# Patient Record
Sex: FEMALE | Race: WHITE | NOT HISPANIC OR LATINO | Employment: UNEMPLOYED | URBAN - NONMETROPOLITAN AREA
[De-identification: names, ages, dates, MRNs, and addresses within clinical notes are randomized per-mention and may not be internally consistent; named-entity substitution may affect disease eponyms.]

---

## 2017-01-07 ENCOUNTER — HOSPITAL ENCOUNTER (EMERGENCY)
Facility: HOSPITAL | Age: 42
Discharge: HOME | End: 2017-01-08
Attending: EMERGENCY MEDICINE | Admitting: EMERGENCY MEDICINE

## 2017-01-07 DIAGNOSIS — N39.0 COMPLICATED UTI (URINARY TRACT INFECTION): Primary | ICD-10-CM

## 2017-01-07 PROCEDURE — 99284 EMERGENCY DEPT VISIT MOD MDM: CPT

## 2017-01-08 ENCOUNTER — APPOINTMENT (OUTPATIENT)
Dept: CT IMAGING | Facility: HOSPITAL | Age: 42
End: 2017-01-08

## 2017-01-08 VITALS
TEMPERATURE: 98.2 F | HEIGHT: 63 IN | WEIGHT: 175 LBS | RESPIRATION RATE: 20 BRPM | SYSTOLIC BLOOD PRESSURE: 138 MMHG | HEART RATE: 104 BPM | DIASTOLIC BLOOD PRESSURE: 88 MMHG | OXYGEN SATURATION: 99 % | BODY MASS INDEX: 31.01 KG/M2

## 2017-01-08 PROBLEM — N39.0 COMPLICATED UTI (URINARY TRACT INFECTION): Status: ACTIVE | Noted: 2017-01-08

## 2017-01-08 LAB
ALBUMIN SERPL-MCNC: 4.7 G/DL (ref 3.5–5)
ALBUMIN/GLOB SERPL: 1.4 G/DL (ref 1.5–2.5)
ALP SERPL-CCNC: 66 U/L (ref 46–116)
ALT SERPL W P-5'-P-CCNC: 9 U/L (ref 10–36)
AMPHET+METHAMPHET UR QL: NEGATIVE
ANION GAP SERPL CALCULATED.3IONS-SCNC: 6.8 MMOL/L (ref 3.6–11.2)
AST SERPL-CCNC: 22 U/L (ref 10–30)
B-HCG UR QL: NEGATIVE
BACTERIA UR QL AUTO: ABNORMAL /HPF
BARBITURATES UR QL SCN: NEGATIVE
BASOPHILS # BLD AUTO: 0.03 10*3/MM3 (ref 0–0.3)
BASOPHILS NFR BLD AUTO: 0.2 % (ref 0–2)
BENZODIAZ UR QL SCN: NEGATIVE
BILIRUB SERPL-MCNC: 0.4 MG/DL (ref 0.2–1.8)
BILIRUB UR QL STRIP: NEGATIVE
BUN BLD-MCNC: 14 MG/DL (ref 7–21)
BUN/CREAT SERPL: 14.9 (ref 7–25)
CALCIUM SPEC-SCNC: 10 MG/DL (ref 7.7–10)
CANNABINOIDS SERPL QL: NEGATIVE
CHLORIDE SERPL-SCNC: 106 MMOL/L (ref 99–112)
CLARITY UR: ABNORMAL
CO2 SERPL-SCNC: 25.2 MMOL/L (ref 24.3–31.9)
COCAINE UR QL: NEGATIVE
COLOR UR: YELLOW
CREAT BLD-MCNC: 0.94 MG/DL (ref 0.43–1.29)
DEPRECATED RDW RBC AUTO: 42.6 FL (ref 37–54)
EOSINOPHIL # BLD AUTO: 0.23 10*3/MM3 (ref 0–0.7)
EOSINOPHIL NFR BLD AUTO: 1.4 % (ref 0–5)
ERYTHROCYTE [DISTWIDTH] IN BLOOD BY AUTOMATED COUNT: 13 % (ref 11.5–14.5)
GFR SERPL CREATININE-BSD FRML MDRD: 66 ML/MIN/1.73
GLOBULIN UR ELPH-MCNC: 3.3 GM/DL
GLUCOSE BLD-MCNC: 106 MG/DL (ref 70–110)
GLUCOSE UR STRIP-MCNC: NEGATIVE MG/DL
HCT VFR BLD AUTO: 42.7 % (ref 37–47)
HGB BLD-MCNC: 14.3 G/DL (ref 12–16)
HGB UR QL STRIP.AUTO: ABNORMAL
HYALINE CASTS UR QL AUTO: ABNORMAL /LPF
IMM GRANULOCYTES # BLD: 0.07 10*3/MM3 (ref 0–0.03)
IMM GRANULOCYTES NFR BLD: 0.4 % (ref 0–0.5)
KETONES UR QL STRIP: ABNORMAL
LEUKOCYTE ESTERASE UR QL STRIP.AUTO: ABNORMAL
LYMPHOCYTES # BLD AUTO: 3.81 10*3/MM3 (ref 1–3)
LYMPHOCYTES NFR BLD AUTO: 22.6 % (ref 21–51)
MCH RBC QN AUTO: 31 PG (ref 27–33)
MCHC RBC AUTO-ENTMCNC: 33.5 G/DL (ref 33–37)
MCV RBC AUTO: 92.4 FL (ref 80–94)
METHADONE UR QL SCN: NEGATIVE
MONOCYTES # BLD AUTO: 1.21 10*3/MM3 (ref 0.1–0.9)
MONOCYTES NFR BLD AUTO: 7.2 % (ref 0–10)
NEUTROPHILS # BLD AUTO: 11.5 10*3/MM3 (ref 1.4–6.5)
NEUTROPHILS NFR BLD AUTO: 68.2 % (ref 30–70)
NITRITE UR QL STRIP: NEGATIVE
OPIATES UR QL: NEGATIVE
OSMOLALITY SERPL CALC.SUM OF ELEC: 276.6 MOSM/KG (ref 273–305)
OXYCODONE UR QL SCN: NEGATIVE
PCP UR QL SCN: NEGATIVE
PH UR STRIP.AUTO: 5.5 [PH] (ref 5–8)
PLATELET # BLD AUTO: 309 10*3/MM3 (ref 130–400)
PMV BLD AUTO: 9.7 FL (ref 6–10)
POTASSIUM BLD-SCNC: 4.1 MMOL/L (ref 3.5–5.3)
PROPOXYPH UR QL: NEGATIVE
PROT SERPL-MCNC: 8 G/DL (ref 6–8)
PROT UR QL STRIP: ABNORMAL
RBC # BLD AUTO: 4.62 10*6/MM3 (ref 4.2–5.4)
RBC # UR: ABNORMAL /HPF
REF LAB TEST METHOD: ABNORMAL
SODIUM BLD-SCNC: 138 MMOL/L (ref 135–153)
SP GR UR STRIP: >1.03 (ref 1–1.03)
SQUAMOUS #/AREA URNS HPF: ABNORMAL /HPF
UROBILINOGEN UR QL STRIP: ABNORMAL
WBC NRBC COR # BLD: 16.85 10*3/MM3 (ref 4.5–12.5)
WBC UR QL AUTO: ABNORMAL /HPF

## 2017-01-08 PROCEDURE — 80307 DRUG TEST PRSMV CHEM ANLYZR: CPT | Performed by: PHYSICIAN ASSISTANT

## 2017-01-08 PROCEDURE — 96374 THER/PROPH/DIAG INJ IV PUSH: CPT

## 2017-01-08 PROCEDURE — 74176 CT ABD & PELVIS W/O CONTRAST: CPT | Performed by: RADIOLOGY

## 2017-01-08 PROCEDURE — G0477 DRUG TEST PRESUMP OPTICAL: HCPCS | Performed by: PHYSICIAN ASSISTANT

## 2017-01-08 PROCEDURE — 80053 COMPREHEN METABOLIC PANEL: CPT | Performed by: PHYSICIAN ASSISTANT

## 2017-01-08 PROCEDURE — 96375 TX/PRO/DX INJ NEW DRUG ADDON: CPT

## 2017-01-08 PROCEDURE — 87086 URINE CULTURE/COLONY COUNT: CPT | Performed by: PHYSICIAN ASSISTANT

## 2017-01-08 PROCEDURE — 74176 CT ABD & PELVIS W/O CONTRAST: CPT

## 2017-01-08 PROCEDURE — 25010000002 ONDANSETRON PER 1 MG: Performed by: PHYSICIAN ASSISTANT

## 2017-01-08 PROCEDURE — 81001 URINALYSIS AUTO W/SCOPE: CPT | Performed by: PHYSICIAN ASSISTANT

## 2017-01-08 PROCEDURE — 81025 URINE PREGNANCY TEST: CPT | Performed by: PHYSICIAN ASSISTANT

## 2017-01-08 PROCEDURE — 85025 COMPLETE CBC W/AUTO DIFF WBC: CPT | Performed by: PHYSICIAN ASSISTANT

## 2017-01-08 PROCEDURE — 25010000002 HYDROMORPHONE PER 4 MG: Performed by: EMERGENCY MEDICINE

## 2017-01-08 PROCEDURE — 96361 HYDRATE IV INFUSION ADD-ON: CPT

## 2017-01-08 PROCEDURE — 25010000002 KETOROLAC TROMETHAMINE PER 15 MG: Performed by: PHYSICIAN ASSISTANT

## 2017-01-08 RX ORDER — SODIUM CHLORIDE 0.9 % (FLUSH) 0.9 %
10 SYRINGE (ML) INJECTION AS NEEDED
Status: DISCONTINUED | OUTPATIENT
Start: 2017-01-08 | End: 2017-01-08 | Stop reason: HOSPADM

## 2017-01-08 RX ORDER — PROMETHAZINE HYDROCHLORIDE 25 MG/1
25 TABLET ORAL EVERY 6 HOURS PRN
COMMUNITY
End: 2019-01-07 | Stop reason: SDUPTHER

## 2017-01-08 RX ORDER — KETOROLAC TROMETHAMINE 30 MG/ML
30 INJECTION, SOLUTION INTRAMUSCULAR; INTRAVENOUS ONCE
Status: COMPLETED | OUTPATIENT
Start: 2017-01-08 | End: 2017-01-08

## 2017-01-08 RX ORDER — KETOROLAC TROMETHAMINE 30 MG/ML
INJECTION, SOLUTION INTRAMUSCULAR; INTRAVENOUS
Status: DISCONTINUED
Start: 2017-01-08 | End: 2017-01-08 | Stop reason: HOSPADM

## 2017-01-08 RX ORDER — ONDANSETRON 2 MG/ML
4 INJECTION INTRAMUSCULAR; INTRAVENOUS ONCE
Status: COMPLETED | OUTPATIENT
Start: 2017-01-08 | End: 2017-01-08

## 2017-01-08 RX ORDER — CIPROFLOXACIN 500 MG/1
500 TABLET, FILM COATED ORAL 2 TIMES DAILY
Qty: 20 TABLET | Refills: 0 | Status: SHIPPED | OUTPATIENT
Start: 2017-01-08 | End: 2017-05-02

## 2017-01-08 RX ADMIN — KETOROLAC TROMETHAMINE 30 MG: 30 INJECTION, SOLUTION INTRAMUSCULAR; INTRAVENOUS at 00:40

## 2017-01-08 RX ADMIN — SODIUM CHLORIDE 1000 ML: 9 INJECTION, SOLUTION INTRAVENOUS at 00:38

## 2017-01-08 RX ADMIN — ONDANSETRON 4 MG: 2 INJECTION, SOLUTION INTRAMUSCULAR; INTRAVENOUS at 00:42

## 2017-01-08 RX ADMIN — HYDROMORPHONE HYDROCHLORIDE 1 MG: 1 INJECTION, SOLUTION INTRAMUSCULAR; INTRAVENOUS; SUBCUTANEOUS at 00:47

## 2017-01-08 NOTE — DISCHARGE INSTRUCTIONS
Follow up with Dr. De Oliveira, if symptoms worsen come back to the ER for further evaluation and treatment.

## 2017-01-08 NOTE — ED PROVIDER NOTES
Subjective   Patient is a 41 y.o. female presenting with flank pain.   History provided by:  Patient   used: No    Flank Pain   Pain location:  L flank  Pain radiates to:  Groin  Duration:  1 day  Timing:  Constant  Progression:  Worsening  Chronicity:  Recurrent  Context: not alcohol use and not diet changes    Relieved by:  Nothing  Ineffective treatments:  Movement  Associated symptoms: no chest pain, no diarrhea, no dysuria and no fever    Risk factors: no alcohol abuse and not pregnant        Review of Systems   Constitutional: Negative.  Negative for fever.   HENT: Negative.    Respiratory: Negative.    Cardiovascular: Negative.  Negative for chest pain.   Gastrointestinal: Negative.  Negative for abdominal pain and diarrhea.   Endocrine: Negative.    Genitourinary: Positive for flank pain. Negative for dysuria.   Skin: Negative.    Neurological: Negative.    Psychiatric/Behavioral: Negative.    All other systems reviewed and are negative.      Past Medical History   Diagnosis Date   • Kidney stones    • Migraine    • Pneumonia    • Strep throat        Allergies   Allergen Reactions   • Compazine [Prochlorperazine Edisylate]    • Sulfa Antibiotics        Past Surgical History   Procedure Laterality Date   •  section     • Cystoscopy electrohydraulic lithotripsy     • Colonoscopy     • Dental procedure     • Kidney stone surgery         History reviewed. No pertinent family history.    Social History     Social History   • Marital status:      Spouse name: N/A   • Number of children: N/A   • Years of education: N/A     Social History Main Topics   • Smoking status: Current Every Day Smoker     Packs/day: 0.50     Types: Cigarettes   • Smokeless tobacco: None   • Alcohol use No   • Drug use: No   • Sexual activity: Defer     Other Topics Concern   • None     Social History Narrative           Objective   Physical Exam   Constitutional: She is oriented to person, place, and  time. She appears well-developed and well-nourished. No distress.   HENT:   Head: Normocephalic and atraumatic.   Right Ear: External ear normal.   Left Ear: External ear normal.   Nose: Nose normal.   Eyes: Conjunctivae and EOM are normal. Pupils are equal, round, and reactive to light.   Neck: Normal range of motion. Neck supple. No JVD present. No tracheal deviation present.   Cardiovascular: Normal rate, regular rhythm and normal heart sounds.    No murmur heard.  Pulmonary/Chest: Effort normal and breath sounds normal. No respiratory distress. She has no wheezes.   Abdominal: Soft. Bowel sounds are normal. There is no tenderness. There is CVA tenderness.   Musculoskeletal: Normal range of motion. She exhibits no edema or deformity.   Neurological: She is alert and oriented to person, place, and time. No cranial nerve deficit.   Skin: Skin is warm and dry. No rash noted. She is not diaphoretic. No erythema. No pallor.   Psychiatric: She has a normal mood and affect. Her behavior is normal. Thought content normal.   Nursing note and vitals reviewed.      Procedures         ED Course  ED Course   Comment By Time   CT scan was ordered on the pt as she insisted on confirmation of passing a kidney stone. She was here 8 days ago with the same pain and states at that time stones were in her kidneys. She states she called Dr. Pierce yesterday as she thought this was due to her ovarian cyst on the left, however she reports that he got on the phone and assured her that it wasn't her cyst and she was likely passing a kidney stone and told her to come to the ER. Linda Okeefe PA-C 01/08 0138                  MDM  Number of Diagnoses or Management Options  Complicated UTI (urinary tract infection): established and worsening     Amount and/or Complexity of Data Reviewed  Clinical lab tests: reviewed and ordered  Tests in the radiology section of CPT®: reviewed and ordered  Decide to obtain previous medical records or  to obtain history from someone other than the patient: yes  Independent visualization of images, tracings, or specimens: yes    Risk of Complications, Morbidity, and/or Mortality  Presenting problems: moderate  Diagnostic procedures: moderate  Management options: moderate    Patient Progress  Patient progress: stable      Final diagnoses:   Complicated UTI (urinary tract infection)            Linda Okeefe PA-C  01/08/17 0152

## 2017-01-10 LAB — BACTERIA SPEC AEROBE CULT: NORMAL

## 2017-01-17 ENCOUNTER — OFFICE VISIT (OUTPATIENT)
Dept: UROLOGY | Facility: CLINIC | Age: 42
End: 2017-01-17

## 2017-01-17 DIAGNOSIS — R30.1 PAINFUL BLADDER SPASM: ICD-10-CM

## 2017-01-17 DIAGNOSIS — R31.29 MICROHEMATURIA: Primary | ICD-10-CM

## 2017-01-17 DIAGNOSIS — R10.32 LEFT LOWER QUADRANT PAIN: ICD-10-CM

## 2017-01-17 LAB
BILIRUB BLD-MCNC: NEGATIVE MG/DL
CLARITY, POC: CLEAR
COLOR UR: YELLOW
GLUCOSE UR STRIP-MCNC: NEGATIVE MG/DL
KETONES UR QL: NEGATIVE
LEUKOCYTE EST, POC: NEGATIVE
NITRITE UR-MCNC: NEGATIVE MG/ML
PH UR: 6 [PH] (ref 5–8)
PROT UR STRIP-MCNC: NEGATIVE MG/DL
RBC # UR STRIP: ABNORMAL /UL
SP GR UR: 1.01 (ref 1–1.03)
UROBILINOGEN UR QL: NORMAL

## 2017-01-17 PROCEDURE — 99213 OFFICE O/P EST LOW 20 MIN: CPT | Performed by: NURSE PRACTITIONER

## 2017-01-17 PROCEDURE — 81003 URINALYSIS AUTO W/O SCOPE: CPT | Performed by: NURSE PRACTITIONER

## 2017-01-17 NOTE — PROGRESS NOTES
Chief Complaint:          Chief Complaint   Patient presents with   • Nephrolithiasis       HPI:   41 y.o. female being seen in the office today status post ED visit for complaint of left lower quadrant pain with nausea and vomiting.  She had a CT scan stone protocol completed on 1/8/2017 revealing bilateral non-obstructing stones.  She continues to have a large amount of blood in her urine per urinalysis today.  She is a smoker of 1/4 ppd x 14 years.    HPI        Past Medical History:        Past Medical History   Diagnosis Date   • Kidney stones    • Migraine    • Pneumonia    • Strep throat          Current Meds:     Current Outpatient Prescriptions   Medication Sig Dispense Refill   • acetaminophen-codeine (TYLENOL #3) 300-30 MG per tablet Take 1-2 tablets by mouth Every 4 (Four) Hours As Needed for moderate pain (4-6). 20 tablet 0   • acetaminophen-codeine (TYLENOL #3) 300-30 MG per tablet Take 1 tablet by mouth Every 6 (Six) Hours As Needed for moderate pain (4-6). 12 tablet 0   • amitriptyline (ELAVIL) 25 MG tablet Take 25 mg by mouth every night.     • ciprofloxacin (CIPRO) 500 MG tablet Take 1 tablet by mouth 2 (Two) Times a Day. 20 tablet 0   • clonazePAM (KlonoPIN) 0.5 MG tablet Take 0.5 mg by mouth 2 (two) times a day as needed for seizures.     • gabapentin (NEURONTIN) 600 MG tablet   0   • HYDROcodone-acetaminophen (NORCO) 5-325 MG per tablet Take 1 tablet by mouth Every 6 (Six) Hours As Needed for moderate pain (4-6) or severe pain (7-10). 30 tablet 0   • ibuprofen (ADVIL,MOTRIN) 800 MG tablet   0   • ondansetron (ZOFRAN) 4 MG tablet Take 1 tablet by mouth Every 6 (Six) Hours As Needed for nausea or vomiting. 10 tablet 0   • ondansetron (ZOFRAN) 4 MG tablet Take 1 tablet by mouth Every 6 (Six) Hours As Needed for nausea or vomiting. 10 tablet 0   • promethazine (PHENERGAN) 25 MG tablet Take 25 mg by mouth Every 6 (Six) Hours As Needed for nausea or vomiting.       No current facility-administered  medications for this visit.         Allergies:      Allergies   Allergen Reactions   • Compazine [Prochlorperazine Edisylate]    • Sulfa Antibiotics         Past Surgical History:     Past Surgical History   Procedure Laterality Date   •  section     • Cystoscopy electrohydraulic lithotripsy     • Colonoscopy     • Dental procedure     • Kidney stone surgery           Social History:     Social History     Social History   • Marital status:      Spouse name: N/A   • Number of children: N/A   • Years of education: N/A     Occupational History   • Not on file.     Social History Main Topics   • Smoking status: Current Every Day Smoker     Packs/day: 0.50     Types: Cigarettes   • Smokeless tobacco: Not on file   • Alcohol use No   • Drug use: No   • Sexual activity: Defer     Other Topics Concern   • Not on file     Social History Narrative       Family History:     History reviewed. No pertinent family history.    Review of Systems:     Review of Systems   Constitutional: Negative for chills, fatigue and fever.   Respiratory: Negative for cough, shortness of breath and wheezing.    Cardiovascular: Negative for leg swelling.   Gastrointestinal: Negative for abdominal pain and nausea.   Musculoskeletal: Negative for back pain and joint swelling.   Neurological: Negative for dizziness and headaches.   Psychiatric/Behavioral: Negative for confusion.       Physical Exam:     Physical Exam   Constitutional: She is oriented to person, place, and time. She appears well-developed and well-nourished. No distress.   Abdominal: Soft. Bowel sounds are normal. She exhibits no distension and no mass. There is no tenderness. There is no rebound and no guarding. No hernia.   Neurological: She is alert and oriented to person, place, and time.   Skin: Skin is warm and dry. No rash noted. She is not diaphoretic. No erythema. No pallor.   Psychiatric: She has a normal mood and affect. Her behavior is normal. Judgment and  thought content normal.   Nursing note and vitals reviewed.      Procedure:     No notes on file      Assessment:     Encounter Diagnoses   Name Primary?   • Microhematuria Yes   • Painful bladder spasm    • Left lower quadrant pain      No orders of the defined types were placed in this encounter.      Plan:   Since the patient continues to have a large amount of blood in her urine, is a smoker, and has not had a cystoscopy in several years, I recommend she have a cystoscopy by Dr. Alcazar or Dr. Friedman to evaluate the bladder lining.  She is anxious over the procedure secondary to having a bad experience during her first in [place], [place].  Will give her a one time prescription for Xanax 0.5 mg to take 30 minutes prior to her cystoscopy to help relieve her anxiety.  She is agreeable to the procedure.    Counseling was given to patient for the following topics diagnostic results, patient and family education, impressions and risks and benefits of treatment options. and the interim medical history and current results were reviewed.  A treatment plan with follow-up was made. Total time of the encounter was 22 minutes and 22 minutes were spent discussing Microhematuria [R31.29] face-to-face.       This document has been electronically signed by CELINE Saucedo January 17, 2017 10:20 AM

## 2017-01-17 NOTE — MR AVS SNAPSHOT
Olivia M Richmond   1/17/2017 9:20 AM   Office Visit    Dept Phone:  [phone]   Encounter #:  [id number]    Provider:  CELINE Robertson   Department:  [facility]                Your Full Care Plan              Your Updated Medication List          This list is accurate as of: 1/17/17 10:27 AM.  Always use your most recent med list.                * acetaminophen-codeine 300-30 MG per tablet   Commonly known as:  TYLENOL #3   Take 1-2 tablets by mouth Every 4 (Four) Hours As Needed for moderate pain (4-6).       * acetaminophen-codeine 300-30 MG per tablet   Commonly known as:  TYLENOL #3   Take 1 tablet by mouth Every 6 (Six) Hours As Needed for moderate pain (4-6).       amitriptyline 25 MG tablet   Commonly known as:  ELAVIL       ciprofloxacin 500 MG tablet   Commonly known as:  CIPRO   Take 1 tablet by mouth 2 (Two) Times a Day.       clonazePAM 0.5 MG tablet   Commonly known as:  KlonoPIN       gabapentin 600 MG tablet   Commonly known as:  NEURONTIN       HYDROcodone-acetaminophen 5-325 MG per tablet   Commonly known as:  NORCO   Take 1 tablet by mouth Every 6 (Six) Hours As Needed for moderate pain (4-6) or severe pain (7-10).       ibuprofen 800 MG tablet   Commonly known as:  ADVIL,MOTRIN       * ondansetron 4 MG tablet   Commonly known as:  ZOFRAN   Take 1 tablet by mouth Every 6 (Six) Hours As Needed for nausea or vomiting.       * ondansetron 4 MG tablet   Commonly known as:  ZOFRAN   Take 1 tablet by mouth Every 6 (Six) Hours As Needed for nausea or vomiting.       promethazine 25 MG tablet   Commonly known as:  PHENERGAN       * Notice:  This list has 4 medication(s) that are the same as other medications prescribed for you. Read the directions carefully, and ask your doctor or other care provider to review them with you.            You Were Diagnosed With        Codes Comments    Microhematuria    -  Primary ICD-10-CM: R31.29  ICD-9-CM:  599.72     Painful bladder spasm     ICD-10-CM: R30.1  ICD-9-CM: 788.99     Left lower quadrant pain     ICD-10-CM: R10.32  ICD-9-CM: 789.04       Instructions     None    Patient Instructions History      Upcoming Appointments     Visit Type Date Time Department    FOLLOW UP 1/17/2017  9:20 AM [facility] [place]    IN OFFICE PROCEDURE 2/1/2017  2:00 PM [facility] [place]      [employer] Signup     Our records indicate that you have an active [email or web address] account.    You can view your After Visit Summary by going to [email or web address] and logging in with your [employer] username and password.  If you don't have a [employer] username and password but a parent or guardian has access to your record, the parent or guardian should login with their own [employer] username and password and access your record to view the After Visit Summary.    If you have questions, you can email [email or web address] or call [phone] to talk to our [employer] staff.  Remember, [employer] is NOT to be used for urgent needs.  For medical emergencies, dial 911.               Other Info from Your Visit           Your Appointments     Feb 01, 2017  2:00 PM EST   IN OFFICE PROCEDURE with [facility]   [facility] (--)    [address] [place] [address] [phone]           Bring medication list, test results, and radiology films that apply.              Allergies     Compazine [Prochlorperazine Edisylate]      Sulfa Antibiotics        Reason for Visit     Nephrolithiasis           Vital Signs     Smoking Status                   Current Every Day Smoker           Problems and Diagnoses Noted     Microscopic amount of blood in urine    -  Primary    Painful bladder spasm        Abdominal pain, left lower quadrant

## 2017-02-18 ENCOUNTER — HOSPITAL ENCOUNTER (EMERGENCY)
Facility: HOSPITAL | Age: 42
Discharge: HOME | End: 2017-02-18
Attending: EMERGENCY MEDICINE | Admitting: EMERGENCY MEDICINE

## 2017-02-18 VITALS
HEART RATE: 79 BPM | TEMPERATURE: 98 F | DIASTOLIC BLOOD PRESSURE: 84 MMHG | SYSTOLIC BLOOD PRESSURE: 122 MMHG | RESPIRATION RATE: 18 BRPM | BODY MASS INDEX: 31.01 KG/M2 | OXYGEN SATURATION: 98 % | WEIGHT: 175 LBS | HEIGHT: 63 IN

## 2017-02-18 DIAGNOSIS — N20.1 RIGHT URETERAL STONE: Primary | ICD-10-CM

## 2017-02-18 LAB
ALBUMIN SERPL-MCNC: 4.5 G/DL (ref 3.5–5)
ALBUMIN/GLOB SERPL: 1.5 G/DL (ref 1.5–2.5)
ALP SERPL-CCNC: 69 U/L (ref 35–104)
ALT SERPL W P-5'-P-CCNC: 17 U/L (ref 10–36)
ANION GAP SERPL CALCULATED.3IONS-SCNC: 5.5 MMOL/L (ref 3.6–11.2)
AST SERPL-CCNC: 25 U/L (ref 10–30)
B-HCG UR QL: NEGATIVE
BACTERIA UR QL AUTO: ABNORMAL /HPF
BASOPHILS # BLD AUTO: 0.01 10*3/MM3 (ref 0–0.3)
BASOPHILS NFR BLD AUTO: 0.1 % (ref 0–2)
BILIRUB SERPL-MCNC: 0.4 MG/DL (ref 0.2–1.8)
BILIRUB UR QL STRIP: NEGATIVE
BUN BLD-MCNC: 10 MG/DL (ref 7–21)
BUN/CREAT SERPL: 12.3 (ref 7–25)
CALCIUM SPEC-SCNC: 9.7 MG/DL (ref 7.7–10)
CHLORIDE SERPL-SCNC: 109 MMOL/L (ref 99–112)
CLARITY UR: CLEAR
CO2 SERPL-SCNC: 26.5 MMOL/L (ref 24.3–31.9)
COLOR UR: YELLOW
CREAT BLD-MCNC: 0.81 MG/DL (ref 0.43–1.29)
DEPRECATED RDW RBC AUTO: 41.7 FL (ref 37–54)
EOSINOPHIL # BLD AUTO: 0.11 10*3/MM3 (ref 0–0.7)
EOSINOPHIL NFR BLD AUTO: 1.2 % (ref 0–5)
ERYTHROCYTE [DISTWIDTH] IN BLOOD BY AUTOMATED COUNT: 12.5 % (ref 11.5–14.5)
GFR SERPL CREATININE-BSD FRML MDRD: 78 ML/MIN/1.73
GLOBULIN UR ELPH-MCNC: 3 GM/DL
GLUCOSE BLD-MCNC: 83 MG/DL (ref 70–110)
GLUCOSE UR STRIP-MCNC: NEGATIVE MG/DL
HCT VFR BLD AUTO: 41.7 % (ref 37–47)
HGB BLD-MCNC: 13.5 G/DL (ref 12–16)
HGB UR QL STRIP.AUTO: ABNORMAL
HYALINE CASTS UR QL AUTO: ABNORMAL /LPF
IMM GRANULOCYTES # BLD: 0.02 10*3/MM3 (ref 0–0.03)
IMM GRANULOCYTES NFR BLD: 0.2 % (ref 0–0.5)
KETONES UR QL STRIP: ABNORMAL
LEUKOCYTE ESTERASE UR QL STRIP.AUTO: NEGATIVE
LYMPHOCYTES # BLD AUTO: 1.67 10*3/MM3 (ref 1–3)
LYMPHOCYTES NFR BLD AUTO: 18.5 % (ref 21–51)
MCH RBC QN AUTO: 30.1 PG (ref 27–33)
MCHC RBC AUTO-ENTMCNC: 32.4 G/DL (ref 33–37)
MCV RBC AUTO: 93.1 FL (ref 80–94)
MONOCYTES # BLD AUTO: 0.63 10*3/MM3 (ref 0.1–0.9)
MONOCYTES NFR BLD AUTO: 7 % (ref 0–10)
NEUTROPHILS # BLD AUTO: 6.6 10*3/MM3 (ref 1.4–6.5)
NEUTROPHILS NFR BLD AUTO: 73 % (ref 30–70)
NITRITE UR QL STRIP: NEGATIVE
OSMOLALITY SERPL CALC.SUM OF ELEC: 279.4 MOSM/KG (ref 273–305)
PH UR STRIP.AUTO: 6 [PH] (ref 5–8)
PLATELET # BLD AUTO: 343 10*3/MM3 (ref 130–400)
PMV BLD AUTO: 9.4 FL (ref 6–10)
POTASSIUM BLD-SCNC: 3.5 MMOL/L (ref 3.5–5.3)
PROT SERPL-MCNC: 7.5 G/DL (ref 6–8)
PROT UR QL STRIP: NEGATIVE
RBC # BLD AUTO: 4.48 10*6/MM3 (ref 4.2–5.4)
RBC # UR: ABNORMAL /HPF
REF LAB TEST METHOD: ABNORMAL
SODIUM BLD-SCNC: 141 MMOL/L (ref 135–153)
SP GR UR STRIP: 1.03 (ref 1–1.03)
SQUAMOUS #/AREA URNS HPF: ABNORMAL /HPF
UROBILINOGEN UR QL STRIP: ABNORMAL
WBC NRBC COR # BLD: 9.04 10*3/MM3 (ref 4.5–12.5)
WBC UR QL AUTO: ABNORMAL /HPF

## 2017-02-18 PROCEDURE — 80053 COMPREHEN METABOLIC PANEL: CPT | Performed by: EMERGENCY MEDICINE

## 2017-02-18 PROCEDURE — 87086 URINE CULTURE/COLONY COUNT: CPT | Performed by: EMERGENCY MEDICINE

## 2017-02-18 PROCEDURE — 81001 URINALYSIS AUTO W/SCOPE: CPT | Performed by: EMERGENCY MEDICINE

## 2017-02-18 PROCEDURE — 25010000002 HYDROMORPHONE PER 4 MG: Performed by: EMERGENCY MEDICINE

## 2017-02-18 PROCEDURE — 36415 COLL VENOUS BLD VENIPUNCTURE: CPT

## 2017-02-18 PROCEDURE — 85025 COMPLETE CBC W/AUTO DIFF WBC: CPT | Performed by: EMERGENCY MEDICINE

## 2017-02-18 PROCEDURE — 96372 THER/PROPH/DIAG INJ SC/IM: CPT

## 2017-02-18 PROCEDURE — 99283 EMERGENCY DEPT VISIT LOW MDM: CPT

## 2017-02-18 PROCEDURE — 81025 URINE PREGNANCY TEST: CPT | Performed by: EMERGENCY MEDICINE

## 2017-02-18 RX ORDER — TAMSULOSIN HYDROCHLORIDE 0.4 MG/1
1 CAPSULE ORAL DAILY
Qty: 30 CAPSULE | Refills: 0 | Status: SHIPPED | OUTPATIENT
Start: 2017-02-18 | End: 2017-05-02

## 2017-02-18 RX ORDER — OXYCODONE HYDROCHLORIDE AND ACETAMINOPHEN 5; 325 MG/1; MG/1
1 TABLET ORAL EVERY 6 HOURS PRN
Qty: 12 TABLET | Refills: 0 | Status: SHIPPED | OUTPATIENT
Start: 2017-02-18 | End: 2017-03-13

## 2017-02-18 RX ADMIN — HYDROMORPHONE HYDROCHLORIDE 1 MG: 1 INJECTION, SOLUTION INTRAMUSCULAR; INTRAVENOUS; SUBCUTANEOUS at 09:05

## 2017-02-18 NOTE — ED PROVIDER NOTES
Subjective   Patient is a 41 y.o. female presenting with abdominal pain.   History provided by:  Patient  Abdominal Pain   Pain location:  R flank  Pain quality: aching    Pain radiates to:  Groin  Pain severity:  Severe  Onset quality:  Sudden  Duration:  2 days  Timing:  Constant  Context comment:  Patient has a long history of multiple kidney stones she's had pain in her right flank 2 days radiating to her right groin.  Patient doesn't want CAT scan of possible she's had some  Associated symptoms: no chest pain, no diarrhea, no fever, no nausea, no shortness of breath and no vomiting        Review of Systems   Constitutional: Negative for activity change and fever.   HENT: Negative.  Negative for trouble swallowing.    Eyes: Negative for discharge.   Respiratory: Negative for shortness of breath, wheezing and stridor.    Cardiovascular: Negative for chest pain.   Gastrointestinal: Positive for abdominal pain. Negative for diarrhea, nausea and vomiting.   Genitourinary: Negative for difficulty urinating and flank pain.   Musculoskeletal: Negative for arthralgias and myalgias.   Skin: Negative for rash and wound.   Neurological: Negative for dizziness.   Psychiatric/Behavioral: Negative for agitation.   All other systems reviewed and are negative.      Past Medical History   Diagnosis Date   • Kidney stones    • Migraine    • Pneumonia    • Strep throat        Allergies   Allergen Reactions   • Compazine [Prochlorperazine Edisylate]    • Sulfa Antibiotics        Past Surgical History   Procedure Laterality Date   •  section     • Cystoscopy electrohydraulic lithotripsy     • Colonoscopy     • Dental procedure     • Kidney stone surgery         History reviewed. No pertinent family history.    Social History     Social History   • Marital status:      Spouse name: N/A   • Number of children: N/A   • Years of education: N/A     Social History Main Topics   • Smoking status: Current Every Day Smoker      Packs/day: 0.50     Types: Cigarettes   • Smokeless tobacco: None   • Alcohol use No   • Drug use: No   • Sexual activity: Defer     Other Topics Concern   • None     Social History Narrative           Objective   Physical Exam   Constitutional: She is oriented to person, place, and time. She appears well-developed and well-nourished.   HENT:   Head: Normocephalic.   Right Ear: External ear normal.   Left Ear: External ear normal.   Nose: Nose normal.   Mouth/Throat: Oropharynx is clear and moist.   Eyes: EOM are normal. Pupils are equal, round, and reactive to light.   Neck: Normal range of motion. Neck supple. No thyromegaly present.   Cardiovascular: Normal rate, regular rhythm, normal heart sounds and intact distal pulses.    Pulmonary/Chest: Effort normal and breath sounds normal. No respiratory distress. She has no wheezes. She has no rales.   Abdominal: Soft. She exhibits no distension. There is no tenderness. There is no rebound and no guarding.   Musculoskeletal: Normal range of motion. She exhibits tenderness (patient has tenderness in the right flank). She exhibits no edema.   Neurological: She is alert and oriented to person, place, and time. She has normal reflexes. No cranial nerve deficit.   Skin: Skin is warm and dry. No rash noted.   Psychiatric: She has a normal mood and affect. Her behavior is normal. Judgment and thought content normal.   Nursing note and vitals reviewed.      Procedures         ED Course  ED Course   Comment By Time   Patient is somewhat improved she would rather continue without a CT at this point she does have hematuria and no white cells in the urine significant be okay ever continues some pain meds and follow up with her urologist if she gets a fever or it's worse she has to come back here Justin Pierce MD 02/18 1130                  MDM  Number of Diagnoses or Management Options  Right ureteral stone: new and requires workup     Amount and/or Complexity of Data  Reviewed  Clinical lab tests: ordered and reviewed  Tests in the radiology section of CPT®: ordered and reviewed  Tests in the medicine section of CPT®: ordered and reviewed    Risk of Complications, Morbidity, and/or Mortality  Presenting problems: moderate  Diagnostic procedures: moderate  Management options: moderate    Patient Progress  Patient progress: stable      Final diagnoses:   Right ureteral stone            Justin Pierce MD  02/18/17 1130

## 2017-02-20 LAB — BACTERIA SPEC AEROBE CULT: NO GROWTH

## 2017-02-26 ENCOUNTER — HOSPITAL ENCOUNTER (EMERGENCY)
Facility: HOSPITAL | Age: 42
Discharge: HOME | End: 2017-02-26
Attending: EMERGENCY MEDICINE | Admitting: PHYSICIAN ASSISTANT

## 2017-02-26 VITALS
DIASTOLIC BLOOD PRESSURE: 89 MMHG | SYSTOLIC BLOOD PRESSURE: 130 MMHG | TEMPERATURE: 97.9 F | OXYGEN SATURATION: 99 % | HEART RATE: 89 BPM | HEIGHT: 63 IN | WEIGHT: 175 LBS | BODY MASS INDEX: 31.01 KG/M2 | RESPIRATION RATE: 16 BRPM

## 2017-02-26 DIAGNOSIS — N20.1 RIGHT URETERAL STONE: Primary | ICD-10-CM

## 2017-02-26 LAB
ANION GAP SERPL CALCULATED.3IONS-SCNC: 7.4 MMOL/L (ref 3.6–11.2)
B-HCG UR QL: NEGATIVE
BACTERIA UR QL AUTO: ABNORMAL /HPF
BASOPHILS # BLD AUTO: 0.01 10*3/MM3 (ref 0–0.3)
BASOPHILS NFR BLD AUTO: 0.1 % (ref 0–2)
BILIRUB UR QL STRIP: NEGATIVE
BUN BLD-MCNC: 6 MG/DL (ref 7–21)
BUN/CREAT SERPL: 8.3 (ref 7–25)
CALCIUM SPEC-SCNC: 9.7 MG/DL (ref 7.7–10)
CHLORIDE SERPL-SCNC: 110 MMOL/L (ref 99–112)
CLARITY UR: ABNORMAL
CO2 SERPL-SCNC: 22.6 MMOL/L (ref 24.3–31.9)
COLOR UR: YELLOW
CREAT BLD-MCNC: 0.72 MG/DL (ref 0.43–1.29)
DEPRECATED RDW RBC AUTO: 44.7 FL (ref 37–54)
EOSINOPHIL # BLD AUTO: 0.11 10*3/MM3 (ref 0–0.7)
EOSINOPHIL NFR BLD AUTO: 0.8 % (ref 0–5)
ERYTHROCYTE [DISTWIDTH] IN BLOOD BY AUTOMATED COUNT: 13.1 % (ref 11.5–14.5)
GFR SERPL CREATININE-BSD FRML MDRD: 90 ML/MIN/1.73
GLUCOSE BLD-MCNC: 94 MG/DL (ref 70–110)
GLUCOSE UR STRIP-MCNC: NEGATIVE MG/DL
HCT VFR BLD AUTO: 43.4 % (ref 37–47)
HGB BLD-MCNC: 14.8 G/DL (ref 12–16)
HGB UR QL STRIP.AUTO: ABNORMAL
HYALINE CASTS UR QL AUTO: ABNORMAL /LPF
IMM GRANULOCYTES # BLD: 0.04 10*3/MM3 (ref 0–0.03)
IMM GRANULOCYTES NFR BLD: 0.3 % (ref 0–0.5)
KETONES UR QL STRIP: NEGATIVE
LEUKOCYTE ESTERASE UR QL STRIP.AUTO: NEGATIVE
LYMPHOCYTES # BLD AUTO: 3.05 10*3/MM3 (ref 1–3)
LYMPHOCYTES NFR BLD AUTO: 22.4 % (ref 21–51)
MCH RBC QN AUTO: 31.9 PG (ref 27–33)
MCHC RBC AUTO-ENTMCNC: 34.1 G/DL (ref 33–37)
MCV RBC AUTO: 93.5 FL (ref 80–94)
MONOCYTES # BLD AUTO: 1.06 10*3/MM3 (ref 0.1–0.9)
MONOCYTES NFR BLD AUTO: 7.8 % (ref 0–10)
NEUTROPHILS # BLD AUTO: 9.36 10*3/MM3 (ref 1.4–6.5)
NEUTROPHILS NFR BLD AUTO: 68.6 % (ref 30–70)
NITRITE UR QL STRIP: NEGATIVE
OSMOLALITY SERPL CALC.SUM OF ELEC: 276.8 MOSM/KG (ref 273–305)
PH UR STRIP.AUTO: 7.5 [PH] (ref 5–8)
PLATELET # BLD AUTO: 333 10*3/MM3 (ref 130–400)
PMV BLD AUTO: 9.8 FL (ref 6–10)
POTASSIUM BLD-SCNC: 3.7 MMOL/L (ref 3.5–5.3)
PROT UR QL STRIP: NEGATIVE
RBC # BLD AUTO: 4.64 10*6/MM3 (ref 4.2–5.4)
RBC # UR: ABNORMAL /HPF
REF LAB TEST METHOD: ABNORMAL
SODIUM BLD-SCNC: 140 MMOL/L (ref 135–153)
SP GR UR STRIP: 1.02 (ref 1–1.03)
SQUAMOUS #/AREA URNS HPF: ABNORMAL /HPF
UROBILINOGEN UR QL STRIP: ABNORMAL
WBC NRBC COR # BLD: 13.63 10*3/MM3 (ref 4.5–12.5)
WBC UR QL AUTO: ABNORMAL /HPF

## 2017-02-26 PROCEDURE — 25010000002 ONDANSETRON PER 1 MG: Performed by: EMERGENCY MEDICINE

## 2017-02-26 PROCEDURE — 87086 URINE CULTURE/COLONY COUNT: CPT | Performed by: PHYSICIAN ASSISTANT

## 2017-02-26 PROCEDURE — 96374 THER/PROPH/DIAG INJ IV PUSH: CPT

## 2017-02-26 PROCEDURE — 85025 COMPLETE CBC W/AUTO DIFF WBC: CPT | Performed by: EMERGENCY MEDICINE

## 2017-02-26 PROCEDURE — 96361 HYDRATE IV INFUSION ADD-ON: CPT

## 2017-02-26 PROCEDURE — 81001 URINALYSIS AUTO W/SCOPE: CPT | Performed by: PHYSICIAN ASSISTANT

## 2017-02-26 PROCEDURE — 81025 URINE PREGNANCY TEST: CPT | Performed by: PHYSICIAN ASSISTANT

## 2017-02-26 PROCEDURE — 80048 BASIC METABOLIC PNL TOTAL CA: CPT | Performed by: EMERGENCY MEDICINE

## 2017-02-26 PROCEDURE — 25010000002 MORPHINE PER 10 MG: Performed by: EMERGENCY MEDICINE

## 2017-02-26 PROCEDURE — 96375 TX/PRO/DX INJ NEW DRUG ADDON: CPT

## 2017-02-26 PROCEDURE — 99284 EMERGENCY DEPT VISIT MOD MDM: CPT

## 2017-02-26 RX ORDER — SODIUM CHLORIDE 0.9 % (FLUSH) 0.9 %
10 SYRINGE (ML) INJECTION AS NEEDED
Status: DISCONTINUED | OUTPATIENT
Start: 2017-02-26 | End: 2017-02-26 | Stop reason: HOSPADM

## 2017-02-26 RX ORDER — OXYCODONE HYDROCHLORIDE AND ACETAMINOPHEN 5; 325 MG/1; MG/1
1 TABLET ORAL ONCE
Status: COMPLETED | OUTPATIENT
Start: 2017-02-26 | End: 2017-02-26

## 2017-02-26 RX ORDER — MORPHINE SULFATE 2 MG/ML
2 INJECTION, SOLUTION INTRAMUSCULAR; INTRAVENOUS ONCE
Status: COMPLETED | OUTPATIENT
Start: 2017-02-26 | End: 2017-02-26

## 2017-02-26 RX ORDER — ONDANSETRON 2 MG/ML
4 INJECTION INTRAMUSCULAR; INTRAVENOUS ONCE
Status: COMPLETED | OUTPATIENT
Start: 2017-02-26 | End: 2017-02-26

## 2017-02-26 RX ORDER — TRAMADOL HYDROCHLORIDE 50 MG/1
50 TABLET ORAL EVERY 6 HOURS PRN
COMMUNITY
End: 2017-05-02

## 2017-02-26 RX ADMIN — OXYCODONE HYDROCHLORIDE AND ACETAMINOPHEN 1 TABLET: 5; 325 TABLET ORAL at 18:26

## 2017-02-26 RX ADMIN — ONDANSETRON 4 MG: 2 INJECTION, SOLUTION INTRAMUSCULAR; INTRAVENOUS at 16:19

## 2017-02-26 RX ADMIN — SODIUM CHLORIDE 500 ML: 9 INJECTION, SOLUTION INTRAVENOUS at 16:19

## 2017-02-26 RX ADMIN — MORPHINE SULFATE 2 MG: 2 INJECTION, SOLUTION INTRAMUSCULAR; INTRAVENOUS at 16:19

## 2017-02-28 LAB — BACTERIA SPEC AEROBE CULT: NORMAL

## 2017-03-02 ENCOUNTER — HOSPITAL ENCOUNTER (EMERGENCY)
Facility: HOSPITAL | Age: 42
Discharge: HOME | End: 2017-03-02
Attending: EMERGENCY MEDICINE | Admitting: EMERGENCY MEDICINE

## 2017-03-02 VITALS
TEMPERATURE: 97.7 F | BODY MASS INDEX: 31.01 KG/M2 | HEART RATE: 94 BPM | RESPIRATION RATE: 18 BRPM | DIASTOLIC BLOOD PRESSURE: 98 MMHG | WEIGHT: 175 LBS | OXYGEN SATURATION: 97 % | HEIGHT: 63 IN | SYSTOLIC BLOOD PRESSURE: 146 MMHG

## 2017-03-02 DIAGNOSIS — N30.01 ACUTE CYSTITIS WITH HEMATURIA: ICD-10-CM

## 2017-03-02 DIAGNOSIS — N20.1 URETEROLITHIASIS: Primary | ICD-10-CM

## 2017-03-02 LAB
ALBUMIN SERPL-MCNC: 4.6 G/DL (ref 3.5–5)
ALBUMIN/GLOB SERPL: 1.3 G/DL (ref 1.5–2.5)
ALP SERPL-CCNC: 71 U/L (ref 35–104)
ALT SERPL W P-5'-P-CCNC: 16 U/L (ref 10–36)
AMPHET+METHAMPHET UR QL: NEGATIVE
ANION GAP SERPL CALCULATED.3IONS-SCNC: 6.5 MMOL/L (ref 3.6–11.2)
AST SERPL-CCNC: 23 U/L (ref 10–30)
BACTERIA UR QL AUTO: ABNORMAL /HPF
BARBITURATES UR QL SCN: NEGATIVE
BASOPHILS # BLD AUTO: 0.02 10*3/MM3 (ref 0–0.3)
BASOPHILS NFR BLD AUTO: 0.2 % (ref 0–2)
BENZODIAZ UR QL SCN: NEGATIVE
BILIRUB SERPL-MCNC: 0.5 MG/DL (ref 0.2–1.8)
BILIRUB UR QL STRIP: NEGATIVE
BUN BLD-MCNC: 9 MG/DL (ref 7–21)
BUN/CREAT SERPL: 10.7 (ref 7–25)
CALCIUM SPEC-SCNC: 10 MG/DL (ref 7.7–10)
CANNABINOIDS SERPL QL: NEGATIVE
CHLORIDE SERPL-SCNC: 106 MMOL/L (ref 99–112)
CLARITY UR: CLEAR
CO2 SERPL-SCNC: 26.5 MMOL/L (ref 24.3–31.9)
COCAINE UR QL: NEGATIVE
COLOR UR: YELLOW
CREAT BLD-MCNC: 0.84 MG/DL (ref 0.43–1.29)
DEPRECATED RDW RBC AUTO: 41.8 FL (ref 37–54)
EOSINOPHIL # BLD AUTO: 0.1 10*3/MM3 (ref 0–0.7)
EOSINOPHIL NFR BLD AUTO: 0.8 % (ref 0–5)
ERYTHROCYTE [DISTWIDTH] IN BLOOD BY AUTOMATED COUNT: 12.8 % (ref 11.5–14.5)
GFR SERPL CREATININE-BSD FRML MDRD: 75 ML/MIN/1.73
GLOBULIN UR ELPH-MCNC: 3.6 GM/DL
GLUCOSE BLD-MCNC: 108 MG/DL (ref 70–110)
GLUCOSE UR STRIP-MCNC: NEGATIVE MG/DL
HCT VFR BLD AUTO: 42 % (ref 37–47)
HGB BLD-MCNC: 14 G/DL (ref 12–16)
HGB UR QL STRIP.AUTO: ABNORMAL
HYALINE CASTS UR QL AUTO: ABNORMAL /LPF
IMM GRANULOCYTES # BLD: 0.03 10*3/MM3 (ref 0–0.03)
IMM GRANULOCYTES NFR BLD: 0.3 % (ref 0–0.5)
KETONES UR QL STRIP: NEGATIVE
LEUKOCYTE ESTERASE UR QL STRIP.AUTO: ABNORMAL
LYMPHOCYTES # BLD AUTO: 2.14 10*3/MM3 (ref 1–3)
LYMPHOCYTES NFR BLD AUTO: 17.8 % (ref 21–51)
MCH RBC QN AUTO: 30.8 PG (ref 27–33)
MCHC RBC AUTO-ENTMCNC: 33.3 G/DL (ref 33–37)
MCV RBC AUTO: 92.5 FL (ref 80–94)
METHADONE UR QL SCN: NEGATIVE
MONOCYTES # BLD AUTO: 0.82 10*3/MM3 (ref 0.1–0.9)
MONOCYTES NFR BLD AUTO: 6.8 % (ref 0–10)
NEUTROPHILS # BLD AUTO: 8.88 10*3/MM3 (ref 1.4–6.5)
NEUTROPHILS NFR BLD AUTO: 74.1 % (ref 30–70)
NITRITE UR QL STRIP: NEGATIVE
OPIATES UR QL: NEGATIVE
OSMOLALITY SERPL CALC.SUM OF ELEC: 276.8 MOSM/KG (ref 273–305)
OXYCODONE UR QL SCN: NEGATIVE
PCP UR QL SCN: NEGATIVE
PH UR STRIP.AUTO: 6.5 [PH] (ref 5–8)
PLATELET # BLD AUTO: 319 10*3/MM3 (ref 130–400)
PMV BLD AUTO: 9.5 FL (ref 6–10)
POTASSIUM BLD-SCNC: 3.9 MMOL/L (ref 3.5–5.3)
PROPOXYPH UR QL: NEGATIVE
PROT SERPL-MCNC: 8.2 G/DL (ref 6–8)
PROT UR QL STRIP: NEGATIVE
RBC # BLD AUTO: 4.54 10*6/MM3 (ref 4.2–5.4)
RBC # UR: ABNORMAL /HPF
REF LAB TEST METHOD: ABNORMAL
SODIUM BLD-SCNC: 139 MMOL/L (ref 135–153)
SP GR UR STRIP: 1.02 (ref 1–1.03)
SQUAMOUS #/AREA URNS HPF: ABNORMAL /HPF
UROBILINOGEN UR QL STRIP: ABNORMAL
WBC NRBC COR # BLD: 11.99 10*3/MM3 (ref 4.5–12.5)
WBC UR QL AUTO: ABNORMAL /HPF

## 2017-03-02 PROCEDURE — 87086 URINE CULTURE/COLONY COUNT: CPT | Performed by: PHYSICIAN ASSISTANT

## 2017-03-02 PROCEDURE — 80307 DRUG TEST PRSMV CHEM ANLYZR: CPT | Performed by: PHYSICIAN ASSISTANT

## 2017-03-02 PROCEDURE — 80053 COMPREHEN METABOLIC PANEL: CPT | Performed by: PHYSICIAN ASSISTANT

## 2017-03-02 PROCEDURE — 85025 COMPLETE CBC W/AUTO DIFF WBC: CPT | Performed by: PHYSICIAN ASSISTANT

## 2017-03-02 PROCEDURE — 96374 THER/PROPH/DIAG INJ IV PUSH: CPT

## 2017-03-02 PROCEDURE — 99283 EMERGENCY DEPT VISIT LOW MDM: CPT

## 2017-03-02 PROCEDURE — 96361 HYDRATE IV INFUSION ADD-ON: CPT

## 2017-03-02 PROCEDURE — 36415 COLL VENOUS BLD VENIPUNCTURE: CPT

## 2017-03-02 PROCEDURE — 25010000002 HYDROMORPHONE PER 4 MG: Performed by: EMERGENCY MEDICINE

## 2017-03-02 PROCEDURE — 81001 URINALYSIS AUTO W/SCOPE: CPT | Performed by: PHYSICIAN ASSISTANT

## 2017-03-02 RX ORDER — HYDROCODONE BITARTRATE AND ACETAMINOPHEN 5; 325 MG/1; MG/1
1 TABLET ORAL EVERY 8 HOURS PRN
Qty: 15 TABLET | Refills: 0 | Status: SHIPPED | OUTPATIENT
Start: 2017-03-02 | End: 2017-05-02

## 2017-03-02 RX ORDER — NITROFURANTOIN 25; 75 MG/1; MG/1
100 CAPSULE ORAL 2 TIMES DAILY
Qty: 14 CAPSULE | Refills: 0 | Status: SHIPPED | OUTPATIENT
Start: 2017-03-02 | End: 2017-03-09

## 2017-03-02 RX ORDER — PROMETHAZINE HYDROCHLORIDE 12.5 MG/1
12.5 TABLET ORAL EVERY 8 HOURS PRN
Qty: 10 TABLET | Refills: 0 | Status: SHIPPED | OUTPATIENT
Start: 2017-03-02 | End: 2017-05-02

## 2017-03-02 RX ORDER — SODIUM CHLORIDE 0.9 % (FLUSH) 0.9 %
10 SYRINGE (ML) INJECTION AS NEEDED
Status: DISCONTINUED | OUTPATIENT
Start: 2017-03-02 | End: 2017-03-02 | Stop reason: HOSPADM

## 2017-03-02 RX ORDER — KETOROLAC TROMETHAMINE 10 MG/1
10 TABLET, FILM COATED ORAL EVERY 6 HOURS PRN
Qty: 10 TABLET | Refills: 0 | Status: SHIPPED | OUTPATIENT
Start: 2017-03-02 | End: 2017-05-02

## 2017-03-02 RX ADMIN — HYDROMORPHONE HYDROCHLORIDE 1 MG: 1 INJECTION, SOLUTION INTRAMUSCULAR; INTRAVENOUS; SUBCUTANEOUS at 12:41

## 2017-03-02 RX ADMIN — SODIUM CHLORIDE 1000 ML: 9 INJECTION, SOLUTION INTRAVENOUS at 12:40

## 2017-03-02 NOTE — ED PROVIDER NOTES
Subjective   Patient is a 41 y.o. female presenting with flank pain.   History provided by:  Patient   used: No    Flank Pain   Pain location:  R flank  Pain quality: sharp, shooting and stabbing    Pain radiates to:  RLQ  Pain severity:  Moderate  Onset quality:  Sudden  Duration:  1 week  Timing:  Constant  Progression:  Worsening  Chronicity:  New  Context: not alcohol use, not awakening from sleep, not diet changes, not eating, not medication withdrawal, not previous surgeries, not recent illness, not retching and not sick contacts    Relieved by:  Nothing  Worsened by:  Nothing  Associated symptoms: hematuria, nausea and vomiting    Associated symptoms: no anorexia, no belching, no dysuria and no fatigue    Risk factors: no alcohol abuse, no aspirin use, has not had multiple surgeries, no NSAID use and not pregnant        Review of Systems   Constitutional: Negative for fatigue.   Gastrointestinal: Positive for nausea and vomiting. Negative for anorexia.   Genitourinary: Positive for flank pain and hematuria. Negative for dysuria.   All other systems reviewed and are negative.      Past Medical History   Diagnosis Date   • Kidney stones    • Migraine    • Pneumonia    • Strep throat        Allergies   Allergen Reactions   • Compazine [Prochlorperazine Edisylate]    • Sulfa Antibiotics        Past Surgical History   Procedure Laterality Date   •  section     • Cystoscopy electrohydraulic lithotripsy     • Colonoscopy     • Dental procedure     • Kidney stone surgery         History reviewed. No pertinent family history.    Social History     Social History   • Marital status:      Spouse name: N/A   • Number of children: N/A   • Years of education: N/A     Social History Main Topics   • Smoking status: Current Every Day Smoker     Packs/day: 0.50     Types: Cigarettes   • Smokeless tobacco: None   • Alcohol use No   • Drug use: No   • Sexual activity: Defer     Other Topics  Concern   • None     Social History Narrative           Objective   Physical Exam   Constitutional: She is oriented to person, place, and time. She appears well-developed and well-nourished.   HENT:   Head: Normocephalic and atraumatic.   Right Ear: External ear normal.   Left Ear: External ear normal.   Nose: Nose normal.   Mouth/Throat: Oropharynx is clear and moist.   Eyes: Conjunctivae and EOM are normal. Pupils are equal, round, and reactive to light.   Neck: Normal range of motion. Neck supple.   Cardiovascular: Normal rate, regular rhythm and normal heart sounds.    Pulmonary/Chest: Effort normal and breath sounds normal.   Abdominal: Soft. Normal appearance and bowel sounds are normal. She exhibits no mass. There is generalized tenderness. There is no rebound and no guarding. No hernia.   Musculoskeletal: She exhibits tenderness.        Arms:  Right CVA tenderness    Neurological: She is alert and oriented to person, place, and time.   Skin: Skin is warm and dry.   Psychiatric: She has a normal mood and affect. Her behavior is normal. Judgment and thought content normal.   Nursing note and vitals reviewed.      Procedures         ED Course  ED Course   Comment By Time   41-year-old female comes in with right flank pain radiates her right lower abdomen for approximately one week.  Patient states this is been intermittent pain described as sharp and stabbing.  Patient does report that she has known kidney stones and has been treated by urologist Dr. Alcazar.  Patient reports she called Dr. Alcazar clinic this a.m. and was unable to get in. Akhil Barajas PA-C 03/02 1235   Patient refused to have repeated CT scan due to multiple scans in the past. Akhil Barajas PA-C 03/02 1236                  MDM  Number of Diagnoses or Management Options  Acute cystitis with hematuria: new and requires workup  Ureterolithiasis: new and requires workup     Amount and/or Complexity of Data Reviewed  Clinical lab  tests: reviewed and ordered    Risk of Complications, Morbidity, and/or Mortality  Presenting problems: moderate  Diagnostic procedures: moderate  Management options: moderate    Patient Progress  Patient progress: stable      Final diagnoses:   Ureterolithiasis   Acute cystitis with hematuria            Akhil Barajas PA-C  03/02/17 1352       Akhil Barajas PA-C  03/02/17 1401

## 2017-03-04 LAB — BACTERIA SPEC AEROBE CULT: NORMAL

## 2017-03-13 ENCOUNTER — APPOINTMENT (OUTPATIENT)
Dept: CT IMAGING | Facility: HOSPITAL | Age: 42
End: 2017-03-13

## 2017-03-13 ENCOUNTER — HOSPITAL ENCOUNTER (EMERGENCY)
Facility: HOSPITAL | Age: 42
Discharge: HOME | End: 2017-03-13
Attending: FAMILY MEDICINE | Admitting: FAMILY MEDICINE

## 2017-03-13 VITALS
WEIGHT: 180 LBS | BODY MASS INDEX: 31.89 KG/M2 | HEIGHT: 63 IN | RESPIRATION RATE: 18 BRPM | TEMPERATURE: 98 F | HEART RATE: 78 BPM | DIASTOLIC BLOOD PRESSURE: 88 MMHG | SYSTOLIC BLOOD PRESSURE: 140 MMHG | OXYGEN SATURATION: 99 %

## 2017-03-13 DIAGNOSIS — N20.0 NEPHROLITHIASIS: ICD-10-CM

## 2017-03-13 DIAGNOSIS — N23 URETERAL COLIC: Primary | ICD-10-CM

## 2017-03-13 LAB
ALBUMIN SERPL-MCNC: 4.4 G/DL (ref 3.5–5)
ALBUMIN/GLOB SERPL: 1.5 G/DL (ref 1.5–2.5)
ALP SERPL-CCNC: 72 U/L (ref 35–104)
ALT SERPL W P-5'-P-CCNC: 14 U/L (ref 10–36)
AMPHET+METHAMPHET UR QL: NEGATIVE
ANION GAP SERPL CALCULATED.3IONS-SCNC: 5.3 MMOL/L (ref 3.6–11.2)
APTT PPP: 27.1 SECONDS (ref 24.4–31)
AST SERPL-CCNC: 23 U/L (ref 10–30)
B-HCG UR QL: NEGATIVE
BACTERIA UR QL AUTO: ABNORMAL /HPF
BARBITURATES UR QL SCN: NEGATIVE
BASOPHILS # BLD AUTO: 0.02 10*3/MM3 (ref 0–0.3)
BASOPHILS NFR BLD AUTO: 0.2 % (ref 0–2)
BENZODIAZ UR QL SCN: NEGATIVE
BILIRUB SERPL-MCNC: 0.3 MG/DL (ref 0.2–1.8)
BILIRUB UR QL STRIP: NEGATIVE
BUN BLD-MCNC: 7 MG/DL (ref 7–21)
BUN/CREAT SERPL: 8.6 (ref 7–25)
CALCIUM SPEC-SCNC: 9.6 MG/DL (ref 7.7–10)
CANNABINOIDS SERPL QL: NEGATIVE
CHLORIDE SERPL-SCNC: 107 MMOL/L (ref 99–112)
CLARITY UR: CLEAR
CO2 SERPL-SCNC: 26.7 MMOL/L (ref 24.3–31.9)
COCAINE UR QL: NEGATIVE
COLOR UR: YELLOW
CREAT BLD-MCNC: 0.81 MG/DL (ref 0.43–1.29)
DEPRECATED RDW RBC AUTO: 44.1 FL (ref 37–54)
EOSINOPHIL # BLD AUTO: 0.17 10*3/MM3 (ref 0–0.7)
EOSINOPHIL NFR BLD AUTO: 1.5 % (ref 0–5)
ERYTHROCYTE [DISTWIDTH] IN BLOOD BY AUTOMATED COUNT: 13.4 % (ref 11.5–14.5)
GFR SERPL CREATININE-BSD FRML MDRD: 78 ML/MIN/1.73
GLOBULIN UR ELPH-MCNC: 3 GM/DL
GLUCOSE BLD-MCNC: 94 MG/DL (ref 70–110)
GLUCOSE UR STRIP-MCNC: NEGATIVE MG/DL
HCT VFR BLD AUTO: 41.9 % (ref 37–47)
HGB BLD-MCNC: 13.6 G/DL (ref 12–16)
HGB UR QL STRIP.AUTO: ABNORMAL
HYALINE CASTS UR QL AUTO: ABNORMAL /LPF
IMM GRANULOCYTES # BLD: 0.03 10*3/MM3 (ref 0–0.03)
IMM GRANULOCYTES NFR BLD: 0.3 % (ref 0–0.5)
INR PPP: 0.88 (ref 0.8–1.1)
KETONES UR QL STRIP: NEGATIVE
LEUKOCYTE ESTERASE UR QL STRIP.AUTO: NEGATIVE
LYMPHOCYTES # BLD AUTO: 2.46 10*3/MM3 (ref 1–3)
LYMPHOCYTES NFR BLD AUTO: 22.3 % (ref 21–51)
MCH RBC QN AUTO: 30.5 PG (ref 27–33)
MCHC RBC AUTO-ENTMCNC: 32.5 G/DL (ref 33–37)
MCV RBC AUTO: 93.9 FL (ref 80–94)
METHADONE UR QL SCN: NEGATIVE
MONOCYTES # BLD AUTO: 0.67 10*3/MM3 (ref 0.1–0.9)
MONOCYTES NFR BLD AUTO: 6.1 % (ref 0–10)
NEUTROPHILS # BLD AUTO: 7.66 10*3/MM3 (ref 1.4–6.5)
NEUTROPHILS NFR BLD AUTO: 69.6 % (ref 30–70)
NITRITE UR QL STRIP: NEGATIVE
OPIATES UR QL: NEGATIVE
OSMOLALITY SERPL CALC.SUM OF ELEC: 275.3 MOSM/KG (ref 273–305)
OXYCODONE UR QL SCN: NEGATIVE
PCP UR QL SCN: NEGATIVE
PH UR STRIP.AUTO: 6 [PH] (ref 5–8)
PLATELET # BLD AUTO: 377 10*3/MM3 (ref 130–400)
PMV BLD AUTO: 9 FL (ref 6–10)
POTASSIUM BLD-SCNC: 3.7 MMOL/L (ref 3.5–5.3)
PROPOXYPH UR QL: NEGATIVE
PROT SERPL-MCNC: 7.4 G/DL (ref 6–8)
PROT UR QL STRIP: NEGATIVE
PROTHROMBIN TIME: 9.7 SECONDS (ref 9.8–11.9)
RBC # BLD AUTO: 4.46 10*6/MM3 (ref 4.2–5.4)
RBC # UR: ABNORMAL /HPF
REF LAB TEST METHOD: ABNORMAL
SODIUM BLD-SCNC: 139 MMOL/L (ref 135–153)
SP GR UR STRIP: 1.01 (ref 1–1.03)
SQUAMOUS #/AREA URNS HPF: ABNORMAL /HPF
UROBILINOGEN UR QL STRIP: ABNORMAL
WBC NRBC COR # BLD: 11.01 10*3/MM3 (ref 4.5–12.5)
WBC UR QL AUTO: ABNORMAL /HPF

## 2017-03-13 PROCEDURE — 25010000002 HYDROMORPHONE PER 4 MG: Performed by: FAMILY MEDICINE

## 2017-03-13 PROCEDURE — 80307 DRUG TEST PRSMV CHEM ANLYZR: CPT | Performed by: FAMILY MEDICINE

## 2017-03-13 PROCEDURE — 25010000002 KETOROLAC TROMETHAMINE PER 15 MG: Performed by: FAMILY MEDICINE

## 2017-03-13 PROCEDURE — 96361 HYDRATE IV INFUSION ADD-ON: CPT

## 2017-03-13 PROCEDURE — 85730 THROMBOPLASTIN TIME PARTIAL: CPT | Performed by: FAMILY MEDICINE

## 2017-03-13 PROCEDURE — 25010000002 ONDANSETRON PER 1 MG: Performed by: FAMILY MEDICINE

## 2017-03-13 PROCEDURE — 96375 TX/PRO/DX INJ NEW DRUG ADDON: CPT

## 2017-03-13 PROCEDURE — 85025 COMPLETE CBC W/AUTO DIFF WBC: CPT | Performed by: FAMILY MEDICINE

## 2017-03-13 PROCEDURE — 80053 COMPREHEN METABOLIC PANEL: CPT | Performed by: FAMILY MEDICINE

## 2017-03-13 PROCEDURE — 81025 URINE PREGNANCY TEST: CPT | Performed by: FAMILY MEDICINE

## 2017-03-13 PROCEDURE — 74176 CT ABD & PELVIS W/O CONTRAST: CPT

## 2017-03-13 PROCEDURE — 96376 TX/PRO/DX INJ SAME DRUG ADON: CPT

## 2017-03-13 PROCEDURE — 74176 CT ABD & PELVIS W/O CONTRAST: CPT | Performed by: RADIOLOGY

## 2017-03-13 PROCEDURE — 85610 PROTHROMBIN TIME: CPT | Performed by: FAMILY MEDICINE

## 2017-03-13 PROCEDURE — 87086 URINE CULTURE/COLONY COUNT: CPT | Performed by: FAMILY MEDICINE

## 2017-03-13 PROCEDURE — 99283 EMERGENCY DEPT VISIT LOW MDM: CPT

## 2017-03-13 PROCEDURE — 96374 THER/PROPH/DIAG INJ IV PUSH: CPT

## 2017-03-13 PROCEDURE — 81001 URINALYSIS AUTO W/SCOPE: CPT | Performed by: FAMILY MEDICINE

## 2017-03-13 RX ORDER — KETOROLAC TROMETHAMINE 30 MG/ML
30 INJECTION, SOLUTION INTRAMUSCULAR; INTRAVENOUS ONCE
Status: COMPLETED | OUTPATIENT
Start: 2017-03-13 | End: 2017-03-13

## 2017-03-13 RX ORDER — ONDANSETRON 2 MG/ML
4 INJECTION INTRAMUSCULAR; INTRAVENOUS ONCE
Status: COMPLETED | OUTPATIENT
Start: 2017-03-13 | End: 2017-03-13

## 2017-03-13 RX ORDER — ONDANSETRON 4 MG/1
4 TABLET, FILM COATED ORAL EVERY 6 HOURS
Qty: 15 TABLET | Refills: 0 | Status: SHIPPED | OUTPATIENT
Start: 2017-03-13 | End: 2017-05-02

## 2017-03-13 RX ORDER — HYDROMORPHONE HYDROCHLORIDE 1 MG/ML
0.5 INJECTION, SOLUTION INTRAMUSCULAR; INTRAVENOUS; SUBCUTANEOUS ONCE
Status: COMPLETED | OUTPATIENT
Start: 2017-03-13 | End: 2017-03-13

## 2017-03-13 RX ORDER — SODIUM CHLORIDE 0.9 % (FLUSH) 0.9 %
10 SYRINGE (ML) INJECTION AS NEEDED
Status: DISCONTINUED | OUTPATIENT
Start: 2017-03-13 | End: 2017-03-13 | Stop reason: HOSPADM

## 2017-03-13 RX ORDER — OXYCODONE HYDROCHLORIDE AND ACETAMINOPHEN 5; 325 MG/1; MG/1
1 TABLET ORAL EVERY 6 HOURS PRN
Qty: 12 TABLET | Refills: 0 | Status: SHIPPED | OUTPATIENT
Start: 2017-03-13 | End: 2017-05-02

## 2017-03-13 RX ADMIN — ONDANSETRON 4 MG: 2 INJECTION INTRAMUSCULAR; INTRAVENOUS at 11:43

## 2017-03-13 RX ADMIN — KETOROLAC TROMETHAMINE 30 MG: 30 INJECTION, SOLUTION INTRAMUSCULAR at 11:42

## 2017-03-13 RX ADMIN — HYDROMORPHONE HYDROCHLORIDE 0.5 MG: 1 INJECTION, SOLUTION INTRAMUSCULAR; INTRAVENOUS; SUBCUTANEOUS at 12:40

## 2017-03-13 RX ADMIN — SODIUM CHLORIDE 1000 ML: 9 INJECTION, SOLUTION INTRAVENOUS at 11:41

## 2017-03-13 RX ADMIN — HYDROMORPHONE HYDROCHLORIDE 0.5 MG: 1 INJECTION, SOLUTION INTRAMUSCULAR; INTRAVENOUS; SUBCUTANEOUS at 11:45

## 2017-03-13 NOTE — ED PROVIDER NOTES
Subjective   Patient is a 41 y.o. female presenting with flank pain.   History provided by:  Patient  Flank Pain   Pain location:  L flank  Pain quality: aching and cramping    Pain radiates to:  Groin  Onset quality:  Gradual  Timing:  Intermittent  Progression:  Worsening  Chronicity:  Recurrent  Context: not alcohol use, not awakening from sleep, not diet changes, not previous surgeries, not recent illness and not recent sexual activity    Relieved by:  Nothing  Worsened by:  Nothing  Ineffective treatments:  None tried  Associated symptoms: nausea    Associated symptoms: no anorexia, no belching, no chest pain, no fatigue and no fever        Review of Systems   Constitutional: Negative for fatigue and fever.   Cardiovascular: Negative for chest pain.   Gastrointestinal: Positive for nausea. Negative for anorexia.   Genitourinary: Positive for flank pain.       Past Medical History   Diagnosis Date   • Kidney stones    • Migraine    • Pneumonia    • Strep throat        Allergies   Allergen Reactions   • Compazine [Prochlorperazine Edisylate]    • Sulfa Antibiotics        Past Surgical History   Procedure Laterality Date   •  section     • Cystoscopy electrohydraulic lithotripsy     • Colonoscopy     • Dental procedure     • Kidney stone surgery         History reviewed. No pertinent family history.    Social History     Social History   • Marital status:      Spouse name: N/A   • Number of children: N/A   • Years of education: N/A     Social History Main Topics   • Smoking status: Current Every Day Smoker     Packs/day: 0.50     Types: Cigarettes   • Smokeless tobacco: None   • Alcohol use No   • Drug use: No   • Sexual activity: Defer     Other Topics Concern   • None     Social History Narrative           Objective   Physical Exam   Constitutional: She appears well-developed and well-nourished.   HENT:   Head: Normocephalic.   Right Ear: External ear normal.   Left Ear: External ear normal.    Nose: Nose normal.   Mouth/Throat: Oropharynx is clear and moist.   Eyes: EOM are normal. Pupils are equal, round, and reactive to light.   Neck: Neck supple.   Cardiovascular: Normal rate and regular rhythm.    Pulmonary/Chest: Effort normal and breath sounds normal.   Abdominal: Soft. Bowel sounds are normal.   Musculoskeletal: Normal range of motion.   Neurological: She is alert.   Skin: Skin is warm.   Psychiatric: She has a normal mood and affect. Her behavior is normal. Judgment and thought content normal.   Vitals reviewed.      Procedures         ED Course  ED Course                  MDM  Number of Diagnoses or Management Options  Nephrolithiasis: new and does not require workup  Ureteral colic: new and does not require workup     Amount and/or Complexity of Data Reviewed  Clinical lab tests: ordered and reviewed  Tests in the radiology section of CPT®: ordered and reviewed  Tests in the medicine section of CPT®: ordered and reviewed  Independent visualization of images, tracings, or specimens: yes    Risk of Complications, Morbidity, and/or Mortality  Presenting problems: moderate  Diagnostic procedures: moderate  Management options: moderate    Patient Progress  Patient progress: stable      Final diagnoses:   Ureteral colic   Nephrolithiasis            Zonia Euceda DO  03/13/17 5300

## 2017-03-15 LAB — BACTERIA SPEC AEROBE CULT: NORMAL

## 2017-03-25 ENCOUNTER — HOSPITAL ENCOUNTER (EMERGENCY)
Facility: HOSPITAL | Age: 42
Discharge: HOME | End: 2017-03-25
Attending: EMERGENCY MEDICINE | Admitting: EMERGENCY MEDICINE

## 2017-03-25 ENCOUNTER — APPOINTMENT (OUTPATIENT)
Dept: ULTRASOUND IMAGING | Facility: HOSPITAL | Age: 42
End: 2017-03-25

## 2017-03-25 VITALS
DIASTOLIC BLOOD PRESSURE: 102 MMHG | SYSTOLIC BLOOD PRESSURE: 144 MMHG | OXYGEN SATURATION: 95 % | TEMPERATURE: 98 F | RESPIRATION RATE: 18 BRPM | WEIGHT: 180 LBS | BODY MASS INDEX: 31.89 KG/M2 | HEIGHT: 63 IN | HEART RATE: 95 BPM

## 2017-03-25 DIAGNOSIS — N23 RENAL COLIC ON LEFT SIDE: Primary | ICD-10-CM

## 2017-03-25 DIAGNOSIS — R31.9 HEMATURIA: ICD-10-CM

## 2017-03-25 LAB
ALBUMIN SERPL-MCNC: 4.5 G/DL (ref 3.5–5)
ALBUMIN/GLOB SERPL: 1.4 G/DL (ref 1.5–2.5)
ALP SERPL-CCNC: 63 U/L (ref 35–104)
ALT SERPL W P-5'-P-CCNC: 17 U/L (ref 10–36)
AMPHET+METHAMPHET UR QL: NEGATIVE
ANION GAP SERPL CALCULATED.3IONS-SCNC: 4.5 MMOL/L (ref 3.6–11.2)
AST SERPL-CCNC: 25 U/L (ref 10–30)
B-HCG UR QL: NEGATIVE
BACTERIA UR QL AUTO: ABNORMAL /HPF
BARBITURATES UR QL SCN: NEGATIVE
BASOPHILS # BLD AUTO: 0.01 10*3/MM3 (ref 0–0.3)
BASOPHILS NFR BLD AUTO: 0.1 % (ref 0–2)
BENZODIAZ UR QL SCN: NEGATIVE
BILIRUB SERPL-MCNC: 0.6 MG/DL (ref 0.2–1.8)
BILIRUB UR QL STRIP: NEGATIVE
BUN BLD-MCNC: 12 MG/DL (ref 7–21)
BUN/CREAT SERPL: 15.2 (ref 7–25)
BUPRENORPHINE+NOR UR QL SCN: NEGATIVE
CALCIUM SPEC-SCNC: 9.6 MG/DL (ref 7.7–10)
CANNABINOIDS SERPL QL: NEGATIVE
CHLORIDE SERPL-SCNC: 109 MMOL/L (ref 99–112)
CLARITY UR: ABNORMAL
CO2 SERPL-SCNC: 26.5 MMOL/L (ref 24.3–31.9)
COCAINE UR QL: NEGATIVE
COLOR UR: ABNORMAL
CREAT BLD-MCNC: 0.79 MG/DL (ref 0.43–1.29)
DEPRECATED RDW RBC AUTO: 45.5 FL (ref 37–54)
EOSINOPHIL # BLD AUTO: 0.14 10*3/MM3 (ref 0–0.7)
EOSINOPHIL NFR BLD AUTO: 1.6 % (ref 0–5)
ERYTHROCYTE [DISTWIDTH] IN BLOOD BY AUTOMATED COUNT: 13.3 % (ref 11.5–14.5)
GFR SERPL CREATININE-BSD FRML MDRD: 81 ML/MIN/1.73
GLOBULIN UR ELPH-MCNC: 3.2 GM/DL
GLUCOSE BLD-MCNC: 96 MG/DL (ref 70–110)
GLUCOSE UR STRIP-MCNC: NEGATIVE MG/DL
HCT VFR BLD AUTO: 40.9 % (ref 37–47)
HGB BLD-MCNC: 13.7 G/DL (ref 12–16)
HGB UR QL STRIP.AUTO: ABNORMAL
IMM GRANULOCYTES # BLD: 0.03 10*3/MM3 (ref 0–0.03)
IMM GRANULOCYTES NFR BLD: 0.3 % (ref 0–0.5)
KETONES UR QL STRIP: ABNORMAL
LEUKOCYTE ESTERASE UR QL STRIP.AUTO: ABNORMAL
LYMPHOCYTES # BLD AUTO: 2.04 10*3/MM3 (ref 1–3)
LYMPHOCYTES NFR BLD AUTO: 23.1 % (ref 21–51)
MCH RBC QN AUTO: 31.6 PG (ref 27–33)
MCHC RBC AUTO-ENTMCNC: 33.5 G/DL (ref 33–37)
MCV RBC AUTO: 94.2 FL (ref 80–94)
METHADONE UR QL SCN: NEGATIVE
MONOCYTES # BLD AUTO: 0.84 10*3/MM3 (ref 0.1–0.9)
MONOCYTES NFR BLD AUTO: 9.5 % (ref 0–10)
NEUTROPHILS # BLD AUTO: 5.78 10*3/MM3 (ref 1.4–6.5)
NEUTROPHILS NFR BLD AUTO: 65.4 % (ref 30–70)
NITRITE UR QL STRIP: NEGATIVE
OPIATES UR QL: NEGATIVE
OSMOLALITY SERPL CALC.SUM OF ELEC: 279 MOSM/KG (ref 273–305)
OXYCODONE UR QL SCN: NEGATIVE
PCP UR QL SCN: NEGATIVE
PH UR STRIP.AUTO: 5.5 [PH] (ref 5–8)
PLATELET # BLD AUTO: 308 10*3/MM3 (ref 130–400)
PMV BLD AUTO: 9.7 FL (ref 6–10)
POTASSIUM BLD-SCNC: 3.6 MMOL/L (ref 3.5–5.3)
PROPOXYPH UR QL: NEGATIVE
PROT SERPL-MCNC: 7.7 G/DL (ref 6–8)
PROT UR QL STRIP: ABNORMAL
RBC # BLD AUTO: 4.34 10*6/MM3 (ref 4.2–5.4)
RBC # UR: ABNORMAL /HPF
REF LAB TEST METHOD: ABNORMAL
SODIUM BLD-SCNC: 140 MMOL/L (ref 135–153)
SP GR UR STRIP: >1.03 (ref 1–1.03)
SQUAMOUS #/AREA URNS HPF: ABNORMAL /HPF
UROBILINOGEN UR QL STRIP: ABNORMAL
WBC NRBC COR # BLD: 8.84 10*3/MM3 (ref 4.5–12.5)
WBC UR QL AUTO: ABNORMAL /HPF

## 2017-03-25 PROCEDURE — 36415 COLL VENOUS BLD VENIPUNCTURE: CPT

## 2017-03-25 PROCEDURE — 80307 DRUG TEST PRSMV CHEM ANLYZR: CPT | Performed by: PHYSICIAN ASSISTANT

## 2017-03-25 PROCEDURE — 76775 US EXAM ABDO BACK WALL LIM: CPT | Performed by: RADIOLOGY

## 2017-03-25 PROCEDURE — 81025 URINE PREGNANCY TEST: CPT | Performed by: PHYSICIAN ASSISTANT

## 2017-03-25 PROCEDURE — 80053 COMPREHEN METABOLIC PANEL: CPT | Performed by: PHYSICIAN ASSISTANT

## 2017-03-25 PROCEDURE — 81001 URINALYSIS AUTO W/SCOPE: CPT | Performed by: PHYSICIAN ASSISTANT

## 2017-03-25 PROCEDURE — 76775 US EXAM ABDO BACK WALL LIM: CPT

## 2017-03-25 PROCEDURE — 99283 EMERGENCY DEPT VISIT LOW MDM: CPT

## 2017-03-25 PROCEDURE — 85025 COMPLETE CBC W/AUTO DIFF WBC: CPT | Performed by: PHYSICIAN ASSISTANT

## 2017-03-25 PROCEDURE — 87086 URINE CULTURE/COLONY COUNT: CPT | Performed by: PHYSICIAN ASSISTANT

## 2017-03-25 RX ORDER — SODIUM CHLORIDE 0.9 % (FLUSH) 0.9 %
10 SYRINGE (ML) INJECTION AS NEEDED
Status: DISCONTINUED | OUTPATIENT
Start: 2017-03-25 | End: 2017-03-25

## 2017-03-25 RX ORDER — HYDROCODONE BITARTRATE AND ACETAMINOPHEN 5; 325 MG/1; MG/1
1 TABLET ORAL ONCE
Status: COMPLETED | OUTPATIENT
Start: 2017-03-25 | End: 2017-03-25

## 2017-03-25 RX ORDER — CEPHALEXIN 500 MG/1
500 CAPSULE ORAL 3 TIMES DAILY
Qty: 21 CAPSULE | Refills: 0 | Status: SHIPPED | OUTPATIENT
Start: 2017-03-25 | End: 2017-04-01

## 2017-03-25 RX ADMIN — HYDROCODONE BITARTRATE AND ACETAMINOPHEN 1 TABLET: 5; 325 TABLET ORAL at 12:56

## 2017-03-25 NOTE — ED PROVIDER NOTES
Subjective   Patient is a 41 y.o. female presenting with flank pain.   History provided by:  Patient   used: No    Flank Pain   Pain location:  L flank  Pain quality: cramping    Pain radiates to:  LLQ  Pain severity:  Moderate  Onset quality:  Sudden  Duration:  2 days  Timing:  Intermittent  Progression:  Waxing and waning  Chronicity:  Chronic  Context: not alcohol use, not awakening from sleep, not diet changes, not eating, not laxative use, not medication withdrawal, not previous surgeries, not recent illness, not recent sexual activity, not recent travel, not retching, not sick contacts, not suspicious food intake and not trauma    Relieved by:  Nothing  Worsened by:  Nothing  Ineffective treatments:  None tried  Associated symptoms: dysuria    Associated symptoms: no anorexia, no belching, no chest pain, no chills, no constipation, no cough, no diarrhea, no fatigue, no fever, no flatus, no hematemesis, no hematochezia, no hematuria, no melena, no nausea, no shortness of breath, no sore throat, no vaginal bleeding, no vaginal discharge and no vomiting    Risk factors: no alcohol abuse, no aspirin use, not elderly, has not had multiple surgeries, no NSAID use, not obese, not pregnant and no recent hospitalization        Review of Systems   Constitutional: Negative for chills, fatigue and fever.   HENT: Negative for sore throat.    Respiratory: Negative for cough and shortness of breath.    Cardiovascular: Negative for chest pain.   Gastrointestinal: Negative for anorexia, constipation, diarrhea, flatus, hematemesis, hematochezia, melena, nausea and vomiting.   Genitourinary: Positive for dysuria and flank pain. Negative for hematuria, vaginal bleeding and vaginal discharge.   All other systems reviewed and are negative.      Past Medical History:   Diagnosis Date   • Kidney stones    • Migraine    • Pneumonia    • Strep throat        Allergies   Allergen Reactions   • Compazine  [Prochlorperazine Edisylate]    • Sulfa Antibiotics        Past Surgical History:   Procedure Laterality Date   •  SECTION     • COLONOSCOPY     • CYSTOSCOPY ELECTROHYDRAULIC LITHOTRIPSY     • DENTAL PROCEDURE     • KIDNEY STONE SURGERY         History reviewed. No pertinent family history.    Social History     Social History   • Marital status:      Spouse name: N/A   • Number of children: N/A   • Years of education: N/A     Social History Main Topics   • Smoking status: Current Every Day Smoker     Packs/day: 0.50     Types: Cigarettes   • Smokeless tobacco: None   • Alcohol use No   • Drug use: No   • Sexual activity: Defer     Other Topics Concern   • None     Social History Narrative           Objective   Physical Exam   Constitutional: She is oriented to person, place, and time. Vital signs are normal. She appears well-developed and well-nourished.   HENT:   Head: Normocephalic and atraumatic.   Right Ear: External ear normal.   Left Ear: External ear normal.   Nose: Nose normal.   Mouth/Throat: Oropharynx is clear and moist.   Eyes: Conjunctivae and EOM are normal. Pupils are equal, round, and reactive to light.   Neck: Normal range of motion.   Cardiovascular: Normal rate, regular rhythm and normal heart sounds.    Pulmonary/Chest: Effort normal and breath sounds normal.   Abdominal: Soft. Normal appearance and bowel sounds are normal. There is CVA tenderness (left flank).   Neurological: She is alert and oriented to person, place, and time.   Skin: Skin is warm and dry.   Nursing note and vitals reviewed.      Procedures         ED Course  ED Course                  MDM  Number of Diagnoses or Management Options  Hematuria: established and worsening  Renal colic on left side: established and worsening     Amount and/or Complexity of Data Reviewed  Clinical lab tests: reviewed and ordered  Tests in the radiology section of CPT®: ordered and reviewed  Tests in the medicine section of CPT®:  ordered and reviewed    Risk of Complications, Morbidity, and/or Mortality  Presenting problems: moderate  Diagnostic procedures: moderate  Management options: moderate    Patient Progress  Patient progress: stable      Final diagnoses:   Renal colic on left side   Hematuria            CRISTINO Lovell  03/25/17 5168

## 2017-03-27 LAB — BACTERIA SPEC AEROBE CULT: NORMAL

## 2017-04-09 ENCOUNTER — APPOINTMENT (OUTPATIENT)
Dept: ULTRASOUND IMAGING | Facility: HOSPITAL | Age: 42
End: 2017-04-09

## 2017-04-09 ENCOUNTER — HOSPITAL ENCOUNTER (EMERGENCY)
Facility: HOSPITAL | Age: 42
Discharge: HOME | End: 2017-04-09
Attending: EMERGENCY MEDICINE | Admitting: EMERGENCY MEDICINE

## 2017-04-09 VITALS
TEMPERATURE: 98 F | WEIGHT: 180 LBS | BODY MASS INDEX: 31.89 KG/M2 | HEIGHT: 63 IN | OXYGEN SATURATION: 98 % | SYSTOLIC BLOOD PRESSURE: 147 MMHG | HEART RATE: 94 BPM | DIASTOLIC BLOOD PRESSURE: 108 MMHG | RESPIRATION RATE: 16 BRPM

## 2017-04-09 DIAGNOSIS — R10.2 PELVIC PAIN: Primary | ICD-10-CM

## 2017-04-09 LAB
ALBUMIN SERPL-MCNC: 4.7 G/DL (ref 3.5–5)
ALBUMIN/GLOB SERPL: 1.5 G/DL (ref 1.5–2.5)
ALP SERPL-CCNC: 70 U/L (ref 35–104)
ALT SERPL W P-5'-P-CCNC: 12 U/L (ref 10–36)
AMPHET+METHAMPHET UR QL: NEGATIVE
AMYLASE SERPL-CCNC: 27 U/L (ref 28–100)
ANION GAP SERPL CALCULATED.3IONS-SCNC: 6 MMOL/L (ref 3.6–11.2)
AST SERPL-CCNC: 19 U/L (ref 10–30)
B-HCG UR QL: NEGATIVE
BACTERIA UR QL AUTO: ABNORMAL /HPF
BARBITURATES UR QL SCN: NEGATIVE
BASOPHILS # BLD AUTO: 0.02 10*3/MM3 (ref 0–0.3)
BASOPHILS NFR BLD AUTO: 0.1 % (ref 0–2)
BENZODIAZ UR QL SCN: NEGATIVE
BILIRUB SERPL-MCNC: 0.6 MG/DL (ref 0.2–1.8)
BILIRUB UR QL STRIP: NEGATIVE
BUN BLD-MCNC: 12 MG/DL (ref 7–21)
BUN/CREAT SERPL: 15.4 (ref 7–25)
BUPRENORPHINE+NOR UR QL SCN: NEGATIVE
C TRACH RRNA CVX QL NAA+PROBE: NOT DETECTED
CALCIUM SPEC-SCNC: 9.3 MG/DL (ref 7.7–10)
CANNABINOIDS SERPL QL: NEGATIVE
CHLORIDE SERPL-SCNC: 105 MMOL/L (ref 99–112)
CLARITY UR: CLEAR
CO2 SERPL-SCNC: 27 MMOL/L (ref 24.3–31.9)
COCAINE UR QL: NEGATIVE
COLOR UR: YELLOW
CREAT BLD-MCNC: 0.78 MG/DL (ref 0.43–1.29)
DEPRECATED RDW RBC AUTO: 42.8 FL (ref 37–54)
EOSINOPHIL # BLD AUTO: 0.12 10*3/MM3 (ref 0–0.7)
EOSINOPHIL NFR BLD AUTO: 0.9 % (ref 0–5)
ERYTHROCYTE [DISTWIDTH] IN BLOOD BY AUTOMATED COUNT: 12.9 % (ref 11.5–14.5)
GFR SERPL CREATININE-BSD FRML MDRD: 82 ML/MIN/1.73
GLOBULIN UR ELPH-MCNC: 3.2 GM/DL
GLUCOSE BLD-MCNC: 111 MG/DL (ref 70–110)
GLUCOSE UR STRIP-MCNC: NEGATIVE MG/DL
HCT VFR BLD AUTO: 40.9 % (ref 37–47)
HGB BLD-MCNC: 13.5 G/DL (ref 12–16)
HGB UR QL STRIP.AUTO: ABNORMAL
HYALINE CASTS UR QL AUTO: ABNORMAL /LPF
IMM GRANULOCYTES # BLD: 0.04 10*3/MM3 (ref 0–0.03)
IMM GRANULOCYTES NFR BLD: 0.3 % (ref 0–0.5)
KETONES UR QL STRIP: NEGATIVE
LEUKOCYTE ESTERASE UR QL STRIP.AUTO: ABNORMAL
LIPASE SERPL-CCNC: 22 U/L (ref 13–60)
LYMPHOCYTES # BLD AUTO: 2.16 10*3/MM3 (ref 1–3)
LYMPHOCYTES NFR BLD AUTO: 15.8 % (ref 21–51)
MCH RBC QN AUTO: 30.9 PG (ref 27–33)
MCHC RBC AUTO-ENTMCNC: 33 G/DL (ref 33–37)
MCV RBC AUTO: 93.6 FL (ref 80–94)
METHADONE UR QL SCN: NEGATIVE
MONOCYTES # BLD AUTO: 1 10*3/MM3 (ref 0.1–0.9)
MONOCYTES NFR BLD AUTO: 7.3 % (ref 0–10)
N GONORRHOEA RRNA SPEC QL NAA+PROBE: NOT DETECTED
NEUTROPHILS # BLD AUTO: 10.35 10*3/MM3 (ref 1.4–6.5)
NEUTROPHILS NFR BLD AUTO: 75.6 % (ref 30–70)
NITRITE UR QL STRIP: NEGATIVE
OPIATES UR QL: NEGATIVE
OSMOLALITY SERPL CALC.SUM OF ELEC: 276.1 MOSM/KG (ref 273–305)
OXYCODONE UR QL SCN: NEGATIVE
PCP UR QL SCN: NEGATIVE
PH UR STRIP.AUTO: 6 [PH] (ref 5–8)
PLATELET # BLD AUTO: 340 10*3/MM3 (ref 130–400)
PMV BLD AUTO: 9.1 FL (ref 6–10)
POTASSIUM BLD-SCNC: 3.8 MMOL/L (ref 3.5–5.3)
PROPOXYPH UR QL: NEGATIVE
PROT SERPL-MCNC: 7.9 G/DL (ref 6–8)
PROT UR QL STRIP: NEGATIVE
RBC # BLD AUTO: 4.37 10*6/MM3 (ref 4.2–5.4)
RBC # UR: ABNORMAL /HPF
REF LAB TEST METHOD: ABNORMAL
SODIUM BLD-SCNC: 138 MMOL/L (ref 135–153)
SP GR UR STRIP: 1.02 (ref 1–1.03)
SQUAMOUS #/AREA URNS HPF: ABNORMAL /HPF
UROBILINOGEN UR QL STRIP: ABNORMAL
WBC NRBC COR # BLD: 13.69 10*3/MM3 (ref 4.5–12.5)
WBC UR QL AUTO: ABNORMAL /HPF

## 2017-04-09 PROCEDURE — 87086 URINE CULTURE/COLONY COUNT: CPT | Performed by: PHYSICIAN ASSISTANT

## 2017-04-09 PROCEDURE — 80307 DRUG TEST PRSMV CHEM ANLYZR: CPT | Performed by: PHYSICIAN ASSISTANT

## 2017-04-09 PROCEDURE — 85025 COMPLETE CBC W/AUTO DIFF WBC: CPT | Performed by: PHYSICIAN ASSISTANT

## 2017-04-09 PROCEDURE — 82150 ASSAY OF AMYLASE: CPT | Performed by: PHYSICIAN ASSISTANT

## 2017-04-09 PROCEDURE — 83690 ASSAY OF LIPASE: CPT | Performed by: PHYSICIAN ASSISTANT

## 2017-04-09 PROCEDURE — 99283 EMERGENCY DEPT VISIT LOW MDM: CPT

## 2017-04-09 PROCEDURE — 87491 CHLMYD TRACH DNA AMP PROBE: CPT | Performed by: PHYSICIAN ASSISTANT

## 2017-04-09 PROCEDURE — 76830 TRANSVAGINAL US NON-OB: CPT | Performed by: RADIOLOGY

## 2017-04-09 PROCEDURE — 81001 URINALYSIS AUTO W/SCOPE: CPT | Performed by: PHYSICIAN ASSISTANT

## 2017-04-09 PROCEDURE — 81025 URINE PREGNANCY TEST: CPT | Performed by: PHYSICIAN ASSISTANT

## 2017-04-09 PROCEDURE — 87591 N.GONORRHOEAE DNA AMP PROB: CPT | Performed by: PHYSICIAN ASSISTANT

## 2017-04-09 PROCEDURE — 80053 COMPREHEN METABOLIC PANEL: CPT | Performed by: PHYSICIAN ASSISTANT

## 2017-04-09 PROCEDURE — 76830 TRANSVAGINAL US NON-OB: CPT

## 2017-04-09 RX ORDER — ACETAMINOPHEN AND CODEINE PHOSPHATE 300; 30 MG/1; MG/1
1 TABLET ORAL EVERY 4 HOURS PRN
Qty: 10 TABLET | Refills: 0 | Status: SHIPPED | OUTPATIENT
Start: 2017-04-09 | End: 2017-05-02

## 2017-04-09 RX ORDER — HYDROCODONE BITARTRATE AND ACETAMINOPHEN 5; 325 MG/1; MG/1
1 TABLET ORAL ONCE
Status: COMPLETED | OUTPATIENT
Start: 2017-04-09 | End: 2017-04-09

## 2017-04-09 RX ADMIN — HYDROCODONE BITARTRATE AND ACETAMINOPHEN 1 TABLET: 5; 325 TABLET ORAL at 13:24

## 2017-04-09 NOTE — DISCHARGE INSTRUCTIONS

## 2017-04-09 NOTE — ED PROVIDER NOTES
Subjective   HPI Comments: 41-year-old female presents to the ED today due to left-sided pelvic pain.  She states the pain has been throbbing for the last 2 days and it seems to be worse today.  She states her last menstrual period was March 5.  She states she is not pregnant as she is not sexually active.  The patient states this pain is different than her typical kidney stone pain.  She is concerned that she may have another ovarian cyst.  She states she has a history of ovarian cysts in the past.  She denies any urinary symptoms.  She states she has been having some breast tenderness.  She denies any fever.  She denies any hematuria.  She states she is having some slight vaginal discharge that is cloudy in color.  She denies any vaginal pain.    Patient is a 41 y.o. female presenting with abdominal pain.   History provided by:  Patient  Abdominal Pain   Pain location:  LLQ  Pain quality: throbbing    Pain radiates to:  LLQ  Pain severity:  Severe  Onset quality:  Gradual  Duration:  2 days  Timing:  Constant  Progression:  Worsening  Chronicity:  Recurrent  Context: not recent travel, not sick contacts, not suspicious food intake and not trauma    Relieved by:  Nothing  Worsened by:  Nothing  Ineffective treatments:  NSAIDs  Associated symptoms: vaginal discharge    Associated symptoms: no anorexia, no belching, no chest pain, no chills, no constipation, no cough, no diarrhea, no dysuria, no fatigue, no fever, no hematemesis, no hematochezia, no hematuria, no melena, no nausea, no shortness of breath, no sore throat and no vomiting    Risk factors: not pregnant        Review of Systems   Constitutional: Negative for chills, fatigue and fever.   HENT: Negative.  Negative for sore throat.    Eyes: Negative.    Respiratory: Negative for cough and shortness of breath.    Cardiovascular: Negative for chest pain.   Gastrointestinal: Positive for abdominal pain. Negative for anorexia, constipation, diarrhea,  hematemesis, hematochezia, melena, nausea and vomiting.   Genitourinary: Positive for pelvic pain and vaginal discharge. Negative for dysuria and hematuria.   Musculoskeletal: Negative.    Skin: Negative.    Neurological: Negative.    Psychiatric/Behavioral: Negative.    All other systems reviewed and are negative.      Past Medical History:   Diagnosis Date   • Kidney stones    • Migraine    • Pneumonia    • Strep throat        Allergies   Allergen Reactions   • Compazine [Prochlorperazine Edisylate]    • Sulfa Antibiotics        Past Surgical History:   Procedure Laterality Date   •  SECTION     • COLONOSCOPY     • CYSTOSCOPY ELECTROHYDRAULIC LITHOTRIPSY     • DENTAL PROCEDURE     • KIDNEY STONE SURGERY         History reviewed. No pertinent family history.    Social History     Social History   • Marital status:      Spouse name: N/A   • Number of children: N/A   • Years of education: N/A     Social History Main Topics   • Smoking status: Current Every Day Smoker     Packs/day: 0.50     Types: Cigarettes   • Smokeless tobacco: None   • Alcohol use No   • Drug use: No   • Sexual activity: Defer     Other Topics Concern   • None     Social History Narrative           Objective   Physical Exam   Constitutional: She is oriented to person, place, and time. She appears well-developed and well-nourished. No distress.   Sitting in a chair, no distress   HENT:   Head: Normocephalic and atraumatic.   Right Ear: External ear normal.   Left Ear: External ear normal.   Nose: Nose normal.   Mouth/Throat: Oropharynx is clear and moist.   Eyes: Conjunctivae and EOM are normal. Pupils are equal, round, and reactive to light.   Neck: Normal range of motion. Neck supple.   Cardiovascular: Regular rhythm, normal heart sounds and intact distal pulses.  Tachycardia present.    Pulmonary/Chest: Effort normal and breath sounds normal. No respiratory distress.   Abdominal: Soft. Bowel sounds are normal. There is no  tenderness. There is no rebound and no guarding.   Musculoskeletal: Normal range of motion.   Neurological: She is alert and oriented to person, place, and time.   Skin: Skin is warm and dry.   Psychiatric: She has a normal mood and affect. Her behavior is normal. Judgment and thought content normal.   Nursing note and vitals reviewed.      Procedures         ED Course  ED Course   Comment By Time   Pt US shows no acute abnormalities.  Her workup is unremarkable at this time.  I have offered a CT scan to further evaluate her pain but she has declined at this time.  The patient has had multiple stones in the past and has had several CT scans in the recent past.  Will have her follow up outpatient. CRISTINO Dennis 04/09 1500                  MDM  Number of Diagnoses or Management Options  Pelvic pain:      Amount and/or Complexity of Data Reviewed  Clinical lab tests: reviewed and ordered  Tests in the radiology section of CPT®: ordered and reviewed  Decide to obtain previous medical records or to obtain history from someone other than the patient: yes    Patient Progress  Patient progress: stable      Final diagnoses:   Pelvic pain            CRISTINO Dennis  04/09/17 8735

## 2017-04-11 LAB — BACTERIA SPEC AEROBE CULT: NORMAL

## 2017-05-02 ENCOUNTER — HOSPITAL ENCOUNTER (EMERGENCY)
Facility: HOSPITAL | Age: 42
Discharge: HOME | End: 2017-05-02
Attending: EMERGENCY MEDICINE | Admitting: EMERGENCY MEDICINE

## 2017-05-02 ENCOUNTER — APPOINTMENT (OUTPATIENT)
Dept: GENERAL RADIOLOGY | Facility: HOSPITAL | Age: 42
End: 2017-05-02

## 2017-05-02 ENCOUNTER — APPOINTMENT (OUTPATIENT)
Dept: ULTRASOUND IMAGING | Facility: HOSPITAL | Age: 42
End: 2017-05-02

## 2017-05-02 VITALS
HEIGHT: 63 IN | SYSTOLIC BLOOD PRESSURE: 140 MMHG | HEART RATE: 76 BPM | DIASTOLIC BLOOD PRESSURE: 78 MMHG | TEMPERATURE: 98.6 F | WEIGHT: 180 LBS | OXYGEN SATURATION: 97 % | BODY MASS INDEX: 31.89 KG/M2 | RESPIRATION RATE: 16 BRPM

## 2017-05-02 DIAGNOSIS — R10.9 LEFT FLANK PAIN: Primary | ICD-10-CM

## 2017-05-02 DIAGNOSIS — R31.9 HEMATURIA: ICD-10-CM

## 2017-05-02 LAB
ALBUMIN SERPL-MCNC: 4.2 G/DL (ref 3.5–5)
ALBUMIN/GLOB SERPL: 1.3 G/DL (ref 1.5–2.5)
ALP SERPL-CCNC: 69 U/L (ref 35–104)
ALT SERPL W P-5'-P-CCNC: 16 U/L (ref 10–36)
ANION GAP SERPL CALCULATED.3IONS-SCNC: 3.8 MMOL/L (ref 3.6–11.2)
AST SERPL-CCNC: 23 U/L (ref 10–30)
B-HCG UR QL: NEGATIVE
BACTERIA UR QL AUTO: ABNORMAL /HPF
BASOPHILS # BLD AUTO: 0.02 10*3/MM3 (ref 0–0.3)
BASOPHILS NFR BLD AUTO: 0.1 % (ref 0–2)
BILIRUB SERPL-MCNC: 0.3 MG/DL (ref 0.2–1.8)
BILIRUB UR QL STRIP: NEGATIVE
BUN BLD-MCNC: 13 MG/DL (ref 7–21)
BUN/CREAT SERPL: 14.9 (ref 7–25)
CALCIUM SPEC-SCNC: 9.4 MG/DL (ref 7.7–10)
CHLORIDE SERPL-SCNC: 112 MMOL/L (ref 99–112)
CLARITY UR: CLEAR
CO2 SERPL-SCNC: 24.2 MMOL/L (ref 24.3–31.9)
COLOR UR: YELLOW
CREAT BLD-MCNC: 0.87 MG/DL (ref 0.43–1.29)
DEPRECATED RDW RBC AUTO: 43.7 FL (ref 37–54)
EOSINOPHIL # BLD AUTO: 0.14 10*3/MM3 (ref 0–0.7)
EOSINOPHIL NFR BLD AUTO: 1 % (ref 0–5)
ERYTHROCYTE [DISTWIDTH] IN BLOOD BY AUTOMATED COUNT: 13.2 % (ref 11.5–14.5)
GFR SERPL CREATININE-BSD FRML MDRD: 72 ML/MIN/1.73
GLOBULIN UR ELPH-MCNC: 3.2 GM/DL
GLUCOSE BLD-MCNC: 98 MG/DL (ref 70–110)
GLUCOSE UR STRIP-MCNC: NEGATIVE MG/DL
HCT VFR BLD AUTO: 42.5 % (ref 37–47)
HGB BLD-MCNC: 14.4 G/DL (ref 12–16)
HGB UR QL STRIP.AUTO: ABNORMAL
HYALINE CASTS UR QL AUTO: ABNORMAL /LPF
IMM GRANULOCYTES # BLD: 0.08 10*3/MM3 (ref 0–0.03)
IMM GRANULOCYTES NFR BLD: 0.6 % (ref 0–0.5)
KETONES UR QL STRIP: NEGATIVE
LEUKOCYTE ESTERASE UR QL STRIP.AUTO: NEGATIVE
LYMPHOCYTES # BLD AUTO: 2.63 10*3/MM3 (ref 1–3)
LYMPHOCYTES NFR BLD AUTO: 19.7 % (ref 21–51)
MCH RBC QN AUTO: 31.3 PG (ref 27–33)
MCHC RBC AUTO-ENTMCNC: 33.9 G/DL (ref 33–37)
MCV RBC AUTO: 92.4 FL (ref 80–94)
MONOCYTES # BLD AUTO: 1.33 10*3/MM3 (ref 0.1–0.9)
MONOCYTES NFR BLD AUTO: 10 % (ref 0–10)
NEUTROPHILS # BLD AUTO: 9.15 10*3/MM3 (ref 1.4–6.5)
NEUTROPHILS NFR BLD AUTO: 68.6 % (ref 30–70)
NITRITE UR QL STRIP: NEGATIVE
OSMOLALITY SERPL CALC.SUM OF ELEC: 279.5 MOSM/KG (ref 273–305)
PH UR STRIP.AUTO: 6.5 [PH] (ref 5–8)
PLATELET # BLD AUTO: 290 10*3/MM3 (ref 130–400)
PMV BLD AUTO: 9.7 FL (ref 6–10)
POTASSIUM BLD-SCNC: 3.7 MMOL/L (ref 3.5–5.3)
PROT SERPL-MCNC: 7.4 G/DL (ref 6–8)
PROT UR QL STRIP: NEGATIVE
RBC # BLD AUTO: 4.6 10*6/MM3 (ref 4.2–5.4)
RBC # UR: ABNORMAL /HPF
REF LAB TEST METHOD: ABNORMAL
SODIUM BLD-SCNC: 140 MMOL/L (ref 135–153)
SP GR UR STRIP: 1.01 (ref 1–1.03)
SQUAMOUS #/AREA URNS HPF: ABNORMAL /HPF
UROBILINOGEN UR QL STRIP: ABNORMAL
WBC NRBC COR # BLD: 13.35 10*3/MM3 (ref 4.5–12.5)
WBC UR QL AUTO: ABNORMAL /HPF

## 2017-05-02 PROCEDURE — 96374 THER/PROPH/DIAG INJ IV PUSH: CPT

## 2017-05-02 PROCEDURE — 25010000002 ONDANSETRON PER 1 MG: Performed by: EMERGENCY MEDICINE

## 2017-05-02 PROCEDURE — 99283 EMERGENCY DEPT VISIT LOW MDM: CPT

## 2017-05-02 PROCEDURE — 25010000002 HYDROMORPHONE PER 4 MG: Performed by: EMERGENCY MEDICINE

## 2017-05-02 PROCEDURE — 85025 COMPLETE CBC W/AUTO DIFF WBC: CPT | Performed by: PHYSICIAN ASSISTANT

## 2017-05-02 PROCEDURE — 76775 US EXAM ABDO BACK WALL LIM: CPT | Performed by: RADIOLOGY

## 2017-05-02 PROCEDURE — 74000 XR ABDOMEN KUB: CPT | Performed by: RADIOLOGY

## 2017-05-02 PROCEDURE — 81001 URINALYSIS AUTO W/SCOPE: CPT | Performed by: PHYSICIAN ASSISTANT

## 2017-05-02 PROCEDURE — P9612 CATHETERIZE FOR URINE SPEC: HCPCS

## 2017-05-02 PROCEDURE — 76775 US EXAM ABDO BACK WALL LIM: CPT

## 2017-05-02 PROCEDURE — 80053 COMPREHEN METABOLIC PANEL: CPT | Performed by: PHYSICIAN ASSISTANT

## 2017-05-02 PROCEDURE — 96361 HYDRATE IV INFUSION ADD-ON: CPT

## 2017-05-02 PROCEDURE — 36415 COLL VENOUS BLD VENIPUNCTURE: CPT

## 2017-05-02 PROCEDURE — 25010000002 KETOROLAC TROMETHAMINE PER 15 MG: Performed by: EMERGENCY MEDICINE

## 2017-05-02 PROCEDURE — 81025 URINE PREGNANCY TEST: CPT | Performed by: PHYSICIAN ASSISTANT

## 2017-05-02 PROCEDURE — 96375 TX/PRO/DX INJ NEW DRUG ADDON: CPT

## 2017-05-02 PROCEDURE — 74000 HC ABDOMEN KUB: CPT

## 2017-05-02 RX ORDER — KETOROLAC TROMETHAMINE 10 MG/1
10 TABLET, FILM COATED ORAL EVERY 6 HOURS PRN
Qty: 10 TABLET | Refills: 0 | Status: SHIPPED | OUTPATIENT
Start: 2017-05-02 | End: 2018-01-18

## 2017-05-02 RX ORDER — SODIUM CHLORIDE 0.9 % (FLUSH) 0.9 %
10 SYRINGE (ML) INJECTION AS NEEDED
Status: DISCONTINUED | OUTPATIENT
Start: 2017-05-02 | End: 2017-05-02 | Stop reason: HOSPADM

## 2017-05-02 RX ORDER — ONDANSETRON 2 MG/ML
4 INJECTION INTRAMUSCULAR; INTRAVENOUS ONCE
Status: COMPLETED | OUTPATIENT
Start: 2017-05-02 | End: 2017-05-02

## 2017-05-02 RX ORDER — KETOROLAC TROMETHAMINE 30 MG/ML
30 INJECTION, SOLUTION INTRAMUSCULAR; INTRAVENOUS ONCE
Status: COMPLETED | OUTPATIENT
Start: 2017-05-02 | End: 2017-05-02

## 2017-05-02 RX ADMIN — SODIUM CHLORIDE 500 ML: 9 INJECTION, SOLUTION INTRAVENOUS at 14:04

## 2017-05-02 RX ADMIN — KETOROLAC TROMETHAMINE 30 MG: 30 INJECTION, SOLUTION INTRAMUSCULAR; INTRAVENOUS at 14:05

## 2017-05-02 RX ADMIN — HYDROMORPHONE HYDROCHLORIDE 1 MG: 1 INJECTION, SOLUTION INTRAMUSCULAR; INTRAVENOUS; SUBCUTANEOUS at 14:06

## 2017-05-02 RX ADMIN — ONDANSETRON 4 MG: 2 INJECTION, SOLUTION INTRAMUSCULAR; INTRAVENOUS at 14:04

## 2017-06-03 ENCOUNTER — APPOINTMENT (OUTPATIENT)
Dept: ULTRASOUND IMAGING | Facility: HOSPITAL | Age: 42
End: 2017-06-03

## 2017-06-03 ENCOUNTER — HOSPITAL ENCOUNTER (EMERGENCY)
Facility: HOSPITAL | Age: 42
Discharge: HOME | End: 2017-06-03
Attending: EMERGENCY MEDICINE | Admitting: EMERGENCY MEDICINE

## 2017-06-03 VITALS
DIASTOLIC BLOOD PRESSURE: 80 MMHG | HEIGHT: 63 IN | TEMPERATURE: 98.1 F | SYSTOLIC BLOOD PRESSURE: 130 MMHG | BODY MASS INDEX: 31.89 KG/M2 | HEART RATE: 99 BPM | WEIGHT: 180 LBS | OXYGEN SATURATION: 99 % | RESPIRATION RATE: 18 BRPM

## 2017-06-03 DIAGNOSIS — R31.9 HEMATURIA: ICD-10-CM

## 2017-06-03 DIAGNOSIS — R10.9 LEFT FLANK PAIN: Primary | ICD-10-CM

## 2017-06-03 LAB
ANION GAP SERPL CALCULATED.3IONS-SCNC: 4.3 MMOL/L (ref 3.6–11.2)
B-HCG UR QL: NEGATIVE
BACTERIA UR QL AUTO: ABNORMAL /HPF
BASOPHILS # BLD AUTO: 0.02 10*3/MM3 (ref 0–0.3)
BASOPHILS NFR BLD AUTO: 0.2 % (ref 0–2)
BILIRUB UR QL STRIP: NEGATIVE
BUN BLD-MCNC: 10 MG/DL (ref 7–21)
BUN/CREAT SERPL: 11.1 (ref 7–25)
CALCIUM SPEC-SCNC: 9.7 MG/DL (ref 7.7–10)
CHLORIDE SERPL-SCNC: 109 MMOL/L (ref 99–112)
CLARITY UR: CLEAR
CO2 SERPL-SCNC: 23.7 MMOL/L (ref 24.3–31.9)
COLOR UR: YELLOW
CREAT BLD-MCNC: 0.9 MG/DL (ref 0.43–1.29)
DEPRECATED RDW RBC AUTO: 43.9 FL (ref 37–54)
EOSINOPHIL # BLD AUTO: 0.09 10*3/MM3 (ref 0–0.7)
EOSINOPHIL NFR BLD AUTO: 0.8 % (ref 0–5)
ERYTHROCYTE [DISTWIDTH] IN BLOOD BY AUTOMATED COUNT: 12.8 % (ref 11.5–14.5)
GFR SERPL CREATININE-BSD FRML MDRD: 69 ML/MIN/1.73
GLUCOSE BLD-MCNC: 102 MG/DL (ref 70–110)
GLUCOSE UR STRIP-MCNC: NEGATIVE MG/DL
HCT VFR BLD AUTO: 40.7 % (ref 37–47)
HGB BLD-MCNC: 13.7 G/DL (ref 12–16)
HGB UR QL STRIP.AUTO: ABNORMAL
HYALINE CASTS UR QL AUTO: ABNORMAL /LPF
IMM GRANULOCYTES # BLD: 0.06 10*3/MM3 (ref 0–0.03)
IMM GRANULOCYTES NFR BLD: 0.5 % (ref 0–0.5)
KETONES UR QL STRIP: NEGATIVE
LEUKOCYTE ESTERASE UR QL STRIP.AUTO: ABNORMAL
LYMPHOCYTES # BLD AUTO: 2.45 10*3/MM3 (ref 1–3)
LYMPHOCYTES NFR BLD AUTO: 20.7 % (ref 21–51)
MCH RBC QN AUTO: 31.5 PG (ref 27–33)
MCHC RBC AUTO-ENTMCNC: 33.7 G/DL (ref 33–37)
MCV RBC AUTO: 93.6 FL (ref 80–94)
MONOCYTES # BLD AUTO: 0.95 10*3/MM3 (ref 0.1–0.9)
MONOCYTES NFR BLD AUTO: 8 % (ref 0–10)
NEUTROPHILS # BLD AUTO: 8.24 10*3/MM3 (ref 1.4–6.5)
NEUTROPHILS NFR BLD AUTO: 69.8 % (ref 30–70)
NITRITE UR QL STRIP: NEGATIVE
OSMOLALITY SERPL CALC.SUM OF ELEC: 273.1 MOSM/KG (ref 273–305)
PH UR STRIP.AUTO: 7.5 [PH] (ref 5–8)
PLATELET # BLD AUTO: 292 10*3/MM3 (ref 130–400)
PMV BLD AUTO: 9.9 FL (ref 6–10)
POTASSIUM BLD-SCNC: 5.9 MMOL/L (ref 3.5–5.3)
PROT UR QL STRIP: NEGATIVE
RBC # BLD AUTO: 4.35 10*6/MM3 (ref 4.2–5.4)
RBC # UR: ABNORMAL /HPF
REF LAB TEST METHOD: ABNORMAL
SODIUM BLD-SCNC: 137 MMOL/L (ref 135–153)
SP GR UR STRIP: 1.02 (ref 1–1.03)
SQUAMOUS #/AREA URNS HPF: ABNORMAL /HPF
UROBILINOGEN UR QL STRIP: ABNORMAL
WBC NRBC COR # BLD: 11.81 10*3/MM3 (ref 4.5–12.5)
WBC UR QL AUTO: ABNORMAL /HPF

## 2017-06-03 PROCEDURE — 99284 EMERGENCY DEPT VISIT MOD MDM: CPT

## 2017-06-03 PROCEDURE — 76775 US EXAM ABDO BACK WALL LIM: CPT

## 2017-06-03 PROCEDURE — 81025 URINE PREGNANCY TEST: CPT | Performed by: PHYSICIAN ASSISTANT

## 2017-06-03 PROCEDURE — 87086 URINE CULTURE/COLONY COUNT: CPT | Performed by: PHYSICIAN ASSISTANT

## 2017-06-03 PROCEDURE — 25010000002 HYDROMORPHONE PER 4 MG: Performed by: EMERGENCY MEDICINE

## 2017-06-03 PROCEDURE — 80048 BASIC METABOLIC PNL TOTAL CA: CPT | Performed by: PHYSICIAN ASSISTANT

## 2017-06-03 PROCEDURE — 81001 URINALYSIS AUTO W/SCOPE: CPT | Performed by: PHYSICIAN ASSISTANT

## 2017-06-03 PROCEDURE — 85025 COMPLETE CBC W/AUTO DIFF WBC: CPT | Performed by: PHYSICIAN ASSISTANT

## 2017-06-03 PROCEDURE — 76775 US EXAM ABDO BACK WALL LIM: CPT | Performed by: RADIOLOGY

## 2017-06-03 PROCEDURE — 96374 THER/PROPH/DIAG INJ IV PUSH: CPT

## 2017-06-03 RX ORDER — HYDROMORPHONE HYDROCHLORIDE 1 MG/ML
0.5 INJECTION, SOLUTION INTRAMUSCULAR; INTRAVENOUS; SUBCUTANEOUS ONCE
Status: COMPLETED | OUTPATIENT
Start: 2017-06-03 | End: 2017-06-03

## 2017-06-03 RX ORDER — SODIUM CHLORIDE 0.9 % (FLUSH) 0.9 %
10 SYRINGE (ML) INJECTION AS NEEDED
Status: DISCONTINUED | OUTPATIENT
Start: 2017-06-03 | End: 2017-06-03 | Stop reason: HOSPADM

## 2017-06-03 RX ADMIN — SODIUM CHLORIDE 500 ML: 9 INJECTION, SOLUTION INTRAVENOUS at 13:11

## 2017-06-03 RX ADMIN — HYDROMORPHONE HYDROCHLORIDE 0.5 MG: 1 INJECTION, SOLUTION INTRAMUSCULAR; INTRAVENOUS; SUBCUTANEOUS at 14:40

## 2017-06-03 NOTE — ED PROVIDER NOTES
Subjective   Patient is a 41 y.o. female presenting with flank pain.   History provided by:  Patient   used: No    Flank Pain   Pain location:  L flank and LLQ  Pain quality: sharp    Pain radiates to:  Does not radiate  Pain severity:  Moderate  Onset quality:  Sudden  Timing:  Constant  Progression:  Unchanged  Chronicity:  Recurrent  Relieved by:  None tried  Worsened by:  Urination  Ineffective treatments:  None tried  Associated symptoms: dysuria, hematuria and nausea    Associated symptoms: no chest pain, no cough, no diarrhea, no fever, no shortness of breath, no sore throat and no vomiting        Review of Systems   Constitutional: Negative for activity change and fever.   HENT: Negative for congestion and sore throat.    Eyes: Negative for pain.   Respiratory: Negative for cough, shortness of breath and wheezing.    Cardiovascular: Negative for chest pain.   Gastrointestinal: Positive for nausea. Negative for abdominal distention, diarrhea and vomiting.   Genitourinary: Positive for dysuria, flank pain and hematuria. Negative for difficulty urinating.   Musculoskeletal: Negative for arthralgias and myalgias.   Skin: Negative for rash and wound.   Neurological: Negative for dizziness and headaches.   Psychiatric/Behavioral: Negative for agitation.   All other systems reviewed and are negative.      Past Medical History:   Diagnosis Date   • Kidney stones    • Migraine    • Pneumonia    • Strep throat        Allergies   Allergen Reactions   • Compazine [Prochlorperazine Edisylate]    • Sulfa Antibiotics        Past Surgical History:   Procedure Laterality Date   •  SECTION     • COLONOSCOPY     • CYSTOSCOPY ELECTROHYDRAULIC LITHOTRIPSY     • DENTAL PROCEDURE     • KIDNEY STONE SURGERY         History reviewed. No pertinent family history.    Social History     Social History   • Marital status:      Spouse name: N/A   • Number of children: N/A   • Years of education: N/A      Social History Main Topics   • Smoking status: Current Every Day Smoker     Packs/day: 0.50     Types: Cigarettes   • Smokeless tobacco: None   • Alcohol use No   • Drug use: No   • Sexual activity: Defer     Other Topics Concern   • None     Social History Narrative           Objective   Physical Exam   Constitutional: She is oriented to person, place, and time. She appears well-developed and well-nourished.   HENT:   Head: Normocephalic and atraumatic.   Eyes: EOM are normal. Pupils are equal, round, and reactive to light.   Neck: Normal range of motion. Neck supple.   Cardiovascular: Normal rate, regular rhythm and normal heart sounds.    Pulmonary/Chest: Effort normal and breath sounds normal.   Abdominal: Soft. Bowel sounds are normal. There is tenderness in the left lower quadrant.   Musculoskeletal: Normal range of motion.   Neurological: She is alert and oriented to person, place, and time.   Skin: Skin is warm and dry.   Psychiatric: She has a normal mood and affect. Her behavior is normal. Judgment and thought content normal.   Nursing note and vitals reviewed.      Procedures         ED Course  ED Course                  MDM  Number of Diagnoses or Management Options  Hematuria:   Left flank pain:      Amount and/or Complexity of Data Reviewed  Clinical lab tests: ordered and reviewed  Tests in the radiology section of CPT®: reviewed and ordered  Tests in the medicine section of CPT®: ordered and reviewed    Patient Progress  Patient progress: stable      Final diagnoses:   Left flank pain   Hematuria            CRISTINO Patterson  06/03/17 1800

## 2017-06-03 NOTE — ED NOTES
Patient states she has taken 3 ketoralac pills one hour prior to   ER visit .  Patient states she doesn't know the mg of medications.  Patient states they have not  helped her pain.       Rod Davey RN  06/03/17 2687

## 2017-06-05 LAB — BACTERIA SPEC AEROBE CULT: NORMAL

## 2017-06-29 ENCOUNTER — APPOINTMENT (OUTPATIENT)
Dept: ULTRASOUND IMAGING | Facility: HOSPITAL | Age: 42
End: 2017-06-29

## 2017-06-29 ENCOUNTER — HOSPITAL ENCOUNTER (EMERGENCY)
Facility: HOSPITAL | Age: 42
Discharge: HOME | End: 2017-06-29
Attending: EMERGENCY MEDICINE | Admitting: EMERGENCY MEDICINE

## 2017-06-29 VITALS
TEMPERATURE: 98.3 F | HEIGHT: 63 IN | HEART RATE: 76 BPM | RESPIRATION RATE: 18 BRPM | DIASTOLIC BLOOD PRESSURE: 89 MMHG | OXYGEN SATURATION: 98 % | WEIGHT: 180 LBS | SYSTOLIC BLOOD PRESSURE: 129 MMHG | BODY MASS INDEX: 31.89 KG/M2

## 2017-06-29 DIAGNOSIS — N23 RENAL COLIC ON RIGHT SIDE: Primary | ICD-10-CM

## 2017-06-29 LAB
6-ACETYL MORPHINE: NEGATIVE
ALBUMIN SERPL-MCNC: 4.5 G/DL (ref 3.5–5)
ALBUMIN/GLOB SERPL: 1.4 G/DL (ref 1.5–2.5)
ALP SERPL-CCNC: 77 U/L (ref 35–104)
ALT SERPL W P-5'-P-CCNC: 18 U/L (ref 10–36)
AMPHET+METHAMPHET UR QL: NEGATIVE
ANION GAP SERPL CALCULATED.3IONS-SCNC: 4.1 MMOL/L (ref 3.6–11.2)
AST SERPL-CCNC: 23 U/L (ref 10–30)
B-HCG UR QL: NEGATIVE
BACTERIA UR QL AUTO: ABNORMAL /HPF
BARBITURATES UR QL SCN: NEGATIVE
BASOPHILS # BLD AUTO: 0.02 10*3/MM3 (ref 0–0.3)
BASOPHILS NFR BLD AUTO: 0.2 % (ref 0–2)
BENZODIAZ UR QL SCN: NEGATIVE
BILIRUB SERPL-MCNC: 0.2 MG/DL (ref 0.2–1.8)
BILIRUB UR QL STRIP: NEGATIVE
BUN BLD-MCNC: 9 MG/DL (ref 7–21)
BUN/CREAT SERPL: 9.7 (ref 7–25)
BUPRENORPHINE SERPL-MCNC: NEGATIVE NG/ML
CALCIUM SPEC-SCNC: 10.2 MG/DL (ref 7.7–10)
CANNABINOIDS SERPL QL: NEGATIVE
CHLORIDE SERPL-SCNC: 108 MMOL/L (ref 99–112)
CLARITY UR: CLEAR
CO2 SERPL-SCNC: 27.9 MMOL/L (ref 24.3–31.9)
COCAINE UR QL: NEGATIVE
COLOR UR: ABNORMAL
CREAT BLD-MCNC: 0.93 MG/DL (ref 0.43–1.29)
DEPRECATED RDW RBC AUTO: 42 FL (ref 37–54)
EOSINOPHIL # BLD AUTO: 0.14 10*3/MM3 (ref 0–0.7)
EOSINOPHIL NFR BLD AUTO: 1.2 % (ref 0–5)
ERYTHROCYTE [DISTWIDTH] IN BLOOD BY AUTOMATED COUNT: 12.5 % (ref 11.5–14.5)
GFR SERPL CREATININE-BSD FRML MDRD: 67 ML/MIN/1.73
GLOBULIN UR ELPH-MCNC: 3.3 GM/DL
GLUCOSE BLD-MCNC: 105 MG/DL (ref 70–110)
GLUCOSE UR STRIP-MCNC: NEGATIVE MG/DL
HCT VFR BLD AUTO: 40.8 % (ref 37–47)
HGB BLD-MCNC: 13.6 G/DL (ref 12–16)
HGB UR QL STRIP.AUTO: ABNORMAL
HYALINE CASTS UR QL AUTO: ABNORMAL /LPF
IMM GRANULOCYTES # BLD: 0.05 10*3/MM3 (ref 0–0.03)
IMM GRANULOCYTES NFR BLD: 0.4 % (ref 0–0.5)
KETONES UR QL STRIP: NEGATIVE
LEUKOCYTE ESTERASE UR QL STRIP.AUTO: ABNORMAL
LYMPHOCYTES # BLD AUTO: 2.31 10*3/MM3 (ref 1–3)
LYMPHOCYTES NFR BLD AUTO: 19.3 % (ref 21–51)
MCH RBC QN AUTO: 31.7 PG (ref 27–33)
MCHC RBC AUTO-ENTMCNC: 33.3 G/DL (ref 33–37)
MCV RBC AUTO: 95.1 FL (ref 80–94)
MDMA UR QL SCN: NEGATIVE
METHADONE UR QL SCN: NEGATIVE
MONOCYTES # BLD AUTO: 0.8 10*3/MM3 (ref 0.1–0.9)
MONOCYTES NFR BLD AUTO: 6.7 % (ref 0–10)
NEUTROPHILS # BLD AUTO: 8.63 10*3/MM3 (ref 1.4–6.5)
NEUTROPHILS NFR BLD AUTO: 72.2 % (ref 30–70)
NITRITE UR QL STRIP: NEGATIVE
OPIATES UR QL: NEGATIVE
OSMOLALITY SERPL CALC.SUM OF ELEC: 278.4 MOSM/KG (ref 273–305)
OXYCODONE UR QL SCN: NEGATIVE
PCP UR QL SCN: NEGATIVE
PH UR STRIP.AUTO: 8.5 [PH] (ref 5–8)
PLATELET # BLD AUTO: 305 10*3/MM3 (ref 130–400)
PMV BLD AUTO: 9.5 FL (ref 6–10)
POTASSIUM BLD-SCNC: 4.2 MMOL/L (ref 3.5–5.3)
PROT SERPL-MCNC: 7.8 G/DL (ref 6–8)
PROT UR QL STRIP: NEGATIVE
RBC # BLD AUTO: 4.29 10*6/MM3 (ref 4.2–5.4)
RBC # UR: ABNORMAL /HPF
REF LAB TEST METHOD: ABNORMAL
SODIUM BLD-SCNC: 140 MMOL/L (ref 135–153)
SP GR UR STRIP: 1.01 (ref 1–1.03)
SQUAMOUS #/AREA URNS HPF: ABNORMAL /HPF
UROBILINOGEN UR QL STRIP: ABNORMAL
WBC NRBC COR # BLD: 11.95 10*3/MM3 (ref 4.5–12.5)
WBC UR QL AUTO: ABNORMAL /HPF

## 2017-06-29 PROCEDURE — 96374 THER/PROPH/DIAG INJ IV PUSH: CPT

## 2017-06-29 PROCEDURE — 81025 URINE PREGNANCY TEST: CPT | Performed by: PHYSICIAN ASSISTANT

## 2017-06-29 PROCEDURE — 80307 DRUG TEST PRSMV CHEM ANLYZR: CPT | Performed by: PHYSICIAN ASSISTANT

## 2017-06-29 PROCEDURE — 96375 TX/PRO/DX INJ NEW DRUG ADDON: CPT

## 2017-06-29 PROCEDURE — 99283 EMERGENCY DEPT VISIT LOW MDM: CPT

## 2017-06-29 PROCEDURE — 25010000002 HYDROMORPHONE PER 4 MG: Performed by: EMERGENCY MEDICINE

## 2017-06-29 PROCEDURE — 81001 URINALYSIS AUTO W/SCOPE: CPT | Performed by: PHYSICIAN ASSISTANT

## 2017-06-29 PROCEDURE — 76775 US EXAM ABDO BACK WALL LIM: CPT | Performed by: RADIOLOGY

## 2017-06-29 PROCEDURE — 85025 COMPLETE CBC W/AUTO DIFF WBC: CPT | Performed by: PHYSICIAN ASSISTANT

## 2017-06-29 PROCEDURE — 87086 URINE CULTURE/COLONY COUNT: CPT | Performed by: PHYSICIAN ASSISTANT

## 2017-06-29 PROCEDURE — 80053 COMPREHEN METABOLIC PANEL: CPT | Performed by: PHYSICIAN ASSISTANT

## 2017-06-29 PROCEDURE — 25010000002 ONDANSETRON PER 1 MG: Performed by: PHYSICIAN ASSISTANT

## 2017-06-29 PROCEDURE — 96361 HYDRATE IV INFUSION ADD-ON: CPT

## 2017-06-29 PROCEDURE — 76775 US EXAM ABDO BACK WALL LIM: CPT

## 2017-06-29 RX ORDER — SODIUM CHLORIDE 0.9 % (FLUSH) 0.9 %
10 SYRINGE (ML) INJECTION AS NEEDED
Status: DISCONTINUED | OUTPATIENT
Start: 2017-06-29 | End: 2017-06-29 | Stop reason: HOSPADM

## 2017-06-29 RX ORDER — ONDANSETRON 2 MG/ML
4 INJECTION INTRAMUSCULAR; INTRAVENOUS ONCE
Status: COMPLETED | OUTPATIENT
Start: 2017-06-29 | End: 2017-06-29

## 2017-06-29 RX ADMIN — SODIUM CHLORIDE 1000 ML: 9 INJECTION, SOLUTION INTRAVENOUS at 13:33

## 2017-06-29 RX ADMIN — ONDANSETRON 4 MG: 2 INJECTION, SOLUTION INTRAMUSCULAR; INTRAVENOUS at 13:29

## 2017-06-29 RX ADMIN — HYDROMORPHONE HYDROCHLORIDE 1 MG: 1 INJECTION, SOLUTION INTRAMUSCULAR; INTRAVENOUS; SUBCUTANEOUS at 13:31

## 2017-07-01 LAB — BACTERIA SPEC AEROBE CULT: NO GROWTH

## 2017-07-24 ENCOUNTER — HOSPITAL ENCOUNTER (EMERGENCY)
Facility: HOSPITAL | Age: 42
Discharge: HOME | End: 2017-07-24
Attending: EMERGENCY MEDICINE | Admitting: EMERGENCY MEDICINE

## 2017-07-24 ENCOUNTER — APPOINTMENT (OUTPATIENT)
Dept: CT IMAGING | Facility: HOSPITAL | Age: 42
End: 2017-07-24

## 2017-07-24 VITALS
SYSTOLIC BLOOD PRESSURE: 146 MMHG | DIASTOLIC BLOOD PRESSURE: 104 MMHG | BODY MASS INDEX: 31.89 KG/M2 | RESPIRATION RATE: 17 BRPM | TEMPERATURE: 98.7 F | HEART RATE: 91 BPM | HEIGHT: 63 IN | OXYGEN SATURATION: 99 % | WEIGHT: 180 LBS

## 2017-07-24 DIAGNOSIS — N20.1 URETERAL STONE: Primary | ICD-10-CM

## 2017-07-24 LAB
ALBUMIN SERPL-MCNC: 4.6 G/DL (ref 3.5–5)
ALBUMIN/GLOB SERPL: 1.4 G/DL (ref 1.5–2.5)
ALP SERPL-CCNC: 86 U/L (ref 35–104)
ALT SERPL W P-5'-P-CCNC: 19 U/L (ref 10–36)
ANION GAP SERPL CALCULATED.3IONS-SCNC: 15 MMOL/L (ref 3.6–11.2)
AST SERPL-CCNC: 29 U/L (ref 10–30)
BACTERIA UR QL AUTO: ABNORMAL /HPF
BASOPHILS # BLD AUTO: 0.02 10*3/MM3 (ref 0–0.3)
BASOPHILS NFR BLD AUTO: 0.2 % (ref 0–2)
BILIRUB SERPL-MCNC: 0.3 MG/DL (ref 0.2–1.8)
BILIRUB UR QL STRIP: NEGATIVE
BUN BLD-MCNC: 6 MG/DL (ref 7–21)
BUN/CREAT SERPL: 8.1 (ref 7–25)
CALCIUM SPEC-SCNC: 10.1 MG/DL (ref 7.7–10)
CHLORIDE SERPL-SCNC: 110 MMOL/L (ref 99–112)
CLARITY UR: CLEAR
CO2 SERPL-SCNC: 18 MMOL/L (ref 24.3–31.9)
COLOR UR: YELLOW
CREAT BLD-MCNC: 0.74 MG/DL (ref 0.43–1.29)
DEPRECATED RDW RBC AUTO: 45.1 FL (ref 37–54)
EOSINOPHIL # BLD AUTO: 0.14 10*3/MM3 (ref 0–0.7)
EOSINOPHIL NFR BLD AUTO: 1.2 % (ref 0–5)
ERYTHROCYTE [DISTWIDTH] IN BLOOD BY AUTOMATED COUNT: 13 % (ref 11.5–14.5)
GFR SERPL CREATININE-BSD FRML MDRD: 87 ML/MIN/1.73
GLOBULIN UR ELPH-MCNC: 3.4 GM/DL
GLUCOSE BLD-MCNC: 89 MG/DL (ref 70–110)
GLUCOSE UR STRIP-MCNC: NEGATIVE MG/DL
HCT VFR BLD AUTO: 40.7 % (ref 37–47)
HGB BLD-MCNC: 13.4 G/DL (ref 12–16)
HGB UR QL STRIP.AUTO: ABNORMAL
HYALINE CASTS UR QL AUTO: ABNORMAL /LPF
IMM GRANULOCYTES # BLD: 0.03 10*3/MM3 (ref 0–0.03)
IMM GRANULOCYTES NFR BLD: 0.3 % (ref 0–0.5)
KETONES UR QL STRIP: NEGATIVE
LEUKOCYTE ESTERASE UR QL STRIP.AUTO: NEGATIVE
LYMPHOCYTES # BLD AUTO: 2.25 10*3/MM3 (ref 1–3)
LYMPHOCYTES NFR BLD AUTO: 19.7 % (ref 21–51)
MCH RBC QN AUTO: 31.5 PG (ref 27–33)
MCHC RBC AUTO-ENTMCNC: 32.9 G/DL (ref 33–37)
MCV RBC AUTO: 95.5 FL (ref 80–94)
MONOCYTES # BLD AUTO: 0.83 10*3/MM3 (ref 0.1–0.9)
MONOCYTES NFR BLD AUTO: 7.2 % (ref 0–10)
NEUTROPHILS # BLD AUTO: 8.18 10*3/MM3 (ref 1.4–6.5)
NEUTROPHILS NFR BLD AUTO: 71.4 % (ref 30–70)
NITRITE UR QL STRIP: NEGATIVE
OSMOLALITY SERPL CALC.SUM OF ELEC: 282.1 MOSM/KG (ref 273–305)
PH UR STRIP.AUTO: 6 [PH] (ref 5–8)
PLATELET # BLD AUTO: 315 10*3/MM3 (ref 130–400)
PMV BLD AUTO: 9.8 FL (ref 6–10)
POTASSIUM BLD-SCNC: 3.7 MMOL/L (ref 3.5–5.3)
PROT SERPL-MCNC: 8 G/DL (ref 6–8)
PROT UR QL STRIP: NEGATIVE
RBC # BLD AUTO: 4.26 10*6/MM3 (ref 4.2–5.4)
RBC # UR: ABNORMAL /HPF
REF LAB TEST METHOD: ABNORMAL
SODIUM BLD-SCNC: 143 MMOL/L (ref 135–153)
SP GR UR STRIP: 1.01 (ref 1–1.03)
SQUAMOUS #/AREA URNS HPF: ABNORMAL /HPF
UROBILINOGEN UR QL STRIP: ABNORMAL
WBC NRBC COR # BLD: 11.45 10*3/MM3 (ref 4.5–12.5)
WBC UR QL AUTO: ABNORMAL /HPF

## 2017-07-24 PROCEDURE — 99283 EMERGENCY DEPT VISIT LOW MDM: CPT

## 2017-07-24 PROCEDURE — 87086 URINE CULTURE/COLONY COUNT: CPT | Performed by: EMERGENCY MEDICINE

## 2017-07-24 PROCEDURE — 80053 COMPREHEN METABOLIC PANEL: CPT | Performed by: EMERGENCY MEDICINE

## 2017-07-24 PROCEDURE — 96372 THER/PROPH/DIAG INJ SC/IM: CPT

## 2017-07-24 PROCEDURE — 36415 COLL VENOUS BLD VENIPUNCTURE: CPT

## 2017-07-24 PROCEDURE — 85025 COMPLETE CBC W/AUTO DIFF WBC: CPT | Performed by: EMERGENCY MEDICINE

## 2017-07-24 PROCEDURE — 81001 URINALYSIS AUTO W/SCOPE: CPT | Performed by: EMERGENCY MEDICINE

## 2017-07-24 PROCEDURE — 25010000002 HYDROMORPHONE PER 4 MG: Performed by: EMERGENCY MEDICINE

## 2017-07-24 RX ORDER — HYDROMORPHONE HCL 110MG/55ML
2 PATIENT CONTROLLED ANALGESIA SYRINGE INTRAVENOUS ONCE
Status: COMPLETED | OUTPATIENT
Start: 2017-07-24 | End: 2017-07-24

## 2017-07-24 RX ORDER — KETOROLAC TROMETHAMINE 10 MG/1
10 TABLET, FILM COATED ORAL EVERY 6 HOURS PRN
Qty: 16 TABLET | Refills: 0 | Status: SHIPPED | OUTPATIENT
Start: 2017-07-24 | End: 2018-01-18

## 2017-07-24 RX ADMIN — HYDROMORPHONE HYDROCHLORIDE 2 MG: 2 INJECTION INTRAMUSCULAR; INTRAVENOUS; SUBCUTANEOUS at 14:43

## 2017-07-24 NOTE — ED NOTES
Lower abdominal and flank pain. Chronic issues with kidney stones.     Christy Velarde RN  07/24/17 0646

## 2017-07-24 NOTE — ED PROVIDER NOTES
Subjective   Patient is a 41 y.o. female presenting with female genitourinary complaint.   History provided by:  Patient  Female  Problem   Primary symptoms include dysuria. This is a recurrent problem. The problem occurs constantly. The problem has been gradually worsening. She is not pregnant. Associated symptoms include abdominal swelling, abdominal pain and nausea.       Review of Systems   Constitutional: Negative.  Negative for fever.   HENT: Negative.    Respiratory: Negative.    Cardiovascular: Negative.  Negative for chest pain.   Gastrointestinal: Positive for abdominal pain and nausea.   Endocrine: Negative.    Genitourinary: Positive for dysuria and hematuria.   Skin: Negative.    Neurological: Negative.    Psychiatric/Behavioral: Negative.    All other systems reviewed and are negative.      Past Medical History:   Diagnosis Date   • Kidney stones    • Migraine    • Pneumonia    • Strep throat        Allergies   Allergen Reactions   • Compazine [Prochlorperazine Edisylate]    • Sulfa Antibiotics        Past Surgical History:   Procedure Laterality Date   •  SECTION     • COLONOSCOPY     • CYSTOSCOPY ELECTROHYDRAULIC LITHOTRIPSY     • DENTAL PROCEDURE     • KIDNEY STONE SURGERY         History reviewed. No pertinent family history.    Social History     Social History   • Marital status:      Spouse name: N/A   • Number of children: N/A   • Years of education: N/A     Social History Main Topics   • Smoking status: Current Every Day Smoker     Packs/day: 0.50     Types: Cigarettes   • Smokeless tobacco: Never Used   • Alcohol use No   • Drug use: No   • Sexual activity: Defer     Other Topics Concern   • None     Social History Narrative           Objective   Physical Exam   Constitutional: She is oriented to person, place, and time. She appears well-developed and well-nourished. No distress.   HENT:   Head: Normocephalic and atraumatic.   Right Ear: External ear normal.   Left Ear:  External ear normal.   Nose: Nose normal.   Eyes: Conjunctivae and EOM are normal. Pupils are equal, round, and reactive to light.   Neck: Normal range of motion. Neck supple. No JVD present. No tracheal deviation present.   Cardiovascular: Normal rate, regular rhythm and normal heart sounds.    No murmur heard.  Pulmonary/Chest: Effort normal and breath sounds normal. No respiratory distress. She has no wheezes.   Abdominal: Soft. Bowel sounds are normal. There is no tenderness.   Tender flank to palpation   Musculoskeletal: Normal range of motion. She exhibits no edema or deformity.   Neurological: She is alert and oriented to person, place, and time. No cranial nerve deficit.   Skin: Skin is warm and dry. No rash noted. She is not diaphoretic. No erythema. No pallor.   Psychiatric: She has a normal mood and affect. Her behavior is normal. Thought content normal.   Nursing note and vitals reviewed.      Procedures         ED Course  ED Course                  MDM    Final diagnoses:   Ureteral stone            Sudeep Pearson MD  07/26/17 2139       Sudeep Pearson MD  07/26/17 2139

## 2017-07-27 LAB — BACTERIA SPEC AEROBE CULT: NORMAL

## 2017-07-28 ENCOUNTER — HOSPITAL ENCOUNTER (EMERGENCY)
Facility: HOSPITAL | Age: 42
Discharge: HOME | End: 2017-07-28
Attending: EMERGENCY MEDICINE | Admitting: EMERGENCY MEDICINE

## 2017-07-28 ENCOUNTER — APPOINTMENT (OUTPATIENT)
Dept: GENERAL RADIOLOGY | Facility: HOSPITAL | Age: 42
End: 2017-07-28

## 2017-07-28 ENCOUNTER — APPOINTMENT (OUTPATIENT)
Dept: ULTRASOUND IMAGING | Facility: HOSPITAL | Age: 42
End: 2017-07-28

## 2017-07-28 VITALS
HEART RATE: 93 BPM | SYSTOLIC BLOOD PRESSURE: 123 MMHG | BODY MASS INDEX: 33.13 KG/M2 | TEMPERATURE: 98.3 F | DIASTOLIC BLOOD PRESSURE: 79 MMHG | RESPIRATION RATE: 17 BRPM | WEIGHT: 180 LBS | HEIGHT: 62 IN | OXYGEN SATURATION: 100 %

## 2017-07-28 DIAGNOSIS — R31.9 HEMATURIA, UNDIAGNOSED CAUSE: Primary | ICD-10-CM

## 2017-07-28 DIAGNOSIS — R10.32 LEFT LOWER QUADRANT PAIN: ICD-10-CM

## 2017-07-28 LAB
ALBUMIN SERPL-MCNC: 4.5 G/DL (ref 3.5–5)
ALBUMIN/GLOB SERPL: 1.5 G/DL (ref 1.5–2.5)
ALP SERPL-CCNC: 73 U/L (ref 35–104)
ALT SERPL W P-5'-P-CCNC: 19 U/L (ref 10–36)
ANION GAP SERPL CALCULATED.3IONS-SCNC: 5.1 MMOL/L (ref 3.6–11.2)
AST SERPL-CCNC: 33 U/L (ref 10–30)
BACTERIA UR QL AUTO: ABNORMAL /HPF
BASOPHILS # BLD AUTO: 0.03 10*3/MM3 (ref 0–0.3)
BASOPHILS NFR BLD AUTO: 0.3 % (ref 0–2)
BILIRUB SERPL-MCNC: 0.4 MG/DL (ref 0.2–1.8)
BILIRUB UR QL STRIP: NEGATIVE
BUN BLD-MCNC: 8 MG/DL (ref 7–21)
BUN/CREAT SERPL: 9.8 (ref 7–25)
CALCIUM SPEC-SCNC: 9.3 MG/DL (ref 7.7–10)
CHLORIDE SERPL-SCNC: 110 MMOL/L (ref 99–112)
CLARITY UR: ABNORMAL
CO2 SERPL-SCNC: 24.9 MMOL/L (ref 24.3–31.9)
COLOR UR: ABNORMAL
CREAT BLD-MCNC: 0.82 MG/DL (ref 0.43–1.29)
DEPRECATED RDW RBC AUTO: 43.4 FL (ref 37–54)
EOSINOPHIL # BLD AUTO: 0.18 10*3/MM3 (ref 0–0.7)
EOSINOPHIL NFR BLD AUTO: 1.6 % (ref 0–5)
ERYTHROCYTE [DISTWIDTH] IN BLOOD BY AUTOMATED COUNT: 13 % (ref 11.5–14.5)
GFR SERPL CREATININE-BSD FRML MDRD: 77 ML/MIN/1.73
GLOBULIN UR ELPH-MCNC: 3.1 GM/DL
GLUCOSE BLD-MCNC: 115 MG/DL (ref 70–110)
GLUCOSE UR STRIP-MCNC: NEGATIVE MG/DL
HCT VFR BLD AUTO: 40.6 % (ref 37–47)
HGB BLD-MCNC: 13.4 G/DL (ref 12–16)
HGB UR QL STRIP.AUTO: ABNORMAL
HYALINE CASTS UR QL AUTO: ABNORMAL /LPF
IMM GRANULOCYTES # BLD: 0.06 10*3/MM3 (ref 0–0.03)
IMM GRANULOCYTES NFR BLD: 0.5 % (ref 0–0.5)
KETONES UR QL STRIP: NEGATIVE
LEUKOCYTE ESTERASE UR QL STRIP.AUTO: ABNORMAL
LYMPHOCYTES # BLD AUTO: 2.1 10*3/MM3 (ref 1–3)
LYMPHOCYTES NFR BLD AUTO: 18.4 % (ref 21–51)
MCH RBC QN AUTO: 31.3 PG (ref 27–33)
MCHC RBC AUTO-ENTMCNC: 33 G/DL (ref 33–37)
MCV RBC AUTO: 94.9 FL (ref 80–94)
MONOCYTES # BLD AUTO: 0.88 10*3/MM3 (ref 0.1–0.9)
MONOCYTES NFR BLD AUTO: 7.7 % (ref 0–10)
NEUTROPHILS # BLD AUTO: 8.17 10*3/MM3 (ref 1.4–6.5)
NEUTROPHILS NFR BLD AUTO: 71.5 % (ref 30–70)
NITRITE UR QL STRIP: NEGATIVE
OSMOLALITY SERPL CALC.SUM OF ELEC: 278.6 MOSM/KG (ref 273–305)
PH UR STRIP.AUTO: 8 [PH] (ref 5–8)
PLATELET # BLD AUTO: 314 10*3/MM3 (ref 130–400)
PMV BLD AUTO: 9.4 FL (ref 6–10)
POTASSIUM BLD-SCNC: 4.9 MMOL/L (ref 3.5–5.3)
PROT SERPL-MCNC: 7.6 G/DL (ref 6–8)
PROT UR QL STRIP: NEGATIVE
RBC # BLD AUTO: 4.28 10*6/MM3 (ref 4.2–5.4)
RBC # UR: ABNORMAL /HPF
REF LAB TEST METHOD: ABNORMAL
SODIUM BLD-SCNC: 140 MMOL/L (ref 135–153)
SP GR UR STRIP: 1.02 (ref 1–1.03)
SQUAMOUS #/AREA URNS HPF: ABNORMAL /HPF
UROBILINOGEN UR QL STRIP: ABNORMAL
WBC NRBC COR # BLD: 11.42 10*3/MM3 (ref 4.5–12.5)
WBC UR QL AUTO: ABNORMAL /HPF

## 2017-07-28 PROCEDURE — 80053 COMPREHEN METABOLIC PANEL: CPT | Performed by: EMERGENCY MEDICINE

## 2017-07-28 PROCEDURE — 25010000002 DICYCLOMINE PER 20 MG: Performed by: EMERGENCY MEDICINE

## 2017-07-28 PROCEDURE — 85025 COMPLETE CBC W/AUTO DIFF WBC: CPT | Performed by: EMERGENCY MEDICINE

## 2017-07-28 PROCEDURE — 74000 HC ABDOMEN KUB: CPT

## 2017-07-28 PROCEDURE — 25010000002 HYDROMORPHONE PER 4 MG

## 2017-07-28 PROCEDURE — 96372 THER/PROPH/DIAG INJ SC/IM: CPT

## 2017-07-28 PROCEDURE — 76775 US EXAM ABDO BACK WALL LIM: CPT | Performed by: RADIOLOGY

## 2017-07-28 PROCEDURE — 81001 URINALYSIS AUTO W/SCOPE: CPT | Performed by: EMERGENCY MEDICINE

## 2017-07-28 PROCEDURE — 25010000002 HYDROMORPHONE PER 4 MG: Performed by: EMERGENCY MEDICINE

## 2017-07-28 PROCEDURE — 76775 US EXAM ABDO BACK WALL LIM: CPT

## 2017-07-28 PROCEDURE — 96376 TX/PRO/DX INJ SAME DRUG ADON: CPT

## 2017-07-28 PROCEDURE — 87086 URINE CULTURE/COLONY COUNT: CPT | Performed by: EMERGENCY MEDICINE

## 2017-07-28 PROCEDURE — 96375 TX/PRO/DX INJ NEW DRUG ADDON: CPT

## 2017-07-28 PROCEDURE — 96374 THER/PROPH/DIAG INJ IV PUSH: CPT

## 2017-07-28 PROCEDURE — 99283 EMERGENCY DEPT VISIT LOW MDM: CPT

## 2017-07-28 PROCEDURE — 74000 XR ABDOMEN KUB: CPT | Performed by: RADIOLOGY

## 2017-07-28 PROCEDURE — 96361 HYDRATE IV INFUSION ADD-ON: CPT

## 2017-07-28 PROCEDURE — 25010000002 KETOROLAC TROMETHAMINE PER 15 MG: Performed by: EMERGENCY MEDICINE

## 2017-07-28 RX ORDER — PHENAZOPYRIDINE HYDROCHLORIDE 200 MG/1
200 TABLET, FILM COATED ORAL ONCE
Status: COMPLETED | OUTPATIENT
Start: 2017-07-28 | End: 2017-07-28

## 2017-07-28 RX ORDER — NITROFURANTOIN 25; 75 MG/1; MG/1
100 CAPSULE ORAL ONCE
Status: COMPLETED | OUTPATIENT
Start: 2017-07-28 | End: 2017-07-28

## 2017-07-28 RX ORDER — DICYCLOMINE HYDROCHLORIDE 10 MG/ML
20 INJECTION INTRAMUSCULAR ONCE
Status: COMPLETED | OUTPATIENT
Start: 2017-07-28 | End: 2017-07-28

## 2017-07-28 RX ORDER — NITROFURANTOIN 25; 75 MG/1; MG/1
100 CAPSULE ORAL 2 TIMES DAILY
Qty: 14 CAPSULE | Refills: 0 | Status: SHIPPED | OUTPATIENT
Start: 2017-07-28 | End: 2017-08-04

## 2017-07-28 RX ORDER — KETOROLAC TROMETHAMINE 30 MG/ML
30 INJECTION, SOLUTION INTRAMUSCULAR; INTRAVENOUS ONCE
Status: COMPLETED | OUTPATIENT
Start: 2017-07-28 | End: 2017-07-28

## 2017-07-28 RX ADMIN — HYDROMORPHONE HYDROCHLORIDE 1 MG: 1 INJECTION, SOLUTION INTRAMUSCULAR; INTRAVENOUS; SUBCUTANEOUS at 12:55

## 2017-07-28 RX ADMIN — DICYCLOMINE HYDROCHLORIDE 20 MG: 20 INJECTION, SOLUTION INTRAMUSCULAR at 12:58

## 2017-07-28 RX ADMIN — HYDROMORPHONE HYDROCHLORIDE 1 MG: 1 INJECTION, SOLUTION INTRAMUSCULAR; INTRAVENOUS; SUBCUTANEOUS at 09:57

## 2017-07-28 RX ADMIN — SODIUM CHLORIDE 1000 ML: 9 INJECTION, SOLUTION INTRAVENOUS at 09:39

## 2017-07-28 RX ADMIN — Medication 1 MG: at 09:57

## 2017-07-28 RX ADMIN — PHENAZOPYRIDINE HYDROCHLORIDE 200 MG: 200 TABLET, FILM COATED ORAL at 11:13

## 2017-07-28 RX ADMIN — NITROFURANTOIN MONOHYDRATE/MACROCRYSTALLINE 100 MG: 25; 75 CAPSULE ORAL at 12:54

## 2017-07-28 RX ADMIN — KETOROLAC TROMETHAMINE 30 MG: 30 INJECTION, SOLUTION INTRAMUSCULAR at 09:38

## 2017-07-30 LAB — BACTERIA SPEC AEROBE CULT: NORMAL

## 2017-08-15 ENCOUNTER — HOSPITAL ENCOUNTER (EMERGENCY)
Facility: HOSPITAL | Age: 42
Discharge: HOME | End: 2017-08-15
Admitting: FAMILY MEDICINE

## 2017-08-15 VITALS
HEIGHT: 63 IN | HEART RATE: 103 BPM | WEIGHT: 180 LBS | OXYGEN SATURATION: 99 % | SYSTOLIC BLOOD PRESSURE: 145 MMHG | DIASTOLIC BLOOD PRESSURE: 89 MMHG | RESPIRATION RATE: 20 BRPM | BODY MASS INDEX: 31.89 KG/M2 | TEMPERATURE: 98.3 F

## 2017-08-15 DIAGNOSIS — R10.9 FLANK PAIN: ICD-10-CM

## 2017-08-15 DIAGNOSIS — N39.0 ACUTE UTI: Primary | ICD-10-CM

## 2017-08-15 LAB
ALBUMIN SERPL-MCNC: 4.6 G/DL (ref 3.5–5)
ALBUMIN/GLOB SERPL: 1.4 G/DL (ref 1.5–2.5)
ALP SERPL-CCNC: 74 U/L (ref 35–104)
ALT SERPL W P-5'-P-CCNC: 14 U/L (ref 10–36)
ANION GAP SERPL CALCULATED.3IONS-SCNC: 7.7 MMOL/L (ref 3.6–11.2)
AST SERPL-CCNC: 19 U/L (ref 10–30)
BACTERIA UR QL AUTO: ABNORMAL /HPF
BASOPHILS # BLD AUTO: 0.02 10*3/MM3 (ref 0–0.3)
BASOPHILS NFR BLD AUTO: 0.2 % (ref 0–2)
BILIRUB SERPL-MCNC: 0.5 MG/DL (ref 0.2–1.8)
BILIRUB UR QL STRIP: ABNORMAL
BUN BLD-MCNC: 10 MG/DL (ref 7–21)
BUN/CREAT SERPL: 13.3 (ref 7–25)
CALCIUM SPEC-SCNC: 9.6 MG/DL (ref 7.7–10)
CHLORIDE SERPL-SCNC: 108 MMOL/L (ref 99–112)
CLARITY UR: ABNORMAL
CO2 SERPL-SCNC: 22.3 MMOL/L (ref 24.3–31.9)
COLOR UR: ABNORMAL
CREAT BLD-MCNC: 0.75 MG/DL (ref 0.43–1.29)
DEPRECATED RDW RBC AUTO: 42.6 FL (ref 37–54)
EOSINOPHIL # BLD AUTO: 0.14 10*3/MM3 (ref 0–0.7)
EOSINOPHIL NFR BLD AUTO: 1.5 % (ref 0–5)
ERYTHROCYTE [DISTWIDTH] IN BLOOD BY AUTOMATED COUNT: 12.5 % (ref 11.5–14.5)
GFR SERPL CREATININE-BSD FRML MDRD: 86 ML/MIN/1.73
GLOBULIN UR ELPH-MCNC: 3.4 GM/DL
GLUCOSE BLD-MCNC: 96 MG/DL (ref 70–110)
GLUCOSE UR STRIP-MCNC: NEGATIVE MG/DL
HCT VFR BLD AUTO: 40.2 % (ref 37–47)
HGB BLD-MCNC: 13.2 G/DL (ref 12–16)
HGB UR QL STRIP.AUTO: ABNORMAL
HYALINE CASTS UR QL AUTO: ABNORMAL /LPF
IMM GRANULOCYTES # BLD: 0.02 10*3/MM3 (ref 0–0.03)
IMM GRANULOCYTES NFR BLD: 0.2 % (ref 0–0.5)
KETONES UR QL STRIP: NEGATIVE
LEUKOCYTE ESTERASE UR QL STRIP.AUTO: ABNORMAL
LYMPHOCYTES # BLD AUTO: 2.22 10*3/MM3 (ref 1–3)
LYMPHOCYTES NFR BLD AUTO: 24.1 % (ref 21–51)
MCH RBC QN AUTO: 31.6 PG (ref 27–33)
MCHC RBC AUTO-ENTMCNC: 32.8 G/DL (ref 33–37)
MCV RBC AUTO: 96.2 FL (ref 80–94)
MONOCYTES # BLD AUTO: 0.62 10*3/MM3 (ref 0.1–0.9)
MONOCYTES NFR BLD AUTO: 6.7 % (ref 0–10)
NEUTROPHILS # BLD AUTO: 6.21 10*3/MM3 (ref 1.4–6.5)
NEUTROPHILS NFR BLD AUTO: 67.3 % (ref 30–70)
NITRITE UR QL STRIP: POSITIVE
OSMOLALITY SERPL CALC.SUM OF ELEC: 274.6 MOSM/KG (ref 273–305)
PH UR STRIP.AUTO: <=5 [PH] (ref 5–8)
PLATELET # BLD AUTO: 321 10*3/MM3 (ref 130–400)
PMV BLD AUTO: 9.1 FL (ref 6–10)
POTASSIUM BLD-SCNC: 3.6 MMOL/L (ref 3.5–5.3)
PROT SERPL-MCNC: 8 G/DL (ref 6–8)
PROT UR QL STRIP: ABNORMAL
RBC # BLD AUTO: 4.18 10*6/MM3 (ref 4.2–5.4)
RBC # UR: ABNORMAL /HPF
REF LAB TEST METHOD: ABNORMAL
SODIUM BLD-SCNC: 138 MMOL/L (ref 135–153)
SP GR UR STRIP: >1.03 (ref 1–1.03)
SQUAMOUS #/AREA URNS HPF: ABNORMAL /HPF
UROBILINOGEN UR QL STRIP: ABNORMAL
WBC NRBC COR # BLD: 9.23 10*3/MM3 (ref 4.5–12.5)
WBC UR QL AUTO: ABNORMAL /HPF

## 2017-08-15 PROCEDURE — 85025 COMPLETE CBC W/AUTO DIFF WBC: CPT | Performed by: NURSE PRACTITIONER

## 2017-08-15 PROCEDURE — 99283 EMERGENCY DEPT VISIT LOW MDM: CPT

## 2017-08-15 PROCEDURE — 36415 COLL VENOUS BLD VENIPUNCTURE: CPT

## 2017-08-15 PROCEDURE — 81003 URINALYSIS AUTO W/O SCOPE: CPT | Performed by: NURSE PRACTITIONER

## 2017-08-15 PROCEDURE — 80053 COMPREHEN METABOLIC PANEL: CPT | Performed by: NURSE PRACTITIONER

## 2017-08-15 PROCEDURE — 87086 URINE CULTURE/COLONY COUNT: CPT | Performed by: NURSE PRACTITIONER

## 2017-08-15 PROCEDURE — 81001 URINALYSIS AUTO W/SCOPE: CPT | Performed by: NURSE PRACTITIONER

## 2017-08-15 RX ORDER — NITROFURANTOIN 25; 75 MG/1; MG/1
100 CAPSULE ORAL 2 TIMES DAILY
Qty: 14 CAPSULE | Refills: 0 | Status: SHIPPED | OUTPATIENT
Start: 2017-08-15 | End: 2017-08-22

## 2017-08-15 RX ORDER — OXYCODONE HYDROCHLORIDE AND ACETAMINOPHEN 5; 325 MG/1; MG/1
1 TABLET ORAL ONCE
Status: COMPLETED | OUTPATIENT
Start: 2017-08-15 | End: 2017-08-15

## 2017-08-15 RX ORDER — ONDANSETRON 4 MG/1
4 TABLET, ORALLY DISINTEGRATING ORAL EVERY 6 HOURS PRN
Qty: 12 TABLET | Refills: 0 | Status: SHIPPED | OUTPATIENT
Start: 2017-08-15 | End: 2018-06-12

## 2017-08-15 RX ORDER — ACETAMINOPHEN AND CODEINE PHOSPHATE 300; 30 MG/1; MG/1
1 TABLET ORAL EVERY 4 HOURS PRN
Qty: 16 TABLET | Refills: 0 | Status: SHIPPED | OUTPATIENT
Start: 2017-08-15 | End: 2018-06-12

## 2017-08-15 RX ORDER — TAMSULOSIN HYDROCHLORIDE 0.4 MG/1
1 CAPSULE ORAL DAILY
Qty: 15 CAPSULE | Refills: 0 | Status: SHIPPED | OUTPATIENT
Start: 2017-08-15 | End: 2018-04-18 | Stop reason: SDUPTHER

## 2017-08-15 RX ADMIN — OXYCODONE HYDROCHLORIDE AND ACETAMINOPHEN 1 TABLET: 5; 325 TABLET ORAL at 14:25

## 2017-08-17 LAB — BACTERIA SPEC AEROBE CULT: NORMAL

## 2017-10-04 ENCOUNTER — APPOINTMENT (OUTPATIENT)
Dept: CT IMAGING | Facility: HOSPITAL | Age: 42
End: 2017-10-04

## 2017-10-04 ENCOUNTER — HOSPITAL ENCOUNTER (EMERGENCY)
Facility: HOSPITAL | Age: 42
Discharge: HOME | End: 2017-10-04
Attending: EMERGENCY MEDICINE | Admitting: EMERGENCY MEDICINE

## 2017-10-04 VITALS
HEART RATE: 73 BPM | RESPIRATION RATE: 18 BRPM | DIASTOLIC BLOOD PRESSURE: 94 MMHG | OXYGEN SATURATION: 98 % | SYSTOLIC BLOOD PRESSURE: 125 MMHG | TEMPERATURE: 97.5 F | WEIGHT: 180 LBS | HEIGHT: 63 IN | BODY MASS INDEX: 31.89 KG/M2

## 2017-10-04 DIAGNOSIS — N20.0 RENAL CALCULI: Primary | ICD-10-CM

## 2017-10-04 LAB
ALBUMIN SERPL-MCNC: 5.2 G/DL (ref 3.5–5)
ALBUMIN/GLOB SERPL: 1.4 G/DL (ref 1.5–2.5)
ALP SERPL-CCNC: 87 U/L (ref 35–104)
ALT SERPL W P-5'-P-CCNC: 23 U/L (ref 10–36)
AMYLASE SERPL-CCNC: 33 U/L (ref 28–100)
ANION GAP SERPL CALCULATED.3IONS-SCNC: 6 MMOL/L (ref 3.6–11.2)
AST SERPL-CCNC: 23 U/L (ref 10–30)
B-HCG UR QL: NEGATIVE
BACTERIA UR QL AUTO: ABNORMAL /HPF
BASOPHILS # BLD AUTO: 0.01 10*3/MM3 (ref 0–0.3)
BASOPHILS NFR BLD AUTO: 0.1 % (ref 0–2)
BILIRUB SERPL-MCNC: 0.6 MG/DL (ref 0.2–1.8)
BILIRUB UR QL STRIP: NEGATIVE
BUN BLD-MCNC: 8 MG/DL (ref 7–21)
BUN/CREAT SERPL: 9.2 (ref 7–25)
CALCIUM SPEC-SCNC: 10.6 MG/DL (ref 7.7–10)
CHLORIDE SERPL-SCNC: 109 MMOL/L (ref 99–112)
CLARITY UR: CLEAR
CO2 SERPL-SCNC: 26 MMOL/L (ref 24.3–31.9)
COLOR UR: YELLOW
CREAT BLD-MCNC: 0.87 MG/DL (ref 0.43–1.29)
DEPRECATED RDW RBC AUTO: 44.3 FL (ref 37–54)
EOSINOPHIL # BLD AUTO: 0.12 10*3/MM3 (ref 0–0.7)
EOSINOPHIL NFR BLD AUTO: 0.9 % (ref 0–5)
ERYTHROCYTE [DISTWIDTH] IN BLOOD BY AUTOMATED COUNT: 12.9 % (ref 11.5–14.5)
GFR SERPL CREATININE-BSD FRML MDRD: 72 ML/MIN/1.73
GLOBULIN UR ELPH-MCNC: 3.7 GM/DL
GLUCOSE BLD-MCNC: 82 MG/DL (ref 70–110)
GLUCOSE UR STRIP-MCNC: NEGATIVE MG/DL
HCT VFR BLD AUTO: 44.7 % (ref 37–47)
HGB BLD-MCNC: 14.6 G/DL (ref 12–16)
HGB UR QL STRIP.AUTO: ABNORMAL
HOLD SPECIMEN: NORMAL
HOLD SPECIMEN: NORMAL
HYALINE CASTS UR QL AUTO: ABNORMAL /LPF
IMM GRANULOCYTES # BLD: 0.04 10*3/MM3 (ref 0–0.03)
IMM GRANULOCYTES NFR BLD: 0.3 % (ref 0–0.5)
KETONES UR QL STRIP: NEGATIVE
LEUKOCYTE ESTERASE UR QL STRIP.AUTO: ABNORMAL
LIPASE SERPL-CCNC: 34 U/L (ref 13–60)
LYMPHOCYTES # BLD AUTO: 3.09 10*3/MM3 (ref 1–3)
LYMPHOCYTES NFR BLD AUTO: 24.3 % (ref 21–51)
MCH RBC QN AUTO: 31.5 PG (ref 27–33)
MCHC RBC AUTO-ENTMCNC: 32.7 G/DL (ref 33–37)
MCV RBC AUTO: 96.3 FL (ref 80–94)
MONOCYTES # BLD AUTO: 0.78 10*3/MM3 (ref 0.1–0.9)
MONOCYTES NFR BLD AUTO: 6.1 % (ref 0–10)
NEUTROPHILS # BLD AUTO: 8.68 10*3/MM3 (ref 1.4–6.5)
NEUTROPHILS NFR BLD AUTO: 68.3 % (ref 30–70)
NITRITE UR QL STRIP: NEGATIVE
OSMOLALITY SERPL CALC.SUM OF ELEC: 278.7 MOSM/KG (ref 273–305)
PH UR STRIP.AUTO: 6 [PH] (ref 5–8)
PLATELET # BLD AUTO: 328 10*3/MM3 (ref 130–400)
PMV BLD AUTO: 9.5 FL (ref 6–10)
POTASSIUM BLD-SCNC: 3.6 MMOL/L (ref 3.5–5.3)
PROT SERPL-MCNC: 8.9 G/DL (ref 6–8)
PROT UR QL STRIP: NEGATIVE
RBC # BLD AUTO: 4.64 10*6/MM3 (ref 4.2–5.4)
RBC # UR: ABNORMAL /HPF
REF LAB TEST METHOD: ABNORMAL
SODIUM BLD-SCNC: 141 MMOL/L (ref 135–153)
SP GR UR STRIP: 1.02 (ref 1–1.03)
SQUAMOUS #/AREA URNS HPF: ABNORMAL /HPF
UROBILINOGEN UR QL STRIP: ABNORMAL
WBC NRBC COR # BLD: 12.72 10*3/MM3 (ref 4.5–12.5)
WBC UR QL AUTO: ABNORMAL /HPF
WHOLE BLOOD HOLD SPECIMEN: NORMAL
WHOLE BLOOD HOLD SPECIMEN: NORMAL

## 2017-10-04 PROCEDURE — 99283 EMERGENCY DEPT VISIT LOW MDM: CPT

## 2017-10-04 PROCEDURE — 80053 COMPREHEN METABOLIC PANEL: CPT | Performed by: EMERGENCY MEDICINE

## 2017-10-04 PROCEDURE — 83690 ASSAY OF LIPASE: CPT | Performed by: EMERGENCY MEDICINE

## 2017-10-04 PROCEDURE — 25010000002 ONDANSETRON PER 1 MG: Performed by: NURSE PRACTITIONER

## 2017-10-04 PROCEDURE — 81025 URINE PREGNANCY TEST: CPT | Performed by: EMERGENCY MEDICINE

## 2017-10-04 PROCEDURE — 25010000002 KETOROLAC TROMETHAMINE PER 15 MG: Performed by: NURSE PRACTITIONER

## 2017-10-04 PROCEDURE — 87086 URINE CULTURE/COLONY COUNT: CPT | Performed by: EMERGENCY MEDICINE

## 2017-10-04 PROCEDURE — 96374 THER/PROPH/DIAG INJ IV PUSH: CPT

## 2017-10-04 PROCEDURE — 96375 TX/PRO/DX INJ NEW DRUG ADDON: CPT

## 2017-10-04 PROCEDURE — 85025 COMPLETE CBC W/AUTO DIFF WBC: CPT | Performed by: EMERGENCY MEDICINE

## 2017-10-04 PROCEDURE — 74176 CT ABD & PELVIS W/O CONTRAST: CPT

## 2017-10-04 PROCEDURE — 82150 ASSAY OF AMYLASE: CPT | Performed by: NURSE PRACTITIONER

## 2017-10-04 PROCEDURE — 96361 HYDRATE IV INFUSION ADD-ON: CPT

## 2017-10-04 PROCEDURE — 74176 CT ABD & PELVIS W/O CONTRAST: CPT | Performed by: RADIOLOGY

## 2017-10-04 PROCEDURE — 81001 URINALYSIS AUTO W/SCOPE: CPT | Performed by: EMERGENCY MEDICINE

## 2017-10-04 RX ORDER — SODIUM CHLORIDE 0.9 % (FLUSH) 0.9 %
10 SYRINGE (ML) INJECTION AS NEEDED
Status: DISCONTINUED | OUTPATIENT
Start: 2017-10-04 | End: 2017-10-04 | Stop reason: HOSPADM

## 2017-10-04 RX ORDER — KETOROLAC TROMETHAMINE 30 MG/ML
30 INJECTION, SOLUTION INTRAMUSCULAR; INTRAVENOUS ONCE
Status: COMPLETED | OUTPATIENT
Start: 2017-10-04 | End: 2017-10-04

## 2017-10-04 RX ORDER — ONDANSETRON 2 MG/ML
4 INJECTION INTRAMUSCULAR; INTRAVENOUS ONCE
Status: COMPLETED | OUTPATIENT
Start: 2017-10-04 | End: 2017-10-04

## 2017-10-04 RX ADMIN — KETOROLAC TROMETHAMINE 30 MG: 30 INJECTION, SOLUTION INTRAMUSCULAR at 16:48

## 2017-10-04 RX ADMIN — SODIUM CHLORIDE 1000 ML: 9 INJECTION, SOLUTION INTRAVENOUS at 16:45

## 2017-10-04 RX ADMIN — ONDANSETRON 4 MG: 2 INJECTION INTRAMUSCULAR; INTRAVENOUS at 16:46

## 2017-10-04 NOTE — DISCHARGE INSTRUCTIONS

## 2017-10-06 LAB — BACTERIA SPEC AEROBE CULT: NORMAL

## 2018-01-18 ENCOUNTER — HOSPITAL ENCOUNTER (EMERGENCY)
Facility: HOSPITAL | Age: 43
Discharge: HOME | End: 2018-01-18
Attending: EMERGENCY MEDICINE | Admitting: EMERGENCY MEDICINE

## 2018-01-18 ENCOUNTER — APPOINTMENT (OUTPATIENT)
Dept: CT IMAGING | Facility: HOSPITAL | Age: 43
End: 2018-01-18

## 2018-01-18 VITALS
BODY MASS INDEX: 31.18 KG/M2 | HEIGHT: 63 IN | SYSTOLIC BLOOD PRESSURE: 110 MMHG | RESPIRATION RATE: 18 BRPM | HEART RATE: 94 BPM | OXYGEN SATURATION: 100 % | WEIGHT: 176 LBS | DIASTOLIC BLOOD PRESSURE: 84 MMHG | TEMPERATURE: 98.6 F

## 2018-01-18 DIAGNOSIS — R91.1 LUNG NODULE: ICD-10-CM

## 2018-01-18 DIAGNOSIS — M54.12 CERVICAL RADICULOPATHY: Primary | ICD-10-CM

## 2018-01-18 LAB
B-HCG UR QL: NEGATIVE
BACTERIA UR QL AUTO: ABNORMAL /HPF
BILIRUB UR QL STRIP: NEGATIVE
CLARITY UR: CLEAR
COLOR UR: YELLOW
GLUCOSE UR STRIP-MCNC: NEGATIVE MG/DL
HGB UR QL STRIP.AUTO: ABNORMAL
HYALINE CASTS UR QL AUTO: ABNORMAL /LPF
KETONES UR QL STRIP: NEGATIVE
LEUKOCYTE ESTERASE UR QL STRIP.AUTO: NEGATIVE
NITRITE UR QL STRIP: NEGATIVE
PH UR STRIP.AUTO: 7.5 [PH] (ref 5–8)
PROT UR QL STRIP: NEGATIVE
RBC # UR: ABNORMAL /HPF
REF LAB TEST METHOD: ABNORMAL
SP GR UR STRIP: 1.01 (ref 1–1.03)
SQUAMOUS #/AREA URNS HPF: ABNORMAL /HPF
UROBILINOGEN UR QL STRIP: ABNORMAL
WBC UR QL AUTO: ABNORMAL /HPF

## 2018-01-18 PROCEDURE — 81025 URINE PREGNANCY TEST: CPT | Performed by: PHYSICIAN ASSISTANT

## 2018-01-18 PROCEDURE — 72128 CT CHEST SPINE W/O DYE: CPT | Performed by: RADIOLOGY

## 2018-01-18 PROCEDURE — 87086 URINE CULTURE/COLONY COUNT: CPT | Performed by: PHYSICIAN ASSISTANT

## 2018-01-18 PROCEDURE — 96372 THER/PROPH/DIAG INJ SC/IM: CPT

## 2018-01-18 PROCEDURE — 99283 EMERGENCY DEPT VISIT LOW MDM: CPT

## 2018-01-18 PROCEDURE — 72128 CT CHEST SPINE W/O DYE: CPT

## 2018-01-18 PROCEDURE — 81001 URINALYSIS AUTO W/SCOPE: CPT | Performed by: PHYSICIAN ASSISTANT

## 2018-01-18 PROCEDURE — 25010000002 ORPHENADRINE CITRATE PER 60 MG: Performed by: PHYSICIAN ASSISTANT

## 2018-01-18 PROCEDURE — 72125 CT NECK SPINE W/O DYE: CPT | Performed by: RADIOLOGY

## 2018-01-18 PROCEDURE — 72125 CT NECK SPINE W/O DYE: CPT

## 2018-01-18 RX ORDER — ORPHENADRINE CITRATE 30 MG/ML
60 INJECTION INTRAMUSCULAR; INTRAVENOUS ONCE
Status: COMPLETED | OUTPATIENT
Start: 2018-01-18 | End: 2018-01-18

## 2018-01-18 RX ORDER — CYCLOBENZAPRINE HCL 10 MG
10 TABLET ORAL 3 TIMES DAILY PRN
Qty: 15 TABLET | Refills: 0 | Status: SHIPPED | OUTPATIENT
Start: 2018-01-18 | End: 2018-08-17

## 2018-01-18 RX ORDER — OXYCODONE HYDROCHLORIDE AND ACETAMINOPHEN 5; 325 MG/1; MG/1
1 TABLET ORAL ONCE
Status: COMPLETED | OUTPATIENT
Start: 2018-01-18 | End: 2018-01-18

## 2018-01-18 RX ADMIN — OXYCODONE HYDROCHLORIDE AND ACETAMINOPHEN 1 TABLET: 5; 325 TABLET ORAL at 10:33

## 2018-01-18 RX ADMIN — ORPHENADRINE CITRATE 60 MG: 30 INJECTION INTRAMUSCULAR; INTRAVENOUS at 11:18

## 2018-01-18 NOTE — ED NOTES
Patient report she is still having pain after her percocet. Notified Mika French RN  01/18/18 8000

## 2018-01-18 NOTE — ED PROVIDER NOTES
Subjective   Patient is a 42 y.o. female presenting with fall.   Fall   Mechanism of injury: fall    Injury location:  Head/neck  Head/neck injury location:  L neck  Time since incident:  11 days  Arrived directly from scene: no    Fall:     Fall occurred: Patient reports that she slipped and fell at home.  She try to get herself with her left arm.  She states that she has injured her left neck that radiates into her left arm into her fourth finger.  She is followed by neurology.      Impact surface:  Hard floor    Entrapped after fall: no    Suspicion of alcohol use: no    Suspicion of drug use: no    Associated symptoms: neck pain    Associated symptoms: no abdominal pain, no back pain, no chest pain and no loss of consciousness    Associated symptoms comment:  She denies any back injury at this time.  Her complaint is her neck that radiates into her left arm.  She voices no other complaints at this time.      Review of Systems   Constitutional: Negative.  Negative for fever.   HENT: Negative.    Respiratory: Negative.    Cardiovascular: Negative.  Negative for chest pain.   Gastrointestinal: Negative.  Negative for abdominal pain.   Endocrine: Negative.    Genitourinary: Negative.  Negative for dysuria.   Musculoskeletal: Positive for neck pain. Negative for back pain.   Skin: Negative.    Neurological: Negative.  Negative for loss of consciousness.   Psychiatric/Behavioral: Negative.    All other systems reviewed and are negative.      Past Medical History:   Diagnosis Date   • Kidney stones    • Migraine    • Pneumonia    • Strep throat        Allergies   Allergen Reactions   • Compazine [Prochlorperazine Edisylate]    • Sulfa Antibiotics        Past Surgical History:   Procedure Laterality Date   •  SECTION     • COLONOSCOPY     • CYSTOSCOPY ELECTROHYDRAULIC LITHOTRIPSY     • DENTAL PROCEDURE     • KIDNEY STONE SURGERY         History reviewed. No pertinent family history.    Social History     Social  History   • Marital status:      Spouse name: N/A   • Number of children: N/A   • Years of education: N/A     Social History Main Topics   • Smoking status: Current Every Day Smoker     Packs/day: 0.50     Types: Cigarettes   • Smokeless tobacco: Never Used   • Alcohol use No   • Drug use: No   • Sexual activity: Defer     Other Topics Concern   • None     Social History Narrative           Objective   Physical Exam   Constitutional: She is oriented to person, place, and time. She appears well-developed and well-nourished. No distress.   HENT:   Head: Normocephalic and atraumatic.   Right Ear: External ear normal.   Left Ear: External ear normal.   Nose: Nose normal.   Eyes: Conjunctivae and EOM are normal. Pupils are equal, round, and reactive to light.   Neck: Normal range of motion. Neck supple. No JVD present. No tracheal deviation present.   Cardiovascular: Normal rate, regular rhythm and normal heart sounds.    No murmur heard.  Pulmonary/Chest: Effort normal and breath sounds normal. No respiratory distress. She has no wheezes.   Abdominal: Soft. Bowel sounds are normal. There is no tenderness.   Musculoskeletal: Normal range of motion. She exhibits tenderness (over her lower C-spine/upper T-spine). She exhibits no edema or deformity.   Neurological: She is alert and oriented to person, place, and time. No cranial nerve deficit.   Skin: Skin is warm and dry. No rash noted. She is not diaphoretic. No erythema. No pallor.   Psychiatric: She has a normal mood and affect. Her behavior is normal. Thought content normal.   Nursing note and vitals reviewed.      Procedures         ED Course  ED Course   Comment By Time   Patient was counseled about the CT findings concerning for an AVM.  She was given the number for pulmonology.  She is also counseled that she could follow primary care on this.  She is not coughing up blood.  She voices no other complaint other than the pain in her arm and neck Ramírez CASTRO  CRISTINO Buitrago 01/18 1208                  MDM  Number of Diagnoses or Management Options  Cervical radiculopathy: new and requires workup  Lung nodule: new and requires workup     Amount and/or Complexity of Data Reviewed  Tests in the radiology section of CPT®: ordered and reviewed  Discuss the patient with other providers: yes  Independent visualization of images, tracings, or specimens: yes    Risk of Complications, Morbidity, and/or Mortality  Presenting problems: moderate        Final diagnoses:   Cervical radiculopathy   Lung nodule            CRISTINO Pena  01/18/18 1248

## 2018-01-18 NOTE — ED NOTES
Patient states she fell last Sunday at home on her living room floor and caught herself with her left arm and states she has been hurting in her left shoulder and down her left arm since. Patient state she went to her neurologist and was given a shot into her shoulder and that really didn't help her. Patient states the pain from her left shoulder and back shoots pain down her left arm and into her hand and to her 4th finger. Patient states it hurts to move her arm any way and states the pain is there wether she moves her extremity of not. Patient is tearful and rates her pain at a 10.  Patient resting quietly on stretcher. Pt AAOx4. No respiratory distress noted. Respirations even and unlabored. Pt denies any needs at this time. Skin PWD. Will continue to monitor and follow plan of care. Bed rails up x 2, bed in lowest position, call light within reach.      Eva French RN  01/18/18 7933

## 2018-01-19 ENCOUNTER — TELEPHONE (OUTPATIENT)
Dept: GENERAL RADIOLOGY | Facility: HOSPITAL | Age: 43
End: 2018-01-19

## 2018-01-20 LAB — BACTERIA SPEC AEROBE CULT: NORMAL

## 2018-04-18 ENCOUNTER — HOSPITAL ENCOUNTER (EMERGENCY)
Facility: HOSPITAL | Age: 43
Discharge: HOME | End: 2018-04-18
Attending: EMERGENCY MEDICINE | Admitting: EMERGENCY MEDICINE

## 2018-04-18 ENCOUNTER — APPOINTMENT (OUTPATIENT)
Dept: CT IMAGING | Facility: HOSPITAL | Age: 43
End: 2018-04-18

## 2018-04-18 VITALS
HEART RATE: 80 BPM | WEIGHT: 172 LBS | BODY MASS INDEX: 30.48 KG/M2 | DIASTOLIC BLOOD PRESSURE: 70 MMHG | HEIGHT: 63 IN | TEMPERATURE: 98 F | SYSTOLIC BLOOD PRESSURE: 120 MMHG | OXYGEN SATURATION: 99 % | RESPIRATION RATE: 16 BRPM

## 2018-04-18 DIAGNOSIS — R10.9 FLANK PAIN: Primary | ICD-10-CM

## 2018-04-18 LAB
ALBUMIN SERPL-MCNC: 4.5 G/DL (ref 3.5–5)
ALBUMIN/GLOB SERPL: 1.5 G/DL (ref 1.5–2.5)
ALP SERPL-CCNC: 72 U/L (ref 35–104)
ALT SERPL W P-5'-P-CCNC: 27 U/L (ref 10–36)
ANION GAP SERPL CALCULATED.3IONS-SCNC: 8.7 MMOL/L (ref 3.6–11.2)
AST SERPL-CCNC: 31 U/L (ref 10–30)
BACTERIA UR QL AUTO: ABNORMAL /HPF
BASOPHILS # BLD AUTO: 0.02 10*3/MM3 (ref 0–0.3)
BASOPHILS NFR BLD AUTO: 0.2 % (ref 0–2)
BILIRUB SERPL-MCNC: 0.3 MG/DL (ref 0.2–1.8)
BILIRUB UR QL STRIP: NEGATIVE
BUN BLD-MCNC: 13 MG/DL (ref 7–21)
BUN/CREAT SERPL: 12.5 (ref 7–25)
CALCIUM SPEC-SCNC: 9.8 MG/DL (ref 7.7–10)
CHLORIDE SERPL-SCNC: 108 MMOL/L (ref 99–112)
CLARITY UR: ABNORMAL
CO2 SERPL-SCNC: 22.3 MMOL/L (ref 24.3–31.9)
COLOR UR: YELLOW
CREAT BLD-MCNC: 1.04 MG/DL (ref 0.43–1.29)
DEPRECATED RDW RBC AUTO: 41.5 FL (ref 37–54)
EOSINOPHIL # BLD AUTO: 0.14 10*3/MM3 (ref 0–0.7)
EOSINOPHIL NFR BLD AUTO: 1.6 % (ref 0–5)
ERYTHROCYTE [DISTWIDTH] IN BLOOD BY AUTOMATED COUNT: 12.6 % (ref 11.5–14.5)
GFR SERPL CREATININE-BSD FRML MDRD: 58 ML/MIN/1.73
GLOBULIN UR ELPH-MCNC: 3.1 GM/DL
GLUCOSE BLD-MCNC: 84 MG/DL (ref 70–110)
GLUCOSE UR STRIP-MCNC: NEGATIVE MG/DL
HCT VFR BLD AUTO: 41.6 % (ref 37–47)
HGB BLD-MCNC: 13.9 G/DL (ref 12–16)
HGB UR QL STRIP.AUTO: ABNORMAL
HYALINE CASTS UR QL AUTO: ABNORMAL /LPF
IMM GRANULOCYTES # BLD: 0.03 10*3/MM3 (ref 0–0.03)
IMM GRANULOCYTES NFR BLD: 0.3 % (ref 0–0.5)
KETONES UR QL STRIP: NEGATIVE
LEUKOCYTE ESTERASE UR QL STRIP.AUTO: ABNORMAL
LYMPHOCYTES # BLD AUTO: 2.45 10*3/MM3 (ref 1–3)
LYMPHOCYTES NFR BLD AUTO: 27.2 % (ref 21–51)
MCH RBC QN AUTO: 31 PG (ref 27–33)
MCHC RBC AUTO-ENTMCNC: 33.4 G/DL (ref 33–37)
MCV RBC AUTO: 92.9 FL (ref 80–94)
MONOCYTES # BLD AUTO: 0.71 10*3/MM3 (ref 0.1–0.9)
MONOCYTES NFR BLD AUTO: 7.9 % (ref 0–10)
NEUTROPHILS # BLD AUTO: 5.67 10*3/MM3 (ref 1.4–6.5)
NEUTROPHILS NFR BLD AUTO: 62.8 % (ref 30–70)
NITRITE UR QL STRIP: NEGATIVE
OSMOLALITY SERPL CALC.SUM OF ELEC: 276.8 MOSM/KG (ref 273–305)
PH UR STRIP.AUTO: 5.5 [PH] (ref 5–8)
PLATELET # BLD AUTO: 282 10*3/MM3 (ref 130–400)
PMV BLD AUTO: 10.2 FL (ref 6–10)
POTASSIUM BLD-SCNC: 3.5 MMOL/L (ref 3.5–5.3)
PROT SERPL-MCNC: 7.6 G/DL (ref 6–8)
PROT UR QL STRIP: ABNORMAL
RBC # BLD AUTO: 4.48 10*6/MM3 (ref 4.2–5.4)
RBC # UR: ABNORMAL /HPF
REF LAB TEST METHOD: ABNORMAL
SODIUM BLD-SCNC: 139 MMOL/L (ref 135–153)
SP GR UR STRIP: 1.03 (ref 1–1.03)
SQUAMOUS #/AREA URNS HPF: ABNORMAL /HPF
UROBILINOGEN UR QL STRIP: ABNORMAL
WBC NRBC COR # BLD: 9.02 10*3/MM3 (ref 4.5–12.5)
WBC UR QL AUTO: ABNORMAL /HPF

## 2018-04-18 PROCEDURE — 74176 CT ABD & PELVIS W/O CONTRAST: CPT

## 2018-04-18 PROCEDURE — 96361 HYDRATE IV INFUSION ADD-ON: CPT

## 2018-04-18 PROCEDURE — 87086 URINE CULTURE/COLONY COUNT: CPT | Performed by: PHYSICIAN ASSISTANT

## 2018-04-18 PROCEDURE — 85025 COMPLETE CBC W/AUTO DIFF WBC: CPT | Performed by: PHYSICIAN ASSISTANT

## 2018-04-18 PROCEDURE — 96375 TX/PRO/DX INJ NEW DRUG ADDON: CPT

## 2018-04-18 PROCEDURE — 81001 URINALYSIS AUTO W/SCOPE: CPT | Performed by: PHYSICIAN ASSISTANT

## 2018-04-18 PROCEDURE — 25010000002 ONDANSETRON PER 1 MG: Performed by: PHYSICIAN ASSISTANT

## 2018-04-18 PROCEDURE — 80053 COMPREHEN METABOLIC PANEL: CPT | Performed by: PHYSICIAN ASSISTANT

## 2018-04-18 PROCEDURE — 99284 EMERGENCY DEPT VISIT MOD MDM: CPT

## 2018-04-18 PROCEDURE — 74176 CT ABD & PELVIS W/O CONTRAST: CPT | Performed by: RADIOLOGY

## 2018-04-18 PROCEDURE — 25010000002 HYDROMORPHONE PER 4 MG: Performed by: EMERGENCY MEDICINE

## 2018-04-18 PROCEDURE — 96374 THER/PROPH/DIAG INJ IV PUSH: CPT

## 2018-04-18 RX ORDER — KETOROLAC TROMETHAMINE 10 MG/1
10 TABLET, FILM COATED ORAL EVERY 6 HOURS PRN
Qty: 12 TABLET | Refills: 0 | Status: SHIPPED | OUTPATIENT
Start: 2018-04-18 | End: 2019-07-01

## 2018-04-18 RX ORDER — ONDANSETRON 2 MG/ML
4 INJECTION INTRAMUSCULAR; INTRAVENOUS ONCE
Status: COMPLETED | OUTPATIENT
Start: 2018-04-18 | End: 2018-04-18

## 2018-04-18 RX ORDER — SODIUM CHLORIDE 0.9 % (FLUSH) 0.9 %
10 SYRINGE (ML) INJECTION AS NEEDED
Status: DISCONTINUED | OUTPATIENT
Start: 2018-04-18 | End: 2018-04-18 | Stop reason: HOSPADM

## 2018-04-18 RX ORDER — TAMSULOSIN HYDROCHLORIDE 0.4 MG/1
1 CAPSULE ORAL DAILY
Qty: 30 CAPSULE | Refills: 0 | Status: SHIPPED | OUTPATIENT
Start: 2018-04-18 | End: 2018-08-24 | Stop reason: SDUPTHER

## 2018-04-18 RX ADMIN — SODIUM CHLORIDE 1000 ML: 9 INJECTION, SOLUTION INTRAVENOUS at 13:56

## 2018-04-18 RX ADMIN — HYDROMORPHONE HYDROCHLORIDE 1 MG: 1 INJECTION, SOLUTION INTRAMUSCULAR; INTRAVENOUS; SUBCUTANEOUS at 13:55

## 2018-04-18 RX ADMIN — ONDANSETRON 4 MG: 2 INJECTION INTRAMUSCULAR; INTRAVENOUS at 13:55

## 2018-04-18 NOTE — ED NOTES
Pt has been advised of the need for urine, she stated she will try to but it hurts wants to see if fluids will help her out put     Jess Varela  04/18/18 7303

## 2018-04-18 NOTE — ED NOTES
Patient sitting on side of bed at this time, states that the pain is beginning to come back in her left flank area. Denies nausea or vomiting. Spouse and child noted to have walked out of the ED at this time. Updated on POC. NADN. WCTM.      Shirley Park RN  04/18/18 8304

## 2018-04-18 NOTE — ED NOTES
Patient asking for a work excuse related to having to leave from work to come to the ED related to back pain.      Shirley Park RN  04/18/18 6881

## 2018-04-18 NOTE — ED PROVIDER NOTES
Subjective   Long history of renal stones   States she has been trying to pass on since yesterday         History provided by:  Patient   used: No    Flank Pain   Pain location:  L flank and R flank  Pain quality: aching    Pain radiates to:  Suprapubic region  Pain severity:  Moderate  Onset quality:  Sudden  Duration:  1 day  Timing:  Constant  Progression:  Worsening  Chronicity:  Recurrent  Relieved by:  None tried  Worsened by:  Urination and movement  Ineffective treatments:  None tried  Associated symptoms: hematuria    Associated symptoms: no chest pain, no chills, no cough, no diarrhea, no dysuria, no fever, no nausea, no shortness of breath, no sore throat and no vomiting        Review of Systems   Constitutional: Negative for chills and fever.   HENT: Negative for congestion, ear pain and sore throat.    Respiratory: Negative for cough, shortness of breath and wheezing.    Cardiovascular: Negative for chest pain.   Gastrointestinal: Positive for abdominal pain. Negative for diarrhea, nausea and vomiting.   Genitourinary: Positive for flank pain and hematuria. Negative for dysuria.   Skin: Negative for rash.   Neurological: Negative for headaches.   Psychiatric/Behavioral: The patient is not nervous/anxious.    All other systems reviewed and are negative.      Past Medical History:   Diagnosis Date   • Kidney stones    • Migraine    • Pneumonia    • Strep throat        Allergies   Allergen Reactions   • Sulfa Antibiotics Anaphylaxis   • Compazine [Prochlorperazine Edisylate] Hives       Past Surgical History:   Procedure Laterality Date   •  SECTION     • COLONOSCOPY     • CYSTOSCOPY ELECTROHYDRAULIC LITHOTRIPSY     • DENTAL PROCEDURE     • KIDNEY STONE SURGERY         History reviewed. No pertinent family history.    Social History     Social History   • Marital status:      Social History Main Topics   • Smoking status: Current Every Day Smoker     Packs/day: 0.50      Types: Cigarettes   • Smokeless tobacco: Never Used   • Alcohol use No   • Drug use: No   • Sexual activity: Defer     Other Topics Concern   • Not on file           Objective   Physical Exam   Constitutional: She is oriented to person, place, and time. She appears well-developed and well-nourished.   HENT:   Head: Normocephalic.   Mouth/Throat: Oropharynx is clear and moist.   Neck: Neck supple.   Cardiovascular: Normal rate and regular rhythm.    Pulmonary/Chest: Effort normal and breath sounds normal.   Abdominal: Soft. Bowel sounds are normal. There is tenderness in the suprapubic area. There is CVA tenderness.   Musculoskeletal: Normal range of motion.   Neurological: She is alert and oriented to person, place, and time.   Skin: Skin is warm and dry.   Psychiatric: She has a normal mood and affect. Her behavior is normal. Judgment and thought content normal.   Nursing note and vitals reviewed.      Procedures         ED Course  ED Course                  MDM    Final diagnoses:   Flank pain            CRISTINO Null  04/18/18 7961

## 2018-04-20 LAB — BACTERIA SPEC AEROBE CULT: NORMAL

## 2018-06-12 ENCOUNTER — APPOINTMENT (OUTPATIENT)
Dept: CT IMAGING | Facility: HOSPITAL | Age: 43
End: 2018-06-12

## 2018-06-12 ENCOUNTER — HOSPITAL ENCOUNTER (EMERGENCY)
Facility: HOSPITAL | Age: 43
Discharge: HOME | End: 2018-06-12
Attending: EMERGENCY MEDICINE | Admitting: EMERGENCY MEDICINE

## 2018-06-12 VITALS
SYSTOLIC BLOOD PRESSURE: 124 MMHG | DIASTOLIC BLOOD PRESSURE: 86 MMHG | RESPIRATION RATE: 18 BRPM | TEMPERATURE: 98.2 F | HEIGHT: 63 IN | HEART RATE: 68 BPM | BODY MASS INDEX: 31.18 KG/M2 | WEIGHT: 176 LBS | OXYGEN SATURATION: 99 %

## 2018-06-12 DIAGNOSIS — R10.9 FLANK PAIN: Primary | ICD-10-CM

## 2018-06-12 LAB
6-ACETYL MORPHINE: NEGATIVE
ALBUMIN SERPL-MCNC: 4.5 G/DL (ref 3.5–5)
ALBUMIN/GLOB SERPL: 1.6 G/DL (ref 1.5–2.5)
ALP SERPL-CCNC: 74 U/L (ref 35–104)
ALT SERPL W P-5'-P-CCNC: 11 U/L (ref 10–36)
AMPHET+METHAMPHET UR QL: NEGATIVE
AMYLASE SERPL-CCNC: 24 U/L (ref 28–100)
ANION GAP SERPL CALCULATED.3IONS-SCNC: 6.9 MMOL/L (ref 3.6–11.2)
AST SERPL-CCNC: 22 U/L (ref 10–30)
BACTERIA UR QL AUTO: ABNORMAL /HPF
BARBITURATES UR QL SCN: NEGATIVE
BASOPHILS # BLD AUTO: 0.02 10*3/MM3 (ref 0–0.3)
BASOPHILS NFR BLD AUTO: 0.2 % (ref 0–2)
BENZODIAZ UR QL SCN: NEGATIVE
BILIRUB SERPL-MCNC: 0.3 MG/DL (ref 0.2–1.8)
BILIRUB UR QL STRIP: NEGATIVE
BUN BLD-MCNC: 8 MG/DL (ref 7–21)
BUN/CREAT SERPL: 9.1 (ref 7–25)
BUPRENORPHINE SERPL-MCNC: NEGATIVE NG/ML
CALCIUM SPEC-SCNC: 9.4 MG/DL (ref 7.7–10)
CANNABINOIDS SERPL QL: NEGATIVE
CHLORIDE SERPL-SCNC: 108 MMOL/L (ref 99–112)
CLARITY UR: CLEAR
CO2 SERPL-SCNC: 26.1 MMOL/L (ref 24.3–31.9)
COCAINE UR QL: NEGATIVE
COLOR UR: YELLOW
CREAT BLD-MCNC: 0.88 MG/DL (ref 0.43–1.29)
DEPRECATED RDW RBC AUTO: 40.4 FL (ref 37–54)
EOSINOPHIL # BLD AUTO: 0.1 10*3/MM3 (ref 0–0.7)
EOSINOPHIL NFR BLD AUTO: 1 % (ref 0–5)
ERYTHROCYTE [DISTWIDTH] IN BLOOD BY AUTOMATED COUNT: 12.2 % (ref 11.5–14.5)
GFR SERPL CREATININE-BSD FRML MDRD: 71 ML/MIN/1.73
GLOBULIN UR ELPH-MCNC: 2.9 GM/DL
GLUCOSE BLD-MCNC: 97 MG/DL (ref 70–110)
GLUCOSE UR STRIP-MCNC: NEGATIVE MG/DL
HCG SERPL QL: NEGATIVE
HCT VFR BLD AUTO: 40.9 % (ref 37–47)
HGB BLD-MCNC: 13.7 G/DL (ref 12–16)
HGB UR QL STRIP.AUTO: ABNORMAL
HYALINE CASTS UR QL AUTO: ABNORMAL /LPF
IMM GRANULOCYTES # BLD: 0.02 10*3/MM3 (ref 0–0.03)
IMM GRANULOCYTES NFR BLD: 0.2 % (ref 0–0.5)
KETONES UR QL STRIP: NEGATIVE
LEUKOCYTE ESTERASE UR QL STRIP.AUTO: NEGATIVE
LIPASE SERPL-CCNC: 26 U/L (ref 13–60)
LYMPHOCYTES # BLD AUTO: 2.21 10*3/MM3 (ref 1–3)
LYMPHOCYTES NFR BLD AUTO: 23.2 % (ref 21–51)
MCH RBC QN AUTO: 30.9 PG (ref 27–33)
MCHC RBC AUTO-ENTMCNC: 33.5 G/DL (ref 33–37)
MCV RBC AUTO: 92.3 FL (ref 80–94)
METHADONE UR QL SCN: NEGATIVE
MONOCYTES # BLD AUTO: 0.67 10*3/MM3 (ref 0.1–0.9)
MONOCYTES NFR BLD AUTO: 7 % (ref 0–10)
NEUTROPHILS # BLD AUTO: 6.52 10*3/MM3 (ref 1.4–6.5)
NEUTROPHILS NFR BLD AUTO: 68.4 % (ref 30–70)
NITRITE UR QL STRIP: NEGATIVE
OPIATES UR QL: POSITIVE
OSMOLALITY SERPL CALC.SUM OF ELEC: 279.5 MOSM/KG (ref 273–305)
OXYCODONE UR QL SCN: NEGATIVE
PCP UR QL SCN: NEGATIVE
PH UR STRIP.AUTO: 7.5 [PH] (ref 5–8)
PLATELET # BLD AUTO: 271 10*3/MM3 (ref 130–400)
PMV BLD AUTO: 10 FL (ref 6–10)
POTASSIUM BLD-SCNC: 4.5 MMOL/L (ref 3.5–5.3)
PROT SERPL-MCNC: 7.4 G/DL (ref 6–8)
PROT UR QL STRIP: NEGATIVE
RBC # BLD AUTO: 4.43 10*6/MM3 (ref 4.2–5.4)
RBC # UR: ABNORMAL /HPF
REF LAB TEST METHOD: ABNORMAL
S PYO AG THROAT QL: NEGATIVE
SODIUM BLD-SCNC: 141 MMOL/L (ref 135–153)
SP GR UR STRIP: 1.02 (ref 1–1.03)
SQUAMOUS #/AREA URNS HPF: ABNORMAL /HPF
UROBILINOGEN UR QL STRIP: ABNORMAL
WBC NRBC COR # BLD: 9.54 10*3/MM3 (ref 4.5–12.5)
WBC UR QL AUTO: ABNORMAL /HPF

## 2018-06-12 PROCEDURE — 80307 DRUG TEST PRSMV CHEM ANLYZR: CPT | Performed by: EMERGENCY MEDICINE

## 2018-06-12 PROCEDURE — 87880 STREP A ASSAY W/OPTIC: CPT | Performed by: EMERGENCY MEDICINE

## 2018-06-12 PROCEDURE — 74176 CT ABD & PELVIS W/O CONTRAST: CPT | Performed by: RADIOLOGY

## 2018-06-12 PROCEDURE — 96361 HYDRATE IV INFUSION ADD-ON: CPT

## 2018-06-12 PROCEDURE — 25010000002 ONDANSETRON PER 1 MG: Performed by: EMERGENCY MEDICINE

## 2018-06-12 PROCEDURE — 25010000002 MORPHINE PER 10 MG: Performed by: EMERGENCY MEDICINE

## 2018-06-12 PROCEDURE — 96375 TX/PRO/DX INJ NEW DRUG ADDON: CPT

## 2018-06-12 PROCEDURE — 87081 CULTURE SCREEN ONLY: CPT | Performed by: EMERGENCY MEDICINE

## 2018-06-12 PROCEDURE — 96374 THER/PROPH/DIAG INJ IV PUSH: CPT

## 2018-06-12 PROCEDURE — 80053 COMPREHEN METABOLIC PANEL: CPT | Performed by: EMERGENCY MEDICINE

## 2018-06-12 PROCEDURE — 84703 CHORIONIC GONADOTROPIN ASSAY: CPT | Performed by: EMERGENCY MEDICINE

## 2018-06-12 PROCEDURE — 82150 ASSAY OF AMYLASE: CPT | Performed by: EMERGENCY MEDICINE

## 2018-06-12 PROCEDURE — 99284 EMERGENCY DEPT VISIT MOD MDM: CPT

## 2018-06-12 PROCEDURE — 85025 COMPLETE CBC W/AUTO DIFF WBC: CPT | Performed by: EMERGENCY MEDICINE

## 2018-06-12 PROCEDURE — 81001 URINALYSIS AUTO W/SCOPE: CPT | Performed by: EMERGENCY MEDICINE

## 2018-06-12 PROCEDURE — 74176 CT ABD & PELVIS W/O CONTRAST: CPT

## 2018-06-12 PROCEDURE — 83690 ASSAY OF LIPASE: CPT | Performed by: EMERGENCY MEDICINE

## 2018-06-12 PROCEDURE — 96376 TX/PRO/DX INJ SAME DRUG ADON: CPT

## 2018-06-12 PROCEDURE — 25010000002 KETOROLAC TROMETHAMINE PER 15 MG: Performed by: EMERGENCY MEDICINE

## 2018-06-12 RX ORDER — ACETAMINOPHEN AND CODEINE PHOSPHATE 300; 30 MG/1; MG/1
1-2 TABLET ORAL EVERY 6 HOURS PRN
Qty: 12 TABLET | Refills: 0 | Status: SHIPPED | OUTPATIENT
Start: 2018-06-12 | End: 2018-08-17

## 2018-06-12 RX ORDER — ONDANSETRON 2 MG/ML
4 INJECTION INTRAMUSCULAR; INTRAVENOUS ONCE
Status: COMPLETED | OUTPATIENT
Start: 2018-06-12 | End: 2018-06-12

## 2018-06-12 RX ORDER — MORPHINE SULFATE 10 MG/ML
4 INJECTION INTRAMUSCULAR; INTRAVENOUS; SUBCUTANEOUS ONCE
Status: COMPLETED | OUTPATIENT
Start: 2018-06-12 | End: 2018-06-12

## 2018-06-12 RX ORDER — KETOROLAC TROMETHAMINE 30 MG/ML
15 INJECTION, SOLUTION INTRAMUSCULAR; INTRAVENOUS ONCE
Status: COMPLETED | OUTPATIENT
Start: 2018-06-12 | End: 2018-06-12

## 2018-06-12 RX ORDER — SODIUM CHLORIDE 9 MG/ML
125 INJECTION, SOLUTION INTRAVENOUS CONTINUOUS
Status: DISCONTINUED | OUTPATIENT
Start: 2018-06-12 | End: 2018-06-12 | Stop reason: HOSPADM

## 2018-06-12 RX ORDER — ONDANSETRON 4 MG/1
4 TABLET, ORALLY DISINTEGRATING ORAL EVERY 6 HOURS PRN
Qty: 12 TABLET | Refills: 0 | Status: SHIPPED | OUTPATIENT
Start: 2018-06-12 | End: 2018-08-17

## 2018-06-12 RX ADMIN — SODIUM CHLORIDE 500 ML: 9 INJECTION, SOLUTION INTRAVENOUS at 15:02

## 2018-06-12 RX ADMIN — MORPHINE SULFATE 4 MG: 10 INJECTION INTRAVENOUS at 15:05

## 2018-06-12 RX ADMIN — SODIUM CHLORIDE 125 ML/HR: 9 INJECTION, SOLUTION INTRAVENOUS at 16:12

## 2018-06-12 RX ADMIN — ONDANSETRON 4 MG: 2 INJECTION, SOLUTION INTRAMUSCULAR; INTRAVENOUS at 15:03

## 2018-06-12 RX ADMIN — MORPHINE SULFATE 4 MG: 10 INJECTION INTRAVENOUS at 16:33

## 2018-06-12 RX ADMIN — KETOROLAC TROMETHAMINE 15 MG: 30 INJECTION, SOLUTION INTRAMUSCULAR; INTRAVENOUS at 15:04

## 2018-06-12 NOTE — DISCHARGE INSTRUCTIONS
Home in care of family.  Rest and drink plenty of fluids.  Take your medications as prescribed.  You need to establish herself with a primary care doctor.  Here is a list of local doctors.  Please call to schedule an appointment as soon as possible.  Return to the emergency Department right away should his worsen/any problems.      Call one of the offices below to establish a primary care provider.  If you are unable to get an appointment and feel it is an emergency and need to be seen immediately please return to the Emergency Department.    Call one of the office below to set up a primary care provider.    Dr. Elvis Elena                                                                                                       [address] [facility] [address]  [phone]    Dr. Villarreal, Dr. CANDELARIA Lee, Dr. BK Lee (Critical access hospital)  [address] [facility] [address]  [phone]    Dr. Cardenas, Dr. Kim, Dr. Hussein (Critical access hospital)  [address] [facility] [address]  [phone]    Dr. Bean  [address] [facility] [address]  [phone]    Dr. Still, Dr. Simmons, Dr. Nino, Dr. Lopez ([facility])  [address] [place] [address] [address]  [facility] [address]  [phone]    Dr. Doris Barr  [address] [facility] [address]  [phone]    Dr. Chandni Rivera  [address]   [address]  [facility] [address]  [phone]    Dr. Elena  [address] [facility] [address]  [phone]    Dr. Ardon, Dr. Perez  [address] [facility]   [facility] [address]  [phone]    Dr. Johnson  [address]                                                              [address]  [facility] [address]  [phone]    Dr. Coyne  [address] [facility] [id number] [phone]    Dr. Norma Gupta  [address]  [address]  [facility] [address]  [phone]

## 2018-06-12 NOTE — ED NOTES
Orange bracelet in place instructed to ask for assistance      Alexandra Johnson RN  06/12/18 1751

## 2018-06-12 NOTE — ED NOTES
Patient resting in stretcher, vital signs stable.updated patient regarding plan of care, will continue to follow plan of care. No request at this time, awaiting further orders. Will continue to monitor.      Jose Torre RN  06/12/18 6003

## 2018-06-12 NOTE — ED PROVIDER NOTES
Subjective   Patient is a 42-year-old white female with a history of kidney stones.  She presents complaining of right flank pain, onset yesterday, that she states feels exactly like previous stones.  This has been associated with nausea and vomiting, urinary frequency and urgency.  She denies fever, chills, chest pain, shortness of breath, hematemesis, hematochezia or melena, hematuria, secondary near-syncope, focal numbness or weakness, other symptoms or other complaints.            Review of Systems   Constitutional: Negative for chills, diaphoresis and fever.   HENT: Negative for ear pain, sore throat and trouble swallowing.    Eyes: Negative for photophobia and pain.   Respiratory: Negative for shortness of breath, wheezing and stridor.    Cardiovascular: Negative for chest pain and palpitations.   Gastrointestinal: Positive for abdominal pain, nausea and vomiting. Negative for abdominal distention, blood in stool and diarrhea.   Endocrine: Negative for polydipsia and polyphagia.   Genitourinary: Negative for difficulty urinating and flank pain.   Musculoskeletal: Negative for back pain, neck pain and neck stiffness.   Skin: Negative for color change and pallor.   Neurological: Negative for seizures, syncope and speech difficulty.   Psychiatric/Behavioral: Negative for confusion.   All other systems reviewed and are negative.      Past Medical History:   Diagnosis Date   • Kidney stones    • Migraine    • Pneumonia    • Strep throat        Allergies   Allergen Reactions   • Sulfa Antibiotics Anaphylaxis   • Compazine [Prochlorperazine Edisylate] Allergic reaction to contrast dye       Past Surgical History:   Procedure Laterality Date   •  SECTION     • COLONOSCOPY     • CYSTOSCOPY ELECTROHYDRAULIC LITHOTRIPSY     • DENTAL PROCEDURE     • KIDNEY STONE SURGERY         History reviewed. No pertinent family history.    Social History     Social History   • Marital status:      Social History Main  Topics   • Smoking status: Current Every Day Smoker     Packs/day: 0.50     Types: Cigarettes   • Smokeless tobacco: Never Used   • Alcohol use No   • Drug use: No   • Sexual activity: Defer     Other Topics Concern   • Not on file           Objective   Physical Exam   Constitutional: She is oriented to person, place, and time. She appears well-developed and well-nourished. No distress.   HENT:   Head: Normocephalic and atraumatic.   Eyes: EOM are normal. Pupils are equal, round, and reactive to light. No scleral icterus.   Neck: Normal range of motion. Neck supple. No neck rigidity. No tracheal deviation present.   Cardiovascular: Normal rate, regular rhythm and intact distal pulses.    Pulmonary/Chest: Effort normal and breath sounds normal. No respiratory distress. She exhibits no tenderness.   Abdominal: Soft. Bowel sounds are normal. There is tenderness (Moderate tenderness in the right flank.  Mild tenderness right lower quadrant and suprapubically.  No other tenderness.  No acute peritoneal signs.). There is no rebound and no guarding.   There is no costovertebral angle tenderness.   Musculoskeletal: Normal range of motion. She exhibits no tenderness.   Neurological: She is alert and oriented to person, place, and time. She has normal strength. No sensory deficit. She exhibits normal muscle tone. Coordination normal. GCS eye subscore is 4. GCS verbal subscore is 5. GCS motor subscore is 6.   Skin: Skin is warm and dry. Capillary refill takes less than 2 seconds. No cyanosis. No pallor.   Psychiatric: She has a normal mood and affect. Her behavior is normal.   Nursing note and vitals reviewed.      Procedures  Ct Abdomen Pelvis Without Contrast    Result Date: 6/12/2018  Narrative:  CT ABDOMEN PELVIS WO CONTRAST-    TECHNIQUE: Multiple axial CT images were obtained from lung bases through pubic symphysis without administration of IV contrast. Reformatted images in the coronal and/or sagittal plane(s) were  generated from the axial data set to facilitate diagnostic accuracy and/or surgical planning. Oral Contrast:NONE.  Radiation dose reduction techniques were utilized per ALARA protocol. Automated exposure control was initiated through either or [employer] or [employer] software packages by  protocol.       Clinical information right flank pain; if hcg negative  Comparison NONE.  Findings LOWER THORAX: Clear. No effusions.  ABDOMEN:     LIVER: Homogeneous. No focal hepatic mass or ductal dilatation.     GALLBLADDER: No dilation or stone identified.     PANCREAS: Unremarkable. No mass or ductal dilatation.     SPLEEN: Homogeneous. No splenomegaly.     ADRENALS: No mass.     KIDNEYS: BILATERAL NONOBSTRUCTIVE RENAL CALCULI. BILATERAL RENAL CYST.     GI TRACT: Non-dilated. No definite wall thickening.     PERITONEUM: No free air. No free fluid or loculated fluid collections.     MESENTERY: Unremarkable.     LYMPH NODES: No lymphadenopathy.     VASCULATURE: No evidence of aneurysm.     ABDOMINAL WALL: No focal hernia or mass.      OTHER: None.  PELVIS:     BLADDER: No focal mass or significant wall thickening     REPRODUCTIVE: Unremarkable as visualized.     APPENDIX: Nondistended. No surrounding inflammation.  BONES: No acute bony abnormality.      Impression: Impression: Bilateral nonobstructive renal calculi. Bilateral renal cyst.  This report was finalized on 6/12/2018 3:48 PM by Dr. Rick Martinez MD.      Results for orders placed or performed during the hospital encounter of 06/12/18   Rapid Strep A Screen - Swab, Throat   Result Value Ref Range    Strep A Ag Negative Negative   Comprehensive Metabolic Panel   Result Value Ref Range    Glucose 97 70 - 110 mg/dL    BUN 8 7 - 21 mg/dL    Creatinine 0.88 0.43 - 1.29 mg/dL    Sodium 141 135 - 153 mmol/L    Potassium 4.5 3.5 - 5.3 mmol/L    Chloride 108 99 - 112 mmol/L    CO2 26.1 24.3 - 31.9 mmol/L    Calcium 9.4 7.7 - 10.0 mg/dL    Total Protein 7.4 6.0 - 8.0  g/dL    Albumin 4.50 3.50 - 5.00 g/dL    ALT (SGPT) 11 10 - 36 U/L    AST (SGOT) 22 10 - 30 U/L    Alkaline Phosphatase 74 35 - 104 U/L    Total Bilirubin 0.3 0.2 - 1.8 mg/dL    eGFR Non African Amer 71 >60 mL/min/1.73    Globulin 2.9 gm/dL    A/G Ratio 1.6 1.5 - 2.5 g/dL    BUN/Creatinine Ratio 9.1 7.0 - 25.0    Anion Gap 6.9 3.6 - 11.2 mmol/L   Lipase   Result Value Ref Range    Lipase 26 13 - 60 U/L   Amylase   Result Value Ref Range    Amylase 24 (L) 28 - 100 U/L   Urinalysis With / Culture If Indicated - Urine, Clean Catch   Result Value Ref Range    Color, UA Yellow Yellow, Straw    Appearance, UA Clear Clear    pH, UA 7.5 5.0 - 8.0    Specific Gravity, UA 1.022 1.005 - 1.030    Glucose, UA Negative Negative    Ketones, UA Negative Negative    Bilirubin, UA Negative Negative    Blood, UA Moderate (2+) (A) Negative    Protein, UA Negative Negative    Leuk Esterase, UA Negative Negative    Nitrite, UA Negative Negative    Urobilinogen, UA 0.2 E.U./dL 0.2 - 1.0 E.U./dL   hCG, Serum, Qualitative   Result Value Ref Range    HCG Qualitative Negative Negative   CBC Auto Differential   Result Value Ref Range    WBC 9.54 4.50 - 12.50 10*3/mm3    RBC 4.43 4.20 - 5.40 10*6/mm3    Hemoglobin 13.7 12.0 - 16.0 g/dL    Hematocrit 40.9 37.0 - 47.0 %    MCV 92.3 80.0 - 94.0 fL    MCH 30.9 27.0 - 33.0 pg    MCHC 33.5 33.0 - 37.0 g/dL    RDW 12.2 11.5 - 14.5 %    RDW-SD 40.4 37.0 - 54.0 fl    MPV 10.0 6.0 - 10.0 fL    Platelets 271 130 - 400 10*3/mm3    Neutrophil % 68.4 30.0 - 70.0 %    Lymphocyte % 23.2 21.0 - 51.0 %    Monocyte % 7.0 0.0 - 10.0 %    Eosinophil % 1.0 0.0 - 5.0 %    Basophil % 0.2 0.0 - 2.0 %    Immature Grans % 0.2 0.0 - 0.5 %    Neutrophils, Absolute 6.52 (H) 1.40 - 6.50 10*3/mm3    Lymphocytes, Absolute 2.21 1.00 - 3.00 10*3/mm3    Monocytes, Absolute 0.67 0.10 - 0.90 10*3/mm3    Eosinophils, Absolute 0.10 0.00 - 0.70 10*3/mm3    Basophils, Absolute 0.02 0.00 - 0.30 10*3/mm3    Immature Grans, Absolute 0.02  0.00 - 0.03 10*3/mm3   Urine Drug Screen - Urine, Clean Catch   Result Value Ref Range    Amphetamine Screen, Urine Negative Negative    Barbiturates Screen, Urine Negative Negative    Benzodiazepine Screen, Urine Negative Negative    Cocaine Screen, Urine Negative Negative    Methadone Screen, Urine Negative Negative    Opiate Screen Positive (A) Negative    Phencyclidine (PCP), Urine Negative Negative    THC, Screen, Urine Negative Negative    6-ACETYL MORPHINE Negative Negative    Buprenorphine, Screen, Urine Negative Negative    Oxycodone Screen, Urine Negative Negative   Osmolality, Calculated   Result Value Ref Range    Osmolality Calc 279.5 273.0 - 305.0 mOsm/kg   Urinalysis, Microscopic Only - Urine, Clean Catch   Result Value Ref Range    RBC, UA 7-12 (A) None Seen, 0-2 /HPF    WBC, UA 0-2 None Seen, 0-2 /HPF    Bacteria, UA Trace (A) None Seen /HPF    Squamous Epithelial Cells, UA 3-6 (A) None Seen, 0-2 /HPF    Hyaline Casts, UA None Seen None Seen /LPF    Methodology Manual Light Microscopy                   ED Course  Patient's emergency department stay has been uneventful.  Never has she shown any signs of distress.  She voices feeling much better after fluids and medications.  We discussed her test results and her plan of care.  She voices understanding and agreement.  She is discharged home in care of family.                  MDM      Final diagnoses:   Flank pain             Please note that portions of this note were completed with a voice recognition program. Efforts were made to edit the dictations, but occasionally words are mistranscribed.       John Paul Shultz MD  06/12/18 9556

## 2018-06-13 LAB — BACTERIA SPEC AEROBE CULT: NORMAL

## 2018-08-17 ENCOUNTER — APPOINTMENT (OUTPATIENT)
Dept: GENERAL RADIOLOGY | Facility: HOSPITAL | Age: 43
End: 2018-08-17

## 2018-08-17 ENCOUNTER — HOSPITAL ENCOUNTER (EMERGENCY)
Facility: HOSPITAL | Age: 43
Discharge: HOME | End: 2018-08-17
Attending: EMERGENCY MEDICINE | Admitting: EMERGENCY MEDICINE

## 2018-08-17 VITALS
HEART RATE: 85 BPM | RESPIRATION RATE: 17 BRPM | HEIGHT: 63 IN | TEMPERATURE: 98 F | BODY MASS INDEX: 30.12 KG/M2 | DIASTOLIC BLOOD PRESSURE: 77 MMHG | WEIGHT: 170 LBS | OXYGEN SATURATION: 99 % | SYSTOLIC BLOOD PRESSURE: 126 MMHG

## 2018-08-17 DIAGNOSIS — N23 RENAL COLIC ON LEFT SIDE: Primary | ICD-10-CM

## 2018-08-17 LAB
ALBUMIN SERPL-MCNC: 4.4 G/DL (ref 3.5–5)
ALBUMIN/GLOB SERPL: 1.5 G/DL (ref 1.5–2.5)
ALP SERPL-CCNC: 80 U/L (ref 35–104)
ALT SERPL W P-5'-P-CCNC: 23 U/L (ref 10–36)
AMYLASE SERPL-CCNC: 30 U/L (ref 28–100)
ANION GAP SERPL CALCULATED.3IONS-SCNC: 5.9 MMOL/L (ref 3.6–11.2)
AST SERPL-CCNC: 28 U/L (ref 10–30)
B-HCG UR QL: NEGATIVE
BACTERIA UR QL AUTO: ABNORMAL /HPF
BASOPHILS # BLD AUTO: 0.02 10*3/MM3 (ref 0–0.3)
BASOPHILS NFR BLD AUTO: 0.2 % (ref 0–2)
BILIRUB SERPL-MCNC: 0.6 MG/DL (ref 0.2–1.8)
BILIRUB UR QL STRIP: NEGATIVE
BUN BLD-MCNC: 11 MG/DL (ref 7–21)
BUN/CREAT SERPL: 12.1 (ref 7–25)
CALCIUM SPEC-SCNC: 9.4 MG/DL (ref 7.7–10)
CHLORIDE SERPL-SCNC: 111 MMOL/L (ref 99–112)
CLARITY UR: CLEAR
CO2 SERPL-SCNC: 22.1 MMOL/L (ref 24.3–31.9)
COLOR UR: YELLOW
CREAT BLD-MCNC: 0.91 MG/DL (ref 0.43–1.29)
DEPRECATED RDW RBC AUTO: 48.2 FL (ref 37–54)
EOSINOPHIL # BLD AUTO: 0.07 10*3/MM3 (ref 0–0.7)
EOSINOPHIL NFR BLD AUTO: 0.8 % (ref 0–5)
ERYTHROCYTE [DISTWIDTH] IN BLOOD BY AUTOMATED COUNT: 14.8 % (ref 11.5–14.5)
GFR SERPL CREATININE-BSD FRML MDRD: 68 ML/MIN/1.73
GLOBULIN UR ELPH-MCNC: 3 GM/DL
GLUCOSE BLD-MCNC: 93 MG/DL (ref 70–110)
GLUCOSE UR STRIP-MCNC: NEGATIVE MG/DL
HCT VFR BLD AUTO: 38.8 % (ref 37–47)
HGB BLD-MCNC: 13.1 G/DL (ref 12–16)
HGB UR QL STRIP.AUTO: ABNORMAL
HYALINE CASTS UR QL AUTO: ABNORMAL /LPF
IMM GRANULOCYTES # BLD: 0.02 10*3/MM3 (ref 0–0.03)
IMM GRANULOCYTES NFR BLD: 0.2 % (ref 0–0.5)
KETONES UR QL STRIP: NEGATIVE
LEUKOCYTE ESTERASE UR QL STRIP.AUTO: ABNORMAL
LIPASE SERPL-CCNC: 26 U/L (ref 13–60)
LYMPHOCYTES # BLD AUTO: 3.72 10*3/MM3 (ref 1–3)
LYMPHOCYTES NFR BLD AUTO: 41.3 % (ref 21–51)
MCH RBC QN AUTO: 31.2 PG (ref 27–33)
MCHC RBC AUTO-ENTMCNC: 33.8 G/DL (ref 33–37)
MCV RBC AUTO: 92.4 FL (ref 80–94)
MONOCYTES # BLD AUTO: 0.75 10*3/MM3 (ref 0.1–0.9)
MONOCYTES NFR BLD AUTO: 8.3 % (ref 0–10)
NEUTROPHILS # BLD AUTO: 4.42 10*3/MM3 (ref 1.4–6.5)
NEUTROPHILS NFR BLD AUTO: 49.2 % (ref 30–70)
NITRITE UR QL STRIP: NEGATIVE
OSMOLALITY SERPL CALC.SUM OF ELEC: 276.6 MOSM/KG (ref 273–305)
PH UR STRIP.AUTO: 6.5 [PH] (ref 5–8)
PLATELET # BLD AUTO: 224 10*3/MM3 (ref 130–400)
PMV BLD AUTO: 9.2 FL (ref 6–10)
POTASSIUM BLD-SCNC: 3.7 MMOL/L (ref 3.5–5.3)
PROT SERPL-MCNC: 7.4 G/DL (ref 6–8)
PROT UR QL STRIP: NEGATIVE
RBC # BLD AUTO: 4.2 10*6/MM3 (ref 4.2–5.4)
RBC # UR: ABNORMAL /HPF
REF LAB TEST METHOD: ABNORMAL
SODIUM BLD-SCNC: 139 MMOL/L (ref 135–153)
SP GR UR STRIP: 1.02 (ref 1–1.03)
SQUAMOUS #/AREA URNS HPF: ABNORMAL /HPF
UROBILINOGEN UR QL STRIP: ABNORMAL
WBC NRBC COR # BLD: 9 10*3/MM3 (ref 4.5–12.5)
WBC UR QL AUTO: ABNORMAL /HPF

## 2018-08-17 PROCEDURE — 74018 RADEX ABDOMEN 1 VIEW: CPT

## 2018-08-17 PROCEDURE — 74018 RADEX ABDOMEN 1 VIEW: CPT | Performed by: RADIOLOGY

## 2018-08-17 PROCEDURE — 25010000002 HYDROMORPHONE PER 4 MG: Performed by: EMERGENCY MEDICINE

## 2018-08-17 PROCEDURE — 81001 URINALYSIS AUTO W/SCOPE: CPT | Performed by: PHYSICIAN ASSISTANT

## 2018-08-17 PROCEDURE — 83690 ASSAY OF LIPASE: CPT | Performed by: PHYSICIAN ASSISTANT

## 2018-08-17 PROCEDURE — 81025 URINE PREGNANCY TEST: CPT | Performed by: PHYSICIAN ASSISTANT

## 2018-08-17 PROCEDURE — 96375 TX/PRO/DX INJ NEW DRUG ADDON: CPT

## 2018-08-17 PROCEDURE — 80053 COMPREHEN METABOLIC PANEL: CPT | Performed by: PHYSICIAN ASSISTANT

## 2018-08-17 PROCEDURE — 25010000002 ONDANSETRON PER 1 MG: Performed by: PHYSICIAN ASSISTANT

## 2018-08-17 PROCEDURE — 96374 THER/PROPH/DIAG INJ IV PUSH: CPT

## 2018-08-17 PROCEDURE — 99283 EMERGENCY DEPT VISIT LOW MDM: CPT

## 2018-08-17 PROCEDURE — 85025 COMPLETE CBC W/AUTO DIFF WBC: CPT | Performed by: PHYSICIAN ASSISTANT

## 2018-08-17 PROCEDURE — 82150 ASSAY OF AMYLASE: CPT | Performed by: PHYSICIAN ASSISTANT

## 2018-08-17 RX ORDER — ONDANSETRON 2 MG/ML
4 INJECTION INTRAMUSCULAR; INTRAVENOUS ONCE
Status: COMPLETED | OUTPATIENT
Start: 2018-08-17 | End: 2018-08-17

## 2018-08-17 RX ORDER — SODIUM CHLORIDE 0.9 % (FLUSH) 0.9 %
10 SYRINGE (ML) INJECTION AS NEEDED
Status: DISCONTINUED | OUTPATIENT
Start: 2018-08-17 | End: 2018-08-17 | Stop reason: HOSPADM

## 2018-08-17 RX ORDER — TAMSULOSIN HYDROCHLORIDE 0.4 MG/1
0.4 CAPSULE ORAL ONCE
Status: COMPLETED | OUTPATIENT
Start: 2018-08-17 | End: 2018-08-17

## 2018-08-17 RX ADMIN — TAMSULOSIN HYDROCHLORIDE 0.4 MG: 0.4 CAPSULE ORAL at 14:04

## 2018-08-17 RX ADMIN — HYDROMORPHONE HYDROCHLORIDE 1 MG: 1 INJECTION, SOLUTION INTRAMUSCULAR; INTRAVENOUS; SUBCUTANEOUS at 12:39

## 2018-08-17 RX ADMIN — ONDANSETRON 4 MG: 2 INJECTION, SOLUTION INTRAMUSCULAR; INTRAVENOUS at 12:39

## 2018-08-17 RX ADMIN — SODIUM CHLORIDE 1000 ML: 9 INJECTION, SOLUTION INTRAVENOUS at 12:38

## 2018-08-17 NOTE — DISCHARGE INSTRUCTIONS
Call one of the offices below to establish a primary care provider.  If you are unable to get an appointment and feel it is an emergency and need to be seen immediately please return to the Emergency Department.    Call one of the office below to set up a primary care provider.    Dr. Elvis Elena                                                                                                       [address] [facility] [address]  [phone]    Dr. Villarreal, Dr. CANDELARIA Lee, Dr. BK Lee (On license of [facility])  [address] [facility] [address]  [phone]    Dr. Cardenas, Dr. Kim, Dr. Hussein (On license of [facility])  [address] [facility] [address]  [phone]    Dr. Bean  [address] [facility] [address]  [phone]    Dr. Still, Dr. Simmons, Dr. Nino, Dr. Lopez ([facility])  [address] [place] [address] [address]  [facility] [address]  [phone]    Dr. Doris Barr  [address] [facility] [place] [address]  [phone]    Dr. Chandni Rivera  [address]   [address]  [facility] [address]  [phone]    Dr. Elena  [address] [facility] [address]  [phone]    Dr. Ardon, Dr. Perez  [address] [facility] [place] [address]  [phone]    Dr. Johnson  [address]                                                              [address]  [facility] [address]  [phone]    Dr. Coyne  [address] [facility] [id number] [phone]    Dr. Norma Gupta  [address]  [address]  [facility] [address]  [phone]

## 2018-08-17 NOTE — ED PROVIDER NOTES
Subjective   43-year-old female presents to the ED today for left flank pain.  She states it has been going on for the last 5 days.  She states she has a history of passing frequent kidney stones and this feels very similar.  She states the pain is now worse and to the left pelvic area.  She denies any fever.  She states she has had nausea but no vomiting.  She says she's had increased urinary urgency and frequency.  She states she took a Toradol this morning with no relief.        History provided by:  Patient  Flank Pain   Pain location:  L flank  Pain quality: sharp    Pain radiates to:  Groin  Pain severity:  Severe  Onset quality:  Gradual  Duration:  5 days  Timing:  Constant  Progression:  Worsening  Chronicity:  Recurrent  Context: not recent travel, not sick contacts, not suspicious food intake and not trauma    Relieved by:  Nothing  Worsened by:  Nothing  Ineffective treatments:  NSAIDs  Associated symptoms: nausea    Associated symptoms: no anorexia, no chest pain, no chills, no constipation, no cough, no diarrhea, no dysuria, no fatigue, no fever, no hematemesis, no hematochezia, no hematuria, no melena, no shortness of breath and no vomiting    Risk factors: not pregnant        Review of Systems   Constitutional: Negative for chills, fatigue and fever.   HENT: Negative.    Eyes: Negative.    Respiratory: Negative for cough and shortness of breath.    Cardiovascular: Negative for chest pain.   Gastrointestinal: Positive for nausea. Negative for anorexia, constipation, diarrhea, hematemesis, hematochezia, melena and vomiting.   Genitourinary: Positive for flank pain, frequency and urgency. Negative for dysuria and hematuria.   Skin: Negative.    Neurological: Negative.    Psychiatric/Behavioral: Negative.    All other systems reviewed and are negative.      Past Medical History:   Diagnosis Date   • Kidney stones    • Migraine    • Pneumonia    • Strep throat        Allergies   Allergen Reactions   •  Sulfa Antibiotics Anaphylaxis   • Compazine [Prochlorperazine Edisylate] Hives       Past Surgical History:   Procedure Laterality Date   •  SECTION     • COLONOSCOPY     • CYSTOSCOPY ELECTROHYDRAULIC LITHOTRIPSY     • DENTAL PROCEDURE     • KIDNEY STONE SURGERY         History reviewed. No pertinent family history.    Social History     Social History   • Marital status:      Social History Main Topics   • Smoking status: Current Every Day Smoker     Packs/day: 0.50     Types: Cigarettes   • Smokeless tobacco: Never Used   • Alcohol use No   • Drug use: No   • Sexual activity: Defer     Other Topics Concern   • Not on file           Objective   Physical Exam   Constitutional: She is oriented to person, place, and time. She appears well-developed and well-nourished. No distress.   HENT:   Head: Normocephalic and atraumatic.   Right Ear: External ear normal.   Left Ear: External ear normal.   Nose: Nose normal.   Mouth/Throat: Oropharynx is clear and moist.   Eyes: Pupils are equal, round, and reactive to light. Conjunctivae and EOM are normal.   Neck: Normal range of motion. Neck supple.   Cardiovascular: Normal rate, regular rhythm, normal heart sounds and intact distal pulses.    Pulmonary/Chest: Effort normal and breath sounds normal.   Abdominal: Soft. Bowel sounds are normal. There is no tenderness. There is no rebound and no guarding.   No CVA tenderness   Musculoskeletal: Normal range of motion.   Neurological: She is alert and oriented to person, place, and time.   Skin: Skin is warm and dry. Capillary refill takes less than 2 seconds.   Psychiatric: She has a normal mood and affect. Her behavior is normal. Judgment and thought content normal.   Nursing note and vitals reviewed.      Procedures           ED Course  ED Course as of Aug 17 1415   Fri Aug 17, 2018   Klaudia VARGAS queried #[id number]  Patient reports she is out of her medications early because she has had to take extra medicine due to  her headaches and back pain.  I advised her that she will have to follow up with her prescriber regarding her medications.  [AH]      ED Course User Index  [AH] Christy Jean, PA                  [facility]  Number of Diagnoses or Management Options  Renal colic on left side:      Amount and/or Complexity of Data Reviewed  Clinical lab tests: reviewed  Tests in the radiology section of CPT®: reviewed    Patient Progress  Patient progress: improved        Final diagnoses:   Renal colic on left side            Christy Jean PA  08/17/18 0401

## 2018-08-24 ENCOUNTER — OFFICE VISIT (OUTPATIENT)
Dept: UROLOGY | Facility: CLINIC | Age: 43
End: 2018-08-24

## 2018-08-24 VITALS — HEIGHT: 63 IN | WEIGHT: 170 LBS | BODY MASS INDEX: 30.12 KG/M2

## 2018-08-24 DIAGNOSIS — N20.0 RENAL CALCULUS: Primary | ICD-10-CM

## 2018-08-24 PROCEDURE — 99204 OFFICE O/P NEW MOD 45 MIN: CPT | Performed by: UROLOGY

## 2018-08-24 RX ORDER — TAMSULOSIN HYDROCHLORIDE 0.4 MG/1
1 CAPSULE ORAL DAILY
Qty: 30 CAPSULE | Refills: 6 | Status: SHIPPED | OUTPATIENT
Start: 2018-08-24 | End: 2019-01-31 | Stop reason: SDUPTHER

## 2018-08-24 RX ORDER — OXYCODONE AND ACETAMINOPHEN 10; 325 MG/1; MG/1
1 TABLET ORAL EVERY 6 HOURS PRN
Qty: 15 TABLET | Refills: 0 | Status: SHIPPED | OUTPATIENT
Start: 2018-08-24 | End: 2018-11-08 | Stop reason: SDUPTHER

## 2018-08-24 NOTE — PROGRESS NOTES
Chief Complaint:          Chief Complaint   Patient presents with   • Renal Calculi       HPI:   43 y.o. female.  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  She has no fevers or chills.  She has nausea and vomiting.  The stone is an obvious calcium oxalate dihydrate stone.  Currently she is free of disease.  But I'm and to give her appropriate pain medication to have a recurrent stones and Flomax to stand out facilitate passage used as medical expulsive therapy.    Past Medical History:        Past Medical History:   Diagnosis Date   • Kidney stones    • Migraine    • Pneumonia    • Strep throat          Current Meds:     Current Outpatient Prescriptions   Medication Sig Dispense Refill   • amitriptyline (ELAVIL) 25 MG tablet Take 25 mg by mouth every night.     • clonazePAM (KlonoPIN) 0.5 MG tablet Take 0.5 mg by mouth 2 (two) times a day as needed for seizures.     • gabapentin (NEURONTIN) 600 MG tablet   0   • ketorolac (TORADOL) 10 MG tablet Take 1 tablet by mouth Every 6 (Six) Hours As Needed for Moderate Pain . 12 tablet 0   • promethazine (PHENERGAN) 25 MG tablet Take 25 mg by mouth Every 6 (Six) Hours As Needed for nausea or vomiting.     • tamsulosin (FLOMAX) 0.4 MG capsule 24 hr capsule Take 1 capsule by mouth Daily. 30 capsule 0     No current facility-administered medications for this visit.         Allergies:      Allergies   Allergen Reactions   • Sulfa Antibiotics Anaphylaxis   • Compazine [Prochlorperazine Edisylate] Hives        Past Surgical History:     Past Surgical History:   Procedure Laterality Date   •  SECTION     • COLONOSCOPY     • CYSTOSCOPY ELECTROHYDRAULIC LITHOTRIPSY     • DENTAL PROCEDURE     • KIDNEY STONE SURGERY           Social History:     Social History     Social History   • Marital status:      Spouse name: N/A   • Number of children: N/A   • Years of education: N/A     Occupational  History   • Not on file.     Social History Main Topics   • Smoking status: Current Every Day Smoker     Packs/day: 0.50     Types: Cigarettes   • Smokeless tobacco: Never Used   • Alcohol use No   • Drug use: No   • Sexual activity: Defer     Other Topics Concern   • Not on file     Social History Narrative   • No narrative on file       Family History:     History reviewed. No pertinent family history.    Review of Systems:     Review of Systems   Constitutional: Negative.  Negative for activity change, appetite change, chills, diaphoresis, fatigue and unexpected weight change.   HENT: Negative for congestion, dental problem, drooling, ear discharge, ear pain, facial swelling, hearing loss, mouth sores, nosebleeds, postnasal drip, rhinorrhea, sinus pressure, sneezing, sore throat, tinnitus, trouble swallowing and voice change.    Eyes: Negative.  Negative for photophobia, pain, discharge, redness, itching and visual disturbance.   Respiratory: Negative.  Negative for apnea, cough, choking, chest tightness, shortness of breath, wheezing and stridor.    Cardiovascular: Negative.  Negative for chest pain, palpitations and leg swelling.   Gastrointestinal: Negative.  Negative for abdominal distention, abdominal pain, anal bleeding, blood in stool, constipation, diarrhea, nausea, rectal pain and vomiting.   Endocrine: Negative.  Negative for cold intolerance, heat intolerance, polydipsia, polyphagia and polyuria.   Musculoskeletal: Negative.  Negative for arthralgias, back pain, gait problem, joint swelling, myalgias, neck pain and neck stiffness.   Skin: Negative.  Negative for color change, pallor, rash and wound.   Allergic/Immunologic: Negative.  Negative for environmental allergies, food allergies and immunocompromised state.   Neurological: Negative.  Negative for dizziness, tremors, seizures, syncope, facial asymmetry, speech difficulty, weakness, light-headedness, numbness and headaches.   Hematological:  Negative.  Negative for adenopathy. Does not bruise/bleed easily.   Psychiatric/Behavioral: Negative for agitation, behavioral problems, confusion, decreased concentration, dysphoric mood, hallucinations, self-injury, sleep disturbance and suicidal ideas. The patient is not nervous/anxious and is not hyperactive.    All other systems reviewed and are negative.      Physical Exam:     Physical Exam   Constitutional: She appears well-developed and well-nourished.   HENT:   Head: Normocephalic and atraumatic.   Right Ear: External ear normal.   Left Ear: External ear normal.   Mouth/Throat: Oropharynx is clear and moist.   Eyes: Pupils are equal, round, and reactive to light. Conjunctivae are normal.   Cardiovascular: Normal rate, regular rhythm, normal heart sounds and intact distal pulses.    Pulmonary/Chest: Effort normal and breath sounds normal.   Abdominal: Soft. Bowel sounds are normal. She exhibits no distension and no mass. There is no tenderness. There is no rebound and no guarding.   Genitourinary: No vaginal discharge found.   Musculoskeletal: Normal range of motion.   Neurological: She is alert. She has normal reflexes.   Skin: Skin is warm and dry.   Psychiatric: She has a normal mood and affect. Her behavior is normal. Judgment and thought content normal.       I have reviewed the following portions of the patient's history: allergies, current medications, past family history, past medical history, past social history, past surgical history, problem list and ROS and confirm it's accurate.      Procedure:       Assessment/Plan:   Recurrent urinary tract calculi-likely calcium oxalate nephrolithiasis.  She's had multiple prior stone analysis.  We discussed fluid intake and appropriate measurement of fluid output.  I'm also to see her back in 6 months for KUB to detect the metabolic activity     Patient's Body mass index is 30.11 kg/m². BMI is above normal parameters. Recommendations include: educational  material.          This document has been electronically signed by LOU MONTANA MD August 24, 2018 8:45 AM

## 2018-08-27 PROBLEM — N20.0 RENAL CALCULUS: Status: ACTIVE | Noted: 2018-08-27

## 2018-09-05 ENCOUNTER — HOSPITAL ENCOUNTER (EMERGENCY)
Facility: HOSPITAL | Age: 43
Discharge: HOME | End: 2018-09-05
Attending: EMERGENCY MEDICINE | Admitting: EMERGENCY MEDICINE

## 2018-09-05 ENCOUNTER — APPOINTMENT (OUTPATIENT)
Dept: CT IMAGING | Facility: HOSPITAL | Age: 43
End: 2018-09-05

## 2018-09-05 VITALS
WEIGHT: 170 LBS | DIASTOLIC BLOOD PRESSURE: 88 MMHG | BODY MASS INDEX: 30.12 KG/M2 | HEIGHT: 63 IN | OXYGEN SATURATION: 100 % | SYSTOLIC BLOOD PRESSURE: 134 MMHG | TEMPERATURE: 98 F | RESPIRATION RATE: 16 BRPM | HEART RATE: 91 BPM

## 2018-09-05 DIAGNOSIS — N20.1 LEFT URETERAL STONE: Primary | ICD-10-CM

## 2018-09-05 DIAGNOSIS — N23 RENAL COLIC ON LEFT SIDE: ICD-10-CM

## 2018-09-05 LAB
ALBUMIN SERPL-MCNC: 4.4 G/DL (ref 3.5–5)
ALBUMIN/GLOB SERPL: 1.4 G/DL (ref 1.5–2.5)
ALP SERPL-CCNC: 81 U/L (ref 35–104)
ALT SERPL W P-5'-P-CCNC: 18 U/L (ref 10–36)
ANION GAP SERPL CALCULATED.3IONS-SCNC: 5.7 MMOL/L (ref 3.6–11.2)
AST SERPL-CCNC: 21 U/L (ref 10–30)
BACTERIA UR QL AUTO: ABNORMAL /HPF
BASOPHILS # BLD AUTO: 0.02 10*3/MM3 (ref 0–0.3)
BASOPHILS NFR BLD AUTO: 0.2 % (ref 0–2)
BILIRUB SERPL-MCNC: 0.6 MG/DL (ref 0.2–1.8)
BILIRUB UR QL STRIP: NEGATIVE
BUN BLD-MCNC: 9 MG/DL (ref 7–21)
BUN/CREAT SERPL: 9.8 (ref 7–25)
CALCIUM SPEC-SCNC: 9.7 MG/DL (ref 7.7–10)
CHLORIDE SERPL-SCNC: 107 MMOL/L (ref 99–112)
CLARITY UR: CLEAR
CO2 SERPL-SCNC: 27.3 MMOL/L (ref 24.3–31.9)
COLOR UR: YELLOW
CREAT BLD-MCNC: 0.92 MG/DL (ref 0.43–1.29)
DEPRECATED RDW RBC AUTO: 46.8 FL (ref 37–54)
EOSINOPHIL # BLD AUTO: 0.09 10*3/MM3 (ref 0–0.7)
EOSINOPHIL NFR BLD AUTO: 1.1 % (ref 0–5)
ERYTHROCYTE [DISTWIDTH] IN BLOOD BY AUTOMATED COUNT: 14.3 % (ref 11.5–14.5)
GFR SERPL CREATININE-BSD FRML MDRD: 67 ML/MIN/1.73
GLOBULIN UR ELPH-MCNC: 3.2 GM/DL
GLUCOSE BLD-MCNC: 98 MG/DL (ref 70–110)
GLUCOSE UR STRIP-MCNC: NEGATIVE MG/DL
HCG SERPL QL: NEGATIVE
HCT VFR BLD AUTO: 40.3 % (ref 37–47)
HGB BLD-MCNC: 13.6 G/DL (ref 12–16)
HGB UR QL STRIP.AUTO: ABNORMAL
HOLD SPECIMEN: NORMAL
HOLD SPECIMEN: NORMAL
HYALINE CASTS UR QL AUTO: ABNORMAL /LPF
IMM GRANULOCYTES # BLD: 0.01 10*3/MM3 (ref 0–0.03)
IMM GRANULOCYTES NFR BLD: 0.1 % (ref 0–0.5)
KETONES UR QL STRIP: NEGATIVE
LEUKOCYTE ESTERASE UR QL STRIP.AUTO: NEGATIVE
LIPASE SERPL-CCNC: 24 U/L (ref 13–60)
LYMPHOCYTES # BLD AUTO: 2.88 10*3/MM3 (ref 1–3)
LYMPHOCYTES NFR BLD AUTO: 33.9 % (ref 21–51)
MCH RBC QN AUTO: 31.8 PG (ref 27–33)
MCHC RBC AUTO-ENTMCNC: 33.7 G/DL (ref 33–37)
MCV RBC AUTO: 94.2 FL (ref 80–94)
MONOCYTES # BLD AUTO: 0.47 10*3/MM3 (ref 0.1–0.9)
MONOCYTES NFR BLD AUTO: 5.5 % (ref 0–10)
NEUTROPHILS # BLD AUTO: 5.02 10*3/MM3 (ref 1.4–6.5)
NEUTROPHILS NFR BLD AUTO: 59.2 % (ref 30–70)
NITRITE UR QL STRIP: NEGATIVE
OSMOLALITY SERPL CALC.SUM OF ELEC: 278.1 MOSM/KG (ref 273–305)
PH UR STRIP.AUTO: 5.5 [PH] (ref 5–8)
PLATELET # BLD AUTO: 258 10*3/MM3 (ref 130–400)
PMV BLD AUTO: 9.2 FL (ref 6–10)
POTASSIUM BLD-SCNC: 3.8 MMOL/L (ref 3.5–5.3)
PROT SERPL-MCNC: 7.6 G/DL (ref 6–8)
PROT UR QL STRIP: NEGATIVE
RBC # BLD AUTO: 4.28 10*6/MM3 (ref 4.2–5.4)
RBC # UR: ABNORMAL /HPF
REF LAB TEST METHOD: ABNORMAL
SODIUM BLD-SCNC: 140 MMOL/L (ref 135–153)
SP GR UR STRIP: 1.02 (ref 1–1.03)
SQUAMOUS #/AREA URNS HPF: ABNORMAL /HPF
UROBILINOGEN UR QL STRIP: ABNORMAL
WBC NRBC COR # BLD: 8.49 10*3/MM3 (ref 4.5–12.5)
WBC UR QL AUTO: ABNORMAL /HPF
WHOLE BLOOD HOLD SPECIMEN: NORMAL
WHOLE BLOOD HOLD SPECIMEN: NORMAL

## 2018-09-05 PROCEDURE — 25010000002 KETOROLAC TROMETHAMINE PER 15 MG: Performed by: EMERGENCY MEDICINE

## 2018-09-05 PROCEDURE — 99284 EMERGENCY DEPT VISIT MOD MDM: CPT

## 2018-09-05 PROCEDURE — 74176 CT ABD & PELVIS W/O CONTRAST: CPT | Performed by: RADIOLOGY

## 2018-09-05 PROCEDURE — 96361 HYDRATE IV INFUSION ADD-ON: CPT

## 2018-09-05 PROCEDURE — 83690 ASSAY OF LIPASE: CPT | Performed by: EMERGENCY MEDICINE

## 2018-09-05 PROCEDURE — 96374 THER/PROPH/DIAG INJ IV PUSH: CPT

## 2018-09-05 PROCEDURE — 80053 COMPREHEN METABOLIC PANEL: CPT | Performed by: EMERGENCY MEDICINE

## 2018-09-05 PROCEDURE — 74176 CT ABD & PELVIS W/O CONTRAST: CPT

## 2018-09-05 PROCEDURE — 96375 TX/PRO/DX INJ NEW DRUG ADDON: CPT

## 2018-09-05 PROCEDURE — 84703 CHORIONIC GONADOTROPIN ASSAY: CPT | Performed by: EMERGENCY MEDICINE

## 2018-09-05 PROCEDURE — 96376 TX/PRO/DX INJ SAME DRUG ADON: CPT

## 2018-09-05 PROCEDURE — 85025 COMPLETE CBC W/AUTO DIFF WBC: CPT | Performed by: EMERGENCY MEDICINE

## 2018-09-05 PROCEDURE — 81001 URINALYSIS AUTO W/SCOPE: CPT | Performed by: EMERGENCY MEDICINE

## 2018-09-05 PROCEDURE — 25010000002 ONDANSETRON PER 1 MG: Performed by: EMERGENCY MEDICINE

## 2018-09-05 PROCEDURE — 25010000002 MORPHINE PER 10 MG: Performed by: EMERGENCY MEDICINE

## 2018-09-05 RX ORDER — SODIUM CHLORIDE 0.9 % (FLUSH) 0.9 %
10 SYRINGE (ML) INJECTION AS NEEDED
Status: DISCONTINUED | OUTPATIENT
Start: 2018-09-05 | End: 2018-09-05 | Stop reason: HOSPADM

## 2018-09-05 RX ORDER — MORPHINE SULFATE 2 MG/ML
2 INJECTION, SOLUTION INTRAMUSCULAR; INTRAVENOUS ONCE
Status: COMPLETED | OUTPATIENT
Start: 2018-09-05 | End: 2018-09-05

## 2018-09-05 RX ORDER — KETOROLAC TROMETHAMINE 30 MG/ML
30 INJECTION, SOLUTION INTRAMUSCULAR; INTRAVENOUS ONCE
Status: COMPLETED | OUTPATIENT
Start: 2018-09-05 | End: 2018-09-05

## 2018-09-05 RX ORDER — ONDANSETRON 2 MG/ML
4 INJECTION INTRAMUSCULAR; INTRAVENOUS ONCE
Status: COMPLETED | OUTPATIENT
Start: 2018-09-05 | End: 2018-09-05

## 2018-09-05 RX ORDER — HYDROCODONE BITARTRATE AND ACETAMINOPHEN 7.5; 325 MG/1; MG/1
1 TABLET ORAL EVERY 8 HOURS PRN
Qty: 12 TABLET | Refills: 0 | Status: SHIPPED | OUTPATIENT
Start: 2018-09-05 | End: 2019-01-31

## 2018-09-05 RX ORDER — ONDANSETRON 4 MG/1
4 TABLET, ORALLY DISINTEGRATING ORAL EVERY 6 HOURS PRN
Qty: 20 TABLET | Refills: 0 | Status: SHIPPED | OUTPATIENT
Start: 2018-09-05 | End: 2019-07-01

## 2018-09-05 RX ADMIN — SODIUM CHLORIDE 1000 ML: 9 INJECTION, SOLUTION INTRAVENOUS at 17:03

## 2018-09-05 RX ADMIN — KETOROLAC TROMETHAMINE 30 MG: 30 INJECTION, SOLUTION INTRAMUSCULAR; INTRAVENOUS at 17:03

## 2018-09-05 RX ADMIN — ONDANSETRON HYDROCHLORIDE 4 MG: 2 INJECTION, SOLUTION INTRAMUSCULAR; INTRAVENOUS at 17:04

## 2018-09-05 RX ADMIN — MORPHINE SULFATE 2 MG: 2 INJECTION, SOLUTION INTRAMUSCULAR; INTRAVENOUS at 18:43

## 2018-09-05 RX ADMIN — MORPHINE SULFATE 2 MG: 2 INJECTION, SOLUTION INTRAMUSCULAR; INTRAVENOUS at 17:07

## 2018-09-05 NOTE — ED PROVIDER NOTES
Subjective     History provided by:  Patient  Flank Pain   Pain location:  L flank  Pain quality: gnawing    Pain radiates to:  Does not radiate  Pain severity:  Moderate  Onset quality:  Gradual  Timing:  Constant  Progression:  Worsening  Chronicity:  Recurrent  Relieved by:  Nothing  Associated symptoms: nausea    Associated symptoms: no chest pain, no dysuria and no fever        Review of Systems   Constitutional: Negative.  Negative for fever.   HENT: Negative.    Respiratory: Negative.    Cardiovascular: Negative.  Negative for chest pain.   Gastrointestinal: Positive for nausea. Negative for abdominal pain.   Endocrine: Negative.    Genitourinary: Positive for flank pain. Negative for dysuria.   Skin: Negative.    Neurological: Negative.    Psychiatric/Behavioral: Negative.    All other systems reviewed and are negative.      Past Medical History:   Diagnosis Date   • Kidney stones    • Migraine    • Pneumonia    • Strep throat        Allergies   Allergen Reactions   • Sulfa Antibiotics Anaphylaxis   • Compazine [Prochlorperazine Edisylate] Hives       Past Surgical History:   Procedure Laterality Date   •  SECTION     • COLONOSCOPY     • CYSTOSCOPY ELECTROHYDRAULIC LITHOTRIPSY     • DENTAL PROCEDURE     • KIDNEY STONE SURGERY         Family History   Problem Relation Age of Onset   • No Known Problems Father    • No Known Problems Mother        Social History     Social History   • Marital status:      Social History Main Topics   • Smoking status: Current Every Day Smoker     Packs/day: 0.50     Types: Cigarettes   • Smokeless tobacco: Never Used   • Alcohol use No   • Drug use: No   • Sexual activity: Defer     Other Topics Concern   • Not on file           Objective   Physical Exam   Constitutional: She is oriented to person, place, and time. She appears well-developed and well-nourished. No distress.   HENT:   Head: Normocephalic and atraumatic.   Right Ear: External ear normal.   Left  Ear: External ear normal.   Nose: Nose normal.   Eyes: Conjunctivae are normal.   Neck: Normal range of motion. Neck supple. No JVD present. No tracheal deviation present.   Cardiovascular: Normal rate, regular rhythm and normal heart sounds.    No murmur heard.  Pulmonary/Chest: Effort normal and breath sounds normal. No respiratory distress. She has no wheezes.   Abdominal: Soft. Bowel sounds are normal. There is tenderness (left flank. ).   Musculoskeletal: Normal range of motion. She exhibits no edema or deformity.   Neurological: She is alert and oriented to person, place, and time. No cranial nerve deficit.   Skin: Skin is warm and dry. No rash noted. She is not diaphoretic. No erythema. No pallor.   Psychiatric: She has a normal mood and affect. Her behavior is normal. Thought content normal.   Nursing note and vitals reviewed.      Procedures           ED Course  ED Course as of Sep 05 1929   Wed Sep 05, 2018   1925 CT stone protocol interpreted by virtual radiology: 3 mm stone in the proximal left ureter minimally instructed.  Numerous additional intrarenal stones mostly on the left  [RB]      ED Course User Index  [RB] John Paul Truong PA                  [facility]  Number of Diagnoses or Management Options  Left ureteral stone: new and does not require workup  Renal colic on left side: new and does not require workup     Amount and/or Complexity of Data Reviewed  Clinical lab tests: reviewed  Tests in the radiology section of CPT®: reviewed    Risk of Complications, Morbidity, and/or Mortality  Presenting problems: moderate  Diagnostic procedures: moderate  Management options: low    Patient Progress  Patient progress: stable        Final diagnoses:   Left ureteral stone   Renal colic on left side            John Paul Truong PA  09/05/18 1929

## 2018-09-06 ENCOUNTER — TELEPHONE (OUTPATIENT)
Dept: UROLOGY | Facility: CLINIC | Age: 43
End: 2018-09-06

## 2018-09-06 NOTE — TELEPHONE ENCOUNTER
I called the patient is aware of the appointment on Monday.    ----- Message from Garth Friedman MD sent at 9/6/2018  7:48 AM EDT -----  Contact: [phone]  Bring in for appt.  ----- Message -----  From: Verna Tom MA  Sent: 9/5/2018   1:40 PM  To: Garth Friedman MD        ----- Message -----  From: Shikha Garrett  Sent: 9/5/2018   8:36 AM  To: Verna Tom MA    Patient call and stated that she is having pain in the left side and nothing was helping it.  She has Kidney Stones and Dr. Friedman told her to call if she got worse.

## 2018-09-07 ENCOUNTER — ANESTHESIA EVENT (OUTPATIENT)
Dept: PERIOP | Facility: HOSPITAL | Age: 43
End: 2018-09-07

## 2018-09-07 ENCOUNTER — APPOINTMENT (OUTPATIENT)
Dept: GENERAL RADIOLOGY | Facility: HOSPITAL | Age: 43
End: 2018-09-07
Attending: UROLOGY

## 2018-09-07 ENCOUNTER — TELEPHONE (OUTPATIENT)
Dept: UROLOGY | Facility: CLINIC | Age: 43
End: 2018-09-07

## 2018-09-07 ENCOUNTER — APPOINTMENT (OUTPATIENT)
Dept: PREADMISSION TESTING | Facility: HOSPITAL | Age: 43
End: 2018-09-07

## 2018-09-07 ENCOUNTER — HOSPITAL ENCOUNTER (OUTPATIENT)
Facility: HOSPITAL | Age: 43
Discharge: HOME | End: 2018-09-07
Attending: UROLOGY | Admitting: UROLOGY

## 2018-09-07 ENCOUNTER — ANESTHESIA (OUTPATIENT)
Dept: PERIOP | Facility: HOSPITAL | Age: 43
End: 2018-09-07

## 2018-09-07 VITALS
SYSTOLIC BLOOD PRESSURE: 120 MMHG | WEIGHT: 170 LBS | HEIGHT: 63 IN | DIASTOLIC BLOOD PRESSURE: 76 MMHG | OXYGEN SATURATION: 99 % | BODY MASS INDEX: 30.12 KG/M2 | HEART RATE: 70 BPM | TEMPERATURE: 97 F | RESPIRATION RATE: 18 BRPM

## 2018-09-07 DIAGNOSIS — N20.0 RENAL CALCULUS: ICD-10-CM

## 2018-09-07 PROCEDURE — 25010000002 FENTANYL CITRATE (PF) 100 MCG/2ML SOLUTION: Performed by: NURSE ANESTHETIST, CERTIFIED REGISTERED

## 2018-09-07 PROCEDURE — 25010000002 KETOROLAC TROMETHAMINE PER 15 MG: Performed by: NURSE ANESTHETIST, CERTIFIED REGISTERED

## 2018-09-07 PROCEDURE — 25010000002 PROPOFOL 10 MG/ML EMULSION: Performed by: NURSE ANESTHETIST, CERTIFIED REGISTERED

## 2018-09-07 PROCEDURE — 74018 RADEX ABDOMEN 1 VIEW: CPT

## 2018-09-07 PROCEDURE — 25010000002 GENTAMICIN PER 80 MG: Performed by: UROLOGY

## 2018-09-07 PROCEDURE — S0260 H&P FOR SURGERY: HCPCS | Performed by: UROLOGY

## 2018-09-07 PROCEDURE — 25010000002 FENTANYL CITRATE (PF) 100 MCG/2ML SOLUTION: Performed by: ANESTHESIOLOGY

## 2018-09-07 PROCEDURE — 74018 RADEX ABDOMEN 1 VIEW: CPT | Performed by: RADIOLOGY

## 2018-09-07 PROCEDURE — 25010000002 ONDANSETRON PER 1 MG: Performed by: NURSE ANESTHETIST, CERTIFIED REGISTERED

## 2018-09-07 PROCEDURE — 25010000002 MIDAZOLAM PER 1 MG: Performed by: NURSE ANESTHETIST, CERTIFIED REGISTERED

## 2018-09-07 PROCEDURE — 50590 FRAGMENTING OF KIDNEY STONE: CPT | Performed by: UROLOGY

## 2018-09-07 RX ORDER — FAMOTIDINE 10 MG/ML
INJECTION, SOLUTION INTRAVENOUS AS NEEDED
Status: DISCONTINUED | OUTPATIENT
Start: 2018-09-07 | End: 2018-09-07 | Stop reason: SURG

## 2018-09-07 RX ORDER — MIDAZOLAM HYDROCHLORIDE 1 MG/ML
INJECTION INTRAMUSCULAR; INTRAVENOUS AS NEEDED
Status: DISCONTINUED | OUTPATIENT
Start: 2018-09-07 | End: 2018-09-07 | Stop reason: SURG

## 2018-09-07 RX ORDER — OXYCODONE HYDROCHLORIDE AND ACETAMINOPHEN 5; 325 MG/1; MG/1
1 TABLET ORAL ONCE AS NEEDED
Status: DISCONTINUED | OUTPATIENT
Start: 2018-09-07 | End: 2018-09-07 | Stop reason: HOSPADM

## 2018-09-07 RX ORDER — PROPOFOL 10 MG/ML
VIAL (ML) INTRAVENOUS AS NEEDED
Status: DISCONTINUED | OUTPATIENT
Start: 2018-09-07 | End: 2018-09-07 | Stop reason: SURG

## 2018-09-07 RX ORDER — GENTAMICIN SULFATE 80 MG/50ML
80 INJECTION, SOLUTION INTRAVENOUS ONCE
Status: COMPLETED | OUTPATIENT
Start: 2018-09-07 | End: 2018-09-07

## 2018-09-07 RX ORDER — FENTANYL CITRATE 50 UG/ML
50 INJECTION, SOLUTION INTRAMUSCULAR; INTRAVENOUS
Status: DISCONTINUED | OUTPATIENT
Start: 2018-09-07 | End: 2018-09-07 | Stop reason: HOSPADM

## 2018-09-07 RX ORDER — IPRATROPIUM BROMIDE AND ALBUTEROL SULFATE 2.5; .5 MG/3ML; MG/3ML
3 SOLUTION RESPIRATORY (INHALATION) ONCE AS NEEDED
Status: DISCONTINUED | OUTPATIENT
Start: 2018-09-07 | End: 2018-09-07 | Stop reason: HOSPADM

## 2018-09-07 RX ORDER — SODIUM CHLORIDE 0.9 % (FLUSH) 0.9 %
1-10 SYRINGE (ML) INJECTION AS NEEDED
Status: DISCONTINUED | OUTPATIENT
Start: 2018-09-07 | End: 2018-09-07 | Stop reason: HOSPADM

## 2018-09-07 RX ORDER — GENTAMICIN SULFATE 80 MG/50ML
80 INJECTION, SOLUTION INTRAVENOUS ONCE
Status: CANCELLED | OUTPATIENT
Start: 2018-09-07 | End: 2018-09-07

## 2018-09-07 RX ORDER — FENTANYL CITRATE 50 UG/ML
100 INJECTION, SOLUTION INTRAMUSCULAR; INTRAVENOUS
Status: DISCONTINUED | OUTPATIENT
Start: 2018-09-07 | End: 2018-09-07 | Stop reason: HOSPADM

## 2018-09-07 RX ORDER — OXYCODONE AND ACETAMINOPHEN 10; 325 MG/1; MG/1
1 TABLET ORAL EVERY 4 HOURS PRN
Qty: 12 TABLET | Refills: 0 | Status: SHIPPED | OUTPATIENT
Start: 2018-09-07 | End: 2018-11-16 | Stop reason: SDUPTHER

## 2018-09-07 RX ORDER — ONDANSETRON 2 MG/ML
4 INJECTION INTRAMUSCULAR; INTRAVENOUS ONCE AS NEEDED
Status: DISCONTINUED | OUTPATIENT
Start: 2018-09-07 | End: 2018-09-07 | Stop reason: HOSPADM

## 2018-09-07 RX ORDER — KETOROLAC TROMETHAMINE 30 MG/ML
INJECTION, SOLUTION INTRAMUSCULAR; INTRAVENOUS AS NEEDED
Status: DISCONTINUED | OUTPATIENT
Start: 2018-09-07 | End: 2018-09-07 | Stop reason: SURG

## 2018-09-07 RX ORDER — ONDANSETRON 2 MG/ML
INJECTION INTRAMUSCULAR; INTRAVENOUS AS NEEDED
Status: DISCONTINUED | OUTPATIENT
Start: 2018-09-07 | End: 2018-09-07 | Stop reason: SURG

## 2018-09-07 RX ORDER — MEPERIDINE HYDROCHLORIDE 50 MG/ML
12.5 INJECTION INTRAMUSCULAR; INTRAVENOUS; SUBCUTANEOUS
Status: DISCONTINUED | OUTPATIENT
Start: 2018-09-07 | End: 2018-09-07 | Stop reason: HOSPADM

## 2018-09-07 RX ORDER — LIDOCAINE HYDROCHLORIDE 20 MG/ML
INJECTION, SOLUTION INFILTRATION; PERINEURAL AS NEEDED
Status: DISCONTINUED | OUTPATIENT
Start: 2018-09-07 | End: 2018-09-07 | Stop reason: SURG

## 2018-09-07 RX ORDER — SODIUM CHLORIDE, SODIUM LACTATE, POTASSIUM CHLORIDE, CALCIUM CHLORIDE 600; 310; 30; 20 MG/100ML; MG/100ML; MG/100ML; MG/100ML
125 INJECTION, SOLUTION INTRAVENOUS CONTINUOUS
Status: DISCONTINUED | OUTPATIENT
Start: 2018-09-07 | End: 2018-09-07 | Stop reason: HOSPADM

## 2018-09-07 RX ADMIN — ONDANSETRON 4 MG: 2 INJECTION, SOLUTION INTRAMUSCULAR; INTRAVENOUS at 14:17

## 2018-09-07 RX ADMIN — MIDAZOLAM HYDROCHLORIDE 2 MG: 1 INJECTION, SOLUTION INTRAMUSCULAR; INTRAVENOUS at 14:12

## 2018-09-07 RX ADMIN — GENTAMICIN SULFATE 80 MG: 80 INJECTION, SOLUTION INTRAVENOUS at 14:12

## 2018-09-07 RX ADMIN — LIDOCAINE HYDROCHLORIDE 40 MG: 20 INJECTION, SOLUTION INFILTRATION; PERINEURAL at 14:17

## 2018-09-07 RX ADMIN — KETOROLAC TROMETHAMINE 30 MG: 30 INJECTION, SOLUTION INTRAMUSCULAR; INTRAVENOUS at 14:23

## 2018-09-07 RX ADMIN — PROPOFOL 150 MG: 10 INJECTION, EMULSION INTRAVENOUS at 14:17

## 2018-09-07 RX ADMIN — FENTANYL CITRATE 50 MCG: 50 INJECTION INTRAMUSCULAR; INTRAVENOUS at 15:17

## 2018-09-07 RX ADMIN — FENTANYL CITRATE 50 MCG: 50 INJECTION INTRAMUSCULAR; INTRAVENOUS at 15:12

## 2018-09-07 RX ADMIN — FAMOTIDINE 20 MG: 10 INJECTION, SOLUTION INTRAVENOUS at 14:17

## 2018-09-07 RX ADMIN — SODIUM CHLORIDE, POTASSIUM CHLORIDE, SODIUM LACTATE AND CALCIUM CHLORIDE 125 ML/HR: 600; 310; 30; 20 INJECTION, SOLUTION INTRAVENOUS at 14:04

## 2018-09-07 RX ADMIN — FENTANYL CITRATE 100 MCG: 50 INJECTION INTRAMUSCULAR; INTRAVENOUS at 14:04

## 2018-09-07 NOTE — H&P
Chief Complaint   Patient presents with   • Renal Calculi         HPI:   43 y.o. female.  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  She has no fevers or chills.  She has nausea and vomiting.  The stone is an obvious calcium oxalate dihydrate stone.  Currently she is free of disease.  But I'm and to give her appropriate pain medication to have a recurrent stones and Flomax to stand out facilitate passage used as medical expulsive therapy.     Past Medical History:         Medical History        Past Medical History:   Diagnosis Date   • Kidney stones     • Migraine     • Pneumonia     • Strep throat                 Current Meds:      Current Medications   Current Outpatient Prescriptions   Medication Sig Dispense Refill   • amitriptyline (ELAVIL) 25 MG tablet Take 25 mg by mouth every night.       • clonazePAM (KlonoPIN) 0.5 MG tablet Take 0.5 mg by mouth 2 (two) times a day as needed for seizures.       • gabapentin (NEURONTIN) 600 MG tablet     0   • ketorolac (TORADOL) 10 MG tablet Take 1 tablet by mouth Every 6 (Six) Hours As Needed for Moderate Pain . 12 tablet 0   • promethazine (PHENERGAN) 25 MG tablet Take 25 mg by mouth Every 6 (Six) Hours As Needed for nausea or vomiting.       • tamsulosin (FLOMAX) 0.4 MG capsule 24 hr capsule Take 1 capsule by mouth Daily. 30 capsule 0      No current facility-administered medications for this visit.             Allergies:            Allergies   Allergen Reactions   • Sulfa Antibiotics Anaphylaxis   • Compazine [Prochlorperazine Edisylate] Hives         Past Surgical History:      Surgical History         Past Surgical History:   Procedure Laterality Date   •  SECTION       • COLONOSCOPY       • CYSTOSCOPY ELECTROHYDRAULIC LITHOTRIPSY       • DENTAL PROCEDURE       • KIDNEY STONE SURGERY                   Social History:      Social History   Social History            Social History   •  Marital status:        Spouse name: N/A   • Number of children: N/A   • Years of education: N/A          Occupational History   • Not on file.            Social History Main Topics   • Smoking status: Current Every Day Smoker       Packs/day: 0.50       Types: Cigarettes   • Smokeless tobacco: Never Used   • Alcohol use No   • Drug use: No   • Sexual activity: Defer           Other Topics Concern   • Not on file          Social History Narrative   • No narrative on file            Family History:      History reviewed. No pertinent family history.     Review of Systems:      Review of Systems   Constitutional: Negative.  Negative for activity change, appetite change, chills, diaphoresis, fatigue and unexpected weight change.   HENT: Negative for congestion, dental problem, drooling, ear discharge, ear pain, facial swelling, hearing loss, mouth sores, nosebleeds, postnasal drip, rhinorrhea, sinus pressure, sneezing, sore throat, tinnitus, trouble swallowing and voice change.    Eyes: Negative.  Negative for photophobia, pain, discharge, redness, itching and visual disturbance.   Respiratory: Negative.  Negative for apnea, cough, choking, chest tightness, shortness of breath, wheezing and stridor.    Cardiovascular: Negative.  Negative for chest pain, palpitations and leg swelling.   Gastrointestinal: Negative.  Negative for abdominal distention, abdominal pain, anal bleeding, blood in stool, constipation, diarrhea, nausea, rectal pain and vomiting.   Endocrine: Negative.  Negative for cold intolerance, heat intolerance, polydipsia, polyphagia and polyuria.   Musculoskeletal: Negative.  Negative for arthralgias, back pain, gait problem, joint swelling, myalgias, neck pain and neck stiffness.   Skin: Negative.  Negative for color change, pallor, rash and wound.   Allergic/Immunologic: Negative.  Negative for environmental allergies, food allergies and immunocompromised state.   Neurological: Negative.  Negative  for dizziness, tremors, seizures, syncope, facial asymmetry, speech difficulty, weakness, light-headedness, numbness and headaches.   Hematological: Negative.  Negative for adenopathy. Does not bruise/bleed easily.   Psychiatric/Behavioral: Negative for agitation, behavioral problems, confusion, decreased concentration, dysphoric mood, hallucinations, self-injury, sleep disturbance and suicidal ideas. The patient is not nervous/anxious and is not hyperactive.    All other systems reviewed and are negative.        Physical Exam:      Physical Exam   Constitutional: She appears well-developed and well-nourished.   HENT:   Head: Normocephalic and atraumatic.   Right Ear: External ear normal.   Left Ear: External ear normal.   Mouth/Throat: Oropharynx is clear and moist.   Eyes: Pupils are equal, round, and reactive to light. Conjunctivae are normal.   Cardiovascular: Normal rate, regular rhythm, normal heart sounds and intact distal pulses.    Pulmonary/Chest: Effort normal and breath sounds normal.   Abdominal: Soft. Bowel sounds are normal. She exhibits no distension and no mass. There is no tenderness. There is no rebound and no guarding.   Genitourinary: No vaginal discharge found.   Musculoskeletal: Normal range of motion.   Neurological: She is alert. She has normal reflexes.   Skin: Skin is warm and dry.   Psychiatric: She has a normal mood and affect. Her behavior is normal. Judgment and thought content normal.         I have reviewed the following portions of the patient's history: allergies, current medications, past family history, past medical history, past social history, past surgical history, problem list and ROS and confirm it's accurate.        Procedure:         Assessment/Plan:   Recurrent urinary tract calculi-likely calcium oxalate nephrolithiasis.  She's had multiple prior stone analysis.  We discussed fluid intake and appropriate measurement of fluid output.  I'm also to see her back in 6 months  for KUB to detect the metabolic activity      Patient's Body mass index is 30.11 kg/m². BMI is above normal parameters. Recommendations include: educational material.

## 2018-09-07 NOTE — OP NOTE
EXTRACORPOREAL SHOCKWAVE LITHOTRIPSY  Procedure Note    Olivia Richmond  9/7/2018    Pre-op Diagnosis:   Renal calculus [N20.0]    Post-op Diagnosis:     Post-Op Diagnosis Codes:     * Renal calculus [N20.0]    Procedure/CPT® Codes:   43-year-old white female who presented emergently with a severe colicky pain from a left upper ureteral stone.  She's crying in pain.  ESWL-the patient is a candidate for extracorporeal shockwave lithotripsy.  We discussed the size type of stone.  And the complications associated with the procedure including but not limited to pain in the flank, hematoma, spontaneous renal hemorrhage, and adequate fragmentation of stones as well as the need for passage of the stone fragments. The need for concomitant additional procedures in the range of 24% and the risk of a distal fragment in the range of 3% requiring ureteroscopic removal..  Fact that sometimes a stent is indicated based on the size and the density of the stone as determined on the CAT scan.  Following an extensive informed consent he is brought to the operative suite and underwent induction of general endotracheal anesthetic the stone was localized at F2 and a total of 3000 shockwaves administered without complication.  There was excellent fragmentation and the patient was awake and alert return to recovery room.          Procedure(s):  EXTRACORPOREAL SHOCKWAVE LITHOTRIPSY    Surgeon(s):  Garth Friedman MD    Anesthesia: see anesthesia record    Staff:   Circulator: Raulito Massey RN  Scrub Person: Noris Carrizales LPN  Other: Myriam Tom    Estimated Blood Loss: none  Urine Voided: * No values recorded between 9/7/2018  2:14 PM and 9/7/2018  2:42 PM *    Specimens:                None      Drains: None    Findings: UPJ stone with good fragmentation and lower pole fragments as well    Blood: N/A    Complications: None    Grafts and Implants: None    Garth Friedman MD     Date: 9/7/2018  Time: 2:42 PM

## 2018-09-07 NOTE — ANESTHESIA PREPROCEDURE EVALUATION
Anesthesia Evaluation     Patient summary reviewed and Nursing notes reviewed   no history of anesthetic complications:  NPO Solid Status: > 6 hours  NPO Liquid Status: > 6 hours           Airway   Mallampati: II  TM distance: >3 FB  Neck ROM: full  No difficulty expected  Dental - normal exam   (+) lower dentures and upper dentures    Pulmonary - normal exam   (+) a smoker Former,   Cardiovascular - negative cardio ROS and normal exam        Neuro/Psych  (+) headaches,     GI/Hepatic/Renal/Endo - negative ROS     Musculoskeletal (-) negative ROS    Abdominal  - normal exam    Bowel sounds: normal.   Substance History - negative use     OB/GYN negative ob/gyn ROS         Other                        Anesthesia Plan    ASA 2     general     intravenous induction   Anesthetic plan, all risks, benefits, and alternatives have been provided, discussed and informed consent has been obtained with: patient.    Plan discussed with CRNA.

## 2018-09-07 NOTE — TELEPHONE ENCOUNTER
Dr. Friedman informed me that he was adding the patient on for today's surgery schedule and that she was miserable.  I talked with the patient to make sure and see when she ate. She said that she took 1 bite of a cracker at 8 am and that was about all she had ate for the past 4 days.  I called surgery and spoke with Ayana and she talked with anesthesia to make sure that everything was ok. She called me back and told me they said by the time she gets back it will be the 8 hrs required.  I  called the patient with PAT and Surgery time. I also gave the patient detailed instructions . Patient expressed understanding. Procedure having:  LEFT ESWL    ARRIVAL TIME: 12:30

## 2018-09-07 NOTE — ANESTHESIA POSTPROCEDURE EVALUATION
Patient: Olivia Richmond    Procedure Summary     Date:  09/07/18 Room / Location:   [facility] 06 /  [facility]    Anesthesia Start:  1412 Anesthesia Stop:  1456    Procedure:  EXTRACORPOREAL SHOCKWAVE LITHOTRIPSY (Left ) Diagnosis:       Renal calculus      (Renal calculus [N20.0])    Surgeon:  Garth Friedman MD Provider:  Kelechi Villeda MD    Anesthesia Type:  general ASA Status:  2          Anesthesia Type: general  Last vitals  BP   120/76 (09/07/18 1541)   Temp   97 °F (36.1 °C) (09/07/18 1533)   Pulse   70 (09/07/18 1541)   Resp   18 (09/07/18 1541)     SpO2   99 % (09/07/18 1541)     Post Anesthesia Care and Evaluation    Patient location during evaluation: PHASE II  Patient participation: complete - patient participated  Level of consciousness: awake and alert  Pain score: 1  Pain management: adequate  Airway patency: patent  Anesthetic complications: No anesthetic complications  PONV Status: controlled  Cardiovascular status: acceptable  Respiratory status: acceptable  Hydration status: acceptable

## 2018-09-07 NOTE — ANESTHESIA PROCEDURE NOTES
Airway    General Information and Staff    Patient location during procedure: OR  CRNA: EDGAR GREEN    Indications and Patient Condition    Preoxygenated: yes      Final Airway Details  Final airway type: supraglottic airway      Successful airway: classic  Size 4    Number of attempts at approach: 1

## 2018-09-10 ENCOUNTER — OFFICE VISIT (OUTPATIENT)
Dept: UROLOGY | Facility: CLINIC | Age: 43
End: 2018-09-10

## 2018-09-10 VITALS — BODY MASS INDEX: 30.12 KG/M2 | HEIGHT: 63 IN | WEIGHT: 170 LBS

## 2018-09-10 DIAGNOSIS — N20.0 RENAL CALCULUS: Primary | ICD-10-CM

## 2018-09-10 PROCEDURE — 99024 POSTOP FOLLOW-UP VISIT: CPT | Performed by: UROLOGY

## 2018-09-10 NOTE — PROGRESS NOTES
Chief Complaint:          Chief Complaint   Patient presents with   • Nephrolithiasis       HPI:   43 y.o. female.  43-year-old white female status post lithotripsy of a left UPJ stone Friday with great results.  She feels great she's passed copious amounts of sand and I'll check her back in 3 months    Past Medical History:        Past Medical History:   Diagnosis Date   • Kidney stones    • Migraine    • Pneumonia    • Strep throat          Current Meds:     Current Outpatient Prescriptions   Medication Sig Dispense Refill   • amitriptyline (ELAVIL) 25 MG tablet Take 25 mg by mouth every night.     • clonazePAM (KlonoPIN) 0.5 MG tablet Take 0.5 mg by mouth 2 (two) times a day as needed for seizures.     • gabapentin (NEURONTIN) 600 MG tablet   0   • HYDROcodone-acetaminophen (NORCO) 7.5-325 MG per tablet Take 1 tablet by mouth Every 8 (Eight) Hours As Needed for Moderate Pain . 12 tablet 0   • ketorolac (TORADOL) 10 MG tablet Take 1 tablet by mouth Every 6 (Six) Hours As Needed for Moderate Pain . 12 tablet 0   • ondansetron ODT (ZOFRAN-ODT) 4 MG disintegrating tablet Take 1 tablet by mouth Every 6 (Six) Hours As Needed for Nausea. 20 tablet 0   • oxyCODONE-acetaminophen (PERCOCET)  MG per tablet Take 1 tablet by mouth Every 6 (Six) Hours As Needed for Severe Pain . 15 tablet 0   • oxyCODONE-acetaminophen (PERCOCET)  MG per tablet Take 1 tablet by mouth Every 4 (Four) Hours As Needed for Moderate Pain 12 tablet 0   • promethazine (PHENERGAN) 25 MG tablet Take 25 mg by mouth Every 6 (Six) Hours As Needed for nausea or vomiting.     • tamsulosin (FLOMAX) 0.4 MG capsule 24 hr capsule Take 1 capsule by mouth Daily. 30 capsule 6     No current facility-administered medications for this visit.         Allergies:      Allergies   Allergen Reactions   • Sulfa Antibiotics Anaphylaxis   • Compazine [Prochlorperazine Edisylate] Hives        Past Surgical History:     Past Surgical History:   Procedure Laterality  Date   •  SECTION     • COLONOSCOPY     • CYSTOSCOPY ELECTROHYDRAULIC LITHOTRIPSY     • DENTAL PROCEDURE     • KIDNEY STONE SURGERY           Social History:     Social History     Social History   • Marital status:      Spouse name: N/A   • Number of children: N/A   • Years of education: N/A     Occupational History   • Not on file.     Social History Main Topics   • Smoking status: Former Smoker     Packs/day: 0.50     Years: 15.00     Types: Cigarettes     Quit date: 2018   • Smokeless tobacco: Never Used   • Alcohol use No   • Drug use: No   • Sexual activity: Defer     Other Topics Concern   • Not on file     Social History Narrative   • No narrative on file       Family History:     Family History   Problem Relation Age of Onset   • No Known Problems Father    • No Known Problems Mother        Review of Systems:     Review of Systems   Constitutional: Negative.  Negative for activity change, appetite change, chills, diaphoresis, fatigue and unexpected weight change.   HENT: Negative for congestion, dental problem, drooling, ear discharge, ear pain, facial swelling, hearing loss, mouth sores, nosebleeds, postnasal drip, rhinorrhea, sinus pressure, sneezing, sore throat, tinnitus, trouble swallowing and voice change.    Eyes: Negative.  Negative for photophobia, pain, discharge, redness, itching and visual disturbance.   Respiratory: Negative.  Negative for apnea, cough, choking, chest tightness, shortness of breath, wheezing and stridor.    Cardiovascular: Negative.  Negative for chest pain, palpitations and leg swelling.   Gastrointestinal: Negative.  Negative for abdominal distention, abdominal pain, anal bleeding, blood in stool, constipation, diarrhea, nausea, rectal pain and vomiting.   Endocrine: Negative.  Negative for cold intolerance, heat intolerance, polydipsia, polyphagia and polyuria.   Musculoskeletal: Negative.  Negative for arthralgias, back pain, gait problem, joint swelling,  myalgias, neck pain and neck stiffness.   Skin: Negative.  Negative for color change, pallor, rash and wound.   Allergic/Immunologic: Negative.  Negative for environmental allergies, food allergies and immunocompromised state.   Neurological: Negative.  Negative for dizziness, tremors, seizures, syncope, facial asymmetry, speech difficulty, weakness, light-headedness, numbness and headaches.   Hematological: Negative.  Negative for adenopathy. Does not bruise/bleed easily.   Psychiatric/Behavioral: Negative for agitation, behavioral problems, confusion, decreased concentration, dysphoric mood, hallucinations, self-injury, sleep disturbance and suicidal ideas. The patient is not nervous/anxious and is not hyperactive.    All other systems reviewed and are negative.      Physical Exam:     Physical Exam   Constitutional: She appears well-developed and well-nourished.   HENT:   Head: Normocephalic and atraumatic.   Right Ear: External ear normal.   Left Ear: External ear normal.   Mouth/Throat: Oropharynx is clear and moist.   Eyes: Pupils are equal, round, and reactive to light. Conjunctivae are normal.   Cardiovascular: Normal rate, regular rhythm, normal heart sounds and intact distal pulses.    Pulmonary/Chest: Effort normal and breath sounds normal.   Abdominal: Soft. Bowel sounds are normal. She exhibits no distension and no mass. There is no tenderness. There is no rebound and no guarding.   Genitourinary: No vaginal discharge found.   Musculoskeletal: Normal range of motion.   Neurological: She is alert. She has normal reflexes.   Skin: Skin is warm and dry.   Psychiatric: She has a normal mood and affect. Her behavior is normal. Judgment and thought content normal.       I have reviewed the following portions of the patient's history: allergies, current medications, past family history, past medical history, past social history, past surgical history, problem list and ROS and confirm it's  accurate.      Procedure:       Assessment/Plan:   Renal calculus-we discussed the presence of the stone we discussed the various therapeutic options available including percutaneous nephrostolithotomy, lithotripsy.  We discussed the risks of lithotripsy including the passage of stones the development of a large string of stones in the distal ureter known as Steinstrasse.  In the 3% incidence of that we will need to proceed with a ureteroscopy for obstructing fragments.  Extremely rare incidence of renal hematoma.  And the significance of this.  We discussed the absolute relative indicators for intervention including the presence of sepsis, and pain we cannot control is the primary need for urgent intervention.  We discussed placement of a stent if indicated and the management of the stent as well.  Status post a very successful lithotripsy     Patient's Body mass index is 30.12 kg/m². BMI is above normal parameters. Recommendations include: educational material.          This document has been electronically signed by LOU MONTANA MD September 10, 2018 8:14 AM

## 2018-10-12 ENCOUNTER — APPOINTMENT (OUTPATIENT)
Dept: CT IMAGING | Facility: HOSPITAL | Age: 43
End: 2018-10-12

## 2018-10-12 ENCOUNTER — HOSPITAL ENCOUNTER (EMERGENCY)
Facility: HOSPITAL | Age: 43
Discharge: HOME | End: 2018-10-12
Attending: EMERGENCY MEDICINE | Admitting: EMERGENCY MEDICINE

## 2018-10-12 VITALS
DIASTOLIC BLOOD PRESSURE: 90 MMHG | TEMPERATURE: 98 F | HEART RATE: 108 BPM | BODY MASS INDEX: 29.23 KG/M2 | HEIGHT: 63 IN | WEIGHT: 165 LBS | RESPIRATION RATE: 18 BRPM | SYSTOLIC BLOOD PRESSURE: 148 MMHG | OXYGEN SATURATION: 99 %

## 2018-10-12 DIAGNOSIS — M54.50 ACUTE RIGHT-SIDED LOW BACK PAIN WITHOUT SCIATICA: Primary | ICD-10-CM

## 2018-10-12 LAB
ALBUMIN SERPL-MCNC: 4.4 G/DL (ref 3.5–5)
ALBUMIN/GLOB SERPL: 1.5 G/DL (ref 1.5–2.5)
ALP SERPL-CCNC: 89 U/L (ref 35–104)
ALT SERPL W P-5'-P-CCNC: 8 U/L (ref 10–36)
ANION GAP SERPL CALCULATED.3IONS-SCNC: 6.6 MMOL/L (ref 3.6–11.2)
AST SERPL-CCNC: 14 U/L (ref 10–30)
BACTERIA UR QL AUTO: ABNORMAL /HPF
BASOPHILS # BLD AUTO: 0.01 10*3/MM3 (ref 0–0.3)
BASOPHILS NFR BLD AUTO: 0.1 % (ref 0–2)
BILIRUB SERPL-MCNC: 0.3 MG/DL (ref 0.2–1.8)
BILIRUB UR QL STRIP: NEGATIVE
BUN BLD-MCNC: 10 MG/DL (ref 7–21)
BUN/CREAT SERPL: 12 (ref 7–25)
CALCIUM SPEC-SCNC: 9.5 MG/DL (ref 7.7–10)
CHLORIDE SERPL-SCNC: 106 MMOL/L (ref 99–112)
CLARITY UR: CLEAR
CO2 SERPL-SCNC: 26.4 MMOL/L (ref 24.3–31.9)
COLOR UR: YELLOW
CREAT BLD-MCNC: 0.83 MG/DL (ref 0.43–1.29)
DEPRECATED RDW RBC AUTO: 44 FL (ref 37–54)
EOSINOPHIL # BLD AUTO: 0.2 10*3/MM3 (ref 0–0.7)
EOSINOPHIL NFR BLD AUTO: 2 % (ref 0–5)
ERYTHROCYTE [DISTWIDTH] IN BLOOD BY AUTOMATED COUNT: 12.8 % (ref 11.5–14.5)
GFR SERPL CREATININE-BSD FRML MDRD: 75 ML/MIN/1.73
GLOBULIN UR ELPH-MCNC: 3 GM/DL
GLUCOSE BLD-MCNC: 94 MG/DL (ref 70–110)
GLUCOSE UR STRIP-MCNC: NEGATIVE MG/DL
HCT VFR BLD AUTO: 38.7 % (ref 37–47)
HGB BLD-MCNC: 13 G/DL (ref 12–16)
HGB UR QL STRIP.AUTO: ABNORMAL
HYALINE CASTS UR QL AUTO: ABNORMAL /LPF
IMM GRANULOCYTES # BLD: 0.03 10*3/MM3 (ref 0–0.03)
IMM GRANULOCYTES NFR BLD: 0.3 % (ref 0–0.5)
KETONES UR QL STRIP: NEGATIVE
LEUKOCYTE ESTERASE UR QL STRIP.AUTO: NEGATIVE
LYMPHOCYTES # BLD AUTO: 3.33 10*3/MM3 (ref 1–3)
LYMPHOCYTES NFR BLD AUTO: 33.2 % (ref 21–51)
MCH RBC QN AUTO: 31.6 PG (ref 27–33)
MCHC RBC AUTO-ENTMCNC: 33.6 G/DL (ref 33–37)
MCV RBC AUTO: 94.2 FL (ref 80–94)
MONOCYTES # BLD AUTO: 0.92 10*3/MM3 (ref 0.1–0.9)
MONOCYTES NFR BLD AUTO: 9.2 % (ref 0–10)
NEUTROPHILS # BLD AUTO: 5.55 10*3/MM3 (ref 1.4–6.5)
NEUTROPHILS NFR BLD AUTO: 55.2 % (ref 30–70)
NITRITE UR QL STRIP: NEGATIVE
OSMOLALITY SERPL CALC.SUM OF ELEC: 276.3 MOSM/KG (ref 273–305)
PH UR STRIP.AUTO: 6 [PH] (ref 5–8)
PLATELET # BLD AUTO: 239 10*3/MM3 (ref 130–400)
PMV BLD AUTO: 9.6 FL (ref 6–10)
POTASSIUM BLD-SCNC: 3.7 MMOL/L (ref 3.5–5.3)
PROT SERPL-MCNC: 7.4 G/DL (ref 6–8)
PROT UR QL STRIP: NEGATIVE
RBC # BLD AUTO: 4.11 10*6/MM3 (ref 4.2–5.4)
RBC # UR: ABNORMAL /HPF
REF LAB TEST METHOD: ABNORMAL
SODIUM BLD-SCNC: 139 MMOL/L (ref 135–153)
SP GR UR STRIP: 1.01 (ref 1–1.03)
SQUAMOUS #/AREA URNS HPF: ABNORMAL /HPF
UROBILINOGEN UR QL STRIP: ABNORMAL
WBC NRBC COR # BLD: 10.04 10*3/MM3 (ref 4.5–12.5)
WBC UR QL AUTO: ABNORMAL /HPF

## 2018-10-12 PROCEDURE — 80053 COMPREHEN METABOLIC PANEL: CPT | Performed by: EMERGENCY MEDICINE

## 2018-10-12 PROCEDURE — 85025 COMPLETE CBC W/AUTO DIFF WBC: CPT | Performed by: EMERGENCY MEDICINE

## 2018-10-12 PROCEDURE — 99283 EMERGENCY DEPT VISIT LOW MDM: CPT

## 2018-10-12 PROCEDURE — 81001 URINALYSIS AUTO W/SCOPE: CPT | Performed by: EMERGENCY MEDICINE

## 2018-10-12 RX ORDER — ONDANSETRON 4 MG/1
4 TABLET, ORALLY DISINTEGRATING ORAL ONCE
Status: COMPLETED | OUTPATIENT
Start: 2018-10-12 | End: 2018-10-12

## 2018-10-12 RX ORDER — KETOROLAC TROMETHAMINE 10 MG/1
10 TABLET, FILM COATED ORAL EVERY 6 HOURS PRN
Qty: 12 TABLET | Refills: 0 | Status: SHIPPED | OUTPATIENT
Start: 2018-10-12 | End: 2019-07-01

## 2018-10-12 RX ADMIN — ONDANSETRON 4 MG: 4 TABLET, ORALLY DISINTEGRATING ORAL at 17:46

## 2018-10-12 NOTE — DISCHARGE INSTRUCTIONS
Call one of the offices below to establish a primary care provider.  If you are unable to get an appointment and feel it is an emergency and need to be seen immediately please return to the Emergency Department.    Call one of the office below to set up a primary care provider.    Dr. Elvis Elena                                                                                                       [address] [facility] [address]  [phone]    Dr. Villarreal, Dr. CANDELARIA Lee, Dr. BK Lee ([facility])  [address] [facility] [address]  [phone]    Dr. Cardenas, Dr. Kim, Dr. Hussein ([facility])  [address] [facility] [address]  [phone]    Dr. Bean  [address] [facility] [address]  [phone]    Dr. Still, Dr. Simmons, Dr. Nino, Dr. Lopez ([facility])  [address] [place] [address] [address]  [facility] [address]  [phone]    Dr. Doris Barr  [address] [facility] [place] [address]  [phone]    Dr. Chandni Rivera  [address]   [address]  [facility] [address]  [phone]    Dr. Elena  [address] [facility] [address]  [phone]    Dr. Ardon, Dr. Perez  [address] [facility] [place] [address]  [phone]    Dr. Johnson  [address]                                                              [address]  [facility] [address]  [phone]    Dr. Coyne  [address] [facility] [id number] [phone]    Dr. Norma Gupta  [address]  [address]  [facility] [address]  [phone]

## 2018-10-12 NOTE — ED NOTES
Pt refuses ct scan at this time, pt reports she has had so many and doesn't want to be scanned, dr. Pearson notified     Gilma Fleming, RN  10/12/18 1072

## 2018-10-12 NOTE — ED PROVIDER NOTES
Subjective   Patient presents to ER with back pain. She has Hx kidney stones and recent lithotripsy        Back Pain   Location:  Lumbar spine  Quality:  Aching and shooting  Pain severity:  Moderate  Onset quality:  Gradual  Timing:  Intermittent  Chronicity:  Recurrent  Context comment:  Kidney stones  Associated symptoms: abdominal pain        Review of Systems   Constitutional: Positive for activity change, appetite change and fatigue.   HENT: Negative.    Eyes: Negative.    Respiratory: Negative.    Cardiovascular: Negative.    Gastrointestinal: Positive for abdominal pain.   Endocrine: Negative.    Genitourinary: Positive for flank pain and urgency.   Musculoskeletal: Positive for back pain.   Allergic/Immunologic: Negative.    Neurological: Negative.    Hematological: Negative.    Psychiatric/Behavioral: Negative.        Past Medical History:   Diagnosis Date   • Kidney stones    • Migraine    • Pneumonia    • Strep throat        Allergies   Allergen Reactions   • Sulfa Antibiotics Anaphylaxis   • Compazine [Prochlorperazine Edisylate] Hives       Past Surgical History:   Procedure Laterality Date   •  SECTION     • COLONOSCOPY     • CYSTOSCOPY ELECTROHYDRAULIC LITHOTRIPSY     • DENTAL PROCEDURE     • EXTRACORPOREAL SHOCK WAVE LITHOTRIPSY (ESWL) Left 2018    Procedure: EXTRACORPOREAL SHOCKWAVE LITHOTRIPSY;  Surgeon: Garth Friedman MD;  Location: [facility];  Service: Urology   • KIDNEY STONE SURGERY         Family History   Problem Relation Age of Onset   • No Known Problems Father    • No Known Problems Mother        Social History     Social History   • Marital status:      Social History Main Topics   • Smoking status: Former Smoker     Packs/day: 0.50     Years: 15.00     Types: Cigarettes     Quit date: 2018   • Smokeless tobacco: Never Used   • Alcohol use No   • Drug use: No   • Sexual activity: Defer     Other Topics Concern   • Not on file           Objective   Physical  Exam   Constitutional: She appears well-developed and well-nourished.   HENT:   Head: Normocephalic and atraumatic.   Eyes: Pupils are equal, round, and reactive to light. EOM are normal.   Neck: Normal range of motion.   Cardiovascular: Normal rate and regular rhythm.    Pulmonary/Chest: Effort normal.   Abdominal: Soft.   Musculoskeletal: Normal range of motion.   Neurological: She is alert.   Skin: Skin is warm.   Psychiatric: She has a normal mood and affect.   Nursing note and vitals reviewed.      Procedures           ED Course                  MDM      Final diagnoses:   Acute right-sided low back pain without sciatica            Sudeep Pearson MD  10/12/18 8545

## 2018-11-06 ENCOUNTER — OFFICE VISIT (OUTPATIENT)
Dept: UROLOGY | Facility: CLINIC | Age: 43
End: 2018-11-06

## 2018-11-06 VITALS — BODY MASS INDEX: 29.23 KG/M2 | WEIGHT: 165 LBS | HEIGHT: 63 IN

## 2018-11-06 DIAGNOSIS — R10.32 LEFT LOWER QUADRANT ABDOMINAL PAIN OF UNKNOWN ETIOLOGY: ICD-10-CM

## 2018-11-06 DIAGNOSIS — N34.2 INFECTIVE URETHRITIS: Primary | ICD-10-CM

## 2018-11-06 PROCEDURE — 99213 OFFICE O/P EST LOW 20 MIN: CPT | Performed by: UROLOGY

## 2018-11-06 RX ORDER — CIPROFLOXACIN 250 MG/1
250 TABLET, FILM COATED ORAL 2 TIMES DAILY
Qty: 14 TABLET | Refills: 0 | Status: SHIPPED | OUTPATIENT
Start: 2018-11-06 | End: 2018-11-13

## 2018-11-06 RX ORDER — CIPROFLOXACIN 250 MG/1
250 TABLET, FILM COATED ORAL 2 TIMES DAILY
Qty: 14 TABLET | Refills: 0 | Status: SHIPPED | OUTPATIENT
Start: 2018-11-06 | End: 2018-11-06 | Stop reason: SDUPTHER

## 2018-11-06 RX ORDER — HYDROCODONE BITARTRATE AND ACETAMINOPHEN 5; 325 MG/1; MG/1
TABLET ORAL
Qty: 12 TABLET | Refills: 0 | Status: SHIPPED | OUTPATIENT
Start: 2018-11-06 | End: 2019-01-31

## 2018-11-06 NOTE — PROGRESS NOTES
Chief Complaint:          Chief Complaint   Patient presents with   • Nephrolithiasis       HPI:   43 y.o. female.  43-year-old white female status post lithotripsy of a left UPJ stone Friday with great results.  She feels great she's passed copious amounts of sand and I'll check her back in 3 months.  She returns today.  She had a prior as wall and 918 and did well she has no fevers and chills started with nausea vomiting and went to the [facility] where she had a CT scan showing bilateral nonobstructive nephrolithiasis.  I have the opportunity to review all the reports and labs.  She then had a urinalysis that was essentially positive for red cells but no white cells.  Her CMP was negative.  Her urine culture was negative.  She had a random repeat culture that was negative.  Pregnancy test was negative.  Her lactic acid was normal.  She had no other abnormalities.  Examination today discloses left lower quadrant abdominal pain that does not appear to be related.  If anything is more of a GI infection I will presumptively treat her with antibiotics for a short period this is not obstructive uropathy.    Past Medical History:        Past Medical History:   Diagnosis Date   • Kidney stones    • Migraine    • Pneumonia    • Strep throat          Current Meds:     Current Outpatient Prescriptions   Medication Sig Dispense Refill   • amitriptyline (ELAVIL) 25 MG tablet Take 25 mg by mouth every night.     • clonazePAM (KlonoPIN) 0.5 MG tablet Take 0.5 mg by mouth 2 (two) times a day as needed for seizures.     • gabapentin (NEURONTIN) 600 MG tablet   0   • HYDROcodone-acetaminophen (NORCO) 7.5-325 MG per tablet Take 1 tablet by mouth Every 8 (Eight) Hours As Needed for Moderate Pain . 12 tablet 0   • ketorolac (TORADOL) 10 MG tablet Take 1 tablet by mouth Every 6 (Six) Hours As Needed for Moderate Pain . 12 tablet 0   • ketorolac (TORADOL) 10 MG tablet Take 1 tablet by mouth Every 6 (Six) Hours As Needed for  Moderate Pain . 12 tablet 0   • ondansetron ODT (ZOFRAN-ODT) 4 MG disintegrating tablet Take 1 tablet by mouth Every 6 (Six) Hours As Needed for Nausea. 20 tablet 0   • oxyCODONE-acetaminophen (PERCOCET)  MG per tablet Take 1 tablet by mouth Every 6 (Six) Hours As Needed for Severe Pain . 15 tablet 0   • oxyCODONE-acetaminophen (PERCOCET)  MG per tablet Take 1 tablet by mouth Every 4 (Four) Hours As Needed for Moderate Pain 12 tablet 0   • promethazine (PHENERGAN) 25 MG tablet Take 25 mg by mouth Every 6 (Six) Hours As Needed for nausea or vomiting.     • tamsulosin (FLOMAX) 0.4 MG capsule 24 hr capsule Take 1 capsule by mouth Daily. 30 capsule 6     No current facility-administered medications for this visit.         Allergies:      Allergies   Allergen Reactions   • Sulfa Antibiotics Anaphylaxis   • Compazine [Prochlorperazine Edisylate] Hives        Past Surgical History:     Past Surgical History:   Procedure Laterality Date   •  SECTION     • COLONOSCOPY     • CYSTOSCOPY ELECTROHYDRAULIC LITHOTRIPSY     • DENTAL PROCEDURE     • EXTRACORPOREAL SHOCK WAVE LITHOTRIPSY (ESWL) Left 2018    Procedure: EXTRACORPOREAL SHOCKWAVE LITHOTRIPSY;  Surgeon: Garth Friedman MD;  Location: [facility];  Service: Urology   • KIDNEY STONE SURGERY           Social History:     Social History     Social History   • Marital status:      Spouse name: N/A   • Number of children: N/A   • Years of education: N/A     Occupational History   • Not on file.     Social History Main Topics   • Smoking status: Former Smoker     Packs/day: 0.50     Years: 15.00     Types: Cigarettes     Quit date: 2018   • Smokeless tobacco: Never Used   • Alcohol use No   • Drug use: No   • Sexual activity: Defer     Other Topics Concern   • Not on file     Social History Narrative   • No narrative on file       Family History:     Family History   Problem Relation Age of Onset   • No Known Problems Father    • No Known  Problems Mother        Review of Systems:     Review of Systems   Constitutional: Negative for chills, fatigue and fever.   Respiratory: Negative for cough, shortness of breath and wheezing.    Cardiovascular: Negative for leg swelling.   Gastrointestinal: Negative for abdominal pain, nausea and vomiting.   Genitourinary: Positive for pelvic pain.   Musculoskeletal: Negative for back pain and joint swelling.   Neurological: Negative for dizziness and headaches.   Psychiatric/Behavioral: Negative for confusion.       Physical Exam:     Physical Exam   Constitutional: She appears well-developed and well-nourished.   HENT:   Head: Normocephalic and atraumatic.   Right Ear: External ear normal.   Left Ear: External ear normal.   Mouth/Throat: Oropharynx is clear and moist.   Eyes: Pupils are equal, round, and reactive to light. Conjunctivae are normal.   Cardiovascular: Normal rate, regular rhythm, normal heart sounds and intact distal pulses.    Pulmonary/Chest: Effort normal and breath sounds normal.   Abdominal: Soft. Bowel sounds are normal. She exhibits no distension and no mass. There is no tenderness. There is no rebound and no guarding.   Genitourinary: No vaginal discharge found.   Genitourinary Comments: Soft nontender abdomen with pain to deep palpation but no peritoneal signs, no rigidity, no organomegaly, no palpable masses.   Musculoskeletal: Normal range of motion.   Neurological: She is alert. She has normal reflexes.   Skin: Skin is warm and dry.   Psychiatric: She has a normal mood and affect. Her behavior is normal. Judgment and thought content normal.       I have reviewed the following portions of the patient's history: allergies, current medications, past family history, past medical history, past social history, past surgical history, problem list and ROS and confirm it's accurate.      Procedure:       Assessment/Plan:   Lower quadrant pain-this is clearly non-urologic in origin.  The lamina  treat her presumptively with a short course of antibiotics and follow-up with her based on this     Patient's Body mass index is 29.24 kg/m². BMI is above normal parameters. Recommendations include: educational material.          This document has been electronically signed by LOU MONTANA MD November 6, 2018 1:39 PM

## 2018-11-07 PROBLEM — R10.32 LEFT LOWER QUADRANT ABDOMINAL PAIN OF UNKNOWN ETIOLOGY: Status: ACTIVE | Noted: 2018-11-07

## 2018-11-08 ENCOUNTER — OFFICE VISIT (OUTPATIENT)
Dept: UROLOGY | Facility: CLINIC | Age: 43
End: 2018-11-08

## 2018-11-08 VITALS — BODY MASS INDEX: 30.55 KG/M2 | WEIGHT: 166 LBS | HEIGHT: 62 IN

## 2018-11-08 DIAGNOSIS — N20.0 RENAL CALCULUS: ICD-10-CM

## 2018-11-08 DIAGNOSIS — N20.0 KIDNEY STONES: Primary | ICD-10-CM

## 2018-11-08 LAB
BILIRUB BLD-MCNC: NEGATIVE MG/DL
CLARITY, POC: CLEAR
COLOR UR: YELLOW
GLUCOSE UR STRIP-MCNC: NEGATIVE MG/DL
KETONES UR QL: NEGATIVE
LEUKOCYTE EST, POC: NEGATIVE
NITRITE UR-MCNC: NEGATIVE MG/ML
PH UR: 6 [PH] (ref 5–8)
PROT UR STRIP-MCNC: ABNORMAL MG/DL
RBC # UR STRIP: ABNORMAL /UL
SP GR UR: 1.01 (ref 1–1.03)
UROBILINOGEN UR QL: NORMAL

## 2018-11-08 PROCEDURE — 99024 POSTOP FOLLOW-UP VISIT: CPT | Performed by: UROLOGY

## 2018-11-08 PROCEDURE — 81002 URINALYSIS NONAUTO W/O SCOPE: CPT | Performed by: UROLOGY

## 2018-11-08 RX ORDER — OXYCODONE AND ACETAMINOPHEN 10; 325 MG/1; MG/1
1 TABLET ORAL EVERY 6 HOURS PRN
Qty: 15 TABLET | Refills: 0 | Status: SHIPPED | OUTPATIENT
Start: 2018-11-08 | End: 2018-12-13 | Stop reason: SDUPTHER

## 2018-11-08 NOTE — PROGRESS NOTES
Chief Complaint:          No chief complaint on file.      HPI:   43 y.o. female.  43-year-old white female status post lithotripsy of a left UPJ stone Friday with great results.  She feels great she's passed copious amounts of sand and I'll check her back in 3 months.  She returns today.  She had a prior as wall and 918 and did well she has no fevers and chills started with nausea vomiting and went to the [facility] where she had a CT scan showing bilateral nonobstructive nephrolithiasis.  I have the opportunity to review all the reports and labs.  She then had a urinalysis that was essentially positive for red cells but no white cells.  Her CMP was negative.  Her urine culture was negative.  She had a random repeat culture that was negative.  Pregnancy test was negative.  Her lactic acid was normal.  She had no other abnormalities.  Examination today discloses left lower quadrant abdominal pain that does not appear to be related.  If anything is more of a GI infection I will presumptively treat her with antibiotics for a short period this is not obstructive uropathy.  She returns for follow-up.  There is now no nausea no vomiting and thought that she passed a stone because this is always heralded by diarrhea.  Currently she is feeling better I told her to stay off work for a day or 2 check her back next week his long she's doing better I don't think she needs follow-up I will follow-up with her based on this    Past Medical History:        Past Medical History:   Diagnosis Date   • Kidney stones    • Migraine    • Pneumonia    • Strep throat          Current Meds:     Current Outpatient Prescriptions   Medication Sig Dispense Refill   • amitriptyline (ELAVIL) 25 MG tablet Take 25 mg by mouth every night.     • ciprofloxacin (CIPRO) 250 MG tablet Take 1 tablet by mouth 2 (Two) Times a Day for 7 days. 14 tablet 0   • clonazePAM (KlonoPIN) 0.5 MG tablet Take 0.5 mg by mouth 2 (two) times a day as needed for  seizures.     • gabapentin (NEURONTIN) 600 MG tablet   0   • HYDROcodone-acetaminophen (NORCO) 5-325 MG per tablet 1 to 2 Tablets Every 6 Hours as Needed for PAIN 12 tablet 0   • HYDROcodone-acetaminophen (NORCO) 7.5-325 MG per tablet Take 1 tablet by mouth Every 8 (Eight) Hours As Needed for Moderate Pain . 12 tablet 0   • ketorolac (TORADOL) 10 MG tablet Take 1 tablet by mouth Every 6 (Six) Hours As Needed for Moderate Pain . 12 tablet 0   • ketorolac (TORADOL) 10 MG tablet Take 1 tablet by mouth Every 6 (Six) Hours As Needed for Moderate Pain . 12 tablet 0   • ondansetron ODT (ZOFRAN-ODT) 4 MG disintegrating tablet Take 1 tablet by mouth Every 6 (Six) Hours As Needed for Nausea. 20 tablet 0   • oxyCODONE-acetaminophen (PERCOCET)  MG per tablet Take 1 tablet by mouth Every 6 (Six) Hours As Needed for Severe Pain . 15 tablet 0   • oxyCODONE-acetaminophen (PERCOCET)  MG per tablet Take 1 tablet by mouth Every 4 (Four) Hours As Needed for Moderate Pain 12 tablet 0   • promethazine (PHENERGAN) 25 MG tablet Take 25 mg by mouth Every 6 (Six) Hours As Needed for nausea or vomiting.     • tamsulosin (FLOMAX) 0.4 MG capsule 24 hr capsule Take 1 capsule by mouth Daily. 30 capsule 6     No current facility-administered medications for this visit.         Allergies:      Allergies   Allergen Reactions   • Sulfa Antibiotics Anaphylaxis   • Compazine [Prochlorperazine Edisylate] Hives        Past Surgical History:     Past Surgical History:   Procedure Laterality Date   •  SECTION     • COLONOSCOPY     • CYSTOSCOPY ELECTROHYDRAULIC LITHOTRIPSY     • DENTAL PROCEDURE     • EXTRACORPOREAL SHOCK WAVE LITHOTRIPSY (ESWL) Left 2018    Procedure: EXTRACORPOREAL SHOCKWAVE LITHOTRIPSY;  Surgeon: Garth Friedman MD;  Location[facility];  Service: Urology   • KIDNEY STONE SURGERY           Social History:     Social History     Social History   • Marital status:      Spouse name: N/A   • Number of  children: N/A   • Years of education: N/A     Occupational History   • Not on file.     Social History Main Topics   • Smoking status: Former Smoker     Packs/day: 0.50     Years: 15.00     Types: Cigarettes     Quit date: 1/1/2018   • Smokeless tobacco: Never Used   • Alcohol use No   • Drug use: No   • Sexual activity: Defer     Other Topics Concern   • Not on file     Social History Narrative   • No narrative on file       Family History:     Family History   Problem Relation Age of Onset   • No Known Problems Father    • No Known Problems Mother        Review of Systems:     Review of Systems   Constitutional: Negative.  Negative for activity change, appetite change, chills, diaphoresis, fatigue and unexpected weight change.   HENT: Negative for congestion, dental problem, drooling, ear discharge, ear pain, facial swelling, hearing loss, mouth sores, nosebleeds, postnasal drip, rhinorrhea, sinus pressure, sneezing, sore throat, tinnitus, trouble swallowing and voice change.    Eyes: Negative.  Negative for photophobia, pain, discharge, redness, itching and visual disturbance.   Respiratory: Negative.  Negative for apnea, cough, choking, chest tightness, shortness of breath, wheezing and stridor.    Cardiovascular: Negative.  Negative for chest pain, palpitations and leg swelling.   Gastrointestinal: Negative.  Negative for abdominal distention, abdominal pain, anal bleeding, blood in stool, constipation, diarrhea, nausea, rectal pain and vomiting.   Endocrine: Negative.  Negative for cold intolerance, heat intolerance, polydipsia, polyphagia and polyuria.   Musculoskeletal: Negative.  Negative for arthralgias, back pain, gait problem, joint swelling, myalgias, neck pain and neck stiffness.   Skin: Negative.  Negative for color change, pallor, rash and wound.   Allergic/Immunologic: Negative.  Negative for environmental allergies, food allergies and immunocompromised state.   Neurological: Negative.  Negative for  dizziness, tremors, seizures, syncope, facial asymmetry, speech difficulty, weakness, light-headedness, numbness and headaches.   Hematological: Negative.  Negative for adenopathy. Does not bruise/bleed easily.   Psychiatric/Behavioral: Negative for agitation, behavioral problems, confusion, decreased concentration, dysphoric mood, hallucinations, self-injury, sleep disturbance and suicidal ideas. The patient is not nervous/anxious and is not hyperactive.    All other systems reviewed and are negative.      Physical Exam:     Physical Exam   Constitutional: She appears well-developed and well-nourished.   HENT:   Head: Normocephalic and atraumatic.   Right Ear: External ear normal.   Left Ear: External ear normal.   Mouth/Throat: Oropharynx is clear and moist.   Eyes: Pupils are equal, round, and reactive to light. Conjunctivae are normal.   Cardiovascular: Normal rate, regular rhythm, normal heart sounds and intact distal pulses.    Pulmonary/Chest: Effort normal and breath sounds normal.   Abdominal: Soft. Bowel sounds are normal. She exhibits no distension and no mass. There is no tenderness. There is no rebound and no guarding.   Genitourinary: No vaginal discharge found.   Musculoskeletal: Normal range of motion.   Neurological: She is alert. She has normal reflexes.   Skin: Skin is warm and dry.   Psychiatric: She has a normal mood and affect. Her behavior is normal. Judgment and thought content normal.       I have reviewed the following portions of the patient's history: allergies, current medications, past family history, past medical history, past social history, past surgical history, problem list and ROS and confirm it's accurate.      Procedure:       Assessment/Plan:   Ureteral calculus-patient has been diagnosed with a ureteral calculus.  We have discussed the various parameters regarding spontaneous passage including the notion that a 2 mm stone has a high likelihood of spontaneous passage versus a  larger stone being caught up in the upper areas of the urinary tract.  We also discussed the medical management of stone disease and the use of medical expulsive therapy in the form of Flomax.  This is used in an off label setting.  We discussed the indicators for intervention including  absolute indicators such as sepsis and uncontrollable severe pain as well as  the relative indicators of moderate pain that is well-controlled with various analgesia.  I also talked about nonoperative management including ambulation and increasing fluids and hot tub as being an effective adjuncts in the treatment of a ureteral stone.  Is probably passed a stone there is no evidence of infection or need for intervention if her pain persists I'm going to get a plain radiograph next week     Patient's There is no height or weight on file to calculate BMI. BMI is above normal parameters. Recommendations include: educational material.          This document has been electronically signed by LOU MONTANA MD November 8, 2018 9:25 AM

## 2018-11-16 ENCOUNTER — OFFICE VISIT (OUTPATIENT)
Dept: UROLOGY | Facility: CLINIC | Age: 43
End: 2018-11-16

## 2018-11-16 VITALS — BODY MASS INDEX: 30.55 KG/M2 | WEIGHT: 166 LBS | HEIGHT: 62 IN

## 2018-11-16 DIAGNOSIS — N20.0 KIDNEY STONE: Primary | ICD-10-CM

## 2018-11-16 DIAGNOSIS — N20.1 CALCULUS OF URETER: ICD-10-CM

## 2018-11-16 DIAGNOSIS — N20.0 RENAL CALCULUS: ICD-10-CM

## 2018-11-16 PROCEDURE — 82360 CALCULUS ASSAY QUANT: CPT | Performed by: UROLOGY

## 2018-11-16 PROCEDURE — 99024 POSTOP FOLLOW-UP VISIT: CPT | Performed by: UROLOGY

## 2018-11-16 RX ORDER — PROMETHAZINE HYDROCHLORIDE 25 MG/1
25 TABLET ORAL EVERY 6 HOURS PRN
Qty: 15 TABLET | Refills: 0 | Status: SHIPPED | OUTPATIENT
Start: 2018-11-16 | End: 2019-04-16 | Stop reason: SDUPTHER

## 2018-11-16 RX ORDER — OXYCODONE AND ACETAMINOPHEN 10; 325 MG/1; MG/1
1 TABLET ORAL EVERY 4 HOURS PRN
Qty: 12 TABLET | Refills: 0 | Status: SHIPPED | OUTPATIENT
Start: 2018-11-16 | End: 2018-12-17 | Stop reason: SDUPTHER

## 2018-11-16 NOTE — PROGRESS NOTES
Chief Complaint:          Chief Complaint   Patient presents with   • Nephrolithiasis       HPI:   43 y.o. female.  43-year-old white female status post lithotripsy of a left UPJ stone Friday with great results.  She feels great she's passed copious amounts of sand and I'll check her back in 3 months.  She returns today.  She had a prior as wall and 918 and did well she has no fevers and chills started with nausea vomiting and went to the [facility] where she had a CT scan showing bilateral nonobstructive nephrolithiasis.  I have the opportunity to review all the reports and labs.  She then had a urinalysis that was essentially positive for red cells but no white cells.  Her CMP was negative.  Her urine culture was negative.  She had a random repeat culture that was negative.  Pregnancy test was negative.  Her lactic acid was normal.  She had no other abnormalities.  Examination today discloses left lower quadrant abdominal pain that does not appear to be related.  If anything is more of a GI infection I will presumptively treat her with antibiotics for a short period this is not obstructive uropathy.  She returns for follow-up.  There is now no nausea no vomiting and thought that she passed a stone because this is always heralded by diarrhea.  Currently she is feeling better I told her to stay off work for a day or 2 check her back next week his long she's doing better I don't think she needs follow-up I will follow-up with her based on this.  She returns today.  She passed this tiny stone that has the appearance of uric acid.  These do not show up on plain film.  She's having a lot of spasms in her lower tract.  I suspect this is perhaps an additional stone waiting to pass certainly there is no absolute indicators for intervention I did refill her pain medication I will see her in 2 weeks I'm in a get the stone analyzed.  I suspect that this is uric acid    Past Medical History:        Past Medical  History:   Diagnosis Date   • Kidney stones    • Migraine    • Pneumonia    • Strep throat          Current Meds:     Current Outpatient Medications   Medication Sig Dispense Refill   • amitriptyline (ELAVIL) 25 MG tablet Take 25 mg by mouth every night.     • clonazePAM (KlonoPIN) 0.5 MG tablet Take 0.5 mg by mouth 2 (two) times a day as needed for seizures.     • gabapentin (NEURONTIN) 600 MG tablet   0   • HYDROcodone-acetaminophen (NORCO) 5-325 MG per tablet 1 to 2 Tablets Every 6 Hours as Needed for PAIN 12 tablet 0   • HYDROcodone-acetaminophen (NORCO) 7.5-325 MG per tablet Take 1 tablet by mouth Every 8 (Eight) Hours As Needed for Moderate Pain . 12 tablet 0   • ketorolac (TORADOL) 10 MG tablet Take 1 tablet by mouth Every 6 (Six) Hours As Needed for Moderate Pain . 12 tablet 0   • ketorolac (TORADOL) 10 MG tablet Take 1 tablet by mouth Every 6 (Six) Hours As Needed for Moderate Pain . 12 tablet 0   • ondansetron ODT (ZOFRAN-ODT) 4 MG disintegrating tablet Take 1 tablet by mouth Every 6 (Six) Hours As Needed for Nausea. 20 tablet 0   • oxyCODONE-acetaminophen (PERCOCET)  MG per tablet Take 1 tablet by mouth Every 4 (Four) Hours As Needed for Moderate Pain 12 tablet 0   • oxyCODONE-acetaminophen (PERCOCET)  MG per tablet Take 1 tablet by mouth Every 6 (Six) Hours As Needed for Severe Pain . 15 tablet 0   • promethazine (PHENERGAN) 25 MG tablet Take 25 mg by mouth Every 6 (Six) Hours As Needed for nausea or vomiting.     • tamsulosin (FLOMAX) 0.4 MG capsule 24 hr capsule Take 1 capsule by mouth Daily. 30 capsule 6     No current facility-administered medications for this visit.         Allergies:      Allergies   Allergen Reactions   • Sulfa Antibiotics Anaphylaxis   • Compazine [Prochlorperazine Edisylate] Hives        Past Surgical History:     Past Surgical History:   Procedure Laterality Date   •  SECTION     • COLONOSCOPY     • CYSTOSCOPY ELECTROHYDRAULIC LITHOTRIPSY     • DENTAL  PROCEDURE     • KIDNEY STONE SURGERY           Social History:     Social History     Socioeconomic History   • Marital status:      Spouse name: Not on file   • Number of children: Not on file   • Years of education: Not on file   • Highest education level: Not on file   Social Needs   • Financial resource strain: Not on file   • Food insecurity - worry: Not on file   • Food insecurity - inability: Not on file   • Transportation needs - medical: Not on file   • Transportation needs - non-medical: Not on file   Occupational History   • Not on file   Tobacco Use   • Smoking status: Former Smoker     Packs/day: 0.50     Years: 15.00     Pack years: 7.50     Types: Cigarettes     Last attempt to quit: 2018     Years since quittin.8   • Smokeless tobacco: Never Used   Substance and Sexual Activity   • Alcohol use: No   • Drug use: No   • Sexual activity: Defer   Other Topics Concern   • Not on file   Social History Narrative   • Not on file       Family History:     Family History   Problem Relation Age of Onset   • No Known Problems Father    • No Known Problems Mother        Review of Systems:     Review of Systems   Constitutional: Negative.  Negative for activity change, appetite change, chills, diaphoresis, fatigue and unexpected weight change.   HENT: Negative for congestion, dental problem, drooling, ear discharge, ear pain, facial swelling, hearing loss, mouth sores, nosebleeds, postnasal drip, rhinorrhea, sinus pressure, sneezing, sore throat, tinnitus, trouble swallowing and voice change.    Eyes: Negative.  Negative for photophobia, pain, discharge, redness, itching and visual disturbance.   Respiratory: Negative.  Negative for apnea, cough, choking, chest tightness, shortness of breath, wheezing and stridor.    Cardiovascular: Negative.  Negative for chest pain, palpitations and leg swelling.   Gastrointestinal: Negative.  Negative for abdominal distention, abdominal pain, anal bleeding, blood  in stool, constipation, diarrhea, nausea, rectal pain and vomiting.   Endocrine: Negative.  Negative for cold intolerance, heat intolerance, polydipsia, polyphagia and polyuria.   Genitourinary: Positive for difficulty urinating, frequency and urgency.   Musculoskeletal: Negative.  Negative for arthralgias, back pain, gait problem, joint swelling, myalgias, neck pain and neck stiffness.   Skin: Negative.  Negative for color change, pallor, rash and wound.   Allergic/Immunologic: Negative.  Negative for environmental allergies, food allergies and immunocompromised state.   Neurological: Negative.  Negative for dizziness, tremors, seizures, syncope, facial asymmetry, speech difficulty, weakness, light-headedness, numbness and headaches.   Hematological: Negative.  Negative for adenopathy. Does not bruise/bleed easily.   Psychiatric/Behavioral: Negative for agitation, behavioral problems, confusion, decreased concentration, dysphoric mood, hallucinations, self-injury, sleep disturbance and suicidal ideas. The patient is not nervous/anxious and is not hyperactive.    All other systems reviewed and are negative.      Physical Exam:     Physical Exam   Constitutional: She appears well-developed and well-nourished.   HENT:   Head: Normocephalic and atraumatic.   Right Ear: External ear normal.   Left Ear: External ear normal.   Mouth/Throat: Oropharynx is clear and moist.   Eyes: Conjunctivae are normal. Pupils are equal, round, and reactive to light.   Cardiovascular: Normal rate, regular rhythm, normal heart sounds and intact distal pulses.   Pulmonary/Chest: Effort normal and breath sounds normal.   Abdominal: Soft. Bowel sounds are normal. She exhibits no distension and no mass. There is no tenderness. There is no rebound and no guarding.   Genitourinary: No vaginal discharge found.   Musculoskeletal: Normal range of motion.   Neurological: She is alert. She has normal reflexes.   Skin: Skin is warm and dry.    Psychiatric: She has a normal mood and affect. Her behavior is normal. Judgment and thought content normal.       I have reviewed the following portions of the patient's history: allergies, current medications, past family history, past medical history, past social history, past surgical history, problem list and ROS and confirm it's accurate.      Procedure:       Assessment/Plan:   Ureteral calculus-patient has been diagnosed with a ureteral calculus.  We have discussed the various parameters regarding spontaneous passage including the notion that a 2 mm stone has a high likelihood of spontaneous passage versus a larger stone being caught up in the upper areas of the urinary tract.  We also discussed the medical management of stone disease and the use of medical expulsive therapy in the form of Flomax.  This is used in an off label setting.  We discussed the indicators for intervention including  absolute indicators such as sepsis and uncontrollable severe pain as well as  the relative indicators of moderate pain that is well-controlled with various analgesia.  I also talked about nonoperative management including ambulation and increasing fluids and hot tub as being an effective adjuncts in the treatment of a ureteral stone.  He just passed a small stone quite likely consistent with uric acid.  She'll continue the Flomax.  Narcotic pain medication-patient has significant acute pain that I believe would be an indication for the use of narcotic pain medication.  I discussed the significant risks of pain medication and the fact that this will be a short only option and I will give her no more than a three-day supply of pain medication.  And I will not plan long-term medication and that this will be sent to a pain clinic if at all becomes necessary.  We discussed signing a pain medication agreement and the fact that we're going to run a state [facility] review to be sure the patient is not getting pain medication from  elsewhere.  If this is the case we will not give pain medication.  As part of the patient's treatment plan of there being prescribed a controlled substance with potential for abuse.  This patient has been wait aware of the appropriate dose of such medications including, the risk for somnolence, limited ability to drive and/or safety and the significant potential for overdose.  It is been made clear that these medications are for the prescribed patient only without concomitant use of alcohol or other sepsis unless prescribed by the medical provider.  Has completed prescribing agreement detailing the terms of continue prescribing him a controlled substance.  Including monitoring Sam ports, the possibility of urine drug screens and pedal counts.  The patient is aware that we reviewed SAM reports on a regular basis and scan them into the chart.  History and physical examination exhibited continued safe and appropriate use of controlled substances.  Also discussed the fact that the new [place] legislation allows only a three-day prescription for pain medication.  In this situation he will be referred to a chronic pain clinic.     Patient's Body mass index is 30.35 kg/m². BMI is above normal parameters. Recommendations include: educational material.          This document has been electronically signed by LOU MONTANA MD November 16, 2018 9:04 AM

## 2018-11-17 PROBLEM — N20.1 CALCULUS OF URETER: Status: ACTIVE | Noted: 2018-11-17

## 2018-11-17 PROBLEM — N20.0 RENAL CALCULUS: Status: RESOLVED | Noted: 2018-08-27 | Resolved: 2018-11-17

## 2018-11-27 LAB
CA PHOS CRY STONE QL IR: 3 %
COD CRY STONE QL IR: 10 %
COLOR STONE: NORMAL
COM CRY STONE QL IR: 87 %
COMPN STONE: NORMAL
Lab: NORMAL
Lab: NORMAL
NIDUS STONE QL: NORMAL
PATH REPORT.COMMENTS IMP SPEC: NORMAL
SIZE STONE: NORMAL MM
SURFACE CRYSTALS: NORMAL
WT STONE: 6.5 MG

## 2018-12-13 ENCOUNTER — OFFICE VISIT (OUTPATIENT)
Dept: UROLOGY | Facility: CLINIC | Age: 43
End: 2018-12-13

## 2018-12-13 VITALS — WEIGHT: 166 LBS | BODY MASS INDEX: 30.55 KG/M2 | HEIGHT: 62 IN

## 2018-12-13 DIAGNOSIS — N20.1 CALCULUS OF URETER: Primary | ICD-10-CM

## 2018-12-13 DIAGNOSIS — N20.0 RENAL CALCULUS: ICD-10-CM

## 2018-12-13 PROCEDURE — 99213 OFFICE O/P EST LOW 20 MIN: CPT | Performed by: UROLOGY

## 2018-12-13 RX ORDER — OXYCODONE AND ACETAMINOPHEN 10; 325 MG/1; MG/1
1 TABLET ORAL EVERY 6 HOURS PRN
Qty: 15 TABLET | Refills: 0 | Status: SHIPPED | OUTPATIENT
Start: 2018-12-13 | End: 2019-01-07 | Stop reason: SDUPTHER

## 2018-12-13 NOTE — PROGRESS NOTES
Chief Complaint:          Chief Complaint   Patient presents with   • Nephrolithiasis       HPI:   43 y.o. female.  43-year-old white female status post lithotripsy of a left UPJ stone Friday with great results.  She feels great she's passed copious amounts of sand and I'll check her back in 3 months.  She returns today.  She had a prior as wall and 918 and did well she has no fevers and chills started with nausea vomiting and went to the [facility] where she had a CT scan showing bilateral nonobstructive nephrolithiasis.  I have the opportunity to review all the reports and labs.  She then had a urinalysis that was essentially positive for red cells but no white cells.  Her CMP was negative.  Her urine culture was negative.  She had a random repeat culture that was negative.  Pregnancy test was negative.  Her lactic acid was normal.  She had no other abnormalities.  Examination today discloses left lower quadrant abdominal pain that does not appear to be related.  If anything is more of a GI infection I will presumptively treat her with antibiotics for a short period this is not obstructive uropathy.  She returns for follow-up.  There is now no nausea no vomiting and thought that she passed a stone because this is always heralded by diarrhea.  Currently she is feeling better I told her to stay off work for a day or 2 check her back next week his long she's doing better I don't think she needs follow-up I will follow-up with her based on this.  She returns today.  She passed this tiny stone that has the appearance of uric acid.  These do not show up on plain film.  She's having a lot of spasms in her lower tract.  I suspect this is perhaps an additional stone waiting to pass certainly there is no absolute indicators for intervention I did refill her pain medication I will see her in 2 weeks I'm in a get the stone analyzed.  I suspect that this is uric acid.  Stone report came back calcium oxalate.  She  still has right-sided pain with nausea and diarrhea no fevers no chills I refilled her pain medication for short.  I'm going to recommend intervention.  She has adequate supplies of Flomax.  I'll see her back on Monday the 17th for follow-up     Past Medical History:         Past Medical History:        Past Medical History:   Diagnosis Date   • Kidney stones    • Migraine    • Pneumonia    • Strep throat          Current Meds:     Current Outpatient Medications   Medication Sig Dispense Refill   • amitriptyline (ELAVIL) 25 MG tablet Take 25 mg by mouth every night.     • clonazePAM (KlonoPIN) 0.5 MG tablet Take 0.5 mg by mouth 2 (two) times a day as needed for seizures.     • gabapentin (NEURONTIN) 600 MG tablet   0   • HYDROcodone-acetaminophen (NORCO) 5-325 MG per tablet 1 to 2 Tablets Every 6 Hours as Needed for PAIN 12 tablet 0   • HYDROcodone-acetaminophen (NORCO) 7.5-325 MG per tablet Take 1 tablet by mouth Every 8 (Eight) Hours As Needed for Moderate Pain . 12 tablet 0   • ketorolac (TORADOL) 10 MG tablet Take 1 tablet by mouth Every 6 (Six) Hours As Needed for Moderate Pain . 12 tablet 0   • ketorolac (TORADOL) 10 MG tablet Take 1 tablet by mouth Every 6 (Six) Hours As Needed for Moderate Pain . 12 tablet 0   • ondansetron ODT (ZOFRAN-ODT) 4 MG disintegrating tablet Take 1 tablet by mouth Every 6 (Six) Hours As Needed for Nausea. 20 tablet 0   • oxyCODONE-acetaminophen (PERCOCET)  MG per tablet Take 1 tablet by mouth Every 6 (Six) Hours As Needed for Severe Pain . 15 tablet 0   • oxyCODONE-acetaminophen (PERCOCET)  MG per tablet Take 1 tablet by mouth Every 4 (Four) Hours As Needed for Moderate Pain 12 tablet 0   • promethazine (PHENERGAN) 25 MG tablet Take 25 mg by mouth Every 6 (Six) Hours As Needed for nausea or vomiting.     • promethazine (PHENERGAN) 25 MG tablet Take 1 tablet by mouth Every 6 (Six) Hours As Needed for Nausea or Vomiting. 15 tablet 0   • tamsulosin (FLOMAX) 0.4 MG capsule  24 hr capsule Take 1 capsule by mouth Daily. 30 capsule 6     No current facility-administered medications for this visit.         Allergies:      Allergies   Allergen Reactions   • Sulfa Antibiotics Anaphylaxis   • Compazine [Prochlorperazine Edisylate] Hives        Past Surgical History:     Past Surgical History:   Procedure Laterality Date   •  SECTION     • COLONOSCOPY     • CYSTOSCOPY ELECTROHYDRAULIC LITHOTRIPSY     • DENTAL PROCEDURE     • KIDNEY STONE SURGERY           Social History:     Social History     Socioeconomic History   • Marital status:      Spouse name: Not on file   • Number of children: Not on file   • Years of education: Not on file   • Highest education level: Not on file   Social Needs   • Financial resource strain: Not on file   • Food insecurity - worry: Not on file   • Food insecurity - inability: Not on file   • Transportation needs - medical: Not on file   • Transportation needs - non-medical: Not on file   Occupational History   • Not on file   Tobacco Use   • Smoking status: Former Smoker     Packs/day: 0.50     Years: 15.00     Pack years: 7.50     Types: Cigarettes     Last attempt to quit: 2018     Years since quittin.9   • Smokeless tobacco: Never Used   Substance and Sexual Activity   • Alcohol use: No   • Drug use: No   • Sexual activity: Defer   Other Topics Concern   • Not on file   Social History Narrative   • Not on file       Family History:     Family History   Problem Relation Age of Onset   • No Known Problems Father    • No Known Problems Mother        Review of Systems:     Review of Systems   Constitutional: Negative.  Negative for activity change, appetite change, chills, diaphoresis, fatigue, fever and unexpected weight change.   HENT: Negative for congestion, dental problem, drooling, ear discharge, ear pain, facial swelling, hearing loss, mouth sores, nosebleeds, postnasal drip, rhinorrhea, sinus pressure, sneezing, sore throat, tinnitus,  trouble swallowing and voice change.    Eyes: Negative.  Negative for photophobia, pain, discharge, redness, itching and visual disturbance.   Respiratory: Negative.  Negative for apnea, cough, choking, chest tightness, shortness of breath, wheezing and stridor.    Cardiovascular: Negative.  Negative for chest pain, palpitations and leg swelling.   Gastrointestinal: Negative.  Negative for abdominal distention, abdominal pain, anal bleeding, blood in stool, constipation, diarrhea, nausea, rectal pain and vomiting.   Endocrine: Negative.  Negative for cold intolerance, heat intolerance, polydipsia, polyphagia and polyuria.   Genitourinary: Positive for flank pain and frequency.   Musculoskeletal: Negative for arthralgias, back pain, gait problem, joint swelling, myalgias, neck pain and neck stiffness.   Skin: Negative.  Negative for color change, pallor, rash and wound.   Allergic/Immunologic: Negative.  Negative for environmental allergies, food allergies and immunocompromised state.   Neurological: Negative.  Negative for dizziness, tremors, seizures, syncope, facial asymmetry, speech difficulty, weakness, light-headedness, numbness and headaches.   Hematological: Negative.  Negative for adenopathy. Does not bruise/bleed easily.   Psychiatric/Behavioral: Negative for agitation, behavioral problems, confusion, decreased concentration, dysphoric mood, hallucinations, self-injury, sleep disturbance and suicidal ideas. The patient is not nervous/anxious and is not hyperactive.    All other systems reviewed and are negative.      Physical Exam:     Physical Exam   Constitutional: She appears well-developed and well-nourished.   HENT:   Head: Normocephalic and atraumatic.   Right Ear: External ear normal.   Left Ear: External ear normal.   Mouth/Throat: Oropharynx is clear and moist.   Eyes: Conjunctivae are normal. Pupils are equal, round, and reactive to light.   Cardiovascular: Normal rate, regular rhythm, normal  heart sounds and intact distal pulses.   Pulmonary/Chest: Effort normal and breath sounds normal.   Abdominal: Soft. Bowel sounds are normal. She exhibits no distension and no mass. There is no tenderness. There is no rebound and no guarding.   Genitourinary: No vaginal discharge found.   Musculoskeletal: Normal range of motion.   Neurological: She is alert. She has normal reflexes.   Skin: Skin is warm and dry.   Psychiatric: She has a normal mood and affect. Her behavior is normal. Judgment and thought content normal.       I have reviewed the following portions of the patient's history: allergies, current medications, past family history, past medical history, past social history, past surgical history, problem list and ROS and confirm it's accurate.      Procedure:       Assessment/Plan:   Ureteral calculus-patient has been diagnosed with a ureteral calculus.  We have discussed the various parameters regarding spontaneous passage including the notion that a 2 mm stone has a high likelihood of spontaneous passage versus a larger stone being caught up in the upper areas of the urinary tract.  We also discussed the medical management of stone disease and the use of medical expulsive therapy in the form of Flomax.  This is used in an off label setting.  We discussed the indicators for intervention including  absolute indicators such as sepsis and uncontrollable severe pain as well as  the relative indicators of moderate pain that is well-controlled with various analgesia.  I also talked about nonoperative management including ambulation and increasing fluids and hot tub as being an effective adjuncts in the treatment of a ureteral stone.     Patient's Body mass index is 30.35 kg/m². BMI is above normal parameters. Recommendations include: educational material.          This document has been electronically signed by LOU MONTANA MD December 13, 2018 10:25 AM

## 2018-12-17 ENCOUNTER — OFFICE VISIT (OUTPATIENT)
Dept: UROLOGY | Facility: CLINIC | Age: 43
End: 2018-12-17

## 2018-12-17 VITALS — HEIGHT: 62 IN | WEIGHT: 166 LBS | BODY MASS INDEX: 30.55 KG/M2

## 2018-12-17 DIAGNOSIS — N20.0 RENAL CALCULUS: ICD-10-CM

## 2018-12-17 DIAGNOSIS — N20.1 CALCULUS OF URETER: ICD-10-CM

## 2018-12-17 DIAGNOSIS — N20.0 KIDNEY STONE: Primary | ICD-10-CM

## 2018-12-17 LAB
BILIRUB BLD-MCNC: ABNORMAL MG/DL
CLARITY, POC: CLEAR
COLOR UR: YELLOW
GLUCOSE UR STRIP-MCNC: NEGATIVE MG/DL
KETONES UR QL: NEGATIVE
LEUKOCYTE EST, POC: NEGATIVE
NITRITE UR-MCNC: NEGATIVE MG/ML
PH UR: 5.5 [PH] (ref 5–8)
PROT UR STRIP-MCNC: ABNORMAL MG/DL
RBC # UR STRIP: ABNORMAL /UL
SP GR UR: 1.02 (ref 1–1.03)
UROBILINOGEN UR QL: NORMAL

## 2018-12-17 PROCEDURE — 81003 URINALYSIS AUTO W/O SCOPE: CPT | Performed by: UROLOGY

## 2018-12-17 PROCEDURE — 99213 OFFICE O/P EST LOW 20 MIN: CPT | Performed by: UROLOGY

## 2018-12-17 RX ORDER — OXYCODONE AND ACETAMINOPHEN 10; 325 MG/1; MG/1
1 TABLET ORAL EVERY 4 HOURS PRN
Qty: 12 TABLET | Refills: 0 | Status: SHIPPED | OUTPATIENT
Start: 2018-12-17 | End: 2019-01-31

## 2018-12-17 NOTE — PROGRESS NOTES
Chief Complaint:          Chief Complaint   Patient presents with   • Nephrolithiasis       HPI:   43 y.o. female.  43-year-old white female status post lithotripsy of a left UPJ stone Friday with great results.  She feels great she's passed copious amounts of sand and I'll check her back in 3 months.  She returns today.  She had a prior as wall and 918 and did well she has no fevers and chills started with nausea vomiting and went to the [facility] where she had a CT scan showing bilateral nonobstructive nephrolithiasis.  I have the opportunity to review all the reports and labs.  She then had a urinalysis that was essentially positive for red cells but no white cells.  Her CMP was negative.  Her urine culture was negative.  She had a random repeat culture that was negative.  Pregnancy test was negative.  Her lactic acid was normal.  She had no other abnormalities.  Examination today discloses left lower quadrant abdominal pain that does not appear to be related.  If anything is more of a GI infection I will presumptively treat her with antibiotics for a short period this is not obstructive uropathy.  She returns for follow-up.  There is now no nausea no vomiting and thought that she passed a stone because this is always heralded by diarrhea.  Currently she is feeling better I told her to stay off work for a day or 2 check her back next week his long she's doing better I don't think she needs follow-up I will follow-up with her based on this.  She returns today.  She passed this tiny stone that has the appearance of uric acid.  These do not show up on plain film.  She's having a lot of spasms in her lower tract.  I suspect this is perhaps an additional stone waiting to pass certainly there is no absolute indicators for intervention I did refill her pain medication I will see her in 2 weeks I'm in a get the stone analyzed.  I suspect that this is uric acid.  Stone report came back calcium oxalate.  She  still has right-sided pain with nausea and diarrhea no fevers no chills I refilled her pain medication for short.  I'm going to recommend intervention.  She has adequate supplies of Flomax.    He returns today she has a small right ureteral stone that has not passed she wants to try to pass it as she still mired in bills from the prior ureteroscopy and imaging.  Certainly reasonable she has no absolute indicators for intervention I'm do recommend an attempt at spontaneous passage with the use of medical expulsive therapy used an off label setting and pain medication I'll follow-up with her based on this       Past Medical History:        Past Medical History:   Diagnosis Date   • Kidney stones    • Migraine    • Pneumonia    • Strep throat          Current Meds:     Current Outpatient Medications   Medication Sig Dispense Refill   • amitriptyline (ELAVIL) 25 MG tablet Take 25 mg by mouth every night.     • clonazePAM (KlonoPIN) 0.5 MG tablet Take 0.5 mg by mouth 2 (two) times a day as needed for seizures.     • gabapentin (NEURONTIN) 600 MG tablet   0   • HYDROcodone-acetaminophen (NORCO) 5-325 MG per tablet 1 to 2 Tablets Every 6 Hours as Needed for PAIN 12 tablet 0   • HYDROcodone-acetaminophen (NORCO) 7.5-325 MG per tablet Take 1 tablet by mouth Every 8 (Eight) Hours As Needed for Moderate Pain . 12 tablet 0   • ketorolac (TORADOL) 10 MG tablet Take 1 tablet by mouth Every 6 (Six) Hours As Needed for Moderate Pain . 12 tablet 0   • ketorolac (TORADOL) 10 MG tablet Take 1 tablet by mouth Every 6 (Six) Hours As Needed for Moderate Pain . 12 tablet 0   • ondansetron ODT (ZOFRAN-ODT) 4 MG disintegrating tablet Take 1 tablet by mouth Every 6 (Six) Hours As Needed for Nausea. 20 tablet 0   • oxyCODONE-acetaminophen (PERCOCET)  MG per tablet Take 1 tablet by mouth Every 4 (Four) Hours As Needed for Moderate Pain 12 tablet 0   • oxyCODONE-acetaminophen (PERCOCET)  MG per tablet Take 1 tablet by mouth Every 6  (Six) Hours As Needed for Severe Pain . 15 tablet 0   • promethazine (PHENERGAN) 25 MG tablet Take 25 mg by mouth Every 6 (Six) Hours As Needed for nausea or vomiting.     • promethazine (PHENERGAN) 25 MG tablet Take 1 tablet by mouth Every 6 (Six) Hours As Needed for Nausea or Vomiting. 15 tablet 0   • tamsulosin (FLOMAX) 0.4 MG capsule 24 hr capsule Take 1 capsule by mouth Daily. 30 capsule 6     No current facility-administered medications for this visit.         Allergies:      Allergies   Allergen Reactions   • Sulfa Antibiotics Anaphylaxis   • Compazine [Prochlorperazine Edisylate] Hives        Past Surgical History:     Past Surgical History:   Procedure Laterality Date   •  SECTION     • COLONOSCOPY     • CYSTOSCOPY ELECTROHYDRAULIC LITHOTRIPSY     • DENTAL PROCEDURE     • KIDNEY STONE SURGERY           Social History:     Social History     Socioeconomic History   • Marital status:      Spouse name: Not on file   • Number of children: Not on file   • Years of education: Not on file   • Highest education level: Not on file   Social Needs   • Financial resource strain: Not on file   • Food insecurity - worry: Not on file   • Food insecurity - inability: Not on file   • Transportation needs - medical: Not on file   • Transportation needs - non-medical: Not on file   Occupational History   • Not on file   Tobacco Use   • Smoking status: Former Smoker     Packs/day: 0.50     Years: 15.00     Pack years: 7.50     Types: Cigarettes     Last attempt to quit: 2018     Years since quittin.9   • Smokeless tobacco: Never Used   Substance and Sexual Activity   • Alcohol use: No   • Drug use: No   • Sexual activity: Defer   Other Topics Concern   • Not on file   Social History Narrative   • Not on file       Family History:     Family History   Problem Relation Age of Onset   • No Known Problems Father    • No Known Problems Mother        Review of Systems:     Review of Systems   Constitutional:  Negative.  Negative for activity change, appetite change, chills, diaphoresis, fatigue and unexpected weight change.   HENT: Negative for congestion, dental problem, drooling, ear discharge, ear pain, facial swelling, hearing loss, mouth sores, nosebleeds, postnasal drip, rhinorrhea, sinus pressure, sneezing, sore throat, tinnitus, trouble swallowing and voice change.    Eyes: Negative.  Negative for photophobia, pain, discharge, redness, itching and visual disturbance.   Respiratory: Negative.  Negative for apnea, cough, choking, chest tightness, shortness of breath, wheezing and stridor.    Cardiovascular: Negative.  Negative for chest pain, palpitations and leg swelling.   Gastrointestinal: Negative.  Negative for abdominal distention, abdominal pain, anal bleeding, blood in stool, constipation, diarrhea, nausea, rectal pain and vomiting.   Endocrine: Negative.  Negative for cold intolerance, heat intolerance, polydipsia, polyphagia and polyuria.   Musculoskeletal: Negative.  Negative for arthralgias, back pain, gait problem, joint swelling, myalgias, neck pain and neck stiffness.   Skin: Negative.  Negative for color change, pallor, rash and wound.   Allergic/Immunologic: Negative.  Negative for environmental allergies, food allergies and immunocompromised state.   Neurological: Negative.  Negative for dizziness, tremors, seizures, syncope, facial asymmetry, speech difficulty, weakness, light-headedness, numbness and headaches.   Hematological: Negative.  Negative for adenopathy. Does not bruise/bleed easily.   Psychiatric/Behavioral: Negative for agitation, behavioral problems, confusion, decreased concentration, dysphoric mood, hallucinations, self-injury, sleep disturbance and suicidal ideas. The patient is not nervous/anxious and is not hyperactive.    All other systems reviewed and are negative.      Physical Exam:     Physical Exam   Constitutional: She appears well-developed and well-nourished.   HENT:    Head: Normocephalic and atraumatic.   Right Ear: External ear normal.   Left Ear: External ear normal.   Mouth/Throat: Oropharynx is clear and moist.   Eyes: Conjunctivae are normal. Pupils are equal, round, and reactive to light.   Cardiovascular: Normal rate, regular rhythm, normal heart sounds and intact distal pulses.   Pulmonary/Chest: Effort normal and breath sounds normal.   Abdominal: Soft. Bowel sounds are normal. She exhibits no distension and no mass. There is no tenderness. There is no rebound and no guarding.   Genitourinary: No vaginal discharge found.   Musculoskeletal: Normal range of motion.   Neurological: She is alert. She has normal reflexes.   Skin: Skin is warm and dry.   Psychiatric: She has a normal mood and affect. Her behavior is normal. Judgment and thought content normal.       I have reviewed the following portions of the patient's history: allergies, current medications, past family history, past medical history, past social history, past surgical history, problem list and ROS and confirm it's accurate.      Procedure:       Assessment/Plan:   Ureteral calculus-patient has been diagnosed with a ureteral calculus.  We have discussed the various parameters regarding spontaneous passage including the notion that a 2 mm stone has a high likelihood of spontaneous passage versus a larger stone being caught up in the upper areas of the urinary tract.  We also discussed the medical management of stone disease and the use of medical expulsive therapy in the form of Flomax.  This is used in an off label setting.  We discussed the indicators for intervention including  absolute indicators such as sepsis and uncontrollable severe pain as well as  the relative indicators of moderate pain that is well-controlled with various analgesia.  I also talked about nonoperative management including ambulation and increasing fluids and hot tub as being an effective adjuncts in the treatment of a ureteral  stone.  Narcotic pain medication-patient has significant acute pain that I believe would be an indication for the use of narcotic pain medication.  I discussed the significant risks of pain medication and the fact that this will be a short only option and I will give her no more than a three-day supply of pain medication.  And I will not plan long-term medication and that this will be sent to a pain clinic if at all becomes necessary.  We discussed signing a pain medication agreement and the fact that we're going to run a state SAM review to be sure the patient is not getting pain medication from elsewhere.  If this is the case we will not give pain medication.  As part of the patient's treatment plan of there being prescribed a controlled substance with potential for abuse.  This patient has been wait aware of the appropriate dose of such medications including, the risk for somnolence, limited ability to drive and/or safety and the significant potential for overdose.  It is been made clear that these medications are for the prescribed patient only without concomitant use of alcohol or other sepsis unless prescribed by the medical provider.  Has completed prescribing agreement detailing the terms of continue prescribing him a controlled substance.  Including monitoring Sam ports, the possibility of urine drug screens and pedal counts.  The patient is aware that we reviewed SAM reports on a regular basis and scan them into the chart.  History and physical examination exhibited continued safe and appropriate use of controlled substances.  Also discussed the fact that the new [place] legislation allows only a three-day prescription for pain medication.  In this situation he will be referred to a chronic pain clinic.     Patient's Body mass index is 30.35 kg/m². BMI is above normal parameters. Recommendations include: educational material.          This document has been electronically signed by OLU  MD RUBÉN December 17, 2018 8:11 AM

## 2018-12-29 ENCOUNTER — APPOINTMENT (OUTPATIENT)
Dept: CT IMAGING | Facility: HOSPITAL | Age: 43
End: 2018-12-29

## 2018-12-29 ENCOUNTER — HOSPITAL ENCOUNTER (EMERGENCY)
Facility: HOSPITAL | Age: 43
Discharge: HOME | End: 2018-12-29
Attending: EMERGENCY MEDICINE | Admitting: EMERGENCY MEDICINE

## 2018-12-29 VITALS
TEMPERATURE: 98 F | WEIGHT: 165 LBS | BODY MASS INDEX: 29.23 KG/M2 | SYSTOLIC BLOOD PRESSURE: 136 MMHG | OXYGEN SATURATION: 98 % | DIASTOLIC BLOOD PRESSURE: 84 MMHG | HEART RATE: 97 BPM | RESPIRATION RATE: 18 BRPM | HEIGHT: 63 IN

## 2018-12-29 DIAGNOSIS — N23 RENAL COLIC: Primary | ICD-10-CM

## 2018-12-29 LAB
ALBUMIN SERPL-MCNC: 4.6 G/DL (ref 3.5–5)
ALBUMIN/GLOB SERPL: 1.6 G/DL (ref 1.5–2.5)
ALP SERPL-CCNC: 81 U/L (ref 35–104)
ALT SERPL W P-5'-P-CCNC: 18 U/L (ref 10–36)
ANION GAP SERPL CALCULATED.3IONS-SCNC: 7.9 MMOL/L (ref 3.6–11.2)
AST SERPL-CCNC: 20 U/L (ref 10–30)
BACTERIA UR QL AUTO: ABNORMAL /HPF
BASOPHILS # BLD AUTO: 0.01 10*3/MM3 (ref 0–0.3)
BASOPHILS NFR BLD AUTO: 0.1 % (ref 0–2)
BILIRUB SERPL-MCNC: 0.3 MG/DL (ref 0.2–1.8)
BILIRUB UR QL STRIP: NEGATIVE
BUN BLD-MCNC: 12 MG/DL (ref 7–21)
BUN/CREAT SERPL: 13.6 (ref 7–25)
CALCIUM SPEC-SCNC: 9.3 MG/DL (ref 7.7–10)
CHLORIDE SERPL-SCNC: 112 MMOL/L (ref 99–112)
CLARITY UR: ABNORMAL
CO2 SERPL-SCNC: 25.1 MMOL/L (ref 24.3–31.9)
COD CRY URNS QL: ABNORMAL /HPF
COLOR UR: YELLOW
CREAT BLD-MCNC: 0.88 MG/DL (ref 0.43–1.29)
DEPRECATED RDW RBC AUTO: 44.2 FL (ref 37–54)
EOSINOPHIL # BLD AUTO: 0.06 10*3/MM3 (ref 0–0.7)
EOSINOPHIL NFR BLD AUTO: 0.7 % (ref 0–5)
ERYTHROCYTE [DISTWIDTH] IN BLOOD BY AUTOMATED COUNT: 12.9 % (ref 11.5–14.5)
GFR SERPL CREATININE-BSD FRML MDRD: 70 ML/MIN/1.73
GLOBULIN UR ELPH-MCNC: 2.9 GM/DL
GLUCOSE BLD-MCNC: 75 MG/DL (ref 70–110)
GLUCOSE UR STRIP-MCNC: NEGATIVE MG/DL
HCG SERPL QL: NEGATIVE
HCT VFR BLD AUTO: 38.8 % (ref 37–47)
HGB BLD-MCNC: 13.1 G/DL (ref 12–16)
HGB UR QL STRIP.AUTO: ABNORMAL
HOLD SPECIMEN: NORMAL
HOLD SPECIMEN: NORMAL
HYALINE CASTS UR QL AUTO: ABNORMAL /LPF
IMM GRANULOCYTES # BLD AUTO: 0.02 10*3/MM3 (ref 0–0.03)
IMM GRANULOCYTES NFR BLD AUTO: 0.2 % (ref 0–0.5)
KETONES UR QL STRIP: ABNORMAL
LEUKOCYTE ESTERASE UR QL STRIP.AUTO: NEGATIVE
LIPASE SERPL-CCNC: 42 U/L (ref 13–60)
LYMPHOCYTES # BLD AUTO: 2.58 10*3/MM3 (ref 1–3)
LYMPHOCYTES NFR BLD AUTO: 31.9 % (ref 21–51)
MCH RBC QN AUTO: 31.9 PG (ref 27–33)
MCHC RBC AUTO-ENTMCNC: 33.8 G/DL (ref 33–37)
MCV RBC AUTO: 94.4 FL (ref 80–94)
MONOCYTES # BLD AUTO: 0.5 10*3/MM3 (ref 0.1–0.9)
MONOCYTES NFR BLD AUTO: 6.2 % (ref 0–10)
NEUTROPHILS # BLD AUTO: 4.92 10*3/MM3 (ref 1.4–6.5)
NEUTROPHILS NFR BLD AUTO: 60.9 % (ref 30–70)
NITRITE UR QL STRIP: NEGATIVE
OSMOLALITY SERPL CALC.SUM OF ELEC: 287.2 MOSM/KG (ref 273–305)
PH UR STRIP.AUTO: 5.5 [PH] (ref 5–8)
PLATELET # BLD AUTO: 279 10*3/MM3 (ref 130–400)
PMV BLD AUTO: 9.3 FL (ref 6–10)
POTASSIUM BLD-SCNC: 3.7 MMOL/L (ref 3.5–5.3)
PROT SERPL-MCNC: 7.5 G/DL (ref 6–8)
PROT UR QL STRIP: NEGATIVE
RBC # BLD AUTO: 4.11 10*6/MM3 (ref 4.2–5.4)
RBC # UR: ABNORMAL /HPF
REF LAB TEST METHOD: ABNORMAL
SODIUM BLD-SCNC: 145 MMOL/L (ref 135–153)
SP GR UR STRIP: >1.03 (ref 1–1.03)
SQUAMOUS #/AREA URNS HPF: ABNORMAL /HPF
UROBILINOGEN UR QL STRIP: ABNORMAL
WBC NRBC COR # BLD: 8.09 10*3/MM3 (ref 4.5–12.5)
WBC UR QL AUTO: ABNORMAL /HPF
WHOLE BLOOD HOLD SPECIMEN: NORMAL
WHOLE BLOOD HOLD SPECIMEN: NORMAL

## 2018-12-29 PROCEDURE — 25010000002 MORPHINE PER 10 MG: Performed by: EMERGENCY MEDICINE

## 2018-12-29 PROCEDURE — 80053 COMPREHEN METABOLIC PANEL: CPT | Performed by: FAMILY MEDICINE

## 2018-12-29 PROCEDURE — 74176 CT ABD & PELVIS W/O CONTRAST: CPT | Performed by: RADIOLOGY

## 2018-12-29 PROCEDURE — 25010000002 ONDANSETRON PER 1 MG: Performed by: EMERGENCY MEDICINE

## 2018-12-29 PROCEDURE — 85025 COMPLETE CBC W/AUTO DIFF WBC: CPT | Performed by: FAMILY MEDICINE

## 2018-12-29 PROCEDURE — 84703 CHORIONIC GONADOTROPIN ASSAY: CPT | Performed by: FAMILY MEDICINE

## 2018-12-29 PROCEDURE — 83690 ASSAY OF LIPASE: CPT | Performed by: FAMILY MEDICINE

## 2018-12-29 PROCEDURE — 81001 URINALYSIS AUTO W/SCOPE: CPT | Performed by: FAMILY MEDICINE

## 2018-12-29 PROCEDURE — 99284 EMERGENCY DEPT VISIT MOD MDM: CPT

## 2018-12-29 PROCEDURE — 96374 THER/PROPH/DIAG INJ IV PUSH: CPT

## 2018-12-29 PROCEDURE — 74176 CT ABD & PELVIS W/O CONTRAST: CPT

## 2018-12-29 PROCEDURE — 96375 TX/PRO/DX INJ NEW DRUG ADDON: CPT

## 2018-12-29 RX ORDER — MORPHINE SULFATE 10 MG/ML
4 INJECTION INTRAMUSCULAR; INTRAVENOUS; SUBCUTANEOUS ONCE
Status: COMPLETED | OUTPATIENT
Start: 2018-12-29 | End: 2018-12-29

## 2018-12-29 RX ORDER — ONDANSETRON 2 MG/ML
4 INJECTION INTRAMUSCULAR; INTRAVENOUS ONCE
Status: COMPLETED | OUTPATIENT
Start: 2018-12-29 | End: 2018-12-29

## 2018-12-29 RX ORDER — SODIUM CHLORIDE 0.9 % (FLUSH) 0.9 %
10 SYRINGE (ML) INJECTION AS NEEDED
Status: DISCONTINUED | OUTPATIENT
Start: 2018-12-29 | End: 2018-12-29 | Stop reason: HOSPADM

## 2018-12-29 RX ADMIN — MORPHINE SULFATE 4 MG: 10 INJECTION INTRAVENOUS at 15:42

## 2018-12-29 RX ADMIN — SODIUM CHLORIDE 1000 ML: 9 INJECTION, SOLUTION INTRAVENOUS at 15:42

## 2018-12-29 RX ADMIN — ONDANSETRON 4 MG: 2 INJECTION, SOLUTION INTRAMUSCULAR; INTRAVENOUS at 15:42

## 2019-01-07 ENCOUNTER — OFFICE VISIT (OUTPATIENT)
Dept: UROLOGY | Facility: CLINIC | Age: 44
End: 2019-01-07

## 2019-01-07 VITALS — WEIGHT: 165 LBS | BODY MASS INDEX: 29.23 KG/M2 | HEIGHT: 63 IN

## 2019-01-07 DIAGNOSIS — N20.1 LEFT URETERAL CALCULUS: Primary | ICD-10-CM

## 2019-01-07 DIAGNOSIS — N20.0 RENAL CALCULUS: ICD-10-CM

## 2019-01-07 PROCEDURE — 99213 OFFICE O/P EST LOW 20 MIN: CPT | Performed by: UROLOGY

## 2019-01-07 RX ORDER — PROMETHAZINE HYDROCHLORIDE 25 MG/1
25 TABLET ORAL EVERY 6 HOURS PRN
Qty: 30 TABLET | Refills: 2 | Status: SHIPPED | OUTPATIENT
Start: 2019-01-07 | End: 2019-12-06 | Stop reason: SDUPTHER

## 2019-01-07 RX ORDER — OXYCODONE AND ACETAMINOPHEN 10; 325 MG/1; MG/1
1 TABLET ORAL EVERY 6 HOURS PRN
Qty: 15 TABLET | Refills: 0 | Status: SHIPPED | OUTPATIENT
Start: 2019-01-07 | End: 2019-01-31

## 2019-01-07 NOTE — PROGRESS NOTES
Chief Complaint:          Chief Complaint   Patient presents with   • Nephrolithiasis     f/u       HPI:   43 y.o. female.  43-year-old white female status post lithotripsy of a left UPJ stone Friday with great results.  She feels great she's passed copious amounts of sand and I'll check her back in 3 months.  She returns today.  She had a prior as wall and 918 and did well she has no fevers and chills started with nausea vomiting and went to the [facility] where she had a CT scan showing bilateral nonobstructive nephrolithiasis.  I have the opportunity to review all the reports and labs.  She then had a urinalysis that was essentially positive for red cells but no white cells.  Her CMP was negative.  Her urine culture was negative.  She had a random repeat culture that was negative.  Pregnancy test was negative.  Her lactic acid was normal.  She had no other abnormalities.  Examination today discloses left lower quadrant abdominal pain that does not appear to be related.  If anything is more of a GI infection I will presumptively treat her with antibiotics for a short period this is not obstructive uropathy.  She returns for follow-up.  There is now no nausea no vomiting and thought that she passed a stone because this is always heralded by diarrhea.  Currently she is feeling better I told her to stay off work for a day or 2 check her back next week his long she's doing better I don't think she needs follow-up I will follow-up with her based on this.  She returns today.  She passed this tiny stone that has the appearance of uric acid.  These do not show up on plain film.  She's having a lot of spasms in her lower tract.  I suspect this is perhaps an additional stone waiting to pass certainly there is no absolute indicators for intervention I did refill her pain medication I will see her in 2 weeks I'm in a get the stone analyzed.  I suspect that this is uric acid.  Stone report came back calcium oxalate.   She still has right-sided pain with nausea and diarrhea no fevers no chills I refilled her pain medication for short.  I'm going to recommend intervention.  She has adequate supplies of Flomax.    He returns today she has a small right ureteral stone that has not passed she wants to try to pass it as she still mired in bills from the prior ureteroscopy and imaging.  Certainly reasonable she has no absolute indicators for intervention I'm do recommend an attempt at spontaneous passage with the use of medical expulsive therapy used an off label setting and pain medication I'll follow-up with her based on this.  She returns today.  She passed a right sided stone.  It had a calcium oxalate appearance.  It was about 4 mm.  She now has left-sided pain.  I know she has some tiny nephrolithiasis and this is clearly  passing and she is drinking copious [place] I did refill her Phenergan and she works full-time and she is very hesitant to proceed with additional imaging because of significant cost to her.  I gave her an additional short course of pain medication while she passes this left-sided stone    Past Medical History:        Past Medical History:   Diagnosis Date   • Kidney stones    • Migraine    • Migraines    • Pneumonia    • Strep throat          Current Meds:     Current Outpatient Medications   Medication Sig Dispense Refill   • amitriptyline (ELAVIL) 25 MG tablet Take 25 mg by mouth every night.     • clonazePAM (KlonoPIN) 0.5 MG tablet Take 0.5 mg by mouth 2 (two) times a day as needed for seizures.     • gabapentin (NEURONTIN) 600 MG tablet   0   • promethazine (PHENERGAN) 25 MG tablet Take 25 mg by mouth Every 6 (Six) Hours As Needed for nausea or vomiting.     • promethazine (PHENERGAN) 25 MG tablet Take 1 tablet by mouth Every 6 (Six) Hours As Needed for Nausea or Vomiting. 15 tablet 0   • tamsulosin (FLOMAX) 0.4 MG capsule 24 hr capsule Take 1 capsule by mouth Daily. 30 capsule 6   •  HYDROcodone-acetaminophen (NORCO) 5-325 MG per tablet 1 to 2 Tablets Every 6 Hours as Needed for PAIN 12 tablet 0   • HYDROcodone-acetaminophen (NORCO) 7.5-325 MG per tablet Take 1 tablet by mouth Every 8 (Eight) Hours As Needed for Moderate Pain . 12 tablet 0   • ketorolac (TORADOL) 10 MG tablet Take 1 tablet by mouth Every 6 (Six) Hours As Needed for Moderate Pain . 12 tablet 0   • ketorolac (TORADOL) 10 MG tablet Take 1 tablet by mouth Every 6 (Six) Hours As Needed for Moderate Pain . 12 tablet 0   • ondansetron ODT (ZOFRAN-ODT) 4 MG disintegrating tablet Take 1 tablet by mouth Every 6 (Six) Hours As Needed for Nausea. 20 tablet 0   • oxyCODONE-acetaminophen (PERCOCET)  MG per tablet Take 1 tablet by mouth Every 6 (Six) Hours As Needed for Severe Pain . 15 tablet 0   • oxyCODONE-acetaminophen (PERCOCET)  MG per tablet Take 1 tablet by mouth Every 4 (Four) Hours As Needed for Moderate Pain 12 tablet 0     No current facility-administered medications for this visit.         Allergies:      Allergies   Allergen Reactions   • Sulfa Antibiotics Anaphylaxis   • Compazine [Prochlorperazine Edisylate] Hives        Past Surgical History:     Past Surgical History:   Procedure Laterality Date   •  SECTION     • COLONOSCOPY     • CYSTOSCOPY ELECTROHYDRAULIC LITHOTRIPSY     • DENTAL PROCEDURE     • EXTRACORPOREAL SHOCK WAVE LITHOTRIPSY (ESWL) Left 2018    Procedure: EXTRACORPOREAL SHOCKWAVE LITHOTRIPSY;  Surgeon: Garth Friedman MD;  Location: [facility];  Service: Urology   • KIDNEY STONE SURGERY           Social History:     Social History     Socioeconomic History   • Marital status:      Spouse name: Not on file   • Number of children: Not on file   • Years of education: Not on file   • Highest education level: Not on file   Social Needs   • Financial resource strain: Not on file   • Food insecurity - worry: Not on file   • Food insecurity - inability: Not on file   • Transportation  needs - medical: Not on file   • Transportation needs - non-medical: Not on file   Occupational History   • Not on file   Tobacco Use   • Smoking status: Former Smoker     Packs/day: 0.50     Years: 15.00     Pack years: 7.50     Types: Cigarettes     Last attempt to quit: 2018     Years since quittin.0   • Smokeless tobacco: Never Used   Substance and Sexual Activity   • Alcohol use: No   • Drug use: No   • Sexual activity: Defer   Other Topics Concern   • Not on file   Social History Narrative   • Not on file       Family History:     Family History   Problem Relation Age of Onset   • No Known Problems Father    • No Known Problems Mother        Review of Systems:     Review of Systems   Constitutional: Negative.  Negative for activity change, appetite change, chills, diaphoresis, fatigue and unexpected weight change.   HENT: Negative for congestion, dental problem, drooling, ear discharge, ear pain, facial swelling, hearing loss, mouth sores, nosebleeds, postnasal drip, rhinorrhea, sinus pressure, sneezing, sore throat, tinnitus, trouble swallowing and voice change.    Eyes: Negative.  Negative for photophobia, pain, discharge, redness, itching and visual disturbance.   Respiratory: Negative.  Negative for apnea, cough, choking, chest tightness, shortness of breath, wheezing and stridor.    Cardiovascular: Negative.  Negative for chest pain, palpitations and leg swelling.   Gastrointestinal: Negative.  Negative for abdominal distention, abdominal pain, anal bleeding, blood in stool, constipation, diarrhea, nausea, rectal pain and vomiting.   Endocrine: Negative.  Negative for cold intolerance, heat intolerance, polydipsia, polyphagia and polyuria.   Genitourinary: Positive for flank pain, pelvic pain and urgency.   Musculoskeletal: Negative for arthralgias, back pain, gait problem, joint swelling, myalgias, neck pain and neck stiffness.   Skin: Negative.  Negative for color change, pallor, rash and wound.    Allergic/Immunologic: Negative.  Negative for environmental allergies, food allergies and immunocompromised state.   Neurological: Negative.  Negative for dizziness, tremors, seizures, syncope, facial asymmetry, speech difficulty, weakness, light-headedness, numbness and headaches.   Hematological: Negative.  Negative for adenopathy. Does not bruise/bleed easily.   Psychiatric/Behavioral: Negative for agitation, behavioral problems, confusion, decreased concentration, dysphoric mood, hallucinations, self-injury, sleep disturbance and suicidal ideas. The patient is not nervous/anxious and is not hyperactive.    All other systems reviewed and are negative.      Physical Exam:     Physical Exam   Constitutional: She appears well-developed and well-nourished.   HENT:   Head: Normocephalic and atraumatic.   Right Ear: External ear normal.   Left Ear: External ear normal.   Mouth/Throat: Oropharynx is clear and moist.   Eyes: Conjunctivae are normal. Pupils are equal, round, and reactive to light.   Cardiovascular: Normal rate, regular rhythm, normal heart sounds and intact distal pulses.   Pulmonary/Chest: Effort normal and breath sounds normal.   Abdominal: Soft. Bowel sounds are normal. She exhibits no distension and no mass. There is no tenderness. There is no rebound and no guarding.   Genitourinary: No vaginal discharge found.   Musculoskeletal: Normal range of motion.   Neurological: She is alert. She has normal reflexes.   Skin: Skin is warm and dry.   Psychiatric: She has a normal mood and affect. Her behavior is normal. Judgment and thought content normal.       I have reviewed the following portions of the patient's history: allergies, current medications, past family history, past medical history, past social history, past surgical history, problem list and ROS and confirm it's accurate.      Procedure:       Assessment/Plan:   Ureteral calculus-patient has been diagnosed with a ureteral calculus.  We have  discussed the various parameters regarding spontaneous passage including the notion that a 2 mm stone has a high likelihood of spontaneous passage versus a larger stone being caught up in the upper areas of the urinary tract.  We also discussed the medical management of stone disease and the use of medical expulsive therapy in the form of Flomax.  This is used in an off label setting.  We discussed the indicators for intervention including  absolute indicators such as sepsis and uncontrollable severe pain as well as  the relative indicators of moderate pain that is well-controlled with various analgesia.  I also talked about nonoperative management including ambulation and increasing fluids and hot tub as being an effective adjuncts in the treatment of a ureteral stone.  She's passed the rice of the stone I have the ability to observe it.  She has a small left-sided stone this should be the last of her stones as her upper tracts were otherwise negative.  Narcotic pain medication-patient has significant acute pain that I believe would be an indication for the use of narcotic pain medication.  I discussed the significant risks of pain medication and the fact that this will be a short only option and I will give her no more than a three-day supply of pain medication.  And I will not plan long-term medication and that this will be sent to a pain clinic if at all becomes necessary.  We discussed signing a pain medication agreement and the fact that we're going to run a state [facility] review to be sure the patient is not getting pain medication from elsewhere.  If this is the case we will not give pain medication.  As part of the patient's treatment plan of there being prescribed a controlled substance with potential for abuse.  This patient has been wait aware of the appropriate dose of such medications including, the risk for somnolence, limited ability to drive and/or safety and the significant potential for overdose.   It is been made clear that these medications are for the prescribed patient only without concomitant use of alcohol or other sepsis unless prescribed by the medical provider.  Has completed prescribing agreement detailing the terms of continue prescribing him a controlled substance.  Including monitoring Sam ports, the possibility of urine drug screens and pedal counts.  The patient is aware that we reviewed SAM reports on a regular basis and scan them into the chart.  History and physical examination exhibited continued safe and appropriate use of controlled substances.  Also discussed the fact that the new [place] legislation allows only a three-day prescription for pain medication.  In this situation he will be referred to a chronic pain clinic.     Patient's Body mass index is 29.23 kg/m². BMI is above normal parameters. Recommendations include: educational material.          This document has been electronically signed by LOU MONTANA MD January 7, 2019 11:11 AM

## 2019-01-31 ENCOUNTER — OFFICE VISIT (OUTPATIENT)
Dept: UROLOGY | Facility: CLINIC | Age: 44
End: 2019-01-31

## 2019-01-31 VITALS — WEIGHT: 165 LBS | BODY MASS INDEX: 29.23 KG/M2 | HEIGHT: 63 IN

## 2019-01-31 DIAGNOSIS — N20.0 RENAL CALCULUS: ICD-10-CM

## 2019-01-31 DIAGNOSIS — N20.1 LEFT URETERAL CALCULUS: Primary | ICD-10-CM

## 2019-01-31 PROCEDURE — 99213 OFFICE O/P EST LOW 20 MIN: CPT | Performed by: UROLOGY

## 2019-01-31 RX ORDER — HYDROCODONE BITARTRATE AND ACETAMINOPHEN 10; 325 MG/1; MG/1
1 TABLET ORAL EVERY 6 HOURS PRN
Qty: 15 TABLET | Refills: 0 | Status: SHIPPED | OUTPATIENT
Start: 2019-01-31 | End: 2019-06-14 | Stop reason: SDUPTHER

## 2019-01-31 RX ORDER — ONDANSETRON 4 MG/1
4 TABLET, FILM COATED ORAL DAILY PRN
Qty: 30 TABLET | Refills: 1 | Status: SHIPPED | OUTPATIENT
Start: 2019-01-31 | End: 2019-05-10 | Stop reason: SDUPTHER

## 2019-01-31 RX ORDER — TAMSULOSIN HYDROCHLORIDE 0.4 MG/1
1 CAPSULE ORAL DAILY
Qty: 30 CAPSULE | Refills: 6 | Status: SHIPPED | OUTPATIENT
Start: 2019-01-31 | End: 2019-03-19 | Stop reason: SDUPTHER

## 2019-01-31 RX ORDER — TRAMADOL HYDROCHLORIDE 50 MG/1
50 TABLET ORAL EVERY 6 HOURS PRN
Qty: 60 TABLET | Refills: 0 | Status: SHIPPED | OUTPATIENT
Start: 2019-01-31 | End: 2019-02-26 | Stop reason: SDUPTHER

## 2019-02-26 ENCOUNTER — OFFICE VISIT (OUTPATIENT)
Dept: UROLOGY | Facility: CLINIC | Age: 44
End: 2019-02-26

## 2019-02-26 VITALS — BODY MASS INDEX: 29.23 KG/M2 | WEIGHT: 165 LBS | HEIGHT: 63 IN

## 2019-02-26 DIAGNOSIS — N20.0 RENAL CALCULUS: ICD-10-CM

## 2019-02-26 DIAGNOSIS — N20.1 CALCULUS OF URETER: Primary | ICD-10-CM

## 2019-02-26 PROCEDURE — 99213 OFFICE O/P EST LOW 20 MIN: CPT | Performed by: UROLOGY

## 2019-02-26 RX ORDER — OXYCODONE AND ACETAMINOPHEN 10; 325 MG/1; MG/1
1 TABLET ORAL EVERY 6 HOURS PRN
Qty: 15 TABLET | Refills: 0 | Status: SHIPPED | OUTPATIENT
Start: 2019-02-26 | End: 2019-03-19 | Stop reason: SDUPTHER

## 2019-02-26 RX ORDER — TRAMADOL HYDROCHLORIDE 50 MG/1
50 TABLET ORAL EVERY 6 HOURS PRN
Qty: 60 TABLET | Refills: 0 | Status: SHIPPED | OUTPATIENT
Start: 2019-02-26 | End: 2019-05-10 | Stop reason: SDUPTHER

## 2019-02-26 NOTE — PROGRESS NOTES
Chief Complaint:          Chief Complaint   Patient presents with   • Nephrolithiasis       HPI:   43 y.o. female.  43-year-old white female status post lithotripsy of a left UPJ stone Friday with great results.  She feels great she's passed copious amounts of sand and I'll check her back in 3 months.  She returns today.  She had a prior as wall and 918 and did well she has no fevers and chills started with nausea vomiting and went to the [facility] where she had a CT scan showing bilateral nonobstructive nephrolithiasis.  I have the opportunity to review all the reports and labs.  She then had a urinalysis that was essentially positive for red cells but no white cells.  Her CMP was negative.  Her urine culture was negative.  She had a random repeat culture that was negative.  Pregnancy test was negative.  Her lactic acid was normal.  She had no other abnormalities.  Examination today discloses left lower quadrant abdominal pain that does not appear to be related.  If anything is more of a GI infection I will presumptively treat her with antibiotics for a short period this is not obstructive uropathy.  She returns for follow-up.  There is now no nausea no vomiting and thought that she passed a stone because this is always heralded by diarrhea.  Currently she is feeling better I told her to stay off work for a day or 2 check her back next week his long she's doing better I don't think she needs follow-up I will follow-up with her based on this.  She returns today.  She passed this tiny stone that has the appearance of uric acid.  These do not show up on plain film.  She's having a lot of spasms in her lower tract.  I suspect this is perhaps an additional stone waiting to pass certainly there is no absolute indicators for intervention I did refill her pain medication I will see her in 2 weeks I'm in a get the stone analyzed.  I suspect that this is uric acid.  Stone report came back calcium oxalate.  She  still has right-sided pain with nausea and diarrhea no fevers no chills I refilled her pain medication for short.  I'm going to recommend intervention.  She has adequate supplies of Flomax.    He returns today she has a small right ureteral stone that has not passed she wants to try to pass it as she still mired in bills from the prior ureteroscopy and imaging.  Certainly reasonable she has no absolute indicators for intervention I'm do recommend an attempt at spontaneous passage with the use of medical expulsive therapy used an off label setting and pain medication I'll follow-up with her based on this.  She returns today.  She passed a right sided stone.  It had a calcium oxalate appearance.  It was about 4 mm.  She now has left-sided pain.  I know she has some tiny nephrolithiasis and this is clearly  passing and she is drinking copious [place] I did refill her Phenergan and she works full-time and she is very hesitant to proceed with additional imaging because of significant cost to her.  I gave her an additional short course of pain medication while she passes this left-sided stone  He brought another small stone.  She is passing one on the right now she has very metabolically active stone disease but with increasing fluids I expected to decrease we discussed medical management at length with great now because of her job she cannot leave work I gave her tramadol to try during the day for pain control  She returns today she is 3-week follow-up.  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  I recommended that she use the tramadol during the day and to use the Percocet  only at night.  There is no fevers she has no absolute indicators for intervention at this time    Past Medical History:        Past Medical History:   Diagnosis Date   • Kidney stones    • Migraine    • Migraines    • Pneumonia    • Strep throat          Current Meds:     Current Outpatient Medications   Medication Sig Dispense Refill   • amitriptyline (ELAVIL) 25 MG tablet Take 25 mg by mouth every night.     • clonazePAM (KlonoPIN) 0.5 MG tablet Take 0.5 mg by mouth 2 (two) times a day as needed for seizures.     • gabapentin (NEURONTIN) 600 MG tablet Take 400 mg by mouth 4 (Four) Times a Day.  0   • HYDROcodone-acetaminophen (NORCO)  MG per tablet Take 1 tablet by mouth Every 6 (Six) Hours As Needed for Moderate Pain . 15 tablet 0   • ketorolac (TORADOL) 10 MG tablet Take 1 tablet by mouth Every 6 (Six) Hours As Needed for Moderate Pain . 12 tablet 0   • ketorolac (TORADOL) 10 MG tablet Take 1 tablet by mouth Every 6 (Six) Hours As Needed for Moderate Pain . 12 tablet 0   • ondansetron (ZOFRAN) 4 MG tablet Take 1 tablet by mouth Daily As Needed for Nausea or Vomiting. 30 tablet 1   • ondansetron ODT (ZOFRAN-ODT) 4 MG disintegrating tablet Take 1 tablet by mouth Every 6 (Six) Hours As Needed for Nausea. 20 tablet 0   • promethazine (PHENERGAN) 25 MG tablet Take 1 tablet by mouth Every 6 (Six) Hours As Needed for Nausea or Vomiting. 15 tablet 0   • promethazine (PHENERGAN) 25 MG tablet Take 1 tablet by mouth Every 6 (Six) Hours As Needed for Nausea or Vomiting. 30 tablet 2   • tamsulosin (FLOMAX) 0.4 MG capsule 24 hr capsule Take 1 capsule by mouth Daily. 30 capsule 6   • traMADol (ULTRAM) 50 MG tablet Take 1 tablet by mouth Every 6 (Six) Hours As Needed for Moderate Pain . 60 tablet 0     No current facility-administered medications for this visit.         Allergies:      Allergies   Allergen Reactions   • Sulfa Antibiotics Anaphylaxis   • Compazine [Prochlorperazine Edisylate] Hives        Past Surgical  History:     Past Surgical History:   Procedure Laterality Date   •  SECTION     • COLONOSCOPY     • CYSTOSCOPY ELECTROHYDRAULIC LITHOTRIPSY     • DENTAL PROCEDURE     • EXTRACORPOREAL SHOCK WAVE LITHOTRIPSY (ESWL) Left 2018    Procedure: EXTRACORPOREAL SHOCKWAVE LITHOTRIPSY;  Surgeon: Garth Friedman MD;  Location: [facility];  Service: Urology   • KIDNEY STONE SURGERY           Social History:     Social History     Socioeconomic History   • Marital status:      Spouse name: Not on file   • Number of children: Not on file   • Years of education: Not on file   • Highest education level: Not on file   Social Needs   • Financial resource strain: Not on file   • Food insecurity - worry: Not on file   • Food insecurity - inability: Not on file   • Transportation needs - medical: Not on file   • Transportation needs - non-medical: Not on file   Occupational History   • Not on file   Tobacco Use   • Smoking status: Former Smoker     Packs/day: 0.50     Years: 15.00     Pack years: 7.50     Types: Cigarettes     Last attempt to quit: 2018     Years since quittin.1   • Smokeless tobacco: Never Used   Substance and Sexual Activity   • Alcohol use: No   • Drug use: No   • Sexual activity: Defer   Other Topics Concern   • Not on file   Social History Narrative   • Not on file       Family History:     Family History   Problem Relation Age of Onset   • No Known Problems Father    • No Known Problems Mother        Review of Systems:     Review of Systems   Constitutional: Negative.  Negative for activity change, appetite change, chills, diaphoresis, fatigue and unexpected weight change.   HENT: Negative for congestion, dental problem, drooling, ear discharge, ear pain, facial swelling, hearing loss, mouth sores, nosebleeds, postnasal drip, rhinorrhea, sinus pressure, sneezing, sore throat, tinnitus, trouble swallowing and voice change.    Eyes: Negative.  Negative for photophobia, pain,  discharge, redness, itching and visual disturbance.   Respiratory: Negative.  Negative for apnea, cough, choking, chest tightness, shortness of breath, wheezing and stridor.    Cardiovascular: Negative.  Negative for chest pain, palpitations and leg swelling.   Gastrointestinal: Negative.  Negative for abdominal distention, abdominal pain, anal bleeding, blood in stool, constipation, diarrhea, nausea, rectal pain and vomiting.   Endocrine: Negative.  Negative for cold intolerance, heat intolerance, polydipsia, polyphagia and polyuria.   Genitourinary: Positive for frequency.   Musculoskeletal: Negative.  Negative for arthralgias, back pain, gait problem, joint swelling, myalgias, neck pain and neck stiffness.   Skin: Negative.  Negative for color change, pallor, rash and wound.   Allergic/Immunologic: Negative.  Negative for environmental allergies, food allergies and immunocompromised state.   Neurological: Negative.  Negative for dizziness, tremors, seizures, syncope, facial asymmetry, speech difficulty, weakness, light-headedness, numbness and headaches.   Hematological: Negative.  Negative for adenopathy. Does not bruise/bleed easily.   Psychiatric/Behavioral: Negative for agitation, behavioral problems, confusion, decreased concentration, dysphoric mood, hallucinations, self-injury, sleep disturbance and suicidal ideas. The patient is not nervous/anxious and is not hyperactive.    All other systems reviewed and are negative.      Physical Exam:     Physical Exam   Constitutional: She appears well-developed and well-nourished.   HENT:   Head: Normocephalic and atraumatic.   Right Ear: External ear normal.   Left Ear: External ear normal.   Mouth/Throat: Oropharynx is clear and moist.   Eyes: Conjunctivae are normal. Pupils are equal, round, and reactive to light.   Cardiovascular: Normal rate, regular rhythm, normal heart sounds and intact distal pulses.   Pulmonary/Chest: Effort normal and breath sounds normal.    Abdominal: Soft. Bowel sounds are normal. She exhibits no distension and no mass. There is no tenderness. There is no rebound and no guarding.   Genitourinary: No vaginal discharge found.   Musculoskeletal: Normal range of motion.   Neurological: She is alert. She has normal reflexes.   Skin: Skin is warm and dry.   Psychiatric: She has a normal mood and affect. Her behavior is normal. Judgment and thought content normal.       I have reviewed the following portions of the patient's history: allergies, current medications, past family history, past medical history, past social history, past surgical history, problem list and ROS and confirm it's accurate.      Procedure:       Assessment/Plan:   Ureteral calculus-patient has been diagnosed with a ureteral calculus.  We have discussed the various parameters regarding spontaneous passage including the notion that a 2 mm stone has a high likelihood of spontaneous passage versus a larger stone being caught up in the upper areas of the urinary tract.  We also discussed the medical management of stone disease and the use of medical expulsive therapy in the form of Flomax.  This is used in an off label setting.  We discussed the indicators for intervention including  absolute indicators such as sepsis and uncontrollable severe pain as well as  the relative indicators of moderate pain that is well-controlled with various analgesia.  I also talked about nonoperative management including ambulation and increasing fluids and hot tub as being an effective adjuncts in the treatment of a ureteral stone.  He has distal ureteral stone and she is not desirous of intervention because of cost and her need for employment told her we will give her an additional 3 weeks to for spontaneous passage as this always seems to work well    Patient's Body mass index is 29.24 kg/m². BMI is above normal parameters. Recommendations include: educational material.          This document has been  electronically signed by LOU MONTANA MD February 26, 2019 7:55 AM

## 2019-03-08 ENCOUNTER — HOSPITAL ENCOUNTER (EMERGENCY)
Facility: HOSPITAL | Age: 44
Discharge: HOME | End: 2019-03-08
Attending: EMERGENCY MEDICINE | Admitting: EMERGENCY MEDICINE

## 2019-03-08 VITALS
TEMPERATURE: 98.2 F | RESPIRATION RATE: 18 BRPM | HEIGHT: 63 IN | BODY MASS INDEX: 31.01 KG/M2 | HEART RATE: 80 BPM | OXYGEN SATURATION: 98 % | SYSTOLIC BLOOD PRESSURE: 123 MMHG | WEIGHT: 175 LBS | DIASTOLIC BLOOD PRESSURE: 87 MMHG

## 2019-03-08 DIAGNOSIS — N23 RENAL COLIC ON LEFT SIDE: Primary | ICD-10-CM

## 2019-03-08 LAB
ALBUMIN SERPL-MCNC: 4.3 G/DL (ref 3.5–5)
ALBUMIN/GLOB SERPL: 1.3 G/DL (ref 1.5–2.5)
ALP SERPL-CCNC: 81 U/L (ref 35–104)
ALT SERPL W P-5'-P-CCNC: 22 U/L (ref 10–36)
ANION GAP SERPL CALCULATED.3IONS-SCNC: 8.7 MMOL/L (ref 3.6–11.2)
AST SERPL-CCNC: 30 U/L (ref 10–30)
BACTERIA UR QL AUTO: ABNORMAL /HPF
BASOPHILS # BLD AUTO: 0.01 10*3/MM3 (ref 0–0.3)
BASOPHILS NFR BLD AUTO: 0.1 % (ref 0–2)
BILIRUB SERPL-MCNC: 0.6 MG/DL (ref 0.2–1.8)
BILIRUB UR QL STRIP: NEGATIVE
BUN BLD-MCNC: 12 MG/DL (ref 7–21)
BUN/CREAT SERPL: 12.4 (ref 7–25)
CALCIUM SPEC-SCNC: 9.6 MG/DL (ref 7.7–10)
CHLORIDE SERPL-SCNC: 108 MMOL/L (ref 99–112)
CLARITY UR: CLEAR
CO2 SERPL-SCNC: 24.3 MMOL/L (ref 24.3–31.9)
COLOR UR: YELLOW
CREAT BLD-MCNC: 0.97 MG/DL (ref 0.43–1.29)
DEPRECATED RDW RBC AUTO: 43.9 FL (ref 37–54)
EOSINOPHIL # BLD AUTO: 0.07 10*3/MM3 (ref 0–0.7)
EOSINOPHIL NFR BLD AUTO: 0.7 % (ref 0–5)
ERYTHROCYTE [DISTWIDTH] IN BLOOD BY AUTOMATED COUNT: 13.1 % (ref 11.5–14.5)
GFR SERPL CREATININE-BSD FRML MDRD: 63 ML/MIN/1.73
GLOBULIN UR ELPH-MCNC: 3.2 GM/DL
GLUCOSE BLD-MCNC: 90 MG/DL (ref 70–110)
GLUCOSE UR STRIP-MCNC: NEGATIVE MG/DL
HCG SERPL QL: NEGATIVE
HCT VFR BLD AUTO: 39.7 % (ref 37–47)
HGB BLD-MCNC: 13.1 G/DL (ref 12–16)
HGB UR QL STRIP.AUTO: NEGATIVE
HOLD SPECIMEN: NORMAL
HOLD SPECIMEN: NORMAL
HYALINE CASTS UR QL AUTO: ABNORMAL /LPF
IMM GRANULOCYTES # BLD AUTO: 0.02 10*3/MM3 (ref 0–0.03)
IMM GRANULOCYTES NFR BLD AUTO: 0.2 % (ref 0–0.5)
KETONES UR QL STRIP: ABNORMAL
LEUKOCYTE ESTERASE UR QL STRIP.AUTO: ABNORMAL
LIPASE SERPL-CCNC: 24 U/L (ref 13–60)
LYMPHOCYTES # BLD AUTO: 2.54 10*3/MM3 (ref 1–3)
LYMPHOCYTES NFR BLD AUTO: 24.9 % (ref 21–51)
MCH RBC QN AUTO: 31.9 PG (ref 27–33)
MCHC RBC AUTO-ENTMCNC: 33 G/DL (ref 33–37)
MCV RBC AUTO: 96.6 FL (ref 80–94)
MONOCYTES # BLD AUTO: 0.62 10*3/MM3 (ref 0.1–0.9)
MONOCYTES NFR BLD AUTO: 6.1 % (ref 0–10)
NEUTROPHILS # BLD AUTO: 6.95 10*3/MM3 (ref 1.4–6.5)
NEUTROPHILS NFR BLD AUTO: 68 % (ref 30–70)
NITRITE UR QL STRIP: NEGATIVE
OSMOLALITY SERPL CALC.SUM OF ELEC: 280.5 MOSM/KG (ref 273–305)
PH UR STRIP.AUTO: 6.5 [PH] (ref 5–8)
PLATELET # BLD AUTO: 303 10*3/MM3 (ref 130–400)
PMV BLD AUTO: 9.4 FL (ref 6–10)
POTASSIUM BLD-SCNC: 3.5 MMOL/L (ref 3.5–5.3)
PROT SERPL-MCNC: 7.5 G/DL (ref 6–8)
PROT UR QL STRIP: NEGATIVE
RBC # BLD AUTO: 4.11 10*6/MM3 (ref 4.2–5.4)
RBC # UR: ABNORMAL /HPF
REF LAB TEST METHOD: ABNORMAL
SODIUM BLD-SCNC: 141 MMOL/L (ref 135–153)
SP GR UR STRIP: 1.02 (ref 1–1.03)
SQUAMOUS #/AREA URNS HPF: ABNORMAL /HPF
UROBILINOGEN UR QL STRIP: ABNORMAL
WBC NRBC COR # BLD: 10.21 10*3/MM3 (ref 4.5–12.5)
WBC UR QL AUTO: ABNORMAL /HPF
WHOLE BLOOD HOLD SPECIMEN: NORMAL
WHOLE BLOOD HOLD SPECIMEN: NORMAL

## 2019-03-08 PROCEDURE — 81001 URINALYSIS AUTO W/SCOPE: CPT | Performed by: EMERGENCY MEDICINE

## 2019-03-08 PROCEDURE — 85025 COMPLETE CBC W/AUTO DIFF WBC: CPT | Performed by: EMERGENCY MEDICINE

## 2019-03-08 PROCEDURE — 84703 CHORIONIC GONADOTROPIN ASSAY: CPT | Performed by: EMERGENCY MEDICINE

## 2019-03-08 PROCEDURE — 80053 COMPREHEN METABOLIC PANEL: CPT | Performed by: EMERGENCY MEDICINE

## 2019-03-08 PROCEDURE — 25010000002 HYDROMORPHONE PER 4 MG: Performed by: EMERGENCY MEDICINE

## 2019-03-08 PROCEDURE — 83690 ASSAY OF LIPASE: CPT | Performed by: EMERGENCY MEDICINE

## 2019-03-08 PROCEDURE — 96372 THER/PROPH/DIAG INJ SC/IM: CPT

## 2019-03-08 PROCEDURE — 99284 EMERGENCY DEPT VISIT MOD MDM: CPT

## 2019-03-08 RX ORDER — SODIUM CHLORIDE 0.9 % (FLUSH) 0.9 %
10 SYRINGE (ML) INJECTION AS NEEDED
Status: DISCONTINUED | OUTPATIENT
Start: 2019-03-08 | End: 2019-03-08

## 2019-03-08 RX ORDER — ONDANSETRON 4 MG/1
4 TABLET, ORALLY DISINTEGRATING ORAL ONCE
Status: COMPLETED | OUTPATIENT
Start: 2019-03-08 | End: 2019-03-08

## 2019-03-08 RX ORDER — HYDROMORPHONE HCL 110MG/55ML
2 PATIENT CONTROLLED ANALGESIA SYRINGE INTRAVENOUS ONCE
Status: COMPLETED | OUTPATIENT
Start: 2019-03-08 | End: 2019-03-08

## 2019-03-08 RX ADMIN — ONDANSETRON 4 MG: 4 TABLET, ORALLY DISINTEGRATING ORAL at 15:59

## 2019-03-08 RX ADMIN — HYDROMORPHONE HYDROCHLORIDE 2 MG: 2 INJECTION, SOLUTION INTRAMUSCULAR; INTRAVENOUS; SUBCUTANEOUS at 16:00

## 2019-03-08 NOTE — ED PROVIDER NOTES
Subjective   43-year-old female presents the ED today with left flank pain.  She states she thinks she is passing another kidney stone.  She states the pain started in the left side of her back yesterday and today it is now in her left lower quadrant and she feels like it is in her bladder.  She states she took ibuprofen, Toradol and tramadol today with no relief.  She states she is having nausea but no vomiting.  She denies any dysuria.  She denies any fever.        History provided by:  Patient  Flank Pain   Pain location:  L flank and LLQ  Pain quality: sharp and stabbing    Pain severity:  Severe  Onset quality:  Sudden  Duration:  1 day  Timing:  Constant  Progression:  Worsening  Chronicity:  Recurrent  Context: not recent travel, not sick contacts, not suspicious food intake and not trauma    Relieved by:  Nothing  Worsened by:  Nothing  Associated symptoms: nausea    Associated symptoms: no anorexia, no chest pain, no chills, no constipation, no cough, no diarrhea, no dysuria, no fatigue, no fever, no hematemesis, no hematochezia, no hematuria, no melena, no shortness of breath and no vomiting        Review of Systems   Constitutional: Negative for chills, fatigue and fever.   HENT: Negative.    Eyes: Negative.    Respiratory: Negative for cough and shortness of breath.    Cardiovascular: Negative for chest pain.   Gastrointestinal: Positive for abdominal pain and nausea. Negative for anorexia, constipation, diarrhea, hematemesis, hematochezia, melena and vomiting.   Genitourinary: Positive for flank pain. Negative for dysuria and hematuria.   Musculoskeletal: Positive for back pain.   Skin: Negative.    Neurological: Negative.    Psychiatric/Behavioral: Negative.    All other systems reviewed and are negative.      Past Medical History:   Diagnosis Date   • Kidney stones    • Migraine    • Migraines    • Pneumonia    • Strep throat        Allergies   Allergen Reactions   • Sulfa Antibiotics Anaphylaxis   •  Compazine [Prochlorperazine Edisylate] Hives       Past Surgical History:   Procedure Laterality Date   •  SECTION     • COLONOSCOPY     • CYSTOSCOPY ELECTROHYDRAULIC LITHOTRIPSY     • DENTAL PROCEDURE     • EXTRACORPOREAL SHOCK WAVE LITHOTRIPSY (ESWL) Left 2018    Procedure: EXTRACORPOREAL SHOCKWAVE LITHOTRIPSY;  Surgeon: Garth Friedman MD;  Location: [facility];  Service: Urology   • KIDNEY STONE SURGERY         Family History   Problem Relation Age of Onset   • No Known Problems Father    • No Known Problems Mother        Social History     Socioeconomic History   • Marital status:      Spouse name: Not on file   • Number of children: Not on file   • Years of education: Not on file   • Highest education level: Not on file   Tobacco Use   • Smoking status: Former Smoker     Packs/day: 0.50     Years: 15.00     Pack years: 7.50     Types: Cigarettes     Last attempt to quit: 2018     Years since quittin.1   • Smokeless tobacco: Never Used   Substance and Sexual Activity   • Alcohol use: No   • Drug use: No   • Sexual activity: Defer           Objective   Physical Exam   Constitutional: She is oriented to person, place, and time. She appears well-developed and well-nourished. She appears distressed (appears to be in pain).   HENT:   Head: Normocephalic and atraumatic.   Right Ear: External ear normal.   Left Ear: External ear normal.   Nose: Nose normal.   Mouth/Throat: Oropharynx is clear and moist.   Eyes: Conjunctivae and EOM are normal. Pupils are equal, round, and reactive to light.   Neck: Normal range of motion. Neck supple.   Cardiovascular: Normal rate, regular rhythm, normal heart sounds and intact distal pulses.   Pulmonary/Chest: Effort normal and breath sounds normal.   Abdominal: Soft. Bowel sounds are normal. There is tenderness (mild in LLQ). There is no rebound and no guarding.   Mild left sided CVA tenderness   Musculoskeletal: Normal range of motion.    Neurological: She is alert and oriented to person, place, and time.   Skin: Skin is warm and dry. Capillary refill takes less than 2 seconds.   Psychiatric: She has a normal mood and affect. Her behavior is normal. Judgment and thought content normal.   Nursing note and vitals reviewed.      Procedures           ED Course  ED Course as of Mar 08 1857   Fri Mar 08, 2019   1856 Patient's pain has improved.  She is scheduled to see Dr. Friedman in about 1 week.  She is encouraged to keep this follow up appointment and she will return to the ED if any of her symptoms change or worsen.  [AH]      ED Course User Index  [AH] Christy Jean PA                  MDM  Number of Diagnoses or Management Options  Renal colic on left side:      Amount and/or Complexity of Data Reviewed  Clinical lab tests: reviewed    Patient Progress  Patient progress: improved        Final diagnoses:   Renal colic on left side            Christy Jean PA  03/08/19 [id number]

## 2019-03-19 ENCOUNTER — OFFICE VISIT (OUTPATIENT)
Dept: UROLOGY | Facility: CLINIC | Age: 44
End: 2019-03-19

## 2019-03-19 VITALS — BODY MASS INDEX: 31.01 KG/M2 | WEIGHT: 175 LBS | HEIGHT: 63 IN

## 2019-03-19 DIAGNOSIS — N20.1 CALCULUS OF URETER: Primary | ICD-10-CM

## 2019-03-19 DIAGNOSIS — N20.0 RENAL CALCULUS: ICD-10-CM

## 2019-03-19 PROCEDURE — 99213 OFFICE O/P EST LOW 20 MIN: CPT | Performed by: UROLOGY

## 2019-03-19 RX ORDER — OXYCODONE AND ACETAMINOPHEN 10; 325 MG/1; MG/1
1 TABLET ORAL EVERY 6 HOURS PRN
Qty: 15 TABLET | Refills: 0 | Status: SHIPPED | OUTPATIENT
Start: 2019-03-19 | End: 2019-04-16 | Stop reason: SDUPTHER

## 2019-03-19 RX ORDER — TAMSULOSIN HYDROCHLORIDE 0.4 MG/1
1 CAPSULE ORAL DAILY
Qty: 30 CAPSULE | Refills: 6 | Status: SHIPPED | OUTPATIENT
Start: 2019-03-19 | End: 2019-07-23 | Stop reason: SDUPTHER

## 2019-03-19 NOTE — PROGRESS NOTES
Chief Complaint:          Chief Complaint   Patient presents with   • Nephrolithiasis     3 week f/u       HPI:   43 y.o. female.  43-year-old white female status post lithotripsy of a left UPJ stone Friday with great results.  She feels great she's passed copious amounts of sand and I'll check her back in 3 months.  She returns today.  She had a prior as wall and 918 and did well she has no fevers and chills started with nausea vomiting and went to the [facility] where she had a CT scan showing bilateral nonobstructive nephrolithiasis.  I have the opportunity to review all the reports and labs.  She then had a urinalysis that was essentially positive for red cells but no white cells.  Her CMP was negative.  Her urine culture was negative.  She had a random repeat culture that was negative.  Pregnancy test was negative.  Her lactic acid was normal.  She had no other abnormalities.  Examination today discloses left lower quadrant abdominal pain that does not appear to be related.  If anything is more of a GI infection I will presumptively treat her with antibiotics for a short period this is not obstructive uropathy.  She returns for follow-up.  There is now no nausea no vomiting and thought that she passed a stone because this is always heralded by diarrhea.  Currently she is feeling better I told her to stay off work for a day or 2 check her back next week his long she's doing better I don't think she needs follow-up I will follow-up with her based on this.  She returns today.  She passed this tiny stone that has the appearance of uric acid.  These do not show up on plain film.  She's having a lot of spasms in her lower tract.  I suspect this is perhaps an additional stone waiting to pass certainly there is no absolute indicators for intervention I did refill her pain medication I will see her in 2 weeks I'm in a get the stone analyzed.  I suspect that this is uric acid.  Stone report came back calcium  oxalate.  She still has right-sided pain with nausea and diarrhea no fevers no chills I refilled her pain medication for short.  I'm going to recommend intervention.  She has adequate supplies of Flomax.    He returns today she has a small right ureteral stone that has not passed she wants to try to pass it as she still mired in bills from the prior ureteroscopy and imaging.  Certainly reasonable she has no absolute indicators for intervention I'm do recommend an attempt at spontaneous passage with the use of medical expulsive therapy used an off label setting and pain medication I'll follow-up with her based on this.  She returns today.  She passed a right sided stone.  It had a calcium oxalate appearance.  It was about 4 mm.  She now has left-sided pain.  I know she has some tiny nephrolithiasis and this is clearly  passing and she is drinking copious Sheppard I did refill her Phenergan and she works full-time and she is very hesitant to proceed with additional imaging because of significant cost to her.  I gave her an additional short course of pain medication while she passes this left-sided stone  He brought another small stone.  She is passing one on the right now she has very metabolically active stone disease but with increasing fluids I expected to decrease we discussed medical management at length with great now because of her job she cannot leave work I gave her tramadol to try during the day for pain control  She returns today she is 3-week follow-up.  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  I recommended that she use the tramadol during the day and to use  the Percocet only at night.  There is no fevers she has no absolute indicators for intervention at this time.  She returns today she passed one yesterday she is actually doing somewhat better I reviewed it it is a 3 mm distal stone has almost a uric acid appearance now she has no fevers chills he has no nausea, vomiting diarrhea she has adequate Zofran I refilled her Pyridium for her Flomax I gave her some additional pain pills if she has severe pain as she works.  See her back in a month    Past Medical History:        Past Medical History:   Diagnosis Date   • Kidney stones    • Migraine    • Migraines    • Pneumonia    • Strep throat          Current Meds:     Current Outpatient Medications   Medication Sig Dispense Refill   • amitriptyline (ELAVIL) 25 MG tablet Take 25 mg by mouth every night.     • clonazePAM (KlonoPIN) 0.5 MG tablet Take 0.5 mg by mouth 2 (two) times a day as needed for seizures.     • gabapentin (NEURONTIN) 600 MG tablet Take 400 mg by mouth 4 (Four) Times a Day.  0   • HYDROcodone-acetaminophen (NORCO)  MG per tablet Take 1 tablet by mouth Every 6 (Six) Hours As Needed for Moderate Pain . 15 tablet 0   • ketorolac (TORADOL) 10 MG tablet Take 1 tablet by mouth Every 6 (Six) Hours As Needed for Moderate Pain . 12 tablet 0   • ketorolac (TORADOL) 10 MG tablet Take 1 tablet by mouth Every 6 (Six) Hours As Needed for Moderate Pain . 12 tablet 0   • oxyCODONE-acetaminophen (PERCOCET)  MG per tablet Take 1 tablet by mouth Every 6 (Six) Hours As Needed for Severe Pain . 15 tablet 0   • promethazine (PHENERGAN) 25 MG tablet Take 1 tablet by mouth Every 6 (Six) Hours As Needed for Nausea or Vomiting. 15 tablet 0   • promethazine (PHENERGAN) 25 MG tablet Take 1 tablet by mouth Every 6 (Six) Hours As Needed for Nausea or Vomiting. 30 tablet 2   • tamsulosin (FLOMAX) 0.4 MG capsule 24 hr capsule Take 1 capsule by mouth Daily. 30 capsule 6   • traMADol (ULTRAM) 50 MG tablet Take 1 tablet by  mouth Every 6 (Six) Hours As Needed for Moderate Pain . 60 tablet 0   • ondansetron (ZOFRAN) 4 MG tablet Take 1 tablet by mouth Daily As Needed for Nausea or Vomiting. 30 tablet 1   • ondansetron ODT (ZOFRAN-ODT) 4 MG disintegrating tablet Take 1 tablet by mouth Every 6 (Six) Hours As Needed for Nausea. 20 tablet 0     No current facility-administered medications for this visit.         Allergies:      Allergies   Allergen Reactions   • Sulfa Antibiotics Anaphylaxis   • Compazine [Prochlorperazine Edisylate] Hives        Past Surgical History:     Past Surgical History:   Procedure Laterality Date   •  SECTION     • COLONOSCOPY     • CYSTOSCOPY ELECTROHYDRAULIC LITHOTRIPSY     • DENTAL PROCEDURE     • EXTRACORPOREAL SHOCK WAVE LITHOTRIPSY (ESWL) Left 2018    Procedure: EXTRACORPOREAL SHOCKWAVE LITHOTRIPSY;  Surgeon: Garth Friedman MD;  Location: [facility];  Service: Urology   • KIDNEY STONE SURGERY           Social History:     Social History     Socioeconomic History   • Marital status:      Spouse name: Not on file   • Number of children: Not on file   • Years of education: Not on file   • Highest education level: Not on file   Social Needs   • Financial resource strain: Not on file   • Food insecurity - worry: Not on file   • Food insecurity - inability: Not on file   • Transportation needs - medical: Not on file   • Transportation needs - non-medical: Not on file   Occupational History   • Not on file   Tobacco Use   • Smoking status: Former Smoker     Packs/day: 0.50     Years: 15.00     Pack years: 7.50     Types: Cigarettes     Last attempt to quit: 2018     Years since quittin.2   • Smokeless tobacco: Never Used   Substance and Sexual Activity   • Alcohol use: No   • Drug use: No   • Sexual activity: Defer   Other Topics Concern   • Not on file   Social History Narrative   • Not on file       Family History:     Family History   Problem Relation Age of Onset   • No Known  Problems Father    • No Known Problems Mother        Review of Systems:     Review of Systems   Constitutional: Negative.  Negative for activity change, appetite change, chills, diaphoresis, fatigue and unexpected weight change.   HENT: Negative for congestion, dental problem, drooling, ear discharge, ear pain, facial swelling, hearing loss, mouth sores, nosebleeds, postnasal drip, rhinorrhea, sinus pressure, sneezing, sore throat, tinnitus, trouble swallowing and voice change.    Eyes: Negative.  Negative for photophobia, pain, discharge, redness, itching and visual disturbance.   Respiratory: Negative.  Negative for apnea, cough, choking, chest tightness, shortness of breath, wheezing and stridor.    Cardiovascular: Negative.  Negative for chest pain, palpitations and leg swelling.   Gastrointestinal: Negative.  Negative for abdominal distention, abdominal pain, anal bleeding, blood in stool, constipation, diarrhea, nausea, rectal pain and vomiting.   Endocrine: Negative.  Negative for cold intolerance, heat intolerance, polydipsia, polyphagia and polyuria.   Musculoskeletal: Negative.  Negative for arthralgias, back pain, gait problem, joint swelling, myalgias, neck pain and neck stiffness.   Skin: Negative.  Negative for color change, pallor, rash and wound.   Allergic/Immunologic: Negative.  Negative for environmental allergies, food allergies and immunocompromised state.   Neurological: Negative.  Negative for dizziness, tremors, seizures, syncope, facial asymmetry, speech difficulty, weakness, light-headedness, numbness and headaches.   Hematological: Negative.  Negative for adenopathy. Does not bruise/bleed easily.   Psychiatric/Behavioral: Negative for agitation, behavioral problems, confusion, decreased concentration, dysphoric mood, hallucinations, self-injury, sleep disturbance and suicidal ideas. The patient is not nervous/anxious and is not hyperactive.    All other systems reviewed and are  negative.      Physical Exam:     Physical Exam   Constitutional: She appears well-developed and well-nourished.   HENT:   Head: Normocephalic and atraumatic.   Right Ear: External ear normal.   Left Ear: External ear normal.   Mouth/Throat: Oropharynx is clear and moist.   Eyes: Conjunctivae are normal. Pupils are equal, round, and reactive to light.   Cardiovascular: Normal rate, regular rhythm, normal heart sounds and intact distal pulses.   Pulmonary/Chest: Effort normal and breath sounds normal.   Abdominal: Soft. Bowel sounds are normal. She exhibits no distension and no mass. There is no tenderness. There is no rebound and no guarding.   Genitourinary: No vaginal discharge found.   Musculoskeletal: Normal range of motion.   Neurological: She is alert. She has normal reflexes.   Skin: Skin is warm and dry.   Psychiatric: She has a normal mood and affect. Her behavior is normal. Judgment and thought content normal.       I have reviewed the following portions of the patient's history: allergies, current medications, past family history, past medical history, past social history, past surgical history, problem list and ROS and confirm it's accurate.      Procedure:       Assessment/Plan:   Ureteral calculus-patient has been diagnosed with a ureteral calculus.  We have discussed the various parameters regarding spontaneous passage including the notion that a 2 mm stone has a high likelihood of spontaneous passage versus a larger stone being caught up in the upper areas of the urinary tract.  We also discussed the medical management of stone disease and the use of medical expulsive therapy in the form of Flomax.  This is used in an off label setting.  We discussed the indicators for intervention including  absolute indicators such as sepsis and uncontrollable severe pain as well as  the relative indicators of moderate pain that is well-controlled with various analgesia.  I also talked about nonoperative management  including ambulation and increasing fluids and hot tub as being an effective adjuncts in the treatment of a ureteral stone.  Narcotic pain medication-patient has significant acute pain that I believe would be an indication for the use of narcotic pain medication.  I discussed the significant risks of pain medication and the fact that this will be a short only option and I will give her no more than a three-day supply of pain medication.  And I will not plan long-term medication and that this will be sent to a pain clinic if at all becomes necessary.  We discussed signing a pain medication agreement and the fact that we're going to run a state [facility] review to be sure the patient is not getting pain medication from elsewhere.  If this is the case we will not give pain medication.  As part of the patient's treatment plan of there being prescribed a controlled substance with potential for abuse.  This patient has been wait aware of the appropriate dose of such medications including, the risk for somnolence, limited ability to drive and/or safety and the significant potential for overdose.  It is been made clear that these medications are for the prescribed patient only without concomitant use of alcohol or other sepsis unless prescribed by the medical provider.  Has completed prescribing agreement detailing the terms of continue prescribing him a controlled substance.  Including monitoring Sam ports, the possibility of urine drug screens and pedal counts.  The patient is aware that we reviewed SAM reports on a regular basis and scan them into the chart.  History and physical examination exhibited continued safe and appropriate use of controlled substances.  Also discussed the fact that the new [place] legislation allows only a three-day prescription for pain medication.  In this situation he will be referred to a chronic pain clinic.    Patient's Body mass index is 31 kg/m². BMI is above normal parameters.  Recommendations include: educational material.          This document has been electronically signed by LOU MONTANA MD March 19, 2019 8:00 AM

## 2019-04-16 ENCOUNTER — OFFICE VISIT (OUTPATIENT)
Dept: UROLOGY | Facility: CLINIC | Age: 44
End: 2019-04-16

## 2019-04-16 VITALS — BODY MASS INDEX: 31.01 KG/M2 | HEIGHT: 63 IN | WEIGHT: 175 LBS

## 2019-04-16 DIAGNOSIS — N20.0 RENAL CALCULUS: ICD-10-CM

## 2019-04-16 DIAGNOSIS — N20.1 LEFT URETERAL CALCULUS: Primary | ICD-10-CM

## 2019-04-16 PROCEDURE — 99214 OFFICE O/P EST MOD 30 MIN: CPT | Performed by: UROLOGY

## 2019-04-16 RX ORDER — OXYCODONE AND ACETAMINOPHEN 10; 325 MG/1; MG/1
1 TABLET ORAL EVERY 6 HOURS PRN
Qty: 15 TABLET | Refills: 0 | Status: SHIPPED | OUTPATIENT
Start: 2019-04-16 | End: 2019-05-10 | Stop reason: SDUPTHER

## 2019-04-16 RX ORDER — PROMETHAZINE HYDROCHLORIDE 25 MG/1
25 TABLET ORAL EVERY 6 HOURS PRN
Qty: 15 TABLET | Refills: 0 | Status: SHIPPED | OUTPATIENT
Start: 2019-04-16 | End: 2019-06-14 | Stop reason: SDUPTHER

## 2019-04-16 NOTE — PROGRESS NOTES
Chief Complaint:          Chief Complaint   Patient presents with   • Flank Pain       HPI:   43 y.o. female.   status post lithotripsy of a left UPJ stone Friday with great results.  She feels great she's passed copious amounts of sand and I'll check her back in 3 months.  She returns today.  She had a prior as wall and 918 and did well she has no fevers and chills started with nausea vomiting and went to the [facility] where she had a CT scan showing bilateral nonobstructive nephrolithiasis.  I have the opportunity to review all the reports and labs.  She then had a urinalysis that was essentially positive for red cells but no white cells.  Her CMP was negative.  Her urine culture was negative.  She had a random repeat culture that was negative.  Pregnancy test was negative.  Her lactic acid was normal.  She had no other abnormalities.  Examination today discloses left lower quadrant abdominal pain that does not appear to be related.  If anything is more of a GI infection I will presumptively treat her with antibiotics for a short period this is not obstructive uropathy.  She returns for follow-up.  There is now no nausea no vomiting and thought that she passed a stone because this is always heralded by diarrhea.  Currently she is feeling better I told her to stay off work for a day or 2 check her back next week his long she's doing better I don't think she needs follow-up I will follow-up with her based on this.  She returns today.  She passed this tiny stone that has the appearance of uric acid.  These do not show up on plain film.  She's having a lot of spasms in her lower tract.  I suspect this is perhaps an additional stone waiting to pass certainly there is no absolute indicators for intervention I did refill her pain medication I will see her in 2 weeks I'm in a get the stone analyzed.  I suspect that this is uric acid.  Stone report came back calcium oxalate.  She still has right-sided pain with  nausea and diarrhea no fevers no chills I refilled her pain medication for short.  I'm going to recommend intervention.  She has adequate supplies of Flomax.    He returns today she has a small right ureteral stone that has not passed she wants to try to pass it as she still mired in bills from the prior ureteroscopy and imaging.  Certainly reasonable she has no absolute indicators for intervention I'm do recommend an attempt at spontaneous passage with the use of medical expulsive therapy used an off label setting and pain medication I'll follow-up with her based on this.  She returns today.  She passed a right sided stone.  It had a calcium oxalate appearance.  It was about 4 mm.  She now has left-sided pain.  I know she has some tiny nephrolithiasis and this is clearly  passing and she is drinking copious [place]pp[place] I did refill her Phenergan and she works full-time and she is very hesitant to proceed with additional imaging because of significant cost to her.  I gave her an additional short course of pain medication while she passes this left-sided stone  He brought another small stone.  She is passing one on the right now she has very metabolically active stone disease but with increasing fluids I expected to decrease we discussed medical management at length with great now because of her job she cannot leave work I gave her tramadol to try during the day for pain control  She returns today she is 3-week follow-up.  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  I recommended that she use the tramadol during the day and to use the Percocet only at night.  There is no fevers  she has no absolute indicators for intervention at this time.  She returns today she passed one yesterday she is actually doing somewhat better I reviewed it it is a 3 mm distal stone has almost a uric acid appearance now she has no fevers chills he has no nausea, vomiting diarrhea she has adequate Zofran I refilled her Pyridium for her Flomax I gave her some additional pain pills if she has severe pain as she works.  See her back in a month.  Returns today she still has 1 left-sided stone she feels a small I told her that at some point were going to need to repeat imaging.  But her problem is she cannot take off work, she can afford visits because of her job situation.  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     • clonazePAM (KlonoPIN) 0.5 MG tablet Take 0.5 mg by mouth 2 (two) times a day as needed for seizures.     • gabapentin (NEURONTIN) 600 MG tablet Take 400 mg by mouth 4 (Four) Times a Day.  0   • HYDROcodone-acetaminophen (NORCO)  MG per tablet Take 1 tablet by mouth Every 6 (Six) Hours As Needed for Moderate Pain . 15 tablet 0   • ketorolac (TORADOL) 10 MG tablet Take 1 tablet by mouth Every 6 (Six) Hours As Needed for Moderate Pain . 12 tablet 0   • ketorolac (TORADOL) 10 MG tablet Take 1 tablet by mouth Every 6 (Six) Hours As Needed for Moderate Pain . 12 tablet 0   • ondansetron (ZOFRAN) 4 MG tablet Take 1 tablet by mouth Daily As Needed for Nausea or Vomiting. 30  tablet 1   • ondansetron ODT (ZOFRAN-ODT) 4 MG disintegrating tablet Take 1 tablet by mouth Every 6 (Six) Hours As Needed for Nausea. 20 tablet 0   • oxyCODONE-acetaminophen (PERCOCET)  MG per tablet Take 1 tablet by mouth Every 6 (Six) Hours As Needed for Severe Pain . 15 tablet 0   • promethazine (PHENERGAN) 25 MG tablet Take 1 tablet by mouth Every 6 (Six) Hours As Needed for Nausea or Vomiting. 15 tablet 0   • promethazine (PHENERGAN) 25 MG tablet Take 1 tablet by mouth Every 6 (Six) Hours As Needed for Nausea or Vomiting. 30 tablet 2   • tamsulosin (FLOMAX) 0.4 MG capsule 24 hr capsule Take 1 capsule by mouth Daily. 30 capsule 6   • traMADol (ULTRAM) 50 MG tablet Take 1 tablet by mouth Every 6 (Six) Hours As Needed for Moderate Pain . 60 tablet 0     No current facility-administered medications for this visit.         Allergies:      Allergies   Allergen Reactions   • Sulfa Antibiotics Anaphylaxis   • Compazine [Prochlorperazine Edisylate] Hives        Past Surgical History:     Past Surgical History:   Procedure Laterality Date   •  SECTION     • COLONOSCOPY     • CYSTOSCOPY ELECTROHYDRAULIC LITHOTRIPSY     • DENTAL PROCEDURE     • EXTRACORPOREAL SHOCK WAVE LITHOTRIPSY (ESWL) Left 2018    Procedure: EXTRACORPOREAL SHOCKWAVE LITHOTRIPSY;  Surgeon: Garth Friedman MD;  [facility];  Service: Urology   • KIDNEY STONE SURGERY           Social History:     Social History     Socioeconomic History   • Marital status:      Spouse name: Not on file   • Number of children: Not on file   • Years of education: Not on file   • Highest education level: Not on file   Tobacco Use   • Smoking status: Former Smoker     Packs/day: 0.50     Years: 15.00     Pack years: 7.50     Types: Cigarettes     Last attempt to quit: 2018     Years since quittin.2   • Smokeless tobacco: Never Used   Substance and Sexual Activity   • Alcohol use: No   • Drug use: No   • Sexual activity: Defer        Family History:     Family History   Problem Relation Age of Onset   • No Known Problems Father    • No Known Problems Mother        Review of Systems:     Review of Systems   Constitutional: Negative.  Negative for activity change, appetite change, chills, diaphoresis, fatigue and unexpected weight change.   HENT: Negative for congestion, dental problem, drooling, ear discharge, ear pain, facial swelling, hearing loss, mouth sores, nosebleeds, postnasal drip, rhinorrhea, sinus pressure, sneezing, sore throat, tinnitus, trouble swallowing and voice change.    Eyes: Negative.  Negative for photophobia, pain, discharge, redness, itching and visual disturbance.   Respiratory: Negative.  Negative for apnea, cough, choking, chest tightness, shortness of breath, wheezing and stridor.    Cardiovascular: Negative.  Negative for chest pain, palpitations and leg swelling.   Gastrointestinal: Negative.  Negative for abdominal distention, abdominal pain, anal bleeding, blood in stool, constipation, diarrhea, nausea, rectal pain and vomiting.   Endocrine: Negative.  Negative for cold intolerance, heat intolerance, polydipsia, polyphagia and polyuria.   Genitourinary: Positive for flank pain, frequency and urgency.   Musculoskeletal: Negative for arthralgias, back pain, gait problem, joint swelling, myalgias, neck pain and neck stiffness.   Skin: Negative.  Negative for color change, pallor, rash and wound.   Allergic/Immunologic: Negative.  Negative for environmental allergies, food allergies and immunocompromised state.   Neurological: Negative.  Negative for dizziness, tremors, seizures, syncope, facial asymmetry, speech difficulty, weakness, light-headedness, numbness and headaches.   Hematological: Negative.  Negative for adenopathy. Does not bruise/bleed easily.   Psychiatric/Behavioral: Negative for agitation, behavioral problems, confusion, decreased concentration, dysphoric mood, hallucinations, self-injury, sleep  disturbance and suicidal ideas. The patient is not nervous/anxious and is not hyperactive.    All other systems reviewed and are negative.      Physical Exam:     Physical Exam   Constitutional: She appears well-developed and well-nourished.   HENT:   Head: Normocephalic and atraumatic.   Right Ear: External ear normal.   Left Ear: External ear normal.   Mouth/Throat: Oropharynx is clear and moist.   Eyes: Conjunctivae are normal. Pupils are equal, round, and reactive to light.   Cardiovascular: Normal rate, regular rhythm, normal heart sounds and intact distal pulses.   Pulmonary/Chest: Effort normal and breath sounds normal.   Abdominal: Soft. Bowel sounds are normal. She exhibits no distension and no mass. There is no tenderness. There is no rebound and no guarding.   Genitourinary: No vaginal discharge found.   Musculoskeletal: Normal range of motion.   Neurological: She is alert. She has normal reflexes.   Skin: Skin is warm and dry.   Psychiatric: She has a normal mood and affect. Her behavior is normal. Judgment and thought content normal.       I have reviewed the following portions of the patient's history: allergies, current medications, past family history, past medical history, past social history, past surgical history, problem list and ROS and confirm it's accurate.      Procedure:       Assessment/Plan:   Ureteral calculus-patient has been diagnosed with a ureteral calculus.  We have discussed the various parameters regarding spontaneous passage including the notion that a 2 mm stone has a high likelihood of spontaneous passage versus a larger stone being caught up in the upper areas of the urinary tract.  We also discussed the medical management of stone disease and the use of medical expulsive therapy in the form of Flomax.  This is used in an off label setting.  We discussed the indicators for intervention including  absolute indicators such as sepsis and uncontrollable severe pain as well as  the  relative indicators of moderate pain that is well-controlled with various analgesia.  I also talked about nonoperative management including ambulation and increasing fluids and hot tub as being an effective adjuncts in the treatment of a ureteral stone.  She still has one small stone left.  She does bring in the stone she is passed and there obvious mulberry stones consistent with calcium oxalate nephrolithiasis.  If it continues next visit she will need a KUB to assess the residual stone burden and perhaps another procedure.  Narcotic pain medication-patient has significant acute pain that I believe would be an indication for the use of narcotic pain medication.  I discussed the significant risks of pain medication and the fact that this will be a short only option and I will give her no more than a three-day supply of pain medication.  And I will not plan long-term medication and that this will be sent to a pain clinic if at all becomes necessary.  We discussed signing a pain medication agreement and the fact that we're going to run a state [facility] review to be sure the patient is not getting pain medication from elsewhere.  If this is the case we will not give pain medication.  As part of the patient's treatment plan of there being prescribed a controlled substance with potential for abuse.  This patient has been wait aware of the appropriate dose of such medications including, the risk for somnolence, limited ability to drive and/or safety and the significant potential for overdose.  It is been made clear that these medications are for the prescribed patient only without concomitant use of alcohol or other sepsis unless prescribed by the medical provider.  Has completed prescribing agreement detailing the terms of continue prescribing him a controlled substance.  Including monitoring Sam ports, the possibility of urine drug screens and pedal counts.  The patient is aware that we reviewed SAM reports on a  regular basis and scan them into the chart.  History and physical examination exhibited continued safe and appropriate use of controlled substances.  Also discussed the fact that the new [place] legislation allows only a three-day prescription for pain medication.  In this situation he will be referred to a chronic pain clinic.    Patient's Body mass index is 31.01 kg/m². BMI is above normal parameters. Recommendations include: educational material.          This document has been electronically signed by LOU MONTANA MD April 16, 2019 7:55 AM

## 2019-05-10 ENCOUNTER — OFFICE VISIT (OUTPATIENT)
Dept: UROLOGY | Facility: CLINIC | Age: 44
End: 2019-05-10

## 2019-05-10 VITALS — WEIGHT: 175 LBS | BODY MASS INDEX: 31.01 KG/M2 | HEIGHT: 63 IN

## 2019-05-10 DIAGNOSIS — N20.1 LEFT URETERAL CALCULUS: Primary | ICD-10-CM

## 2019-05-10 DIAGNOSIS — N20.0 RENAL CALCULUS: ICD-10-CM

## 2019-05-10 PROCEDURE — 99213 OFFICE O/P EST LOW 20 MIN: CPT | Performed by: UROLOGY

## 2019-05-10 RX ORDER — ONDANSETRON 4 MG/1
4 TABLET, FILM COATED ORAL DAILY PRN
Qty: 30 TABLET | Refills: 1 | Status: SHIPPED | OUTPATIENT
Start: 2019-05-10 | End: 2019-07-23 | Stop reason: SDUPTHER

## 2019-05-10 RX ORDER — OXYCODONE AND ACETAMINOPHEN 10; 325 MG/1; MG/1
1 TABLET ORAL EVERY 6 HOURS PRN
Qty: 15 TABLET | Refills: 0 | Status: SHIPPED | OUTPATIENT
Start: 2019-05-10 | End: 2019-07-01

## 2019-05-10 RX ORDER — TRAMADOL HYDROCHLORIDE 50 MG/1
50 TABLET ORAL EVERY 6 HOURS PRN
Qty: 60 TABLET | Refills: 0 | Status: SHIPPED | OUTPATIENT
Start: 2019-05-10 | End: 2019-06-14 | Stop reason: SDUPTHER

## 2019-05-10 NOTE — PROGRESS NOTES
Chief Complaint:          Chief Complaint   Patient presents with   • Flank Pain     1 mnth follow up       HPI:   43 y.o. female.status post lithotripsy of a left UPJ stone Friday with great results.  She feels great she's passed copious amounts of sand and I'll check her back in 3 months.  She returns today.  She had a prior as wall and 918 and did well she has no fevers and chills started with nausea vomiting and went to the [facility] where she had a CT scan showing bilateral nonobstructive nephrolithiasis.  I have the opportunity to review all the reports and labs.  She then had a urinalysis that was essentially positive for red cells but no white cells.  Her CMP was negative.  Her urine culture was negative.  She had a random repeat culture that was negative.  Pregnancy test was negative.  Her lactic acid was normal.  She had no other abnormalities.  Examination today discloses left lower quadrant abdominal pain that does not appear to be related.  If anything is more of a GI infection I will presumptively treat her with antibiotics for a short period this is not obstructive uropathy.  She returns for follow-up.  There is now no nausea no vomiting and thought that she passed a stone because this is always heralded by diarrhea.  Currently she is feeling better I told her to stay off work for a day or 2 check her back next week his long she's doing better I don't think she needs follow-up I will follow-up with her based on this.  She returns today.  She passed this tiny stone that has the appearance of uric acid.  These do not show up on plain film.  She's having a lot of spasms in her lower tract.  I suspect this is perhaps an additional stone waiting to pass certainly there is no absolute indicators for intervention I did refill her pain medication I will see her in 2 weeks I'm in a get the stone analyzed.  I suspect that this is uric acid.  Stone report came back calcium oxalate.  She still has  right-sided pain with nausea and diarrhea no fevers no chills I refilled her pain medication for short.  I'm going to recommend intervention.  She has adequate supplies of Flomax.    He returns today she has a small right ureteral stone that has not passed she wants to try to pass it as she still mired in bills from the prior ureteroscopy and imaging.  Certainly reasonable she has no absolute indicators for intervention I'm do recommend an attempt at spontaneous passage with the use of medical expulsive therapy used an off label setting and pain medication I'll follow-up with her based on this.  She returns today.  She passed a right sided stone.  It had a calcium oxalate appearance.  It was about 4 mm.  She now has left-sided pain.  I know she has some tiny nephrolithiasis and this is clearly  passing and she is drinking copious [place]pp[place] I did refill her Phenergan and she works full-time and she is very hesitant to proceed with additional imaging because of significant cost to her.  I gave her an additional short course of pain medication while she passes this left-sided stone  He brought another small stone.  She is passing one on the right now she has very metabolically active stone disease but with increasing fluids I expected to decrease we discussed medical management at length with great now because of her job she cannot leave work I gave her tramadol to try during the day for pain control  She returns today she is 3-week follow-up.  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  I recommended that she use the tramadol during the day and to use the Percocet only at  night.  There is no fevers she has no absolute indicators for intervention at this time.  She returns today she passed one yesterday she is actually doing somewhat better I reviewed it it is a 3 mm distal stone has almost a uric acid appearance now she has no fevers chills he has no nausea, vomiting diarrhea she has adequate Zofran I refilled her Pyridium for her Flomax I gave her some additional pain pills if she has severe pain as she works.  See her back in a month.  Returns today she still has 1 left-sided stone she feels a small I told her that at some point were going to need to repeat imaging.  But her problem is she cannot take off work, she can afford visits because of her job situation.  She is quite emotionally distraught about this and crying and having significant pain but states if she does not do something medically she will lose her job plan to check a film next month I refilled her Phenergan and her Percocet but gave her very small number  She returns today.  It is been a month.  She is passed her left-sided stone.  She now has significant right-sided pain for 3 days she is tearful, writhing in pain she is absolutely adamant about not wanting surgery because of the cost and the fact that she will lose her job she uses tramadol during the day to work through the day and she is given 12 Percocet over a month long.  I made sure she has Flomax.  She has adequate medicine for nausea I told her at some point in the near future I am going to have to recommend any intervention as I cannot keep continuing to manage this.  I will see her back in 2 weeks if she is not better she will get an x-ray.  She has documented CT of bilateral small renal stones so it is very conceivable she continues to pass the stones she promises she decreased the call intake and increased fluid intake      Past Medical History:        Past Medical History:   Diagnosis Date   • Kidney stones    • Migraine    • Migraines    • Pneumonia     • Strep throat          Current Meds:     Current Outpatient Medications   Medication Sig Dispense Refill   • amitriptyline (ELAVIL) 25 MG tablet Take 25 mg by mouth every night.     • clonazePAM (KlonoPIN) 0.5 MG tablet Take 0.5 mg by mouth 2 (two) times a day as needed for seizures.     • gabapentin (NEURONTIN) 600 MG tablet Take 400 mg by mouth 4 (Four) Times a Day.  0   • HYDROcodone-acetaminophen (NORCO)  MG per tablet Take 1 tablet by mouth Every 6 (Six) Hours As Needed for Moderate Pain . 15 tablet 0   • ketorolac (TORADOL) 10 MG tablet Take 1 tablet by mouth Every 6 (Six) Hours As Needed for Moderate Pain . 12 tablet 0   • ketorolac (TORADOL) 10 MG tablet Take 1 tablet by mouth Every 6 (Six) Hours As Needed for Moderate Pain . 12 tablet 0   • ondansetron (ZOFRAN) 4 MG tablet Take 1 tablet by mouth Daily As Needed for Nausea or Vomiting. 30 tablet 1   • ondansetron ODT (ZOFRAN-ODT) 4 MG disintegrating tablet Take 1 tablet by mouth Every 6 (Six) Hours As Needed for Nausea. 20 tablet 0   • oxyCODONE-acetaminophen (PERCOCET)  MG per tablet Take 1 tablet by mouth Every 6 (Six) Hours As Needed for Severe Pain . 15 tablet 0   • promethazine (PHENERGAN) 25 MG tablet Take 1 tablet by mouth Every 6 (Six) Hours As Needed for Nausea or Vomiting. 30 tablet 2   • promethazine (PHENERGAN) 25 MG tablet Take 1 tablet by mouth Every 6 (Six) Hours As Needed for Nausea or Vomiting. 15 tablet 0   • tamsulosin (FLOMAX) 0.4 MG capsule 24 hr capsule Take 1 capsule by mouth Daily. 30 capsule 6   • traMADol (ULTRAM) 50 MG tablet Take 1 tablet by mouth Every 6 (Six) Hours As Needed for Moderate Pain . 60 tablet 0     No current facility-administered medications for this visit.         Allergies:      Allergies   Allergen Reactions   • Sulfa Antibiotics Anaphylaxis   • Compazine [Prochlorperazine Edisylate] Hives        Past Surgical History:     Past Surgical History:   Procedure Laterality Date   •  SECTION      • COLONOSCOPY     • CYSTOSCOPY ELECTROHYDRAULIC LITHOTRIPSY     • DENTAL PROCEDURE     • EXTRACORPOREAL SHOCK WAVE LITHOTRIPSY (ESWL) Left 2018    Procedure: EXTRACORPOREAL SHOCKWAVE LITHOTRIPSY;  Surgeon: Garth Friedman MD;  Location: [facility];  Service: Urology   • KIDNEY STONE SURGERY           Social History:     Social History     Socioeconomic History   • Marital status:      Spouse name: Not on file   • Number of children: Not on file   • Years of education: Not on file   • Highest education level: Not on file   Tobacco Use   • Smoking status: Former Smoker     Packs/day: 0.50     Years: 15.00     Pack years: 7.50     Types: Cigarettes     Last attempt to quit: 2018     Years since quittin.3   • Smokeless tobacco: Never Used   Substance and Sexual Activity   • Alcohol use: No   • Drug use: No   • Sexual activity: Defer       Family History:     Family History   Problem Relation Age of Onset   • No Known Problems Father    • No Known Problems Mother        Review of Systems:     Review of Systems   Constitutional: Negative.  Negative for activity change, appetite change, chills, diaphoresis, fatigue and unexpected weight change.   HENT: Negative for congestion, dental problem, drooling, ear discharge, ear pain, facial swelling, hearing loss, mouth sores, nosebleeds, postnasal drip, rhinorrhea, sinus pressure, sneezing, sore throat, tinnitus, trouble swallowing and voice change.    Eyes: Negative.  Negative for photophobia, pain, discharge, redness, itching and visual disturbance.   Respiratory: Negative.  Negative for apnea, cough, choking, chest tightness, shortness of breath, wheezing and stridor.    Cardiovascular: Negative.  Negative for chest pain, palpitations and leg swelling.   Gastrointestinal: Negative.  Negative for abdominal distention, abdominal pain, anal bleeding, blood in stool, constipation, diarrhea, nausea, rectal pain and vomiting.   Endocrine: Negative.   Negative for cold intolerance, heat intolerance, polydipsia, polyphagia and polyuria.   Genitourinary: Positive for difficulty urinating, dysuria, flank pain, frequency, pelvic pain and urgency.   Musculoskeletal: Negative for arthralgias, back pain, gait problem, joint swelling, myalgias, neck pain and neck stiffness.   Skin: Negative.  Negative for color change, pallor, rash and wound.   Allergic/Immunologic: Negative.  Negative for environmental allergies, food allergies and immunocompromised state.   Neurological: Negative.  Negative for dizziness, tremors, seizures, syncope, facial asymmetry, speech difficulty, weakness, light-headedness, numbness and headaches.   Hematological: Negative.  Negative for adenopathy. Does not bruise/bleed easily.   Psychiatric/Behavioral: Negative for agitation, behavioral problems, confusion, decreased concentration, dysphoric mood, hallucinations, self-injury, sleep disturbance and suicidal ideas. The patient is not nervous/anxious and is not hyperactive.    All other systems reviewed and are negative.      Physical Exam:     Physical Exam   Constitutional: She appears well-developed and well-nourished.   HENT:   Head: Normocephalic and atraumatic.   Right Ear: External ear normal.   Left Ear: External ear normal.   Mouth/Throat: Oropharynx is clear and moist.   Eyes: Conjunctivae are normal. Pupils are equal, round, and reactive to light.   Cardiovascular: Normal rate, regular rhythm, normal heart sounds and intact distal pulses.   Pulmonary/Chest: Effort normal and breath sounds normal.   Abdominal: Soft. Bowel sounds are normal. She exhibits no distension and no mass. There is no tenderness. There is no rebound and no guarding.   Genitourinary: No vaginal discharge found.   Musculoskeletal: Normal range of motion.   Neurological: She is alert. She has normal reflexes.   Skin: Skin is warm and dry.   Psychiatric: She has a normal mood and affect. Her behavior is normal.  Judgment and thought content normal.       I have reviewed the following portions of the patient's history: allergies, current medications, past family history, past medical history, past social history, past surgical history, problem list and ROS and confirm it's accurate.      Procedure:       Assessment/Plan:   Ureteral calculus-patient has been diagnosed with a ureteral calculus.  We have discussed the various parameters regarding spontaneous passage including the notion that a 2 mm stone has a high likelihood of spontaneous passage versus a larger stone being caught up in the upper areas of the urinary tract.  We also discussed the medical management of stone disease and the use of medical expulsive therapy in the form of Flomax.  This is used in an off label setting.  We discussed the indicators for intervention including  absolute indicators such as sepsis and uncontrollable severe pain as well as  the relative indicators of moderate pain that is well-controlled with various analgesia.  I also talked about nonoperative management including ambulation and increasing fluids and hot tub as being an effective adjuncts in the treatment of a ureteral stone.  He is passed the stone on the left she now has a small one on the right she is had an x-ray but is been a while if she has it again ominous set up for CT in 2 weeks.    Patient's Body mass index is 31.01 kg/m². BMI is above normal parameters. Recommendations include: educational material.          This document has been electronically signed by LOU MONTANA MD May 10, 2019 8:00 AM

## 2019-05-24 ENCOUNTER — HOSPITAL ENCOUNTER (OUTPATIENT)
Dept: GENERAL RADIOLOGY | Facility: HOSPITAL | Age: 44
Discharge: HOME | End: 2019-05-24
Admitting: UROLOGY

## 2019-05-24 ENCOUNTER — OFFICE VISIT (OUTPATIENT)
Dept: UROLOGY | Facility: CLINIC | Age: 44
End: 2019-05-24

## 2019-05-24 VITALS — HEIGHT: 63 IN | BODY MASS INDEX: 31.01 KG/M2 | WEIGHT: 175 LBS

## 2019-05-24 DIAGNOSIS — N20.1 CALCULUS OF URETER: ICD-10-CM

## 2019-05-24 DIAGNOSIS — N20.1 CALCULUS OF URETER: Primary | ICD-10-CM

## 2019-05-24 PROCEDURE — 74018 RADEX ABDOMEN 1 VIEW: CPT | Performed by: RADIOLOGY

## 2019-05-24 PROCEDURE — 74018 RADEX ABDOMEN 1 VIEW: CPT

## 2019-05-24 PROCEDURE — 99213 OFFICE O/P EST LOW 20 MIN: CPT | Performed by: UROLOGY

## 2019-05-24 RX ORDER — HYDROCODONE BITARTRATE AND ACETAMINOPHEN 10; 325 MG/1; MG/1
1 TABLET ORAL EVERY 6 HOURS PRN
Qty: 10 TABLET | Refills: 0 | Status: SHIPPED | OUTPATIENT
Start: 2019-05-24 | End: 2019-07-01

## 2019-05-24 RX ORDER — TAMSULOSIN HYDROCHLORIDE 0.4 MG/1
1 CAPSULE ORAL NIGHTLY
Qty: 30 CAPSULE | Refills: 5 | Status: SHIPPED | OUTPATIENT
Start: 2019-05-24 | End: 2019-12-01

## 2019-05-24 NOTE — PROGRESS NOTES
Chief Complaint:          Chief Complaint   Patient presents with   • Flank Pain     2 wk f/u       HPI:   43 y.o. female status post lithotripsy of a left UPJ stone Friday with great results.  She feels great she's passed copious amounts of sand and I'll check her back in 3 months.  She returns today.  She had a prior as wall and 918 and did well she has no fevers and chills started with nausea vomiting and went to the [facility] where she had a CT scan showing bilateral nonobstructive nephrolithiasis.  I have the opportunity to review all the reports and labs.  She then had a urinalysis that was essentially positive for red cells but no white cells.  Her CMP was negative.  Her urine culture was negative.  She had a random repeat culture that was negative.  Pregnancy test was negative.  Her lactic acid was normal.  She had no other abnormalities.  Examination today discloses left lower quadrant abdominal pain that does not appear to be related.  If anything is more of a GI infection I will presumptively treat her with antibiotics for a short period this is not obstructive uropathy.  She returns for follow-up.  There is now no nausea no vomiting and thought that she passed a stone because this is always heralded by diarrhea.  Currently she is feeling better I told her to stay off work for a day or 2 check her back next week his long she's doing better I don't think she needs follow-up I will follow-up with her based on this.  She returns today.  She passed this tiny stone that has the appearance of uric acid.  These do not show up on plain film.  She's having a lot of spasms in her lower tract.  I suspect this is perhaps an additional stone waiting to pass certainly there is no absolute indicators for intervention I did refill her pain medication I will see her in 2 weeks I'm in a get the stone analyzed.  I suspect that this is uric acid.  Stone report came back calcium oxalate.  She still has right-sided  pain with nausea and diarrhea no fevers no chills I refilled her pain medication for short.  I'm going to recommend intervention.  She has adequate supplies of Flomax.    He returns today she has a small right ureteral stone that has not passed she wants to try to pass it as she still mired in bills from the prior ureteroscopy and imaging.  Certainly reasonable she has no absolute indicators for intervention I'm do recommend an attempt at spontaneous passage with the use of medical expulsive therapy used an off label setting and pain medication I'll follow-up with her based on this.  She returns today.  She passed a right sided stone.  It had a calcium oxalate appearance.  It was about 4 mm.  She now has left-sided pain.  I know she has some tiny nephrolithiasis and this is clearly  passing and she is drinking copious [place] I did refill her Phenergan and she works full-time and she is very hesitant to proceed with additional imaging because of significant cost to her.  I gave her an additional short course of pain medication while she passes this left-sided stone  He brought another small stone.  She is passing one on the right now she has very metabolically active stone disease but with increasing fluids I expected to decrease we discussed medical management at length with great now because of her job she cannot leave work I gave her tramadol to try during the day for pain control  She returns today she is 3-week follow-up.  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  I recommended that she use the tramadol during the day and to use the Percocet only at night.  There is  no fevers she has no absolute indicators for intervention at this time.  She returns today she passed one yesterday she is actually doing somewhat better I reviewed it it is a 3 mm distal stone has almost a uric acid appearance now she has no fevers chills he has no nausea, vomiting diarrhea she has adequate Zofran I refilled her Pyridium for her Flomax I gave her some additional pain pills if she has severe pain as she works.  See her back in a month.  Returns today she still has 1 left-sided stone she feels a small I told her that at some point were going to need to repeat imaging.  But her problem is she cannot take off work, she can afford visits because of her job situation.  She is quite emotionally distraught about this and crying and having significant pain but states if she does not do something medically she will lose her job plan to check a film next month I refilled her Phenergan and her Percocet but gave her very small number  She returns today.  It is been a month.  She is passed her left-sided stone.  She now has significant right-sided pain for 3 days she is tearful, writhing in pain she is absolutely adamant about not wanting surgery because of the cost and the fact that she will lose her job she uses tramadol during the day to work through the day and she is given 12 Percocet over a month long.  I made sure she has Flomax.  She has adequate medicine for nausea I told her at some point in the near future I am going to have to recommend any intervention as I cannot keep continuing to manage this.  I will see her back in 2 weeks if she is not better she will get an x-ray.  She has documented CT of bilateral small renal stones so it is very conceivable she continues to pass the stones she promises she decreased the call intake and increased fluid intake.  She returns today is actually doing somewhat better the right lower quadrant pain is decreased to the point she wants to downgrade the pain  medication I would get a KUB today see her back in 3 weeks I believe is probably getting close to passing I am recommending only observation I will plan to follow back up with her in 3 weeks I will notify her of the KUB when it is available.         Past Medical History:        Past Medical History:   Diagnosis Date   • Kidney stones    • Migraine    • Migraines    • Pneumonia    • Strep throat          Current Meds:     Current Outpatient Medications   Medication Sig Dispense Refill   • amitriptyline (ELAVIL) 25 MG tablet Take 25 mg by mouth every night.     • clonazePAM (KlonoPIN) 0.5 MG tablet Take 0.5 mg by mouth 2 (two) times a day as needed for seizures.     • gabapentin (NEURONTIN) 600 MG tablet Take 400 mg by mouth 4 (Four) Times a Day.  0   • HYDROcodone-acetaminophen (NORCO)  MG per tablet Take 1 tablet by mouth Every 6 (Six) Hours As Needed for Moderate Pain . 15 tablet 0   • ketorolac (TORADOL) 10 MG tablet Take 1 tablet by mouth Every 6 (Six) Hours As Needed for Moderate Pain . 12 tablet 0   • ketorolac (TORADOL) 10 MG tablet Take 1 tablet by mouth Every 6 (Six) Hours As Needed for Moderate Pain . 12 tablet 0   • ondansetron (ZOFRAN) 4 MG tablet Take 1 tablet by mouth Daily As Needed for Nausea or Vomiting. 30 tablet 1   • ondansetron ODT (ZOFRAN-ODT) 4 MG disintegrating tablet Take 1 tablet by mouth Every 6 (Six) Hours As Needed for Nausea. 20 tablet 0   • oxyCODONE-acetaminophen (PERCOCET)  MG per tablet Take 1 tablet by mouth Every 6 (Six) Hours As Needed for Severe Pain . 15 tablet 0   • promethazine (PHENERGAN) 25 MG tablet Take 1 tablet by mouth Every 6 (Six) Hours As Needed for Nausea or Vomiting. 30 tablet 2   • promethazine (PHENERGAN) 25 MG tablet Take 1 tablet by mouth Every 6 (Six) Hours As Needed for Nausea or Vomiting. 15 tablet 0   • tamsulosin (FLOMAX) 0.4 MG capsule 24 hr capsule Take 1 capsule by mouth Daily. 30 capsule 6   • traMADol (ULTRAM) 50 MG tablet Take 1 tablet by  mouth Every 6 (Six) Hours As Needed for Moderate Pain . 60 tablet 0     No current facility-administered medications for this visit.         Allergies:      Allergies   Allergen Reactions   • Sulfa Antibiotics Anaphylaxis   • Compazine [Prochlorperazine Edisylate] Hives        Past Surgical History:     Past Surgical History:   Procedure Laterality Date   •  SECTION     • COLONOSCOPY     • CYSTOSCOPY ELECTROHYDRAULIC LITHOTRIPSY     • DENTAL PROCEDURE     • EXTRACORPOREAL SHOCK WAVE LITHOTRIPSY (ESWL) Left 2018    Procedure: EXTRACORPOREAL SHOCKWAVE LITHOTRIPSY;  Surgeon: Garth Friedman MD;  Location: [facility];  Service: Urology   • KIDNEY STONE SURGERY           Social History:     Social History     Socioeconomic History   • Marital status:      Spouse name: Not on file   • Number of children: Not on file   • Years of education: Not on file   • Highest education level: Not on file   Tobacco Use   • Smoking status: Former Smoker     Packs/day: 0.50     Years: 15.00     Pack years: 7.50     Types: Cigarettes     Last attempt to quit: 2018     Years since quittin.3   • Smokeless tobacco: Never Used   Substance and Sexual Activity   • Alcohol use: No   • Drug use: No   • Sexual activity: Defer       Family History:     Family History   Problem Relation Age of Onset   • No Known Problems Father    • No Known Problems Mother        Review of Systems:     Review of Systems   Constitutional: Negative.  Negative for activity change, appetite change, chills, diaphoresis, fatigue and unexpected weight change.   HENT: Negative for congestion, dental problem, drooling, ear discharge, ear pain, facial swelling, hearing loss, mouth sores, nosebleeds, postnasal drip, rhinorrhea, sinus pressure, sneezing, sore throat, tinnitus, trouble swallowing and voice change.    Eyes: Negative.  Negative for photophobia, pain, discharge, redness, itching and visual disturbance.   Respiratory: Negative.   Negative for apnea, cough, choking, chest tightness, shortness of breath, wheezing and stridor.    Cardiovascular: Negative.  Negative for chest pain, palpitations and leg swelling.   Gastrointestinal: Negative.  Negative for abdominal distention, abdominal pain, anal bleeding, blood in stool, constipation, diarrhea, nausea, rectal pain and vomiting.   Endocrine: Negative.  Negative for cold intolerance, heat intolerance, polydipsia, polyphagia and polyuria.   Genitourinary: Positive for flank pain.   Musculoskeletal: Negative for arthralgias, back pain, gait problem, joint swelling, myalgias, neck pain and neck stiffness.   Skin: Negative.  Negative for color change, pallor, rash and wound.   Allergic/Immunologic: Negative.  Negative for environmental allergies, food allergies and immunocompromised state.   Neurological: Negative.  Negative for dizziness, tremors, seizures, syncope, facial asymmetry, speech difficulty, weakness, light-headedness, numbness and headaches.   Hematological: Negative.  Negative for adenopathy. Does not bruise/bleed easily.   Psychiatric/Behavioral: Negative for agitation, behavioral problems, confusion, decreased concentration, dysphoric mood, hallucinations, self-injury, sleep disturbance and suicidal ideas. The patient is not nervous/anxious and is not hyperactive.    All other systems reviewed and are negative.      Physical Exam:     Physical Exam   Constitutional: She appears well-developed and well-nourished.   HENT:   Head: Normocephalic and atraumatic.   Right Ear: External ear normal.   Left Ear: External ear normal.   Mouth/Throat: Oropharynx is clear and moist.   Eyes: Conjunctivae are normal. Pupils are equal, round, and reactive to light.   Cardiovascular: Normal rate, regular rhythm, normal heart sounds and intact distal pulses.   Pulmonary/Chest: Effort normal and breath sounds normal.   Abdominal: Soft. Bowel sounds are normal. She exhibits no distension and no mass.  There is no tenderness. There is no rebound and no guarding.   Genitourinary: No vaginal discharge found.   Musculoskeletal: Normal range of motion.   Neurological: She is alert. She has normal reflexes.   Skin: Skin is warm and dry.   Psychiatric: She has a normal mood and affect. Her behavior is normal. Judgment and thought content normal.       I have reviewed the following portions of the patient's history: allergies, current medications, past family history, past medical history, past social history, past surgical history, problem list and ROS and confirm it's accurate.      Procedure:       Assessment/Plan:   Renal calculus-we discussed the presence of the stone we discussed the various therapeutic options available including percutaneous nephrostolithotomy, lithotripsy.  We discussed the risks of lithotripsy including the passage of stones the development of a large string of stones in the distal ureter known as Steinstrasse.  In the 3% incidence of that we will need to proceed with a ureteroscopy for obstructing fragments.  Extremely rare incidence of renal hematoma.  And the significance of this.  We discussed the absolute relative indicators for intervention including the presence of sepsis, and pain we cannot control is the primary need for urgent intervention.  We discussed placement of a stent if indicated and the management of the stent as well.  She still has stones in the right distal ureter I have a KUB pending            Patient's Body mass index is 31.01 kg/m². BMI is above normal parameters. Recommendations include: educational material.              This document has been electronically signed by LOU MONTANA MD May 24, 2019 8:12 AM

## 2019-06-14 ENCOUNTER — OFFICE VISIT (OUTPATIENT)
Dept: UROLOGY | Facility: CLINIC | Age: 44
End: 2019-06-14

## 2019-06-14 VITALS — HEIGHT: 62 IN | BODY MASS INDEX: 32.2 KG/M2 | WEIGHT: 175 LBS

## 2019-06-14 DIAGNOSIS — N20.0 RENAL CALCULUS: ICD-10-CM

## 2019-06-14 DIAGNOSIS — N20.1 LEFT URETERAL CALCULUS: Primary | ICD-10-CM

## 2019-06-14 PROCEDURE — 99213 OFFICE O/P EST LOW 20 MIN: CPT | Performed by: UROLOGY

## 2019-06-14 RX ORDER — HYDROCODONE BITARTRATE AND ACETAMINOPHEN 10; 325 MG/1; MG/1
1 TABLET ORAL EVERY 6 HOURS PRN
Qty: 15 TABLET | Refills: 0 | Status: SHIPPED | OUTPATIENT
Start: 2019-06-14 | End: 2019-07-01

## 2019-06-14 RX ORDER — PROMETHAZINE HYDROCHLORIDE 25 MG/1
25 TABLET ORAL EVERY 6 HOURS PRN
Qty: 15 TABLET | Refills: 0 | Status: SHIPPED | OUTPATIENT
Start: 2019-06-14 | End: 2020-09-22

## 2019-06-14 RX ORDER — TRAMADOL HYDROCHLORIDE 50 MG/1
50 TABLET ORAL EVERY 6 HOURS PRN
Qty: 60 TABLET | Refills: 3 | Status: SHIPPED | OUTPATIENT
Start: 2019-06-14 | End: 2019-07-23 | Stop reason: SDUPTHER

## 2019-06-14 NOTE — PROGRESS NOTES
Chief Complaint:          Chief Complaint   Patient presents with   • Nephrolithiasis     3 week follow up        HPI:   43 y.o. female status post lithotripsy of a left UPJ stone Friday with great results.  She feels great she's passed copious amounts of sand and I'll check her back in 3 months.  She returns today.  She had a prior as wall and 918 and did well she has no fevers and chills started with nausea vomiting and went to the [facility] where she had a CT scan showing bilateral nonobstructive nephrolithiasis.  I have the opportunity to review all the reports and labs.  She then had a urinalysis that was essentially positive for red cells but no white cells.  Her CMP was negative.  Her urine culture was negative.  She had a random repeat culture that was negative.  Pregnancy test was negative.  Her lactic acid was normal.  She had no other abnormalities.  Examination today discloses left lower quadrant abdominal pain that does not appear to be related.  If anything is more of a GI infection I will presumptively treat her with antibiotics for a short period this is not obstructive uropathy.  She returns for follow-up.  There is now no nausea no vomiting and thought that she passed a stone because this is always heralded by diarrhea.  Currently she is feeling better I told her to stay off work for a day or 2 check her back next week his long she's doing better I don't think she needs follow-up I will follow-up with her based on this.  She returns today.  She passed this tiny stone that has the appearance of uric acid.  These do not show up on plain film.  She's having a lot of spasms in her lower tract.  I suspect this is perhaps an additional stone waiting to pass certainly there is no absolute indicators for intervention I did refill her pain medication I will see her in 2 weeks I'm in a get the stone analyzed.  I suspect that this is uric acid.  Stone report came back calcium oxalate.  She still has  right-sided pain with nausea and diarrhea no fevers no chills I refilled her pain medication for short.  I'm going to recommend intervention.  She has adequate supplies of Flomax.    He returns today she has a small right ureteral stone that has not passed she wants to try to pass it as she still mired in bills from the prior ureteroscopy and imaging.  Certainly reasonable she has no absolute indicators for intervention I'm do recommend an attempt at spontaneous passage with the use of medical expulsive therapy used an off label setting and pain medication I'll follow-up with her based on this.  She returns today.  She passed a right sided stone.  It had a calcium oxalate appearance.  It was about 4 mm.  She now has left-sided pain.  I know she has some tiny nephrolithiasis and this is clearly  passing and she is drinking copious [place] I did refill her Phenergan and she works full-time and she is very hesitant to proceed with additional imaging because of significant cost to her.  I gave her an additional short course of pain medication while she passes this left-sided stone  He brought another small stone.  She is passing one on the right now she has very metabolically active stone disease but with increasing fluids I expected to decrease we discussed medical management at length with great now because of her job she cannot leave work I gave her tramadol to try during the day for pain control  She returns today she is 3-week follow-up.  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  I recommended that she use the tramadol during the day and to use the Percocet only at  night.  There is no fevers she has no absolute indicators for intervention at this time.  She returns today she passed one yesterday she is actually doing somewhat better I reviewed it it is a 3 mm distal stone has almost a uric acid appearance now she has no fevers chills he has no nausea, vomiting diarrhea she has adequate Zofran I refilled her Pyridium for her Flomax I gave her some additional pain pills if she has severe pain as she works.  See her back in a month.  Returns today she still has 1 left-sided stone she feels a small I told her that at some point were going to need to repeat imaging.  But her problem is she cannot take off work, she can afford visits because of her job situation.  She is quite emotionally distraught about this and crying and having significant pain but states if she does not do something medically she will lose her job plan to check a film next month I refilled her Phenergan and her Percocet but gave her very small number  She returns today.  It is been a month.  She is passed her left-sided stone.  She now has significant right-sided pain for 3 days she is tearful, writhing in pain she is absolutely adamant about not wanting surgery because of the cost and the fact that she will lose her job she uses tramadol during the day to work through the day and she is given 12 Percocet over a month long.  I made sure she has Flomax.  She has adequate medicine for nausea I told her at some point in the near future I am going to have to recommend any intervention as I cannot keep continuing to manage this.  I will see her back in 2 weeks if she is not better she will get an x-ray.  She has documented CT of bilateral small renal stones so it is very conceivable she continues to pass the stones she promises she decreased the call intake and increased fluid intake.  She returns today is actually doing somewhat better the right lower quadrant pain is decreased to the point she wants to downgrade  the pain medication I would get a KUB today see her back in 3 weeks I believe is probably getting close to passing I am recommending only observation I will plan to follow back up with her in 3 weeks I will notify her of the KUB when it is available.  She returns today she passed one small stone she is having some significant spasms after but overall she tends to be doing better she is actually not crying today she is having no other complaints of but some post spasm she had a refill of her Phenergan for occasional nausea I will see her back in a month      Past Medical History:        Past Medical History:   Diagnosis Date   • Kidney stones    • Migraine    • Migraines    • Pneumonia    • Strep throat          Current Meds:     Current Outpatient Medications   Medication Sig Dispense Refill   • amitriptyline (ELAVIL) 25 MG tablet Take 25 mg by mouth every night.     • clonazePAM (KlonoPIN) 0.5 MG tablet Take 0.5 mg by mouth 2 (two) times a day as needed for seizures.     • gabapentin (NEURONTIN) 600 MG tablet Take 400 mg by mouth 4 (Four) Times a Day.  0   • HYDROcodone-acetaminophen (NORCO)  MG per tablet Take 1 tablet by mouth Every 6 (Six) Hours As Needed for Moderate Pain . 15 tablet 0   • HYDROcodone-acetaminophen (NORCO)  MG per tablet Take 1 tablet by mouth Every 6 (Six) Hours As Needed for Moderate Pain . 10 tablet 0   • ketorolac (TORADOL) 10 MG tablet Take 1 tablet by mouth Every 6 (Six) Hours As Needed for Moderate Pain . 12 tablet 0   • ketorolac (TORADOL) 10 MG tablet Take 1 tablet by mouth Every 6 (Six) Hours As Needed for Moderate Pain . 12 tablet 0   • ondansetron (ZOFRAN) 4 MG tablet Take 1 tablet by mouth Daily As Needed for Nausea or Vomiting. 30 tablet 1   • ondansetron ODT (ZOFRAN-ODT) 4 MG disintegrating tablet Take 1 tablet by mouth Every 6 (Six) Hours As Needed for Nausea. 20 tablet 0   • oxyCODONE-acetaminophen (PERCOCET)  MG per tablet Take 1 tablet by mouth Every 6 (Six)  Hours As Needed for Severe Pain . 15 tablet 0   • promethazine (PHENERGAN) 25 MG tablet Take 1 tablet by mouth Every 6 (Six) Hours As Needed for Nausea or Vomiting. 30 tablet 2   • promethazine (PHENERGAN) 25 MG tablet Take 1 tablet by mouth Every 6 (Six) Hours As Needed for Nausea or Vomiting. 15 tablet 0   • tamsulosin (FLOMAX) 0.4 MG capsule 24 hr capsule Take 1 capsule by mouth Daily. 30 capsule 6   • tamsulosin (FLOMAX) 0.4 MG capsule 24 hr capsule Take 1 capsule by mouth Every Night. 30 capsule 5   • traMADol (ULTRAM) 50 MG tablet Take 1 tablet by mouth Every 6 (Six) Hours As Needed for Moderate Pain . 60 tablet 0     No current facility-administered medications for this visit.         Allergies:      Allergies   Allergen Reactions   • Sulfa Antibiotics Anaphylaxis   • Compazine [Prochlorperazine Edisylate] Hives        Past Surgical History:     Past Surgical History:   Procedure Laterality Date   •  SECTION     • COLONOSCOPY     • CYSTOSCOPY ELECTROHYDRAULIC LITHOTRIPSY     • DENTAL PROCEDURE     • EXTRACORPOREAL SHOCK WAVE LITHOTRIPSY (ESWL) Left 2018    Procedure: EXTRACORPOREAL SHOCKWAVE LITHOTRIPSY;  Surgeon: Garth Friedman MD;  Location: [facility];  Service: Urology   • KIDNEY STONE SURGERY           Social History:     Social History     Socioeconomic History   • Marital status:      Spouse name: Not on file   • Number of children: Not on file   • Years of education: Not on file   • Highest education level: Not on file   Tobacco Use   • Smoking status: Former Smoker     Packs/day: 0.50     Years: 15.00     Pack years: 7.50     Types: Cigarettes     Last attempt to quit: 2018     Years since quittin.4   • Smokeless tobacco: Never Used   Substance and Sexual Activity   • Alcohol use: No   • Drug use: No   • Sexual activity: Defer       Family History:     Family History   Problem Relation Age of Onset   • No Known Problems Father    • No Known Problems Mother         Review of Systems:     Review of Systems   Constitutional: Negative.  Negative for activity change, appetite change, chills, diaphoresis, fatigue and unexpected weight change.   HENT: Negative for congestion, dental problem, drooling, ear discharge, ear pain, facial swelling, hearing loss, mouth sores, nosebleeds, postnasal drip, rhinorrhea, sinus pressure, sneezing, sore throat, tinnitus, trouble swallowing and voice change.    Eyes: Negative.  Negative for photophobia, pain, discharge, redness, itching and visual disturbance.   Respiratory: Negative.  Negative for apnea, cough, choking, chest tightness, shortness of breath, wheezing and stridor.    Cardiovascular: Negative.  Negative for chest pain, palpitations and leg swelling.   Gastrointestinal: Positive for nausea. Negative for abdominal distention, abdominal pain, anal bleeding, blood in stool, constipation, diarrhea, rectal pain and vomiting.   Endocrine: Negative.  Negative for cold intolerance, heat intolerance, polydipsia, polyphagia and polyuria.   Genitourinary: Positive for flank pain.   Musculoskeletal: Negative for arthralgias, back pain, gait problem, joint swelling, myalgias, neck pain and neck stiffness.   Skin: Negative.  Negative for color change, pallor, rash and wound.   Allergic/Immunologic: Negative.  Negative for environmental allergies, food allergies and immunocompromised state.   Neurological: Negative.  Negative for dizziness, tremors, seizures, syncope, facial asymmetry, speech difficulty, weakness, light-headedness, numbness and headaches.   Hematological: Negative.  Negative for adenopathy. Does not bruise/bleed easily.   Psychiatric/Behavioral: Negative for agitation, behavioral problems, confusion, decreased concentration, dysphoric mood, hallucinations, self-injury, sleep disturbance and suicidal ideas. The patient is not nervous/anxious and is not hyperactive.    All other systems reviewed and are negative.      Physical  Exam:     Physical Exam   Constitutional: She appears well-developed and well-nourished.   HENT:   Head: Normocephalic and atraumatic.   Right Ear: External ear normal.   Left Ear: External ear normal.   Mouth/Throat: Oropharynx is clear and moist.   Eyes: Conjunctivae are normal. Pupils are equal, round, and reactive to light.   Cardiovascular: Normal rate, regular rhythm, normal heart sounds and intact distal pulses.   Pulmonary/Chest: Effort normal and breath sounds normal.   Abdominal: Soft. Bowel sounds are normal. She exhibits no distension and no mass. There is no tenderness. There is no rebound and no guarding.   Genitourinary: No vaginal discharge found.   Musculoskeletal: Normal range of motion.   Neurological: She is alert. She has normal reflexes.   Skin: Skin is warm and dry.   Psychiatric: She has a normal mood and affect. Her behavior is normal. Judgment and thought content normal.       I have reviewed the following portions of the patient's history: allergies, current medications, past family history, past medical history, past social history, past surgical history, problem list and ROS and confirm it's accurate.      Procedure:       Assessment/Plan:   Ureteral calculus-patient has been diagnosed with a ureteral calculus.  We have discussed the various parameters regarding spontaneous passage including the notion that a 2 mm stone has a high likelihood of spontaneous passage versus a larger stone being caught up in the upper areas of the urinary tract.  We also discussed the medical management of stone disease and the use of medical expulsive therapy in the form of Flomax.  This is used in an off label setting.  We discussed the indicators for intervention including  absolute indicators such as sepsis and uncontrollable severe pain as well as  the relative indicators of moderate pain that is well-controlled with various analgesia.  I also talked about nonoperative management including ambulation  and increasing fluids and hot tub as being an effective adjuncts in the treatment of a ureteral stone.  Narcotic pain medication-patient has significant acute pain that I believe would be an indication for the use of narcotic pain medication.  I discussed the significant risks of pain medication and the fact that this will be a short only option and I will give her no more than a three-day supply of pain medication.  And I will not plan long-term medication and that this will be sent to a pain clinic if at all becomes necessary.  We discussed signing a pain medication agreement and the fact that we're going to run a state [facility] review to be sure the patient is not getting pain medication from elsewhere.  If this is the case we will not give pain medication.  As part of the patient's treatment plan of there being prescribed a controlled substance with potential for abuse.  This patient has been wait aware of the appropriate dose of such medications including, the risk for somnolence, limited ability to drive and/or safety and the significant potential for overdose.  It is been made clear that these medications are for the prescribed patient only without concomitant use of alcohol or other sepsis unless prescribed by the medical provider.  Has completed prescribing agreement detailing the terms of continue prescribing him a controlled substance.  Including monitoring Sam ports, the possibility of urine drug screens and pedal counts.  The patient is aware that we reviewed SAM reports on a regular basis and scan them into the chart.  History and physical examination exhibited continued safe and appropriate use of controlled substances.  Also discussed the fact that the new [place] legislation allows only a three-day prescription for pain medication.  In this situation he will be referred to a chronic pain clinic.            Patient's Body mass index is 32.01 kg/m². BMI is above normal parameters. Recommendations  include: educational material.              This document has been electronically signed by LOU MONTANA MD June 14, 2019 10:32 AM

## 2019-07-01 ENCOUNTER — OFFICE VISIT (OUTPATIENT)
Dept: UROLOGY | Facility: CLINIC | Age: 44
End: 2019-07-01

## 2019-07-01 VITALS
HEIGHT: 62 IN | SYSTOLIC BLOOD PRESSURE: 120 MMHG | WEIGHT: 182.2 LBS | BODY MASS INDEX: 33.53 KG/M2 | DIASTOLIC BLOOD PRESSURE: 80 MMHG | TEMPERATURE: 98.3 F

## 2019-07-01 DIAGNOSIS — N20.1 LEFT URETERAL CALCULUS: Primary | ICD-10-CM

## 2019-07-01 PROCEDURE — 99213 OFFICE O/P EST LOW 20 MIN: CPT | Performed by: UROLOGY

## 2019-07-01 RX ORDER — OXYCODONE AND ACETAMINOPHEN 10; 325 MG/1; MG/1
1 TABLET ORAL EVERY 6 HOURS PRN
Qty: 10 TABLET | Refills: 0 | Status: SHIPPED | OUTPATIENT
Start: 2019-07-01 | End: 2019-07-23 | Stop reason: SDUPTHER

## 2019-07-01 NOTE — PROGRESS NOTES
Chief Complaint:          Chief Complaint   Patient presents with   • Nephrolithiasis     f/u       HPI:   43 y.o. female status post lithotripsy of a left UPJ stone Friday with great results.  She feels great she's passed copious amounts of sand and I'll check her back in 3 months.  She returns today.  She had a prior as wall and 918 and did well she has no fevers and chills started with nausea vomiting and went to the [facility] where she had a CT scan showing bilateral nonobstructive nephrolithiasis.  I have the opportunity to review all the reports and labs.  She then had a urinalysis that was essentially positive for red cells but no white cells.  Her CMP was negative.  Her urine culture was negative.  She had a random repeat culture that was negative.  Pregnancy test was negative.  Her lactic acid was normal.  She had no other abnormalities.  Examination today discloses left lower quadrant abdominal pain that does not appear to be related.  If anything is more of a GI infection I will presumptively treat her with antibiotics for a short period this is not obstructive uropathy.  She returns for follow-up.  There is now no nausea no vomiting and thought that she passed a stone because this is always heralded by diarrhea.  Currently she is feeling better I told her to stay off work for a day or 2 check her back next week his long she's doing better I don't think she needs follow-up I will follow-up with her based on this.  She returns today.  She passed this tiny stone that has the appearance of uric acid.  These do not show up on plain film.  She's having a lot of spasms in her lower tract.  I suspect this is perhaps an additional stone waiting to pass certainly there is no absolute indicators for intervention I did refill her pain medication I will see her in 2 weeks I'm in a get the stone analyzed.  I suspect that this is uric acid.  Stone report came back calcium oxalate.  She still has right-sided  pain with nausea and diarrhea no fevers no chills I refilled her pain medication for short.  I'm going to recommend intervention.  She has adequate supplies of Flomax.    He returns today she has a small right ureteral stone that has not passed she wants to try to pass it as she still mired in bills from the prior ureteroscopy and imaging.  Certainly reasonable she has no absolute indicators for intervention I'm do recommend an attempt at spontaneous passage with the use of medical expulsive therapy used an off label setting and pain medication I'll follow-up with her based on this.  She returns today.  She passed a right sided stone.  It had a calcium oxalate appearance.  It was about 4 mm.  She now has left-sided pain.  I know she has some tiny nephrolithiasis and this is clearly  passing and she is drinking copious [place] I did refill her Phenergan and she works full-time and she is very hesitant to proceed with additional imaging because of significant cost to her.  I gave her an additional short course of pain medication while she passes this left-sided stone  He brought another small stone.  She is passing one on the right now she has very metabolically active stone disease but with increasing fluids I expected to decrease we discussed medical management at length with great now because of her job she cannot leave work I gave her tramadol to try during the day for pain control  She returns today she is 3-week follow-up.  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  I recommended that she use the tramadol during the day and to use the Percocet only at night.  There is  no fevers she has no absolute indicators for intervention at this time.  She returns today she passed one yesterday she is actually doing somewhat better I reviewed it it is a 3 mm distal stone has almost a uric acid appearance now she has no fevers chills he has no nausea, vomiting diarrhea she has adequate Zofran I refilled her Pyridium for her Flomax I gave her some additional pain pills if she has severe pain as she works.  See her back in a month.  Returns today she still has 1 left-sided stone she feels a small I told her that at some point were going to need to repeat imaging.  But her problem is she cannot take off work, she can afford visits because of her job situation.  She is quite emotionally distraught about this and crying and having significant pain but states if she does not do something medically she will lose her job plan to check a film next month I refilled her Phenergan and her Percocet but gave her very small number  She returns today.  It is been a month.  She is passed her left-sided stone.  She now has significant right-sided pain for 3 days she is tearful, writhing in pain she is absolutely adamant about not wanting surgery because of the cost and the fact that she will lose her job she uses tramadol during the day to work through the day and she is given 12 Percocet over a month long.  I made sure she has Flomax.  She has adequate medicine for nausea I told her at some point in the near future I am going to have to recommend any intervention as I cannot keep continuing to manage this.  I will see her back in 2 weeks if she is not better she will get an x-ray.  She has documented CT of bilateral small renal stones so it is very conceivable she continues to pass the stones she promises she decreased the call intake and increased fluid intake.  She returns today is actually doing somewhat better the right lower quadrant pain is decreased to the point she wants to downgrade the pain  medication I would get a KUB today see her back in 3 weeks I believe is probably getting close to passing I am recommending only observation I will plan to follow back up with her in 3 weeks I will notify her of the KUB when it is available.  She returns today she passed one small stone she is having some significant spasms after but overall she tends to be doing better she is actually not crying today she is having no other complaints of but some post spasm she had a refill of her Phenergan for occasional nausea I will see her back in a month  She returns today 2 weeks early Friday morning she passed a 3 mm left stone which I examined.  She has no fevers chills she has nausea vomiting left flank pain.  She wants a refill of her nausea medication and a short additional course of pain medication again, she does not want imaging, and radiation exposure      Past Medical History:        Past Medical History:   Diagnosis Date   • Kidney stones    • Migraine    • Migraines    • Pneumonia    • Strep throat          Current Meds:     Current Outpatient Medications   Medication Sig Dispense Refill   • amitriptyline (ELAVIL) 25 MG tablet Take 25 mg by mouth every night.     • clonazePAM (KlonoPIN) 0.5 MG tablet Take 0.5 mg by mouth 2 (two) times a day as needed for seizures.     • gabapentin (NEURONTIN) 600 MG tablet Take 400 mg by mouth 4 (Four) Times a Day.  0   • HYDROcodone-acetaminophen (NORCO)  MG per tablet Take 1 tablet by mouth Every 6 (Six) Hours As Needed for Moderate Pain . 10 tablet 0   • HYDROcodone-acetaminophen (NORCO)  MG per tablet Take 1 tablet by mouth Every 6 (Six) Hours As Needed for Moderate Pain . 15 tablet 0   • ketorolac (TORADOL) 10 MG tablet Take 1 tablet by mouth Every 6 (Six) Hours As Needed for Moderate Pain . 12 tablet 0   • ketorolac (TORADOL) 10 MG tablet Take 1 tablet by mouth Every 6 (Six) Hours As Needed for Moderate Pain . 12 tablet 0   • ondansetron (ZOFRAN) 4 MG tablet Take  1 tablet by mouth Daily As Needed for Nausea or Vomiting. 30 tablet 1   • ondansetron ODT (ZOFRAN-ODT) 4 MG disintegrating tablet Take 1 tablet by mouth Every 6 (Six) Hours As Needed for Nausea. 20 tablet 0   • oxyCODONE-acetaminophen (PERCOCET)  MG per tablet Take 1 tablet by mouth Every 6 (Six) Hours As Needed for Severe Pain . 15 tablet 0   • promethazine (PHENERGAN) 25 MG tablet Take 1 tablet by mouth Every 6 (Six) Hours As Needed for Nausea or Vomiting. 30 tablet 2   • promethazine (PHENERGAN) 25 MG tablet Take 1 tablet by mouth Every 6 (Six) Hours As Needed for Nausea or Vomiting. 15 tablet 0   • tamsulosin (FLOMAX) 0.4 MG capsule 24 hr capsule Take 1 capsule by mouth Daily. 30 capsule 6   • tamsulosin (FLOMAX) 0.4 MG capsule 24 hr capsule Take 1 capsule by mouth Every Night. 30 capsule 5   • traMADol (ULTRAM) 50 MG tablet Take 1 tablet by mouth Every 6 (Six) Hours As Needed for Moderate Pain . 60 tablet 3     No current facility-administered medications for this visit.         Allergies:      Allergies   Allergen Reactions   • Sulfa Antibiotics Anaphylaxis   • Compazine [Prochlorperazine Edisylate] Hives        Past Surgical History:     Past Surgical History:   Procedure Laterality Date   •  SECTION     • COLONOSCOPY     • CYSTOSCOPY ELECTROHYDRAULIC LITHOTRIPSY     • DENTAL PROCEDURE     • EXTRACORPOREAL SHOCK WAVE LITHOTRIPSY (ESWL) Left 2018    Procedure: EXTRACORPOREAL SHOCKWAVE LITHOTRIPSY;  Surgeon: Garth Friedman MD;  Location: [facility];  Service: Urology   • KIDNEY STONE SURGERY           Social History:     Social History     Socioeconomic History   • Marital status:      Spouse name: Not on file   • Number of children: Not on file   • Years of education: Not on file   • Highest education level: Not on file   Tobacco Use   • Smoking status: Former Smoker     Packs/day: 0.50     Years: 15.00     Pack years: 7.50     Types: Cigarettes     Last attempt to quit:  2018     Years since quittin.4   • Smokeless tobacco: Never Used   Substance and Sexual Activity   • Alcohol use: No   • Drug use: No   • Sexual activity: Defer       Family History:     Family History   Problem Relation Age of Onset   • No Known Problems Father    • No Known Problems Mother        Review of Systems:     Review of Systems   Constitutional: Negative.  Negative for activity change, appetite change, chills, diaphoresis, fatigue and unexpected weight change.   HENT: Negative for congestion, dental problem, drooling, ear discharge, ear pain, facial swelling, hearing loss, mouth sores, nosebleeds, postnasal drip, rhinorrhea, sinus pressure, sneezing, sore throat, tinnitus, trouble swallowing and voice change.    Eyes: Negative.  Negative for photophobia, pain, discharge, redness, itching and visual disturbance.   Respiratory: Negative.  Negative for apnea, cough, choking, chest tightness, shortness of breath, wheezing and stridor.    Cardiovascular: Negative.  Negative for chest pain, palpitations and leg swelling.   Gastrointestinal: Negative.  Negative for abdominal distention, abdominal pain, anal bleeding, blood in stool, constipation, diarrhea, nausea, rectal pain and vomiting.   Endocrine: Negative.  Negative for cold intolerance, heat intolerance, polydipsia, polyphagia and polyuria.   Musculoskeletal: Negative.  Negative for arthralgias, back pain, gait problem, joint swelling, myalgias, neck pain and neck stiffness.   Skin: Negative.  Negative for color change, pallor, rash and wound.   Allergic/Immunologic: Negative.  Negative for environmental allergies, food allergies and immunocompromised state.   Neurological: Negative.  Negative for dizziness, tremors, seizures, syncope, facial asymmetry, speech difficulty, weakness, light-headedness, numbness and headaches.   Hematological: Negative.  Negative for adenopathy. Does not bruise/bleed easily.   Psychiatric/Behavioral: Negative for  agitation, behavioral problems, confusion, decreased concentration, dysphoric mood, hallucinations, self-injury, sleep disturbance and suicidal ideas. The patient is not nervous/anxious and is not hyperactive.    All other systems reviewed and are negative.      Physical Exam:     Physical Exam   Constitutional: She appears well-developed and well-nourished.   HENT:   Head: Normocephalic and atraumatic.   Right Ear: External ear normal.   Left Ear: External ear normal.   Mouth/Throat: Oropharynx is clear and moist.   Eyes: Conjunctivae are normal. Pupils are equal, round, and reactive to light.   Cardiovascular: Normal rate, regular rhythm, normal heart sounds and intact distal pulses.   Pulmonary/Chest: Effort normal and breath sounds normal.   Abdominal: Soft. Bowel sounds are normal. She exhibits no distension and no mass. There is no tenderness. There is no rebound and no guarding.   Genitourinary: No vaginal discharge found.   Musculoskeletal: Normal range of motion.   Neurological: She is alert. She has normal reflexes.   Skin: Skin is warm and dry.   Psychiatric: She has a normal mood and affect. Her behavior is normal. Judgment and thought content normal.       I have reviewed the following portions of the patient's history: allergies, current medications, past family history, past medical history, past social history, past surgical history, problem list and ROS and confirm it's accurate.      Procedure:       Assessment/Plan:   Ureteral calculus-patient has been diagnosed with a ureteral calculus.  We have discussed the various parameters regarding spontaneous passage including the notion that a 2 mm stone has a high likelihood of spontaneous passage versus a larger stone being caught up in the upper areas of the urinary tract.  We also discussed the medical management of stone disease and the use of medical expulsive therapy in the form of Flomax.  This is used in an off label setting.  We discussed the  indicators for intervention including  absolute indicators such as sepsis and uncontrollable severe pain as well as  the relative indicators of moderate pain that is well-controlled with various analgesia.  I also talked about nonoperative management including ambulation and increasing fluids and hot tub as being an effective adjuncts in the treatment of a ureteral stone.  Narcotic pain medication-patient has significant acute pain that I believe would be an indication for the use of narcotic pain medication.  I discussed the significant risks of pain medication and the fact that this will be a short only option and I will give her no more than a three-day supply of pain medication.  And I will not plan long-term medication and that this will be sent to a pain clinic if at all becomes necessary.  We discussed signing a pain medication agreement and the fact that we're going to run a state [facility] review to be sure the patient is not getting pain medication from elsewhere.  If this is the case we will not give pain medication.  As part of the patient's treatment plan of there being prescribed a controlled substance with potential for abuse.  This patient has been wait aware of the appropriate dose of such medications including, the risk for somnolence, limited ability to drive and/or safety and the significant potential for overdose.  It is been made clear that these medications are for the prescribed patient only without concomitant use of alcohol or other sepsis unless prescribed by the medical provider.  Has completed prescribing agreement detailing the terms of continue prescribing him a controlled substance.  Including monitoring Sam ports, the possibility of urine drug screens and pedal counts.  The patient is aware that we reviewed SAM reports on a regular basis and scan them into the chart.  History and physical examination exhibited continued safe and appropriate use of controlled substances.  Also  discussed the fact that the new [place] legislation allows only a three-day prescription for pain medication.  In this situation he will be referred to a chronic pain clinic.            Patient's There is no height or weight on file to calculate BMI. BMI is above normal parameters. Recommendations include: educational material.              This document has been electronically signed by LOU MONTANA MD July 1, 2019 1:07 PM

## 2019-07-23 ENCOUNTER — OFFICE VISIT (OUTPATIENT)
Dept: UROLOGY | Facility: CLINIC | Age: 44
End: 2019-07-23

## 2019-07-23 VITALS — HEIGHT: 62 IN | BODY MASS INDEX: 33.49 KG/M2 | WEIGHT: 182 LBS

## 2019-07-23 DIAGNOSIS — N20.0 RENAL CALCULUS: ICD-10-CM

## 2019-07-23 DIAGNOSIS — N20.1 LEFT URETERAL CALCULUS: ICD-10-CM

## 2019-07-23 PROCEDURE — 99213 OFFICE O/P EST LOW 20 MIN: CPT | Performed by: UROLOGY

## 2019-07-23 RX ORDER — TRAMADOL HYDROCHLORIDE 50 MG/1
50 TABLET ORAL EVERY 6 HOURS PRN
Qty: 60 TABLET | Refills: 3 | Status: SHIPPED | OUTPATIENT
Start: 2019-07-23 | End: 2019-12-06 | Stop reason: SDUPTHER

## 2019-07-23 RX ORDER — OXYCODONE AND ACETAMINOPHEN 10; 325 MG/1; MG/1
1 TABLET ORAL EVERY 6 HOURS PRN
Qty: 10 TABLET | Refills: 0 | Status: SHIPPED | OUTPATIENT
Start: 2019-07-23 | End: 2019-12-06 | Stop reason: SDUPTHER

## 2019-07-23 RX ORDER — ONDANSETRON 4 MG/1
4 TABLET, FILM COATED ORAL DAILY PRN
Qty: 30 TABLET | Refills: 1 | Status: SHIPPED | OUTPATIENT
Start: 2019-07-23 | End: 2020-01-03 | Stop reason: SDUPTHER

## 2019-07-23 RX ORDER — TAMSULOSIN HYDROCHLORIDE 0.4 MG/1
1 CAPSULE ORAL DAILY
Qty: 30 CAPSULE | Refills: 6 | Status: SHIPPED | OUTPATIENT
Start: 2019-07-23 | End: 2019-12-01

## 2019-07-23 NOTE — PROGRESS NOTES
Chief Complaint:          Chief Complaint   Patient presents with   • ureteral stone       HPI:   43 y.o. female  status post lithotripsy of a left UPJ stone Friday with great results.  She feels great she's passed copious amounts of sand and I'll check her back in 3 months.  She returns today.  She had a prior as wall and 918 and did well she has no fevers and chills started with nausea vomiting and went to the [facility] where she had a CT scan showing bilateral nonobstructive nephrolithiasis.  I have the opportunity to review all the reports and labs.  She then had a urinalysis that was essentially positive for red cells but no white cells.  Her CMP was negative.  Her urine culture was negative.  She had a random repeat culture that was negative.  Pregnancy test was negative.  Her lactic acid was normal.  She had no other abnormalities.  Examination today discloses left lower quadrant abdominal pain that does not appear to be related.  If anything is more of a GI infection I will presumptively treat her with antibiotics for a short period this is not obstructive uropathy.  She returns for follow-up.  There is now no nausea no vomiting and thought that she passed a stone because this is always heralded by diarrhea.  Currently she is feeling better I told her to stay off work for a day or 2 check her back next week his long she's doing better I don't think she needs follow-up I will follow-up with her based on this.  She returns today.  She passed this tiny stone that has the appearance of uric acid.  These do not show up on plain film.  She's having a lot of spasms in her lower tract.  I suspect this is perhaps an additional stone waiting to pass certainly there is no absolute indicators for intervention I did refill her pain medication I will see her in 2 weeks I'm in a get the stone analyzed.  I suspect that this is uric acid.  Stone report came back calcium oxalate.  She still has right-sided pain with  nausea and diarrhea no fevers no chills I refilled her pain medication for short.  I'm going to recommend intervention.  She has adequate supplies of Flomax.    He returns today she has a small right ureteral stone that has not passed she wants to try to pass it as she still mired in bills from the prior ureteroscopy and imaging.  Certainly reasonable she has no absolute indicators for intervention I'm do recommend an attempt at spontaneous passage with the use of medical expulsive therapy used an off label setting and pain medication I'll follow-up with her based on this.  She returns today.  She passed a right sided stone.  It had a calcium oxalate appearance.  It was about 4 mm.  She now has left-sided pain.  I know she has some tiny nephrolithiasis and this is clearly  passing and she is drinking copious [place] I did refill her Phenergan and she works full-time and she is very hesitant to proceed with additional imaging because of significant cost to her.  I gave her an additional short course of pain medication while she passes this left-sided stone  He brought another small stone.  She is passing one on the right now she has very metabolically active stone disease but with increasing fluids I expected to decrease we discussed medical management at length with great now because of her job she cannot leave work I gave her tramadol to try during the day for pain control  She returns today she is 3-week follow-up.  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  I recommended that she use the tramadol during the day and to use the Percocet only at night.  There is no fevers  she has no absolute indicators for intervention at this time.  She returns today she passed one yesterday she is actually doing somewhat better I reviewed it it is a 3 mm distal stone has almost a uric acid appearance now she has no fevers chills he has no nausea, vomiting diarrhea she has adequate Zofran I refilled her Pyridium for her Flomax I gave her some additional pain pills if she has severe pain as she works.  See her back in a month.  Returns today she still has 1 left-sided stone she feels a small I told her that at some point were going to need to repeat imaging.  But her problem is she cannot take off work, she can afford visits because of her job situation.  She is quite emotionally distraught about this and crying and having significant pain but states if she does not do something medically she will lose her job plan to check a film next month I refilled her Phenergan and her Percocet but gave her very small number  She returns today.  It is been a month.  She is passed her left-sided stone.  She now has significant right-sided pain for 3 days she is tearful, writhing in pain she is absolutely adamant about not wanting surgery because of the cost and the fact that she will lose her job she uses tramadol during the day to work through the day and she is given 12 Percocet over a month long.  I made sure she has Flomax.  She has adequate medicine for nausea I told her at some point in the near future I am going to have to recommend any intervention as I cannot keep continuing to manage this.  I will see her back in 2 weeks if she is not better she will get an x-ray.  She has documented CT of bilateral small renal stones so it is very conceivable she continues to pass the stones she promises she decreased the call intake and increased fluid intake.  She returns today is actually doing somewhat better the right lower quadrant pain is decreased to the point she wants to downgrade the pain medication I would  get a KUB today see her back in 3 weeks I believe is probably getting close to passing I am recommending only observation I will plan to follow back up with her in 3 weeks I will notify her of the KUB when it is available.  She returns today she passed one small stone she is having some significant spasms after but overall she tends to be doing better she is actually not crying today she is having no other complaints of but some post spasm she had a refill of her Phenergan for occasional nausea I will see her back in a month  She returns today 2 weeks early Friday morning she passed a 3 mm left stone which I examined.  She has no fevers chills she has nausea vomiting left flank pain.  She wants a refill of her nausea medication and a short additional course of pain medication again, she does not want imaging, and radiation exposure  Returns today she has a left distal stone.  I refilled her tramadol gave her a short course of Percocet for severe pain in her Zofran I will see her back in 4 weeks      Past Medical History:        Past Medical History:   Diagnosis Date   • Kidney stones    • Migraine    • Migraines    • Pneumonia    • Strep throat          Current Meds:     Current Outpatient Medications   Medication Sig Dispense Refill   • amitriptyline (ELAVIL) 25 MG tablet Take 25 mg by mouth every night.     • clonazePAM (KlonoPIN) 0.5 MG tablet Take 0.5 mg by mouth 2 (two) times a day as needed for seizures.     • gabapentin (NEURONTIN) 600 MG tablet Take 400 mg by mouth 4 (Four) Times a Day.  0   • ondansetron (ZOFRAN) 4 MG tablet Take 1 tablet by mouth Daily As Needed for Nausea or Vomiting. 30 tablet 1   • oxyCODONE-acetaminophen (PERCOCET)  MG per tablet Take 1 tablet by mouth Every 6 (Six) Hours As Needed for Moderate Pain . 10 tablet 0   • promethazine (PHENERGAN) 25 MG tablet Take 1 tablet by mouth Every 6 (Six) Hours As Needed for Nausea or Vomiting. 30 tablet 2   • promethazine (PHENERGAN) 25 MG  tablet Take 1 tablet by mouth Every 6 (Six) Hours As Needed for Nausea or Vomiting. 15 tablet 0   • tamsulosin (FLOMAX) 0.4 MG capsule 24 hr capsule Take 1 capsule by mouth Daily. 30 capsule 6   • tamsulosin (FLOMAX) 0.4 MG capsule 24 hr capsule Take 1 capsule by mouth Every Night. 30 capsule 5   • traMADol (ULTRAM) 50 MG tablet Take 1 tablet by mouth Every 6 (Six) Hours As Needed for Moderate Pain . 60 tablet 3     No current facility-administered medications for this visit.         Allergies:      Allergies   Allergen Reactions   • Sulfa Antibiotics Anaphylaxis   • Compazine [Prochlorperazine Edisylate] Hives        Past Surgical History:     Past Surgical History:   Procedure Laterality Date   •  SECTION     • COLONOSCOPY     • CYSTOSCOPY ELECTROHYDRAULIC LITHOTRIPSY     • DENTAL PROCEDURE     • EXTRACORPOREAL SHOCK WAVE LITHOTRIPSY (ESWL) Left 2018    Procedure: EXTRACORPOREAL SHOCKWAVE LITHOTRIPSY;  Surgeon: Garth Friedman MD;  Location: [facility];  Service: Urology   • KIDNEY STONE SURGERY           Social History:     Social History     Socioeconomic History   • Marital status:      Spouse name: Not on file   • Number of children: Not on file   • Years of education: Not on file   • Highest education level: Not on file   Tobacco Use   • Smoking status: Former Smoker     Packs/day: 0.50     Years: 15.00     Pack years: 7.50     Types: Cigarettes     Last attempt to quit: 2018     Years since quittin.5   • Smokeless tobacco: Never Used   Substance and Sexual Activity   • Alcohol use: No   • Drug use: No   • Sexual activity: Defer       Family History:     Family History   Problem Relation Age of Onset   • No Known Problems Father    • No Known Problems Mother        Review of Systems:     Review of Systems   Constitutional: Negative.  Negative for activity change, appetite change, chills, diaphoresis, fatigue and unexpected weight change.   HENT: Negative for congestion,  dental problem, drooling, ear discharge, ear pain, facial swelling, hearing loss, mouth sores, nosebleeds, postnasal drip, rhinorrhea, sinus pressure, sneezing, sore throat, tinnitus, trouble swallowing and voice change.    Eyes: Negative.  Negative for photophobia, pain, discharge, redness, itching and visual disturbance.   Respiratory: Negative.  Negative for apnea, cough, choking, chest tightness, shortness of breath, wheezing and stridor.    Cardiovascular: Negative.  Negative for chest pain, palpitations and leg swelling.   Gastrointestinal: Negative.  Negative for abdominal distention, abdominal pain, anal bleeding, blood in stool, constipation, diarrhea, nausea, rectal pain and vomiting.   Endocrine: Negative.  Negative for cold intolerance, heat intolerance, polydipsia, polyphagia and polyuria.   Genitourinary: Positive for flank pain.   Musculoskeletal: Negative for arthralgias, back pain, gait problem, joint swelling, myalgias, neck pain and neck stiffness.   Skin: Negative.  Negative for color change, pallor, rash and wound.   Allergic/Immunologic: Negative.  Negative for environmental allergies, food allergies and immunocompromised state.   Neurological: Negative.  Negative for dizziness, tremors, seizures, syncope, facial asymmetry, speech difficulty, weakness, light-headedness, numbness and headaches.   Hematological: Negative.  Negative for adenopathy. Does not bruise/bleed easily.   Psychiatric/Behavioral: Negative for agitation, behavioral problems, confusion, decreased concentration, dysphoric mood, hallucinations, self-injury, sleep disturbance and suicidal ideas. The patient is not nervous/anxious and is not hyperactive.    All other systems reviewed and are negative.      Physical Exam:     Physical Exam   Constitutional: She appears well-developed and well-nourished.   HENT:   Head: Normocephalic and atraumatic.   Right Ear: External ear normal.   Left Ear: External ear normal.   Mouth/Throat:  Oropharynx is clear and moist.   Eyes: Conjunctivae are normal. Pupils are equal, round, and reactive to light.   Cardiovascular: Normal rate, regular rhythm, normal heart sounds and intact distal pulses.   Pulmonary/Chest: Effort normal and breath sounds normal.   Abdominal: Soft. Bowel sounds are normal. She exhibits no distension and no mass. There is no tenderness. There is no rebound and no guarding.   Genitourinary: No vaginal discharge found.   Musculoskeletal: Normal range of motion.   Neurological: She is alert. She has normal reflexes.   Skin: Skin is warm and dry.   Psychiatric: She has a normal mood and affect. Her behavior is normal. Judgment and thought content normal.       I have reviewed the following portions of the patient's history: allergies, current medications, past family history, past medical history, past social history, past surgical history, problem list and ROS and confirm it's accurate.      Procedure:       Assessment/Plan:   Ureteral calculus-patient has been diagnosed with a ureteral calculus.  We have discussed the various parameters regarding spontaneous passage including the notion that a 2 mm stone has a high likelihood of spontaneous passage versus a larger stone being caught up in the upper areas of the urinary tract.  We also discussed the medical management of stone disease and the use of medical expulsive therapy in the form of Flomax.  This is used in an off label setting.  We discussed the indicators for intervention including  absolute indicators such as sepsis and uncontrollable severe pain as well as  the relative indicators of moderate pain that is well-controlled with various analgesia.  I also talked about nonoperative management including ambulation and increasing fluids and hot tub as being an effective adjuncts in the treatment of a ureteral stone.  Narcotic pain medication-patient has significant acute pain that I believe would be an indication for the use of  narcotic pain medication.  I discussed the significant risks of pain medication and the fact that this will be a short only option and I will give her no more than a three-day supply of pain medication.  And I will not plan long-term medication and that this will be sent to a pain clinic if at all becomes necessary.  We discussed signing a pain medication agreement and the fact that we're going to run a state SAM review to be sure the patient is not getting pain medication from elsewhere.  If this is the case we will not give pain medication.  As part of the patient's treatment plan of there being prescribed a controlled substance with potential for abuse.  This patient has been wait aware of the appropriate dose of such medications including, the risk for somnolence, limited ability to drive and/or safety and the significant potential for overdose.  It is been made clear that these medications are for the prescribed patient only without concomitant use of alcohol or other sepsis unless prescribed by the medical provider.  Has completed prescribing agreement detailing the terms of continue prescribing him a controlled substance.  Including monitoring Sam ports, the possibility of urine drug screens and pedal counts.  The patient is aware that we reviewed SAM reports on a regular basis and scan them into the chart.  History and physical examination exhibited continued safe and appropriate use of controlled substances.  Also discussed the fact that the new [place] legislation allows only a three-day prescription for pain medication.  In this situation he will be referred to a chronic pain clinic.            Patient's Body mass index is 33.28 kg/m². BMI is above normal parameters. Recommendations include: educational material.      Smoking Cessation Counseling:  Never a smoker.  Patient does not currently use any tobacco products.                   This document has been electronically signed by LOU  MD RUBÉN July 23, 2019 3:12 PM

## 2019-08-04 ENCOUNTER — APPOINTMENT (OUTPATIENT)
Dept: GENERAL RADIOLOGY | Facility: HOSPITAL | Age: 44
End: 2019-08-04

## 2019-08-04 ENCOUNTER — HOSPITAL ENCOUNTER (EMERGENCY)
Facility: HOSPITAL | Age: 44
Discharge: HOME | End: 2019-08-04
Attending: EMERGENCY MEDICINE | Admitting: EMERGENCY MEDICINE

## 2019-08-04 ENCOUNTER — APPOINTMENT (OUTPATIENT)
Dept: CT IMAGING | Facility: HOSPITAL | Age: 44
End: 2019-08-04

## 2019-08-04 VITALS
BODY MASS INDEX: 32.2 KG/M2 | SYSTOLIC BLOOD PRESSURE: 130 MMHG | TEMPERATURE: 98.3 F | HEART RATE: 86 BPM | RESPIRATION RATE: 18 BRPM | DIASTOLIC BLOOD PRESSURE: 72 MMHG | OXYGEN SATURATION: 98 % | WEIGHT: 175 LBS | HEIGHT: 62 IN

## 2019-08-04 DIAGNOSIS — N23 RENAL COLIC: Primary | ICD-10-CM

## 2019-08-04 LAB
ALBUMIN SERPL-MCNC: 4.31 G/DL (ref 3.5–5.2)
ALBUMIN/GLOB SERPL: 1.2 G/DL
ALP SERPL-CCNC: 95 U/L (ref 39–117)
ALT SERPL W P-5'-P-CCNC: 21 U/L (ref 1–33)
ANION GAP SERPL CALCULATED.3IONS-SCNC: 14.1 MMOL/L (ref 5–15)
AST SERPL-CCNC: 27 U/L (ref 1–32)
B-HCG UR QL: NEGATIVE
BACTERIA UR QL AUTO: ABNORMAL /HPF
BASOPHILS # BLD AUTO: 0.01 10*3/MM3 (ref 0–0.2)
BASOPHILS NFR BLD AUTO: 0.1 % (ref 0–1.5)
BILIRUB SERPL-MCNC: 0.4 MG/DL (ref 0.2–1.2)
BILIRUB UR QL STRIP: NEGATIVE
BUN BLD-MCNC: 9 MG/DL (ref 6–20)
BUN/CREAT SERPL: 9.4 (ref 7–25)
CALCIUM SPEC-SCNC: 9.7 MG/DL (ref 8.6–10.5)
CHLORIDE SERPL-SCNC: 100 MMOL/L (ref 98–107)
CLARITY UR: CLEAR
CO2 SERPL-SCNC: 24.9 MMOL/L (ref 22–29)
COLOR UR: YELLOW
CREAT BLD-MCNC: 0.96 MG/DL (ref 0.57–1)
DEPRECATED RDW RBC AUTO: 43.7 FL (ref 37–54)
EOSINOPHIL # BLD AUTO: 0.08 10*3/MM3 (ref 0–0.4)
EOSINOPHIL NFR BLD AUTO: 0.8 % (ref 0.3–6.2)
ERYTHROCYTE [DISTWIDTH] IN BLOOD BY AUTOMATED COUNT: 13 % (ref 12.3–15.4)
GFR SERPL CREATININE-BSD FRML MDRD: 64 ML/MIN/1.73
GLOBULIN UR ELPH-MCNC: 3.5 GM/DL
GLUCOSE BLD-MCNC: 103 MG/DL (ref 65–99)
GLUCOSE UR STRIP-MCNC: NEGATIVE MG/DL
HCT VFR BLD AUTO: 40.2 % (ref 34–46.6)
HGB BLD-MCNC: 13.5 G/DL (ref 12–15.9)
HGB UR QL STRIP.AUTO: ABNORMAL
HYALINE CASTS UR QL AUTO: ABNORMAL /LPF
IMM GRANULOCYTES # BLD AUTO: 0.02 10*3/MM3 (ref 0–0.05)
IMM GRANULOCYTES NFR BLD AUTO: 0.2 % (ref 0–0.5)
KETONES UR QL STRIP: NEGATIVE
LEUKOCYTE ESTERASE UR QL STRIP.AUTO: ABNORMAL
LIPASE SERPL-CCNC: 10 U/L (ref 13–60)
LYMPHOCYTES # BLD AUTO: 2.92 10*3/MM3 (ref 0.7–3.1)
LYMPHOCYTES NFR BLD AUTO: 29.6 % (ref 19.6–45.3)
MCH RBC QN AUTO: 32.1 PG (ref 26.6–33)
MCHC RBC AUTO-ENTMCNC: 33.6 G/DL (ref 31.5–35.7)
MCV RBC AUTO: 95.5 FL (ref 79–97)
MONOCYTES # BLD AUTO: 0.51 10*3/MM3 (ref 0.1–0.9)
MONOCYTES NFR BLD AUTO: 5.2 % (ref 5–12)
NEUTROPHILS # BLD AUTO: 6.33 10*3/MM3 (ref 1.7–7)
NEUTROPHILS NFR BLD AUTO: 64.1 % (ref 42.7–76)
NITRITE UR QL STRIP: NEGATIVE
PH UR STRIP.AUTO: 8 [PH] (ref 5–8)
PLATELET # BLD AUTO: 274 10*3/MM3 (ref 140–450)
PMV BLD AUTO: 9.4 FL (ref 6–12)
POTASSIUM BLD-SCNC: 3.9 MMOL/L (ref 3.5–5.2)
PROT SERPL-MCNC: 7.8 G/DL (ref 6–8.5)
PROT UR QL STRIP: NEGATIVE
RBC # BLD AUTO: 4.21 10*6/MM3 (ref 3.77–5.28)
RBC # UR: ABNORMAL /HPF
REF LAB TEST METHOD: ABNORMAL
SODIUM BLD-SCNC: 139 MMOL/L (ref 136–145)
SP GR UR STRIP: 1.01 (ref 1–1.03)
SQUAMOUS #/AREA URNS HPF: ABNORMAL /HPF
UROBILINOGEN UR QL STRIP: ABNORMAL
WBC NRBC COR # BLD: 9.87 10*3/MM3 (ref 3.4–10.8)
WBC UR QL AUTO: ABNORMAL /HPF

## 2019-08-04 PROCEDURE — 80053 COMPREHEN METABOLIC PANEL: CPT | Performed by: PHYSICIAN ASSISTANT

## 2019-08-04 PROCEDURE — 96374 THER/PROPH/DIAG INJ IV PUSH: CPT

## 2019-08-04 PROCEDURE — 99283 EMERGENCY DEPT VISIT LOW MDM: CPT

## 2019-08-04 PROCEDURE — 74018 RADEX ABDOMEN 1 VIEW: CPT

## 2019-08-04 PROCEDURE — 81025 URINE PREGNANCY TEST: CPT | Performed by: PHYSICIAN ASSISTANT

## 2019-08-04 PROCEDURE — 81001 URINALYSIS AUTO W/SCOPE: CPT | Performed by: PHYSICIAN ASSISTANT

## 2019-08-04 PROCEDURE — 25010000002 MORPHINE PER 10 MG: Performed by: EMERGENCY MEDICINE

## 2019-08-04 PROCEDURE — 25010000002 KETOROLAC TROMETHAMINE PER 15 MG: Performed by: PHYSICIAN ASSISTANT

## 2019-08-04 PROCEDURE — 83690 ASSAY OF LIPASE: CPT | Performed by: PHYSICIAN ASSISTANT

## 2019-08-04 PROCEDURE — 96375 TX/PRO/DX INJ NEW DRUG ADDON: CPT

## 2019-08-04 PROCEDURE — 74018 RADEX ABDOMEN 1 VIEW: CPT | Performed by: RADIOLOGY

## 2019-08-04 PROCEDURE — 85025 COMPLETE CBC W/AUTO DIFF WBC: CPT | Performed by: PHYSICIAN ASSISTANT

## 2019-08-04 RX ORDER — MORPHINE SULFATE 2 MG/ML
2 INJECTION, SOLUTION INTRAMUSCULAR; INTRAVENOUS ONCE
Status: COMPLETED | OUTPATIENT
Start: 2019-08-04 | End: 2019-08-04

## 2019-08-04 RX ORDER — KETOROLAC TROMETHAMINE 30 MG/ML
30 INJECTION, SOLUTION INTRAMUSCULAR; INTRAVENOUS ONCE
Status: COMPLETED | OUTPATIENT
Start: 2019-08-04 | End: 2019-08-04

## 2019-08-04 RX ORDER — SODIUM CHLORIDE 0.9 % (FLUSH) 0.9 %
10 SYRINGE (ML) INJECTION AS NEEDED
Status: DISCONTINUED | OUTPATIENT
Start: 2019-08-04 | End: 2019-08-04 | Stop reason: HOSPADM

## 2019-08-04 RX ADMIN — KETOROLAC TROMETHAMINE 30 MG: 30 INJECTION, SOLUTION INTRAMUSCULAR at 14:06

## 2019-08-04 RX ADMIN — MORPHINE SULFATE 2 MG: 2 INJECTION, SOLUTION INTRAMUSCULAR; INTRAVENOUS at 15:19

## 2019-08-04 RX ADMIN — SODIUM CHLORIDE 1000 ML: 9 INJECTION, SOLUTION INTRAVENOUS at 14:06

## 2019-08-04 NOTE — ED NOTES
Pt reports left flank pain x 2 days, she thinks she has a kidney stone.     Carissa Daily, RN  08/04/19 9705

## 2019-08-04 NOTE — ED PROVIDER NOTES
Subjective     History provided by:  Patient   used: No    Flank Pain   Pain location:  L flank  Pain quality: sharp    Pain radiates to:  LLQ  Onset quality:  Sudden  Duration:  2 days  Timing:  Constant  Progression:  Worsening  Chronicity:  New  Relieved by:  Nothing  Worsened by:  Nothing  Ineffective treatments:  None tried  Associated symptoms: hematuria    Risk factors comment:  Hx of kidney stones       Review of Systems   Constitutional: Negative.    HENT: Negative.    Eyes: Negative.    Respiratory: Negative.    Cardiovascular: Negative.    Gastrointestinal: Negative.    Endocrine: Negative.    Genitourinary: Positive for flank pain, frequency and hematuria.   Skin: Negative.    Allergic/Immunologic: Negative.    Neurological: Negative.    Hematological: Negative.    Psychiatric/Behavioral: Negative.    All other systems reviewed and are negative.      Past Medical History:   Diagnosis Date   • Kidney stones    • Migraine    • Migraines    • Pneumonia    • Strep throat        Allergies   Allergen Reactions   • Sulfa Antibiotics Anaphylaxis   • Compazine [Prochlorperazine Edisylate] Hives       Past Surgical History:   Procedure Laterality Date   •  SECTION     • COLONOSCOPY     • CYSTOSCOPY ELECTROHYDRAULIC LITHOTRIPSY     • DENTAL PROCEDURE     • EXTRACORPOREAL SHOCK WAVE LITHOTRIPSY (ESWL) Left 2018    Procedure: EXTRACORPOREAL SHOCKWAVE LITHOTRIPSY;  Surgeon: Garth Friedman MD;  Location: [facility];  Service: Urology   • KIDNEY STONE SURGERY         Family History   Problem Relation Age of Onset   • No Known Problems Father    • No Known Problems Mother        Social History     Socioeconomic History   • Marital status:      Spouse name: Not on file   • Number of children: Not on file   • Years of education: Not on file   • Highest education level: Not on file   Tobacco Use   • Smoking status: Former Smoker     Packs/day: 0.50     Years: 15.00     Pack  years: 7.50     Types: Cigarettes     Last attempt to quit: 2018     Years since quittin.5   • Smokeless tobacco: Never Used   Substance and Sexual Activity   • Alcohol use: No   • Drug use: No   • Sexual activity: Defer           Objective   Physical Exam   Constitutional: She is oriented to person, place, and time. She appears well-developed and well-nourished.   HENT:   Head: Normocephalic and atraumatic.   Right Ear: External ear normal.   Left Ear: External ear normal.   Nose: Nose normal.   Mouth/Throat: Oropharynx is clear and moist.   Eyes: Conjunctivae and EOM are normal. Pupils are equal, round, and reactive to light.   Neck: Normal range of motion. Neck supple.   Cardiovascular: Normal rate, regular rhythm, normal heart sounds and intact distal pulses.   Pulmonary/Chest: Effort normal and breath sounds normal.   Abdominal: Soft. Bowel sounds are normal.   Musculoskeletal: Normal range of motion.   Neurological: She is alert and oriented to person, place, and time.   Skin: Skin is warm and dry.   Nursing note and vitals reviewed.      Procedures           ED Course  ED Course as of Aug 04 1529   Sun Aug 04, 2019   1455 Pt refused CT scan. States friedman told her not to have any more and to get xrays.    [ML]   1529 Pt instructed to f/u with Dr. Friedman tomorrow in office. Discussed sx that would warrant return to the ED.   [ML]      ED Course User Index  [ML] Yudith Santos PA                  [facility]      Final diagnoses:   Renal colic            Yudith Santos PA  19 1529

## 2019-08-05 ENCOUNTER — TELEPHONE (OUTPATIENT)
Dept: UROLOGY | Facility: CLINIC | Age: 44
End: 2019-08-05

## 2019-08-05 NOTE — TELEPHONE ENCOUNTER
This patient had called earlier this morning 8am 08/05/2019. She spoke with Zee and let her know that she was seen at [facility] on 08/04/2019, for kidney stones. She had a KUB, and the ER told her that they did not see any kidney stones.     Zee spoke with pt at 8am, and let he know that Dr. Friedman reviewed the film, and he did not see anything either, I was not aware of this conversation.     The patient later called back at approximately 10:20 a.m. And spoke with me (Marielle Stephens) said that she just passed a kidney stone, and was in tears saying that she knows she has kidney stones and needs to get in ASAP. She said she could be here at 10:30, I put her on the schedule for 10:30AM.    After Dr. Friedman realized that she was on the schedule, he voiced to Zee that he already reviewed the film and he did not see any kidney stones.     Dr. Friedman explained that he could see her if she wished to be seen, but there is nothing he could do, and could not give her any pain medications, because there are no kidney stones. The Xray only showed Constipation.     I tried to contact the patient to let her know, and it went to voicemail. It is now after 11:00 a.m, and patient has not made the appointment.

## 2019-08-05 NOTE — TELEPHONE ENCOUNTER
PT called this morning said she had been in then ER yesterday I let Dr Friedman review the image he said he did not see any stones that it was more constipation

## 2019-09-04 ENCOUNTER — APPOINTMENT (OUTPATIENT)
Dept: GENERAL RADIOLOGY | Facility: HOSPITAL | Age: 44
End: 2019-09-04

## 2019-09-04 ENCOUNTER — HOSPITAL ENCOUNTER (EMERGENCY)
Facility: HOSPITAL | Age: 44
Discharge: HOME | End: 2019-09-04
Attending: EMERGENCY MEDICINE | Admitting: EMERGENCY MEDICINE

## 2019-09-04 VITALS
DIASTOLIC BLOOD PRESSURE: 74 MMHG | OXYGEN SATURATION: 98 % | WEIGHT: 180 LBS | HEART RATE: 94 BPM | BODY MASS INDEX: 31.89 KG/M2 | RESPIRATION RATE: 16 BRPM | TEMPERATURE: 98.3 F | SYSTOLIC BLOOD PRESSURE: 146 MMHG | HEIGHT: 63 IN

## 2019-09-04 DIAGNOSIS — N23 RENAL COLIC ON LEFT SIDE: Primary | ICD-10-CM

## 2019-09-04 LAB
ALBUMIN SERPL-MCNC: 4.65 G/DL (ref 3.5–5.2)
ALBUMIN/GLOB SERPL: 1.4 G/DL
ALP SERPL-CCNC: 74 U/L (ref 39–117)
ALT SERPL W P-5'-P-CCNC: 17 U/L (ref 1–33)
ANION GAP SERPL CALCULATED.3IONS-SCNC: 14.1 MMOL/L (ref 5–15)
AST SERPL-CCNC: 20 U/L (ref 1–32)
BACTERIA UR QL AUTO: ABNORMAL /HPF
BASOPHILS # BLD AUTO: 0.01 10*3/MM3 (ref 0–0.2)
BASOPHILS NFR BLD AUTO: 0.1 % (ref 0–1.5)
BILIRUB SERPL-MCNC: 0.6 MG/DL (ref 0.2–1.2)
BILIRUB UR QL STRIP: NEGATIVE
BUN BLD-MCNC: 11 MG/DL (ref 6–20)
BUN/CREAT SERPL: 10.4 (ref 7–25)
CALCIUM SPEC-SCNC: 10.1 MG/DL (ref 8.6–10.5)
CHLORIDE SERPL-SCNC: 103 MMOL/L (ref 98–107)
CLARITY UR: ABNORMAL
CO2 SERPL-SCNC: 23.9 MMOL/L (ref 22–29)
COLOR UR: ABNORMAL
CREAT BLD-MCNC: 1.06 MG/DL (ref 0.57–1)
DEPRECATED RDW RBC AUTO: 41.8 FL (ref 37–54)
EOSINOPHIL # BLD AUTO: 0.14 10*3/MM3 (ref 0–0.4)
EOSINOPHIL NFR BLD AUTO: 1.2 % (ref 0.3–6.2)
ERYTHROCYTE [DISTWIDTH] IN BLOOD BY AUTOMATED COUNT: 12.4 % (ref 12.3–15.4)
GFR SERPL CREATININE-BSD FRML MDRD: 57 ML/MIN/1.73
GLOBULIN UR ELPH-MCNC: 3.3 GM/DL
GLUCOSE BLD-MCNC: 106 MG/DL (ref 65–99)
GLUCOSE UR STRIP-MCNC: NEGATIVE MG/DL
HCG SERPL QL: NEGATIVE
HCT VFR BLD AUTO: 39.7 % (ref 34–46.6)
HGB BLD-MCNC: 13.2 G/DL (ref 12–15.9)
HGB UR QL STRIP.AUTO: ABNORMAL
HOLD SPECIMEN: NORMAL
HOLD SPECIMEN: NORMAL
HYALINE CASTS UR QL AUTO: ABNORMAL /LPF
IMM GRANULOCYTES # BLD AUTO: 0.02 10*3/MM3 (ref 0–0.05)
IMM GRANULOCYTES NFR BLD AUTO: 0.2 % (ref 0–0.5)
KETONES UR QL STRIP: NEGATIVE
LEUKOCYTE ESTERASE UR QL STRIP.AUTO: ABNORMAL
LIPASE SERPL-CCNC: 14 U/L (ref 13–60)
LYMPHOCYTES # BLD AUTO: 4.15 10*3/MM3 (ref 0.7–3.1)
LYMPHOCYTES NFR BLD AUTO: 36.9 % (ref 19.6–45.3)
MCH RBC QN AUTO: 31.7 PG (ref 26.6–33)
MCHC RBC AUTO-ENTMCNC: 33.2 G/DL (ref 31.5–35.7)
MCV RBC AUTO: 95.2 FL (ref 79–97)
MONOCYTES # BLD AUTO: 0.92 10*3/MM3 (ref 0.1–0.9)
MONOCYTES NFR BLD AUTO: 8.2 % (ref 5–12)
NEUTROPHILS # BLD AUTO: 6.02 10*3/MM3 (ref 1.7–7)
NEUTROPHILS NFR BLD AUTO: 53.4 % (ref 42.7–76)
NITRITE UR QL STRIP: NEGATIVE
PH UR STRIP.AUTO: <=5 [PH] (ref 5–8)
PLATELET # BLD AUTO: 309 10*3/MM3 (ref 140–450)
PMV BLD AUTO: 9.4 FL (ref 6–12)
POTASSIUM BLD-SCNC: 3.8 MMOL/L (ref 3.5–5.2)
PROT SERPL-MCNC: 7.9 G/DL (ref 6–8.5)
PROT UR QL STRIP: ABNORMAL
RBC # BLD AUTO: 4.17 10*6/MM3 (ref 3.77–5.28)
RBC # UR: ABNORMAL /HPF
REF LAB TEST METHOD: ABNORMAL
SODIUM BLD-SCNC: 141 MMOL/L (ref 136–145)
SP GR UR STRIP: >=1.03 (ref 1–1.03)
SQUAMOUS #/AREA URNS HPF: ABNORMAL /HPF
UROBILINOGEN UR QL STRIP: ABNORMAL
WBC NRBC COR # BLD: 11.26 10*3/MM3 (ref 3.4–10.8)
WBC UR QL AUTO: ABNORMAL /HPF
WHOLE BLOOD HOLD SPECIMEN: NORMAL
WHOLE BLOOD HOLD SPECIMEN: NORMAL

## 2019-09-04 PROCEDURE — 74018 RADEX ABDOMEN 1 VIEW: CPT

## 2019-09-04 PROCEDURE — 25010000002 MORPHINE PER 10 MG: Performed by: EMERGENCY MEDICINE

## 2019-09-04 PROCEDURE — 85025 COMPLETE CBC W/AUTO DIFF WBC: CPT | Performed by: EMERGENCY MEDICINE

## 2019-09-04 PROCEDURE — 96375 TX/PRO/DX INJ NEW DRUG ADDON: CPT

## 2019-09-04 PROCEDURE — 81001 URINALYSIS AUTO W/SCOPE: CPT | Performed by: EMERGENCY MEDICINE

## 2019-09-04 PROCEDURE — 25010000002 ONDANSETRON PER 1 MG: Performed by: EMERGENCY MEDICINE

## 2019-09-04 PROCEDURE — 83690 ASSAY OF LIPASE: CPT | Performed by: EMERGENCY MEDICINE

## 2019-09-04 PROCEDURE — 74018 RADEX ABDOMEN 1 VIEW: CPT | Performed by: RADIOLOGY

## 2019-09-04 PROCEDURE — 99283 EMERGENCY DEPT VISIT LOW MDM: CPT

## 2019-09-04 PROCEDURE — 84703 CHORIONIC GONADOTROPIN ASSAY: CPT | Performed by: EMERGENCY MEDICINE

## 2019-09-04 PROCEDURE — 96374 THER/PROPH/DIAG INJ IV PUSH: CPT

## 2019-09-04 PROCEDURE — 80053 COMPREHEN METABOLIC PANEL: CPT | Performed by: EMERGENCY MEDICINE

## 2019-09-04 RX ORDER — ONDANSETRON 2 MG/ML
4 INJECTION INTRAMUSCULAR; INTRAVENOUS ONCE
Status: COMPLETED | OUTPATIENT
Start: 2019-09-04 | End: 2019-09-04

## 2019-09-04 RX ORDER — ONDANSETRON 4 MG/1
4 TABLET, ORALLY DISINTEGRATING ORAL EVERY 6 HOURS PRN
Qty: 20 TABLET | Refills: 0 | Status: SHIPPED | OUTPATIENT
Start: 2019-09-04 | End: 2019-12-31

## 2019-09-04 RX ORDER — DICLOFENAC SODIUM 75 MG/1
75 TABLET, DELAYED RELEASE ORAL 2 TIMES DAILY
Qty: 30 TABLET | Refills: 0 | Status: SHIPPED | OUTPATIENT
Start: 2019-09-04 | End: 2020-01-03 | Stop reason: SDUPTHER

## 2019-09-04 RX ORDER — SODIUM CHLORIDE 0.9 % (FLUSH) 0.9 %
10 SYRINGE (ML) INJECTION AS NEEDED
Status: DISCONTINUED | OUTPATIENT
Start: 2019-09-04 | End: 2019-09-05 | Stop reason: HOSPADM

## 2019-09-04 RX ADMIN — MORPHINE SULFATE 4 MG: 4 INJECTION, SOLUTION INTRAMUSCULAR; INTRAVENOUS at 21:28

## 2019-09-04 RX ADMIN — ONDANSETRON 4 MG: 2 INJECTION, SOLUTION INTRAMUSCULAR; INTRAVENOUS at 21:27

## 2019-09-05 NOTE — ED NOTES
Patient states she has left flank pain. States she has history of kidney stones and states this feels like a kidney stone.      Ariella Yanes RN  09/04/19 0958

## 2019-09-05 NOTE — ED PROVIDER NOTES
Subjective     History provided by:  Patient  Flank Pain   Pain location:  L flank  Pain quality: aching and stabbing    Pain radiates to:  Does not radiate  Pain severity:  Moderate  Onset quality:  Sudden  Duration:  1 day  Timing:  Constant  Progression:  Worsening  Chronicity:  Chronic  Relieved by:  Nothing  Ineffective treatments:  NSAIDs and position changes  Associated symptoms: nausea    Associated symptoms: no chest pain, no dysuria and no fever        Review of Systems   Constitutional: Negative.  Negative for fever.   HENT: Negative.    Respiratory: Negative.    Cardiovascular: Negative.  Negative for chest pain.   Gastrointestinal: Positive for nausea. Negative for abdominal pain.   Endocrine: Negative.    Genitourinary: Positive for flank pain. Negative for dysuria.   Skin: Negative.    Neurological: Negative.    Psychiatric/Behavioral: Negative.    All other systems reviewed and are negative.      Past Medical History:   Diagnosis Date   • Kidney stones    • Migraine    • Migraines    • Pneumonia    • Strep throat        Allergies   Allergen Reactions   • Sulfa Antibiotics Anaphylaxis   • Compazine [Prochlorperazine Edisylate] Hives       Past Surgical History:   Procedure Laterality Date   •  SECTION     • COLONOSCOPY     • CYSTOSCOPY ELECTROHYDRAULIC LITHOTRIPSY     • DENTAL PROCEDURE     • EXTRACORPOREAL SHOCK WAVE LITHOTRIPSY (ESWL) Left 2018    Procedure: EXTRACORPOREAL SHOCKWAVE LITHOTRIPSY;  Surgeon: Garth Friedman MD;  Location: [facility];  Service: Urology   • KIDNEY STONE SURGERY         Family History   Problem Relation Age of Onset   • No Known Problems Father    • No Known Problems Mother        Social History     Socioeconomic History   • Marital status:      Spouse name: Not on file   • Number of children: Not on file   • Years of education: Not on file   • Highest education level: Not on file   Tobacco Use   • Smoking status: Former Smoker     Packs/day:  0.50     Years: 15.00     Pack years: 7.50     Types: Cigarettes     Last attempt to quit: 2018     Years since quittin.6   • Smokeless tobacco: Never Used   Substance and Sexual Activity   • Alcohol use: No   • Drug use: No   • Sexual activity: Defer           Objective   Physical Exam   Constitutional: She is oriented to person, place, and time. She appears well-developed and well-nourished. No distress.   HENT:   Head: Normocephalic and atraumatic.   Right Ear: External ear normal.   Left Ear: External ear normal.   Nose: Nose normal.   Eyes: Conjunctivae are normal.   Neck: Normal range of motion. Neck supple. No JVD present. No tracheal deviation present.   Cardiovascular: Normal rate, regular rhythm and normal heart sounds.   No murmur heard.  Pulmonary/Chest: Effort normal and breath sounds normal. No respiratory distress. She has no wheezes.   Abdominal: Soft. Bowel sounds are normal. There is tenderness.   Suprapubic / left flank.    Musculoskeletal: Normal range of motion. She exhibits no edema or deformity.   Neurological: She is alert and oriented to person, place, and time. No cranial nerve deficit.   Skin: Skin is warm and dry. No rash noted. She is not diaphoretic. No erythema. No pallor.   Psychiatric: She has a normal mood and affect. Her behavior is normal. Thought content normal.   Nursing note and vitals reviewed.      Procedures           ED Course  ED Course as of Sep 05 0042   Wed Sep 04, 2019   2206 Xr rad interpreted:  Moderate to large volume stool in the colon  Calcification projecting over the left kidney      [RB]      ED Course User Index  [RB] John Paul Truong PA         Final Diagnosis: as of Sep 05 0042   Renal colic on left side                  MDM  Number of Diagnoses or Management Options  Renal colic on left side: new and requires workup     Amount and/or Complexity of Data Reviewed  Clinical lab tests: ordered and reviewed  Tests in the radiology section of CPT®: ordered  and reviewed    Risk of Complications, Morbidity, and/or Mortality  Presenting problems: moderate  Diagnostic procedures: moderate  Management options: low    Patient Progress  Patient progress: stable               John Paul Truong PA  09/05/19 0044

## 2019-12-01 ENCOUNTER — HOSPITAL ENCOUNTER (EMERGENCY)
Facility: HOSPITAL | Age: 44
Discharge: HOME | End: 2019-12-01
Attending: FAMILY MEDICINE | Admitting: FAMILY MEDICINE

## 2019-12-01 ENCOUNTER — APPOINTMENT (OUTPATIENT)
Dept: GENERAL RADIOLOGY | Facility: HOSPITAL | Age: 44
End: 2019-12-01

## 2019-12-01 VITALS
WEIGHT: 180 LBS | TEMPERATURE: 97.6 F | SYSTOLIC BLOOD PRESSURE: 146 MMHG | RESPIRATION RATE: 20 BRPM | HEART RATE: 85 BPM | OXYGEN SATURATION: 99 % | DIASTOLIC BLOOD PRESSURE: 88 MMHG | BODY MASS INDEX: 33.13 KG/M2 | HEIGHT: 62 IN

## 2019-12-01 DIAGNOSIS — K59.00 CONSTIPATION, UNSPECIFIED CONSTIPATION TYPE: ICD-10-CM

## 2019-12-01 DIAGNOSIS — N23 RENAL COLIC: Primary | ICD-10-CM

## 2019-12-01 LAB
ALBUMIN SERPL-MCNC: 3.89 G/DL (ref 3.5–5.2)
ALBUMIN/GLOB SERPL: 1.1 G/DL
ALP SERPL-CCNC: 64 U/L (ref 39–117)
ALT SERPL W P-5'-P-CCNC: 11 U/L (ref 1–33)
AMYLASE SERPL-CCNC: 29 U/L (ref 28–100)
ANION GAP SERPL CALCULATED.3IONS-SCNC: 11 MMOL/L (ref 5–15)
AST SERPL-CCNC: 25 U/L (ref 1–32)
B-HCG UR QL: NEGATIVE
BACTERIA UR QL AUTO: ABNORMAL /HPF
BASOPHILS # BLD AUTO: 0.03 10*3/MM3 (ref 0–0.2)
BASOPHILS NFR BLD AUTO: 0.3 % (ref 0–1.5)
BILIRUB SERPL-MCNC: 0.4 MG/DL (ref 0.2–1.2)
BILIRUB UR QL STRIP: NEGATIVE
BUN BLD-MCNC: 9 MG/DL (ref 6–20)
BUN/CREAT SERPL: 11.1 (ref 7–25)
CALCIUM SPEC-SCNC: 9 MG/DL (ref 8.6–10.5)
CHLORIDE SERPL-SCNC: 105 MMOL/L (ref 98–107)
CLARITY UR: CLEAR
CO2 SERPL-SCNC: 22 MMOL/L (ref 22–29)
COLOR UR: YELLOW
CREAT BLD-MCNC: 0.81 MG/DL (ref 0.57–1)
DEPRECATED RDW RBC AUTO: 42.5 FL (ref 37–54)
EOSINOPHIL # BLD AUTO: 0.09 10*3/MM3 (ref 0–0.4)
EOSINOPHIL NFR BLD AUTO: 1 % (ref 0.3–6.2)
ERYTHROCYTE [DISTWIDTH] IN BLOOD BY AUTOMATED COUNT: 12.1 % (ref 12.3–15.4)
GFR SERPL CREATININE-BSD FRML MDRD: 77 ML/MIN/1.73
GLOBULIN UR ELPH-MCNC: 3.5 GM/DL
GLUCOSE BLD-MCNC: 92 MG/DL (ref 65–99)
GLUCOSE UR STRIP-MCNC: NEGATIVE MG/DL
HCT VFR BLD AUTO: 39.9 % (ref 34–46.6)
HGB BLD-MCNC: 13.1 G/DL (ref 12–15.9)
HGB UR QL STRIP.AUTO: ABNORMAL
HYALINE CASTS UR QL AUTO: ABNORMAL /LPF
IMM GRANULOCYTES # BLD AUTO: 0.04 10*3/MM3 (ref 0–0.05)
IMM GRANULOCYTES NFR BLD AUTO: 0.5 % (ref 0–0.5)
KETONES UR QL STRIP: NEGATIVE
LEUKOCYTE ESTERASE UR QL STRIP.AUTO: ABNORMAL
LIPASE SERPL-CCNC: 22 U/L (ref 13–60)
LYMPHOCYTES # BLD AUTO: 2.78 10*3/MM3 (ref 0.7–3.1)
LYMPHOCYTES NFR BLD AUTO: 31.3 % (ref 19.6–45.3)
MCH RBC QN AUTO: 31.5 PG (ref 26.6–33)
MCHC RBC AUTO-ENTMCNC: 32.8 G/DL (ref 31.5–35.7)
MCV RBC AUTO: 95.9 FL (ref 79–97)
MONOCYTES # BLD AUTO: 0.47 10*3/MM3 (ref 0.1–0.9)
MONOCYTES NFR BLD AUTO: 5.3 % (ref 5–12)
NEUTROPHILS # BLD AUTO: 5.47 10*3/MM3 (ref 1.7–7)
NEUTROPHILS NFR BLD AUTO: 61.6 % (ref 42.7–76)
NITRITE UR QL STRIP: NEGATIVE
NRBC BLD AUTO-RTO: 0 /100 WBC (ref 0–0.2)
PH UR STRIP.AUTO: 5.5 [PH] (ref 5–8)
PLATELET # BLD AUTO: 311 10*3/MM3 (ref 140–450)
PMV BLD AUTO: 9.5 FL (ref 6–12)
POTASSIUM BLD-SCNC: 4.8 MMOL/L (ref 3.5–5.2)
PROT SERPL-MCNC: 7.4 G/DL (ref 6–8.5)
PROT UR QL STRIP: NEGATIVE
RBC # BLD AUTO: 4.16 10*6/MM3 (ref 3.77–5.28)
RBC # UR: ABNORMAL /HPF
REF LAB TEST METHOD: ABNORMAL
SODIUM BLD-SCNC: 138 MMOL/L (ref 136–145)
SP GR UR STRIP: 1.02 (ref 1–1.03)
SQUAMOUS #/AREA URNS HPF: ABNORMAL /HPF
UROBILINOGEN UR QL STRIP: ABNORMAL
WBC NRBC COR # BLD: 8.88 10*3/MM3 (ref 3.4–10.8)
WBC UR QL AUTO: ABNORMAL /HPF

## 2019-12-01 PROCEDURE — 82150 ASSAY OF AMYLASE: CPT | Performed by: PHYSICIAN ASSISTANT

## 2019-12-01 PROCEDURE — 25010000002 ONDANSETRON PER 1 MG: Performed by: PHYSICIAN ASSISTANT

## 2019-12-01 PROCEDURE — 25010000002 MORPHINE PER 10 MG: Performed by: FAMILY MEDICINE

## 2019-12-01 PROCEDURE — 81001 URINALYSIS AUTO W/SCOPE: CPT | Performed by: PHYSICIAN ASSISTANT

## 2019-12-01 PROCEDURE — 81025 URINE PREGNANCY TEST: CPT | Performed by: PHYSICIAN ASSISTANT

## 2019-12-01 PROCEDURE — 96375 TX/PRO/DX INJ NEW DRUG ADDON: CPT

## 2019-12-01 PROCEDURE — 80053 COMPREHEN METABOLIC PANEL: CPT | Performed by: PHYSICIAN ASSISTANT

## 2019-12-01 PROCEDURE — 99283 EMERGENCY DEPT VISIT LOW MDM: CPT

## 2019-12-01 PROCEDURE — 96374 THER/PROPH/DIAG INJ IV PUSH: CPT

## 2019-12-01 PROCEDURE — 83690 ASSAY OF LIPASE: CPT | Performed by: PHYSICIAN ASSISTANT

## 2019-12-01 PROCEDURE — 74018 RADEX ABDOMEN 1 VIEW: CPT

## 2019-12-01 PROCEDURE — 74018 RADEX ABDOMEN 1 VIEW: CPT | Performed by: RADIOLOGY

## 2019-12-01 PROCEDURE — 85025 COMPLETE CBC W/AUTO DIFF WBC: CPT | Performed by: PHYSICIAN ASSISTANT

## 2019-12-01 RX ORDER — MAGNESIUM CARB/ALUMINUM HYDROX 105-160MG
296 TABLET,CHEWABLE ORAL ONCE
Status: COMPLETED | OUTPATIENT
Start: 2019-12-01 | End: 2019-12-01

## 2019-12-01 RX ORDER — SODIUM CHLORIDE 0.9 % (FLUSH) 0.9 %
10 SYRINGE (ML) INJECTION AS NEEDED
Status: DISCONTINUED | OUTPATIENT
Start: 2019-12-01 | End: 2019-12-01 | Stop reason: HOSPADM

## 2019-12-01 RX ORDER — PHENAZOPYRIDINE HYDROCHLORIDE 200 MG/1
200 TABLET, FILM COATED ORAL 3 TIMES DAILY PRN
Qty: 6 TABLET | Refills: 0 | Status: SHIPPED | OUTPATIENT
Start: 2019-12-01 | End: 2019-12-03

## 2019-12-01 RX ORDER — ONDANSETRON 2 MG/ML
4 INJECTION INTRAMUSCULAR; INTRAVENOUS ONCE
Status: COMPLETED | OUTPATIENT
Start: 2019-12-01 | End: 2019-12-01

## 2019-12-01 RX ORDER — KETOROLAC TROMETHAMINE 30 MG/ML
30 INJECTION, SOLUTION INTRAMUSCULAR; INTRAVENOUS ONCE
Status: DISCONTINUED | OUTPATIENT
Start: 2019-12-01 | End: 2019-12-01 | Stop reason: HOSPADM

## 2019-12-01 RX ORDER — MORPHINE SULFATE 2 MG/ML
2 INJECTION, SOLUTION INTRAMUSCULAR; INTRAVENOUS ONCE
Status: COMPLETED | OUTPATIENT
Start: 2019-12-01 | End: 2019-12-01

## 2019-12-01 RX ORDER — TAMSULOSIN HYDROCHLORIDE 0.4 MG/1
1 CAPSULE ORAL DAILY
Qty: 30 CAPSULE | Refills: 0 | Status: SHIPPED | OUTPATIENT
Start: 2019-12-01 | End: 2019-12-06 | Stop reason: SDUPTHER

## 2019-12-01 RX ADMIN — SODIUM CHLORIDE 1000 ML: 9 INJECTION, SOLUTION INTRAVENOUS at 10:39

## 2019-12-01 RX ADMIN — CITROMA MAGNESIUM CITRATE 296 ML: 1.75 LIQUID ORAL at 12:32

## 2019-12-01 RX ADMIN — MORPHINE SULFATE 2 MG: 2 INJECTION, SOLUTION INTRAMUSCULAR; INTRAVENOUS at 10:59

## 2019-12-01 RX ADMIN — ONDANSETRON 4 MG: 2 INJECTION, SOLUTION INTRAMUSCULAR; INTRAVENOUS at 10:58

## 2019-12-01 NOTE — ED PROVIDER NOTES
Subjective   45 y/o female patient presents to the ED today with complaints of right flank pain that radiates to her RLQ. She states this has been going on for the past couple of days. HX of kidney stones. Reports dysuria, dark colored urine, and feeling like her bladder is having spasms.  Patient denies any fevers, CP, or SOB.  No N/V/D.  Patient states she sees Dr. Friedman.          History provided by:  Patient   used: No        Review of Systems   Constitutional: Negative.    HENT: Negative.    Eyes: Negative.    Respiratory: Negative.    Cardiovascular: Negative.    Gastrointestinal: Positive for abdominal pain.   Endocrine: Negative.    Genitourinary: Positive for dysuria, flank pain and hematuria.   Skin: Negative.    Allergic/Immunologic: Negative.    Neurological: Negative.    Hematological: Negative.    Psychiatric/Behavioral: Negative.    All other systems reviewed and are negative.      Past Medical History:   Diagnosis Date   • Kidney stones    • Migraine    • Migraines    • Pneumonia    • Strep throat        Allergies   Allergen Reactions   • Sulfa Antibiotics Anaphylaxis   • Compazine [Prochlorperazine Edisylate] Hives       Past Surgical History:   Procedure Laterality Date   •  SECTION     • COLONOSCOPY     • CYSTOSCOPY ELECTROHYDRAULIC LITHOTRIPSY     • DENTAL PROCEDURE     • EXTRACORPOREAL SHOCK WAVE LITHOTRIPSY (ESWL) Left 2018    Procedure: EXTRACORPOREAL SHOCKWAVE LITHOTRIPSY;  Surgeon: Garth Friedman MD;  Location: [facility];  Service: Urology   • KIDNEY STONE SURGERY         Family History   Problem Relation Age of Onset   • No Known Problems Father    • No Known Problems Mother        Social History     Socioeconomic History   • Marital status:      Spouse name: Not on file   • Number of children: Not on file   • Years of education: Not on file   • Highest education level: Not on file   Tobacco Use   • Smoking status: Former Smoker      Packs/day: 0.50     Years: 15.00     Pack years: 7.50     Types: Cigarettes     Last attempt to quit: 2018     Years since quittin.9   • Smokeless tobacco: Never Used   Substance and Sexual Activity   • Alcohol use: No   • Drug use: No   • Sexual activity: Defer           Objective   Physical Exam   Constitutional: She is oriented to person, place, and time. She appears well-developed and well-nourished.   HENT:   Head: Normocephalic and atraumatic.   Mouth/Throat: Oropharynx is clear and moist.   Eyes: EOM are normal. Pupils are equal, round, and reactive to light.   Cardiovascular: Normal rate, regular rhythm, normal heart sounds and intact distal pulses.   Pulmonary/Chest: Effort normal and breath sounds normal.   Abdominal: Soft. Normal appearance and bowel sounds are normal. There is tenderness. There is CVA tenderness.   Neurological: She is alert and oriented to person, place, and time.   Skin: Skin is warm and dry. Capillary refill takes less than 2 seconds.   Psychiatric: She has a normal mood and affect. Her behavior is normal.   Nursing note and vitals reviewed.      Procedures           ED Course  ED Course as of Dec 01 1224   Sun Dec 01, 2019   1015 Pt states she has had several CT scans in the past and [place] does not want her to have anymore. States that she has nodules on her thyroids and concerned it is from too much radiation.  She has an appt to see endocrinology.   [ML]   1221 Impression    Moderate stool in the colon         This report was finalized on 2019 12:08 PM by Dr. Dave Will MD.     XR Abdomen KUB [ML]   1221 Flank pain resolved.      [ML]   1223 Pt instructed to f/u with Dr. Friedman. Discussed sx that would warrant return to the ED.   [ML]      ED Course User Index  [ML] Yudith Santos PA                  MDM  Number of Diagnoses or Management Options  Constipation, unspecified constipation type:   Renal colic:      Amount and/or Complexity of Data  Reviewed  Clinical lab tests: ordered and reviewed  Tests in the radiology section of CPT®: ordered    Risk of Complications, Morbidity, and/or Mortality  Presenting problems: low  Diagnostic procedures: low  Management options: low    Patient Progress  Patient progress: improved      Final diagnoses:   Renal colic   Constipation, unspecified constipation type              Yudith Santos PA  12/01/19 1224

## 2019-12-06 ENCOUNTER — OFFICE VISIT (OUTPATIENT)
Dept: UROLOGY | Facility: CLINIC | Age: 44
End: 2019-12-06

## 2019-12-06 VITALS — WEIGHT: 178 LBS | HEIGHT: 62 IN | BODY MASS INDEX: 32.76 KG/M2

## 2019-12-06 DIAGNOSIS — N20.0 RENAL CALCULUS: ICD-10-CM

## 2019-12-06 DIAGNOSIS — N32.89 BLADDER SPASMS: Primary | ICD-10-CM

## 2019-12-06 DIAGNOSIS — N20.1 LEFT URETERAL CALCULUS: ICD-10-CM

## 2019-12-06 PROCEDURE — 99214 OFFICE O/P EST MOD 30 MIN: CPT | Performed by: NURSE PRACTITIONER

## 2019-12-06 RX ORDER — TAMSULOSIN HYDROCHLORIDE 0.4 MG/1
1 CAPSULE ORAL DAILY
Qty: 30 CAPSULE | Refills: 0 | Status: SHIPPED | OUTPATIENT
Start: 2019-12-06 | End: 2019-12-31

## 2019-12-06 RX ORDER — OXYCODONE AND ACETAMINOPHEN 10; 325 MG/1; MG/1
1 TABLET ORAL EVERY 6 HOURS PRN
Qty: 10 TABLET | Refills: 0 | Status: SHIPPED | OUTPATIENT
Start: 2019-12-06 | End: 2019-12-23 | Stop reason: SDUPTHER

## 2019-12-06 RX ORDER — PROMETHAZINE HYDROCHLORIDE 25 MG/1
25 TABLET ORAL EVERY 6 HOURS PRN
Qty: 30 TABLET | Refills: 2 | Status: SHIPPED | OUTPATIENT
Start: 2019-12-06 | End: 2020-01-03 | Stop reason: SDUPTHER

## 2019-12-06 RX ORDER — TRAMADOL HYDROCHLORIDE 50 MG/1
50 TABLET ORAL EVERY 6 HOURS PRN
Qty: 60 TABLET | Refills: 3 | Status: SHIPPED | OUTPATIENT
Start: 2019-12-06 | End: 2020-01-21

## 2019-12-06 NOTE — PROGRESS NOTES
"Chief Complaint:          Chief Complaint   Patient presents with   • Baldder Spasms       HPI:   44 y.o. female.    She presents with bladder spasms. She states her spasms are related to her lower abdominal pain and groin pain. She states its 'My kidney Stones\". She reports nausea and vomiting  With it, and significant discomfort 10/10.    She reports urinary symptoms of frequency and urgency. She states she has dysuria and it feels like razor blades each time she voids. She says its bam stones. But no gross hematuria.    She has a significant history of stones and has had multiple therapies. She went to the ED  A few days ago and had a negative CT scan but positive for constipation which she reports its now resolved.      Past Medical History:        Past Medical History:   Diagnosis Date   • Kidney stones    • Migraine    • Migraines    • Pneumonia    • Strep throat          Current Meds:     Current Outpatient Medications   Medication Sig Dispense Refill   • amitriptyline (ELAVIL) 25 MG tablet Take 25 mg by mouth every night.     • clonazePAM (KlonoPIN) 0.5 MG tablet Take 0.5 mg by mouth 2 (two) times a day as needed for seizures.     • diclofenac (VOLTAREN) 75 MG EC tablet Take 1 tablet by mouth 2 (Two) Times a Day. 30 tablet 0   • gabapentin (NEURONTIN) 600 MG tablet Take 400 mg by mouth 4 (Four) Times a Day.  0   • tamsulosin (FLOMAX) 0.4 MG capsule 24 hr capsule Take 1 capsule by mouth Daily. 30 capsule 0   • traMADol (ULTRAM) 50 MG tablet Take 1 tablet by mouth Every 6 (Six) Hours As Needed for Moderate Pain . 60 tablet 3   • ondansetron (ZOFRAN) 4 MG tablet Take 1 tablet by mouth Daily As Needed for Nausea or Vomiting. 30 tablet 1   • ondansetron ODT (ZOFRAN-ODT) 4 MG disintegrating tablet Take 1 tablet by mouth Every 6 (Six) Hours As Needed for Nausea. 20 tablet 0   • oxyCODONE-acetaminophen (PERCOCET)  MG per tablet Take 1 tablet by mouth Every 6 (Six) Hours As Needed for Moderate Pain . 10 " tablet 0   • promethazine (PHENERGAN) 25 MG tablet Take 1 tablet by mouth Every 6 (Six) Hours As Needed for Nausea or Vomiting. 15 tablet 0   • promethazine (PHENERGAN) 25 MG tablet Take 1 tablet by mouth Every 6 (Six) Hours As Needed for Nausea or Vomiting. 30 tablet 2     No current facility-administered medications for this visit.         Allergies:      Allergies   Allergen Reactions   • Sulfa Antibiotics Anaphylaxis   • Compazine [Prochlorperazine Edisylate] Hives        Past Surgical History:     Past Surgical History:   Procedure Laterality Date   •  SECTION     • COLONOSCOPY     • CYSTOSCOPY ELECTROHYDRAULIC LITHOTRIPSY     • DENTAL PROCEDURE     • EXTRACORPOREAL SHOCK WAVE LITHOTRIPSY (ESWL) Left 2018    Procedure: EXTRACORPOREAL SHOCKWAVE LITHOTRIPSY;  Surgeon: Garth Friedman MD;  Location: [facility];  Service: Urology   • KIDNEY STONE SURGERY           Social History:     Social History     Socioeconomic History   • Marital status:      Spouse name: Not on file   • Number of children: Not on file   • Years of education: Not on file   • Highest education level: Not on file   Tobacco Use   • Smoking status: Former Smoker     Packs/day: 0.50     Years: 15.00     Pack years: 7.50     Types: Cigarettes     Last attempt to quit: 2018     Years since quittin.9   • Smokeless tobacco: Never Used   Substance and Sexual Activity   • Alcohol use: No   • Drug use: No   • Sexual activity: Defer       Family History:     Family History   Problem Relation Age of Onset   • No Known Problems Father    • No Known Problems Mother        Review of Systems:     Review of Systems   Constitutional: Positive for activity change, chills and fatigue.   HENT: Negative for dental problem, ear pain and postnasal drip.    Eyes:        She is tearful and crying in pain   Respiratory: Negative for apnea, cough, choking, chest tightness and stridor.    Cardiovascular: Negative for chest pain,  palpitations and leg swelling.   Gastrointestinal: Positive for abdominal pain, constipation, nausea and vomiting.   Endocrine: Negative for cold intolerance, heat intolerance, polydipsia and polyphagia.   Genitourinary: Positive for dysuria, flank pain, frequency and urgency. Negative for difficulty urinating, dyspareunia, hematuria, pelvic pain and vaginal pain.   Musculoskeletal: Positive for back pain.   Skin: Positive for pallor. Negative for color change.   Allergic/Immunologic: Negative for environmental allergies and food allergies.   Neurological: Negative for dizziness, tremors, speech difficulty and headaches.   Hematological: Negative for adenopathy. Does not bruise/bleed easily.   Psychiatric/Behavioral: Positive for behavioral problems. Negative for agitation, confusion, decreased concentration and dysphoric mood. The patient is not nervous/anxious.    All other systems reviewed and are negative.      Physical Exam:     Physical Exam   Constitutional: She is oriented to person, place, and time. She appears well-developed and well-nourished. She appears distressed.   HENT:   Head: Normocephalic and atraumatic.   Eyes: EOM are normal. Right eye exhibits no discharge. Left eye exhibits no discharge.   Neck: Normal range of motion. Neck supple. No JVD present. No tracheal deviation present. No thyromegaly present.   Cardiovascular: Normal rate, regular rhythm and normal heart sounds. Exam reveals no friction rub.   No murmur heard.  Pulmonary/Chest: Effort normal. No stridor. No respiratory distress. She has no wheezes.   Abdominal: Soft. Bowel sounds are normal. There is tenderness.   Genitourinary: Vagina normal. No vaginal discharge found.   Musculoskeletal: Normal range of motion. She exhibits tenderness. She exhibits no deformity.   Lymphadenopathy:     She has no cervical adenopathy.   Neurological: She is alert and oriented to person, place, and time.   Skin: Skin is dry. Capillary refill takes less  "than 2 seconds. There is pallor.   Psychiatric: She has a normal mood and affect. Judgment and thought content normal.         Procedure:       Assessment/Plan:     Bladder Spasms/Right Flank pain: She presents with Bladder spasms and right flank pain radiating to her groin area. She reports this is consistent with her \"Kidney stone pain\" and she feels like razor blades each time she urinates due to her passing bam pieces of the stones.     We reviewed her Negative CT.  She has had good bowel movements since then she states. She is in apparent discomfort from back pain and  wants some relief. Discussed her symptoms with Dr Friedman who is very familiar with this patient and her treatment of  Her stones. We also discussed the medical management of stone disease and the use of medical expulsive therapy in the form of Flomax she is very farmiliar with. I also talked about nonoperative management including ambulation and increasing fluids and hot tub as being an effective adjuncts in the treatment of  stone.     Dr Friedman has given her a refill of pain medication just in case and Nausea medication and Flomax for medical expulsive therapy.    Will See her back in One week for follow up with a KUB.      Patient reports that she is not currently experiencing any symptoms of urinary incontinence.      Patient's Body mass index is 32.56 kg/m². BMI is above normal parameters. Recommendations include: educational material, exercise counseling and nutrition counseling.                   This document has been electronically signed by Griselda Cheng-Akwa, APRN December 6, 2019 5:08 PM  "

## 2019-12-13 ENCOUNTER — OFFICE VISIT (OUTPATIENT)
Dept: UROLOGY | Facility: CLINIC | Age: 44
End: 2019-12-13

## 2019-12-13 VITALS — WEIGHT: 177 LBS | BODY MASS INDEX: 32.57 KG/M2 | HEIGHT: 62 IN

## 2019-12-13 DIAGNOSIS — N20.1 LEFT URETERAL CALCULUS: Primary | ICD-10-CM

## 2019-12-13 PROCEDURE — 99213 OFFICE O/P EST LOW 20 MIN: CPT | Performed by: UROLOGY

## 2019-12-13 RX ORDER — PROMETHAZINE HYDROCHLORIDE 25 MG/1
25 TABLET ORAL EVERY 6 HOURS PRN
Qty: 21 TABLET | Refills: 2 | Status: SHIPPED | OUTPATIENT
Start: 2019-12-13 | End: 2020-01-03 | Stop reason: SDUPTHER

## 2019-12-13 RX ORDER — TAMSULOSIN HYDROCHLORIDE 0.4 MG/1
1 CAPSULE ORAL NIGHTLY
Qty: 30 CAPSULE | Refills: 5 | Status: SHIPPED | OUTPATIENT
Start: 2019-12-13 | End: 2020-05-12

## 2019-12-13 RX ORDER — OXYCODONE AND ACETAMINOPHEN 10; 325 MG/1; MG/1
1 TABLET ORAL EVERY 6 HOURS PRN
Qty: 12 TABLET | Refills: 0 | Status: SHIPPED | OUTPATIENT
Start: 2019-12-13 | End: 2019-12-31

## 2019-12-13 NOTE — PROGRESS NOTES
Chief Complaint:          Chief Complaint   Patient presents with   • Bladder Spasms   • Right Flank Pain       HPI:   44 y.o. female status post lithotripsy of a left UPJ stone Friday with great results.  She feels great she's passed copious amounts of sand and I'll check her back in 3 months.  She returns today.  She had a prior as wall and 918 and did well she has no fevers and chills started with nausea vomiting and went to the [facility] where she had a CT scan showing bilateral nonobstructive nephrolithiasis.  I have the opportunity to review all the reports and labs.  She then had a urinalysis that was essentially positive for red cells but no white cells.  Her CMP was negative.  Her urine culture was negative.  She had a random repeat culture that was negative.  Pregnancy test was negative.  Her lactic acid was normal.  She had no other abnormalities.  Examination today discloses left lower quadrant abdominal pain that does not appear to be related.  If anything is more of a GI infection I will presumptively treat her with antibiotics for a short period this is not obstructive uropathy.  She returns for follow-up.  There is now no nausea no vomiting and thought that she passed a stone because this is always heralded by diarrhea.  Currently she is feeling better I told her to stay off work for a day or 2 check her back next week his long she's doing better I don't think she needs follow-up I will follow-up with her based on this.  She returns today.  She passed this tiny stone that has the appearance of uric acid.  These do not show up on plain film.  She's having a lot of spasms in her lower tract.  I suspect this is perhaps an additional stone waiting to pass certainly there is no absolute indicators for intervention I did refill her pain medication I will see her in 2 weeks I'm in a get the stone analyzed.  I suspect that this is uric acid.  Stone report came back calcium oxalate.  She still has  right-sided pain with nausea and diarrhea no fevers no chills I refilled her pain medication for short.  I'm going to recommend intervention.  She has adequate supplies of Flomax.    He returns today she has a small right ureteral stone that has not passed she wants to try to pass it as she still mired in bills from the prior ureteroscopy and imaging.  Certainly reasonable she has no absolute indicators for intervention I'm do recommend an attempt at spontaneous passage with the use of medical expulsive therapy used an off label setting and pain medication I'll follow-up with her based on this.  She returns today.  She passed a right sided stone.  It had a calcium oxalate appearance.  It was about 4 mm.  She now has left-sided pain.  I know she has some tiny nephrolithiasis and this is clearly  passing and she is drinking copious [place] I did refill her Phenergan and she works full-time and she is very hesitant to proceed with additional imaging because of significant cost to her.  I gave her an additional short course of pain medication while she passes this left-sided stone  He brought another small stone.  She is passing one on the right now she has very metabolically active stone disease but with increasing fluids I expected to decrease we discussed medical management at length with great now because of her job she cannot leave work I gave her tramadol to try during the day for pain control  She returns today she is 3-week follow-up.  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  I recommended that she use the tramadol during the day and to use the Percocet only at  night.  There is no fevers she has no absolute indicators for intervention at this time.  She returns today she passed one yesterday she is actually doing somewhat better I reviewed it it is a 3 mm distal stone has almost a uric acid appearance now she has no fevers chills he has no nausea, vomiting diarrhea she has adequate Zofran I refilled her Pyridium for her Flomax I gave her some additional pain pills if she has severe pain as she works.  See her back in a month.  Returns today she still has 1 left-sided stone she feels a small I told her that at some point were going to need to repeat imaging.  But her problem is she cannot take off work, she can afford visits because of her job situation.  She is quite emotionally distraught about this and crying and having significant pain but states if she does not do something medically she will lose her job plan to check a film next month I refilled her Phenergan and her Percocet but gave her very small number  She returns today.  It is been a month.  She is passed her left-sided stone.  She now has significant right-sided pain for 3 days she is tearful, writhing in pain she is absolutely adamant about not wanting surgery because of the cost and the fact that she will lose her job she uses tramadol during the day to work through the day and she is given 12 Percocet over a month long.  I made sure she has Flomax.  She has adequate medicine for nausea I told her at some point in the near future I am going to have to recommend any intervention as I cannot keep continuing to manage this.  I will see her back in 2 weeks if she is not better she will get an x-ray.  She has documented CT of bilateral small renal stones so it is very conceivable she continues to pass the stones she promises she decreased the call intake and increased fluid intake.  She returns today is actually doing somewhat better the right lower quadrant pain is decreased to the point she wants to downgrade  the pain medication I would get a KUB today see her back in 3 weeks I believe is probably getting close to passing I am recommending only observation I will plan to follow back up with her in 3 weeks I will notify her of the KUB when it is available.  She returns today she passed one small stone she is having some significant spasms after but overall she tends to be doing better she is actually not crying today she is having no other complaints of but some post spasm she had a refill of her Phenergan for occasional nausea I will see her back in a month  She returns today 2 weeks early Friday morning she passed a 3 mm left stone which I examined.  She has no fevers chills she has nausea vomiting left flank pain.  She wants a refill of her nausea medication and a short additional course of pain medication again, she does not want imaging, and radiation exposure  Returns today she has a left distal stone.  I refilled her tramadol gave her a short course of Percocet for severe pain in her Zofran I will see her back in 4 weeks     She returns today.  She is sobbing in pain.  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  I gave her an additional refill I told her if she does not pass will have to go surgically and get it but will see which one happens and give her some time      Past Medical History:        Past Medical History:   Diagnosis Date   • Kidney stones    • Migraine    • Migraines    • Pneumonia    • Strep throat          Current Meds:     Current Outpatient Medications   Medication Sig Dispense Refill   • amitriptyline (ELAVIL) 25 MG tablet Take 25 mg by mouth every night.     • clonazePAM (KlonoPIN) 0.5 MG tablet Take 0.5 mg by mouth 2 (two) times a day as needed for seizures.     • diclofenac (VOLTAREN) 75 MG EC tablet Take 1 tablet by mouth 2  (Two) Times a Day. 30 tablet 0   • gabapentin (NEURONTIN) 600 MG tablet Take 400 mg by mouth 4 (Four) Times a Day.  0   • ondansetron (ZOFRAN) 4 MG tablet Take 1 tablet by mouth Daily As Needed for Nausea or Vomiting. 30 tablet 1   • ondansetron ODT (ZOFRAN-ODT) 4 MG disintegrating tablet Take 1 tablet by mouth Every 6 (Six) Hours As Needed for Nausea. 20 tablet 0   • promethazine (PHENERGAN) 25 MG tablet Take 1 tablet by mouth Every 6 (Six) Hours As Needed for Nausea or Vomiting. 15 tablet 0   • promethazine (PHENERGAN) 25 MG tablet Take 1 tablet by mouth Every 6 (Six) Hours As Needed for Nausea or Vomiting. 30 tablet 2   • tamsulosin (FLOMAX) 0.4 MG capsule 24 hr capsule Take 1 capsule by mouth Daily. 30 capsule 0   • traMADol (ULTRAM) 50 MG tablet Take 1 tablet by mouth Every 6 (Six) Hours As Needed for Moderate Pain . 60 tablet 3   • oxyCODONE-acetaminophen (PERCOCET)  MG per tablet Take 1 tablet by mouth Every 6 (Six) Hours As Needed for Moderate Pain . 10 tablet 0     No current facility-administered medications for this visit.         Allergies:      Allergies   Allergen Reactions   • Sulfa Antibiotics Anaphylaxis   • Compazine [Prochlorperazine Edisylate] Hives        Past Surgical History:     Past Surgical History:   Procedure Laterality Date   •  SECTION     • COLONOSCOPY     • CYSTOSCOPY ELECTROHYDRAULIC LITHOTRIPSY     • DENTAL PROCEDURE     • EXTRACORPOREAL SHOCK WAVE LITHOTRIPSY (ESWL) Left 2018    Procedure: EXTRACORPOREAL SHOCKWAVE LITHOTRIPSY;  Surgeon: Garth Friedman MD;  Location: [facility];  Service: Urology   • KIDNEY STONE SURGERY           Social History:     Social History     Socioeconomic History   • Marital status:      Spouse name: Not on file   • Number of children: Not on file   • Years of education: Not on file   • Highest education level: Not on file   Tobacco Use   • Smoking status: Former Smoker     Packs/day: 0.50     Years: 15.00     Pack years:  7.50     Types: Cigarettes     Last attempt to quit: 2018     Years since quittin.9   • Smokeless tobacco: Never Used   Substance and Sexual Activity   • Alcohol use: No   • Drug use: No   • Sexual activity: Defer       Family History:     Family History   Problem Relation Age of Onset   • No Known Problems Father    • No Known Problems Mother        Review of Systems:     Review of Systems   Constitutional: Negative.  Negative for activity change, appetite change, chills, diaphoresis, fatigue and unexpected weight change.   HENT: Negative for congestion, dental problem, drooling, ear discharge, ear pain, facial swelling, hearing loss, mouth sores, nosebleeds, postnasal drip, rhinorrhea, sinus pressure, sneezing, sore throat, tinnitus, trouble swallowing and voice change.    Eyes: Negative.  Negative for photophobia, pain, discharge, redness, itching and visual disturbance.   Respiratory: Negative.  Negative for apnea, cough, choking, chest tightness, shortness of breath, wheezing and stridor.    Cardiovascular: Negative.  Negative for chest pain, palpitations and leg swelling.   Gastrointestinal: Negative.  Negative for abdominal distention, abdominal pain, anal bleeding, blood in stool, constipation, diarrhea, nausea, rectal pain and vomiting.   Endocrine: Negative.  Negative for cold intolerance, heat intolerance, polydipsia, polyphagia and polyuria.   Genitourinary: Positive for flank pain, frequency, pelvic pain and urgency.   Musculoskeletal: Negative for arthralgias, back pain, gait problem, joint swelling, myalgias, neck pain and neck stiffness.   Skin: Negative.  Negative for color change, pallor, rash and wound.   Allergic/Immunologic: Negative.  Negative for environmental allergies, food allergies and immunocompromised state.   Neurological: Negative.  Negative for dizziness, tremors, seizures, syncope, facial asymmetry, speech difficulty, weakness, light-headedness, numbness and headaches.    Hematological: Negative.  Negative for adenopathy. Does not bruise/bleed easily.   Psychiatric/Behavioral: Negative for agitation, behavioral problems, confusion, decreased concentration, dysphoric mood, hallucinations, self-injury, sleep disturbance and suicidal ideas. The patient is not nervous/anxious and is not hyperactive.    All other systems reviewed and are negative.      Physical Exam:     Physical Exam   Constitutional: She appears well-developed and well-nourished.   HENT:   Head: Normocephalic and atraumatic.   Right Ear: External ear normal.   Left Ear: External ear normal.   Mouth/Throat: Oropharynx is clear and moist.   Eyes: Pupils are equal, round, and reactive to light. Conjunctivae are normal.   Cardiovascular: Normal rate, regular rhythm, normal heart sounds and intact distal pulses.   Pulmonary/Chest: Effort normal and breath sounds normal.   Abdominal: Soft. Bowel sounds are normal. She exhibits no distension and no mass. There is no tenderness. There is no rebound and no guarding.   Genitourinary: No vaginal discharge found.   Musculoskeletal: Normal range of motion.   Neurological: She is alert. She has normal reflexes.   Skin: Skin is warm and dry.   Psychiatric: She has a normal mood and affect. Her behavior is normal. Judgment and thought content normal.       I have reviewed the following portions of the patient's history: allergies, current medications, past family history, past medical history, past social history, past surgical history, problem list and ROS and confirm it's accurate.      Procedure:       Assessment/Plan:   Ureteral calculus-patient has been diagnosed with a ureteral calculus.  We have discussed the various parameters regarding spontaneous passage including the notion that a 2 mm stone has a high likelihood of spontaneous passage versus a larger stone being caught up in the upper areas of the urinary tract.  We also discussed the medical management of stone disease  and the use of medical expulsive therapy in the form of Flomax.  This is used in an off label setting.  We discussed the indicators for intervention including  absolute indicators such as sepsis and uncontrollable severe pain as well as  the relative indicators of moderate pain that is well-controlled with various analgesia.  I also talked about nonoperative management including ambulation and increasing fluids and hot tub as being an effective adjuncts in the treatment of a ureteral stone.  Narcotic pain medication-patient has significant acute pain that I believe would be an indication for the use of narcotic pain medication.  I discussed the significant risks of pain medication and the fact that this will be a short only option and I will give her no more than a three-day supply of pain medication.  And I will not plan long-term medication and that this will be sent to a pain clinic if at all becomes necessary.  We discussed signing a pain medication agreement and the fact that we're going to run a state [facility] [place] review to be sure the patient is not getting pain medication from elsewhere.  If this is the case we will not give pain medication.  As part of the patient's treatment plan of there being prescribed a controlled substance with potential for abuse.  This patient has been wait aware of the appropriate dose of such medications including, the risk for somnolence, limited ability to drive and/or safety and the significant potential for overdose.  It is been made clear that these medications are for the prescribed patient only without concomitant use of alcohol or other sepsis unless prescribed by the medical provider.  Has completed prescribing agreement detailing the terms of continue prescribing him a controlled substance.  Including monitoring Sam ports, the possibility of urine drug screens and pedal counts.  The patient is aware that we reviewed SAM reports on a regular basis and scan them into the  chart.  History and physical examination exhibited continued safe and appropriate use of controlled substances.  Also discussed the fact that the new [place] legislation allows only a three-day prescription for pain medication.  In this situation he will be referred to a chronic pain clinic.            Patient's Body mass index is 32.37 kg/m². BMI is above normal parameters. Recommendations include: educational material.              This document has been electronically signed by LOU MONTANA MD December 13, 2019 2:00 PM

## 2019-12-23 ENCOUNTER — OFFICE VISIT (OUTPATIENT)
Dept: UROLOGY | Facility: CLINIC | Age: 44
End: 2019-12-23

## 2019-12-23 ENCOUNTER — HOSPITAL ENCOUNTER (OUTPATIENT)
Dept: GENERAL RADIOLOGY | Facility: HOSPITAL | Age: 44
Discharge: HOME | End: 2019-12-23
Admitting: NURSE PRACTITIONER

## 2019-12-23 VITALS
RESPIRATION RATE: 16 BRPM | BODY MASS INDEX: 33.31 KG/M2 | HEIGHT: 62 IN | WEIGHT: 181 LBS | HEART RATE: 104 BPM | SYSTOLIC BLOOD PRESSURE: 132 MMHG | DIASTOLIC BLOOD PRESSURE: 86 MMHG

## 2019-12-23 DIAGNOSIS — N20.1 LEFT URETERAL CALCULUS: ICD-10-CM

## 2019-12-23 DIAGNOSIS — N20.0 KIDNEY STONE: ICD-10-CM

## 2019-12-23 DIAGNOSIS — Z87.898: Primary | ICD-10-CM

## 2019-12-23 DIAGNOSIS — N32.89 BLADDER SPASMS: ICD-10-CM

## 2019-12-23 DIAGNOSIS — R35.0 URINE FREQUENCY: ICD-10-CM

## 2019-12-23 LAB
BILIRUB BLD-MCNC: ABNORMAL MG/DL
CLARITY, POC: CLEAR
COLOR UR: ABNORMAL
GLUCOSE UR STRIP-MCNC: ABNORMAL MG/DL
KETONES UR QL: NEGATIVE
LEUKOCYTE EST, POC: ABNORMAL
NITRITE UR-MCNC: POSITIVE MG/ML
PH UR: 5 [PH] (ref 5–8)
PROT UR STRIP-MCNC: NEGATIVE MG/DL
RBC # UR STRIP: ABNORMAL /UL
SP GR UR: 1.02 (ref 1–1.03)
UROBILINOGEN UR QL: ABNORMAL

## 2019-12-23 PROCEDURE — 74018 RADEX ABDOMEN 1 VIEW: CPT

## 2019-12-23 PROCEDURE — 81003 URINALYSIS AUTO W/O SCOPE: CPT | Performed by: NURSE PRACTITIONER

## 2019-12-23 PROCEDURE — 74018 RADEX ABDOMEN 1 VIEW: CPT | Performed by: RADIOLOGY

## 2019-12-23 PROCEDURE — 99214 OFFICE O/P EST MOD 30 MIN: CPT | Performed by: NURSE PRACTITIONER

## 2019-12-23 PROCEDURE — 87086 URINE CULTURE/COLONY COUNT: CPT | Performed by: NURSE PRACTITIONER

## 2019-12-23 RX ORDER — OXYCODONE AND ACETAMINOPHEN 10; 325 MG/1; MG/1
1 TABLET ORAL EVERY 6 HOURS PRN
Qty: 10 TABLET | Refills: 0 | Status: SHIPPED | OUTPATIENT
Start: 2019-12-23 | End: 2019-12-31

## 2019-12-23 NOTE — PROGRESS NOTES
"Chief Complaint:          Chief Complaint   Patient presents with   • Bladder Spasms     RLQ pain, burning with urination       HPI:   44 y.o. female.    44-year-old female seen in exam room with her .  Patient is crying and sobbing uncontrollably.  She returns today a week earlier prior to her follow-up appointment.    She presents today with right lower quadrant pain, burning on urination, and bladder spasms.  She reports her symptoms have been ongoing x1 week, and it has been progressive.  She states her appointment was scheduled for Friday next week, but she cannot take the discomfort anymore so she decided to come in today.    Upon exam, she has suprapubic discomfort which she describes as bladder spasms.  She states she has been taking Pyridium with minimal relief of her discomfort.  She does not have any CVA tenderness.  She denies gross hematuria, Dysuria, frequency, urgency, and nocturia.  Denies having any nausea, vomiting, or diarrhea.  She is however \"sick to her stomach\".  She does not have any fever, she states she has had some chills.    Due to her extensive history of kidney stones, will get a KUB.        Past Medical History:        Past Medical History:   Diagnosis Date   • Kidney stones    • Migraine    • Migraines    • Pneumonia    • Strep throat      The following portions of the patient's history were reviewed and updated as appropriate: allergies, current medications, past family history, past medical history, past social history, past surgical history and problem list.      Current Meds:     Current Outpatient Medications   Medication Sig Dispense Refill   • amitriptyline (ELAVIL) 25 MG tablet Take 25 mg by mouth every night.     • clonazePAM (KlonoPIN) 0.5 MG tablet Take 0.5 mg by mouth 2 (two) times a day as needed for seizures.     • diclofenac (VOLTAREN) 75 MG EC tablet Take 1 tablet by mouth 2 (Two) Times a Day. 30 tablet 0   • gabapentin (NEURONTIN) 600 MG tablet Take 400 mg by " mouth 4 (Four) Times a Day.  0   • ondansetron (ZOFRAN) 4 MG tablet Take 1 tablet by mouth Daily As Needed for Nausea or Vomiting. 30 tablet 1   • ondansetron ODT (ZOFRAN-ODT) 4 MG disintegrating tablet Take 1 tablet by mouth Every 6 (Six) Hours As Needed for Nausea. 20 tablet 0   • oxyCODONE-acetaminophen (PERCOCET)  MG per tablet Take 1 tablet by mouth Every 6 (Six) Hours As Needed for Moderate Pain . 12 tablet 0   • oxyCODONE-acetaminophen (PERCOCET)  MG per tablet Take 1 tablet by mouth Every 6 (Six) Hours As Needed for Moderate Pain . 10 tablet 0   • promethazine (PHENERGAN) 25 MG tablet Take 1 tablet by mouth Every 6 (Six) Hours As Needed for Nausea or Vomiting. 15 tablet 0   • promethazine (PHENERGAN) 25 MG tablet Take 1 tablet by mouth Every 6 (Six) Hours As Needed for Nausea or Vomiting. 30 tablet 2   • promethazine (PHENERGAN) 25 MG tablet Take 1 tablet by mouth Every 6 (Six) Hours As Needed for Nausea or Vomiting. 21 tablet 2   • tamsulosin (FLOMAX) 0.4 MG capsule 24 hr capsule Take 1 capsule by mouth Daily. 30 capsule 0   • tamsulosin (FLOMAX) 0.4 MG capsule 24 hr capsule Take 1 capsule by mouth Every Night. 30 capsule 5   • traMADol (ULTRAM) 50 MG tablet Take 1 tablet by mouth Every 6 (Six) Hours As Needed for Moderate Pain . 60 tablet 3     No current facility-administered medications for this visit.         Allergies:      Allergies   Allergen Reactions   • Sulfa Antibiotics Anaphylaxis   • Compazine [Prochlorperazine Edisylate] Hives        Past Surgical History:     Past Surgical History:   Procedure Laterality Date   •  SECTION     • COLONOSCOPY     • CYSTOSCOPY ELECTROHYDRAULIC LITHOTRIPSY     • DENTAL PROCEDURE     • EXTRACORPOREAL SHOCK WAVE LITHOTRIPSY (ESWL) Left 2018    Procedure: EXTRACORPOREAL SHOCKWAVE LITHOTRIPSY;  Surgeon: Garth Friedman MD;  Location: [facility];  Service: Urology   • KIDNEY STONE SURGERY           Social History:     Social History      Socioeconomic History   • Marital status:      Spouse name: Not on file   • Number of children: Not on file   • Years of education: Not on file   • Highest education level: Not on file   Tobacco Use   • Smoking status: Former Smoker     Packs/day: 0.50     Years: 15.00     Pack years: 7.50     Types: Cigarettes     Last attempt to quit: 2018     Years since quittin.9   • Smokeless tobacco: Never Used   Substance and Sexual Activity   • Alcohol use: No   • Drug use: No   • Sexual activity: Defer       Family History:     Family History   Problem Relation Age of Onset   • No Known Problems Father    • No Known Problems Mother        Review of Systems:     Review of Systems   Constitutional: Positive for activity change, chills and fatigue. Negative for appetite change.   HENT: Negative for congestion, sinus pressure and sneezing.    Eyes: Negative for blurred vision.   Gastrointestinal: Positive for abdominal pain. Negative for nausea and vomiting.   Genitourinary: Positive for dysuria, flank pain and pelvic pressure. Negative for difficulty urinating, dyspareunia, frequency, genital sores, hematuria, pelvic pain, urgency, urinary incontinence and vaginal discharge.   Musculoskeletal: Positive for back pain. Negative for gait problem.   Skin: Positive for pallor. Negative for color change and dry skin.   Neurological: Positive for weakness. Negative for dizziness, headache, memory problem and confusion.   Hematological: Negative for adenopathy. Does not bruise/bleed easily.   Psychiatric/Behavioral: Negative for behavioral problems and decreased concentration. The patient is not nervous/anxious.         Physical Exam:     Physical Exam   Constitutional: She is oriented to person, place, and time. She appears well-developed and well-nourished. She appears distressed.   HENT:   Head: Normocephalic and atraumatic.   Eyes: Pupils are equal, round, and reactive to light. EOM are normal.   Neck: Normal  range of motion. No tracheal deviation present. No thyromegaly present.   Cardiovascular: Normal rate and regular rhythm.   No murmur heard.  Pulmonary/Chest: Effort normal and breath sounds normal. No stridor. No respiratory distress. She has no wheezes.   Abdominal: Soft. Bowel sounds are normal. There is tenderness.   Genitourinary: Vagina normal. There is no tenderness on the right labia. There is no tenderness on the left labia. No vaginal discharge found.   Genitourinary Comments: Bladder pressure   Musculoskeletal: Normal range of motion. She exhibits tenderness. She exhibits no edema or deformity.   Neurological: She is alert and oriented to person, place, and time. No cranial nerve deficit or sensory deficit. Coordination normal.   Skin: Skin is warm and dry. Capillary refill takes less than 2 seconds. No rash noted. No erythema. No pallor.   Psychiatric: She has a normal mood and affect. Her behavior is normal. Judgment and thought content normal.         Procedure:       Assessment/Plan:     Bladder Spasms/ RLQ abdominal pain: Patient has a very significant history of kidney stones, and her symptoms are very consistent.  She has had multiple therapies, requiring narcotic pain control.    We reviewed her Negative KUB, showing no calcifications overlying the kidneys, or along the course of the ureters.  We discussed the fact that she is still very constipated, which may be the source of her flank pain. She states, She has had good bowel movements, increased her fiber and is drinking copious amounts of water.      She is in apparent discomfort from back pain, RLQ pain and bladder spasms. We will send her urine for culture since she is already on Pyridium,  And  wants some relief.  Discussed pain management with her and that she should use more of her tramadol and less of her Percocet which is a possible cause of her constipation.  Also discussed comfort measures and the alternating use of NSAIDs/Tylenol for  "pain and discomfort.     Discussed her symptoms with Dr Friedman who is very familiar with this patient and her treatment of  \"Her Stones\". We also discussed the medical management of stone disease and the use of medical expulsive therapy in the form of Flomax she is very farmiliar with. I also talked about nonoperative management including ambulation and increasing fluids and hot tub as being an effective adjuncts in the treatment of stones.     Finally, we discussed a kidney stone prevention diet to include increasing p.o. fluid intake, to at least 1 to 2 L of water daily.  She is to avoid caffeine products such as cola, coffee, and to avoid soft or soda drinks.  She is to decrease her sodium consumption as in  Fast foods, ferrara, salted nuts, canned foods, and smoked/cured foods. She is also to decrease her oxalate consumption, as in spinach, Neal greens, and Rhubarb.  Also important is to decrease protein intake, as in red meats, peanut butter, and also avoid nuts.      We will call her with culture results,    We will see her back in 1 month.        Patient reports that she is not currently experiencing any symptoms of urinary incontinence.      Patient's Body mass index is 33.11 kg/m². BMI is above normal parameters. Recommendations include: educational material, exercise counseling and nutrition counseling.          Smoking Cessation Counseling:  Never a smoker.  Patient does not currently use any tobacco products.     Counseling was given to patient for the following topics instructions for management as follows: po fluid intake, engage in kidney stone prevention diet. and importance of treatment compliance due to risk of narcotic induced constipation, and Developing more kidney stones.. The interim medical history and current results were reviewed.  A treatment plan with follow-up was made for Personal history of bilateral flank pain [Z87.898].          This document has been electronically signed by " Griselda Cheng-Akwa, APRN December 23, 2019 5:13 PM

## 2019-12-25 LAB — BACTERIA SPEC AEROBE CULT: NO GROWTH

## 2019-12-31 ENCOUNTER — OFFICE VISIT (OUTPATIENT)
Dept: UROLOGY | Facility: CLINIC | Age: 44
End: 2019-12-31

## 2019-12-31 VITALS — BODY MASS INDEX: 32.83 KG/M2 | HEIGHT: 62 IN | WEIGHT: 178.4 LBS

## 2019-12-31 DIAGNOSIS — N20.1 CALCULUS OF URETER: Primary | ICD-10-CM

## 2019-12-31 PROCEDURE — 99213 OFFICE O/P EST LOW 20 MIN: CPT | Performed by: UROLOGY

## 2019-12-31 RX ORDER — OXYCODONE AND ACETAMINOPHEN 10; 325 MG/1; MG/1
1 TABLET ORAL EVERY 6 HOURS PRN
Qty: 15 TABLET | Refills: 0 | Status: SHIPPED | OUTPATIENT
Start: 2019-12-31 | End: 2020-01-03 | Stop reason: SDUPTHER

## 2020-01-03 ENCOUNTER — OFFICE VISIT (OUTPATIENT)
Dept: UROLOGY | Facility: CLINIC | Age: 45
End: 2020-01-03

## 2020-01-03 VITALS — WEIGHT: 178 LBS | BODY MASS INDEX: 32.76 KG/M2 | HEIGHT: 62 IN

## 2020-01-03 DIAGNOSIS — N20.1 CALCULUS OF URETER: Primary | ICD-10-CM

## 2020-01-03 PROCEDURE — 82360 CALCULUS ASSAY QUANT: CPT | Performed by: UROLOGY

## 2020-01-03 PROCEDURE — 99213 OFFICE O/P EST LOW 20 MIN: CPT | Performed by: UROLOGY

## 2020-01-03 RX ORDER — MELOXICAM 15 MG/1
TABLET ORAL
Refills: 1 | COMMUNITY
Start: 2019-09-30 | End: 2020-01-21

## 2020-01-03 RX ORDER — BACLOFEN 10 MG/1
10 TABLET ORAL 3 TIMES DAILY
COMMUNITY
Start: 2019-12-24

## 2020-01-03 RX ORDER — ONDANSETRON 4 MG/1
4 TABLET, FILM COATED ORAL DAILY PRN
Qty: 30 TABLET | Refills: 1 | Status: SHIPPED | OUTPATIENT
Start: 2020-01-03 | End: 2020-01-21 | Stop reason: SDUPTHER

## 2020-01-03 RX ORDER — OXYCODONE AND ACETAMINOPHEN 10; 325 MG/1; MG/1
1 TABLET ORAL EVERY 6 HOURS PRN
Qty: 10 TABLET | Refills: 0 | Status: SHIPPED | OUTPATIENT
Start: 2020-01-03 | End: 2020-01-21

## 2020-01-03 NOTE — PROGRESS NOTES
Chief Complaint:          Chief Complaint   Patient presents with   • Flank Pain     3 day f/u       HPI:   44 y.o. female status post lithotripsy of a left UPJ stone Friday with great results.  She feels great she's passed copious amounts of sand and I'll check her back in 3 months.  She returns today.  She had a prior as wall and 918 and did well she has no fevers and chills started with nausea vomiting and went to the [facility] where she had a CT scan showing bilateral nonobstructive nephrolithiasis.  I have the opportunity to review all the reports and labs.  She then had a urinalysis that was essentially positive for red cells but no white cells.  Her CMP was negative.  Her urine culture was negative.  She had a random repeat culture that was negative.  Pregnancy test was negative.  Her lactic acid was normal.  She had no other abnormalities.  Examination today discloses left lower quadrant abdominal pain that does not appear to be related.  If anything is more of a GI infection I will presumptively treat her with antibiotics for a short period this is not obstructive uropathy.  She returns for follow-up.  There is now no nausea no vomiting and thought that she passed a stone because this is always heralded by diarrhea.  Currently she is feeling better I told her to stay off work for a day or 2 check her back next week his long she's doing better I don't think she needs follow-up I will follow-up with her based on this.  She returns today.  She passed this tiny stone that has the appearance of uric acid.  These do not show up on plain film.  She's having a lot of spasms in her lower tract.  I suspect this is perhaps an additional stone waiting to pass certainly there is no absolute indicators for intervention I did refill her pain medication I will see her in 2 weeks I'm in a get the stone analyzed.  I suspect that this is uric acid.  Stone report came back calcium oxalate.  She still has right-sided  pain with nausea and diarrhea no fevers no chills I refilled her pain medication for short.  I'm going to recommend intervention.  She has adequate supplies of Flomax.    He returns today she has a small right ureteral stone that has not passed she wants to try to pass it as she still mired in bills from the prior ureteroscopy and imaging.  Certainly reasonable she has no absolute indicators for intervention I'm do recommend an attempt at spontaneous passage with the use of medical expulsive therapy used an off label setting and pain medication I'll follow-up with her based on this.  She returns today.  She passed a right sided stone.  It had a calcium oxalate appearance.  It was about 4 mm.  She now has left-sided pain.  I know she has some tiny nephrolithiasis and this is clearly  passing and she is drinking copious [place]pp[place] I did refill her Phenergan and she works full-time and she is very hesitant to proceed with additional imaging because of significant cost to her.  I gave her an additional short course of pain medication while she passes this left-sided stone  He brought another small stone.  She is passing one on the right now she has very metabolically active stone disease but with increasing fluids I expected to decrease we discussed medical management at length with great now because of her job she cannot leave work I gave her tramadol to try during the day for pain control  She returns today she is 3-week follow-up.  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  I recommended that she use the tramadol during the day and to use the Percocet only at night.  There is  no fevers she has no absolute indicators for intervention at this time.  She returns today she passed one yesterday she is actually doing somewhat better I reviewed it it is a 3 mm distal stone has almost a uric acid appearance now she has no fevers chills he has no nausea, vomiting diarrhea she has adequate Zofran I refilled her Pyridium for her Flomax I gave her some additional pain pills if she has severe pain as she works.  See her back in a month.  Returns today she still has 1 left-sided stone she feels a small I told her that at some point were going to need to repeat imaging.  But her problem is she cannot take off work, she can afford visits because of her job situation.  She is quite emotionally distraught about this and crying and having significant pain but states if she does not do something medically she will lose her job plan to check a film next month I refilled her Phenergan and her Percocet but gave her very small number  She returns today.  It is been a month.  She is passed her left-sided stone.  She now has significant right-sided pain for 3 days she is tearful, writhing in pain she is absolutely adamant about not wanting surgery because of the cost and the fact that she will lose her job she uses tramadol during the day to work through the day and she is given 12 Percocet over a month long.  I made sure she has Flomax.  She has adequate medicine for nausea I told her at some point in the near future I am going to have to recommend any intervention as I cannot keep continuing to manage this.  I will see her back in 2 weeks if she is not better she will get an x-ray.  She has documented CT of bilateral small renal stones so it is very conceivable she continues to pass the stones she promises she decreased the call intake and increased fluid intake.  She returns today is actually doing somewhat better the right lower quadrant pain is decreased to the point she wants to downgrade the pain  medication I would get a KUB today see her back in 3 weeks I believe is probably getting close to passing I am recommending only observation I will plan to follow back up with her in 3 weeks I will notify her of the KUB when it is available.  She returns today she passed one small stone she is having some significant spasms after but overall she tends to be doing better she is actually not crying today she is having no other complaints of but some post spasm she had a refill of her Phenergan for occasional nausea I will see her back in a month  She returns today 2 weeks early Friday morning she passed a 3 mm left stone which I examined.  She has no fevers chills she has nausea vomiting left flank pain.  She wants a refill of her nausea medication and a short additional course of pain medication again, she does not want imaging, and radiation exposure  Returns today she has a left distal stone.  I refilled her tramadol gave her a short course of Percocet for severe pain in her Zofran I will see her back in 4 weeks     She returns today.  She is sobbing in pain.  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  I gave her an additional refill I told her if she does not pass will have to go surgically and get it but will see which one happens and give her some time  She returns today sobbing.  I told her she needs to have a CT before proceeding with any other work-up to rule out stones.  She came in today accompanied by daughter and .  It was very enlightening.  Apparently she has a very decreased volume of water and very increased volume of root beer.  Today, she brought in a number of stones she passed and she feels dramatically better and abdomen to have them analyzed.  These are not the typical appearing stones.  I will see her back in 3  weeks      Past Medical History:        Past Medical History:   Diagnosis Date   • Complicated UTI (urinary tract infection)    • Kidney stones    • Migraine    • Migraines    • Pneumonia    • Polycystic kidney disease    • Strep throat          Current Meds:     Current Outpatient Medications   Medication Sig Dispense Refill   • amitriptyline (ELAVIL) 25 MG tablet Take 25 mg by mouth every night.     • baclofen (LIORESAL) 10 MG tablet      • clonazePAM (KlonoPIN) 0.5 MG tablet Take 0.5 mg by mouth 2 (two) times a day as needed for seizures.     • gabapentin (NEURONTIN) 600 MG tablet Take 400 mg by mouth 4 (Four) Times a Day.  0   • meloxicam (MOBIC) 15 MG tablet TAKE 1 TABLET BY MOUTH EVERY DAY FOR PAIN OR INFLAMMATION  1   • oxyCODONE-acetaminophen (PERCOCET)  MG per tablet Take 1 tablet by mouth Every 6 (Six) Hours As Needed for Moderate Pain . 15 tablet 0   • promethazine (PHENERGAN) 25 MG tablet Take 1 tablet by mouth Every 6 (Six) Hours As Needed for Nausea or Vomiting. 15 tablet 0   • tamsulosin (FLOMAX) 0.4 MG capsule 24 hr capsule Take 1 capsule by mouth Every Night. 30 capsule 5   • traMADol (ULTRAM) 50 MG tablet Take 1 tablet by mouth Every 6 (Six) Hours As Needed for Moderate Pain . 60 tablet 3     No current facility-administered medications for this visit.         Allergies:      Allergies   Allergen Reactions   • Sulfa Antibiotics Anaphylaxis   • Compazine [Prochlorperazine Edisylate] Hives        Past Surgical History:     Past Surgical History:   Procedure Laterality Date   •  SECTION     • COLONOSCOPY     • CYSTOSCOPY     • CYSTOSCOPY ELECTROHYDRAULIC LITHOTRIPSY     • DENTAL PROCEDURE     • EXTRACORPOREAL SHOCK WAVE LITHOTRIPSY (ESWL) Left 2018    Procedure: EXTRACORPOREAL SHOCKWAVE LITHOTRIPSY;  Surgeon: Garth Friedman MD;  Location: [facility];  Service: Urology   • KIDNEY STONE SURGERY     • LITHOTRIPSY           Social History:     Social History     Socioeconomic  History   • Marital status:      Spouse name: Not on file   • Number of children: Not on file   • Years of education: Not on file   • Highest education level: Not on file   Tobacco Use   • Smoking status: Former Smoker     Packs/day: 0.50     Years: 15.00     Pack years: 7.50     Types: Cigarettes     Last attempt to quit: 2018     Years since quittin.0   • Smokeless tobacco: Never Used   Substance and Sexual Activity   • Alcohol use: No   • Drug use: No   • Sexual activity: Yes     Partners: Male     Birth control/protection: None       Family History:     Family History   Problem Relation Age of Onset   • No Known Problems Father    • No Known Problems Mother        Review of Systems:     Review of Systems   Constitutional: Negative.  Negative for activity change, appetite change, chills, diaphoresis, fatigue and unexpected weight change.   HENT: Negative for congestion, dental problem, drooling, ear discharge, ear pain, facial swelling, hearing loss, mouth sores, nosebleeds, postnasal drip, rhinorrhea, sinus pressure, sneezing, sore throat, tinnitus, trouble swallowing and voice change.    Eyes: Negative.  Negative for photophobia, pain, discharge, redness, itching and visual disturbance.   Respiratory: Negative.  Negative for apnea, cough, choking, chest tightness, shortness of breath, wheezing and stridor.    Cardiovascular: Negative.  Negative for chest pain, palpitations and leg swelling.   Gastrointestinal: Negative.  Negative for abdominal distention, abdominal pain, anal bleeding, blood in stool, constipation, diarrhea, nausea, rectal pain and vomiting.   Endocrine: Negative.  Negative for cold intolerance, heat intolerance, polydipsia, polyphagia and polyuria.   Musculoskeletal: Negative.  Negative for arthralgias, back pain, gait problem, joint swelling, myalgias, neck pain and neck stiffness.   Skin: Negative.  Negative for color change, pallor, rash and wound.   Allergic/Immunologic:  Negative.  Negative for environmental allergies, food allergies and immunocompromised state.   Neurological: Negative.  Negative for dizziness, tremors, seizures, syncope, facial asymmetry, speech difficulty, weakness, light-headedness, numbness and headaches.   Hematological: Negative.  Negative for adenopathy. Does not bruise/bleed easily.   Psychiatric/Behavioral: Negative for agitation, behavioral problems, confusion, decreased concentration, dysphoric mood, hallucinations, self-injury, sleep disturbance and suicidal ideas. The patient is not nervous/anxious and is not hyperactive.    All other systems reviewed and are negative.      Physical Exam:     Physical Exam   Constitutional: She appears well-developed and well-nourished.   HENT:   Head: Normocephalic and atraumatic.   Right Ear: External ear normal.   Left Ear: External ear normal.   Mouth/Throat: Oropharynx is clear and moist.   Eyes: Pupils are equal, round, and reactive to light. Conjunctivae are normal.   Cardiovascular: Normal rate, regular rhythm, normal heart sounds and intact distal pulses.   Pulmonary/Chest: Effort normal and breath sounds normal.   Abdominal: Soft. Bowel sounds are normal. She exhibits no distension and no mass. There is no tenderness. There is no rebound and no guarding.   Genitourinary: No vaginal discharge found.   Musculoskeletal: Normal range of motion.   Neurological: She is alert. She has normal reflexes.   Skin: Skin is warm and dry.   Psychiatric: She has a normal mood and affect. Her behavior is normal. Judgment and thought content normal.       I have reviewed the following portions of the patient's history: allergies, current medications, past family history, past medical history, past social history, past surgical history, problem list and ROS and confirm it's accurate.      Procedure:       Assessment/Plan:   Ureteral calculus-patient has been diagnosed with a ureteral calculus.  We have discussed the various  parameters regarding spontaneous passage including the notion that a 2 mm stone has a high likelihood of spontaneous passage versus a larger stone being caught up in the upper areas of the urinary tract.  We also discussed the medical management of stone disease and the use of medical expulsive therapy in the form of Flomax.  This is used in an off label setting.  We discussed the indicators for intervention including  absolute indicators such as sepsis and uncontrollable severe pain as well as  the relative indicators of moderate pain that is well-controlled with various analgesia.  I also talked about nonoperative management including ambulation and increasing fluids and hot tub as being an effective adjuncts in the treatment of a ureteral stone.  She has extremely metabolically active stone disease but we find out to risk factors that here to 4 she did not admit to.  I also sent a fairly large fairly loose aggregate stone for analysis            Patient's Body mass index is 32.56 kg/m². BMI is above normal parameters. Recommendations include: educational material.              This document has been electronically signed by LOU MONTANA MD January 3, 2020 2:26 PM

## 2020-01-14 LAB
CA PHOS CRY STONE QL IR: 15 %
COD CRY STONE QL IR: 30 %
COLOR STONE: NORMAL
COM CRY STONE QL IR: 55 %
COMPN STONE: NORMAL
Lab: NORMAL
Lab: NORMAL
NIDUS STONE QL: NORMAL
PATH REPORT.COMMENTS IMP SPEC: NORMAL
SIZE STONE: NORMAL MM
WT STONE: 41.3 MG

## 2020-01-19 ENCOUNTER — APPOINTMENT (OUTPATIENT)
Dept: CT IMAGING | Facility: HOSPITAL | Age: 45
End: 2020-01-19

## 2020-01-19 ENCOUNTER — HOSPITAL ENCOUNTER (EMERGENCY)
Facility: HOSPITAL | Age: 45
Discharge: HOME | End: 2020-01-19
Attending: FAMILY MEDICINE | Admitting: FAMILY MEDICINE

## 2020-01-19 VITALS
HEIGHT: 62 IN | RESPIRATION RATE: 18 BRPM | DIASTOLIC BLOOD PRESSURE: 92 MMHG | OXYGEN SATURATION: 100 % | HEART RATE: 70 BPM | WEIGHT: 172 LBS | TEMPERATURE: 98 F | SYSTOLIC BLOOD PRESSURE: 132 MMHG | BODY MASS INDEX: 31.65 KG/M2

## 2020-01-19 DIAGNOSIS — N23 RENAL COLIC ON LEFT SIDE: Primary | ICD-10-CM

## 2020-01-19 LAB
ALBUMIN SERPL-MCNC: 4.42 G/DL (ref 3.5–5.2)
ALBUMIN/GLOB SERPL: 1.5 G/DL
ALP SERPL-CCNC: 73 U/L (ref 39–117)
ALT SERPL W P-5'-P-CCNC: 12 U/L (ref 1–33)
ANION GAP SERPL CALCULATED.3IONS-SCNC: 13.9 MMOL/L (ref 5–15)
AST SERPL-CCNC: 19 U/L (ref 1–32)
B-HCG UR QL: NEGATIVE
BACTERIA UR QL AUTO: ABNORMAL /HPF
BASOPHILS # BLD AUTO: 0.03 10*3/MM3 (ref 0–0.2)
BASOPHILS NFR BLD AUTO: 0.3 % (ref 0–1.5)
BILIRUB SERPL-MCNC: 0.6 MG/DL (ref 0.2–1.2)
BILIRUB UR QL STRIP: NEGATIVE
BUN BLD-MCNC: 13 MG/DL (ref 6–20)
BUN/CREAT SERPL: 15.3 (ref 7–25)
CALCIUM SPEC-SCNC: 9.3 MG/DL (ref 8.6–10.5)
CHLORIDE SERPL-SCNC: 105 MMOL/L (ref 98–107)
CLARITY UR: ABNORMAL
CO2 SERPL-SCNC: 21.1 MMOL/L (ref 22–29)
COLOR UR: ABNORMAL
CREAT BLD-MCNC: 0.85 MG/DL (ref 0.57–1)
CRP SERPL-MCNC: 0.09 MG/DL (ref 0–0.5)
DEPRECATED RDW RBC AUTO: 46.8 FL (ref 37–54)
EOSINOPHIL # BLD AUTO: 0.06 10*3/MM3 (ref 0–0.4)
EOSINOPHIL NFR BLD AUTO: 0.5 % (ref 0.3–6.2)
ERYTHROCYTE [DISTWIDTH] IN BLOOD BY AUTOMATED COUNT: 13.2 % (ref 12.3–15.4)
GFR SERPL CREATININE-BSD FRML MDRD: 73 ML/MIN/1.73
GLOBULIN UR ELPH-MCNC: 2.9 GM/DL
GLUCOSE BLD-MCNC: 102 MG/DL (ref 65–99)
GLUCOSE UR STRIP-MCNC: NEGATIVE MG/DL
HCT VFR BLD AUTO: 41.9 % (ref 34–46.6)
HGB BLD-MCNC: 13.7 G/DL (ref 12–15.9)
HGB UR QL STRIP.AUTO: ABNORMAL
HYALINE CASTS UR QL AUTO: ABNORMAL /LPF
IMM GRANULOCYTES # BLD AUTO: 0.05 10*3/MM3 (ref 0–0.05)
IMM GRANULOCYTES NFR BLD AUTO: 0.4 % (ref 0–0.5)
KETONES UR QL STRIP: NEGATIVE
LEUKOCYTE ESTERASE UR QL STRIP.AUTO: ABNORMAL
LYMPHOCYTES # BLD AUTO: 3.37 10*3/MM3 (ref 0.7–3.1)
LYMPHOCYTES NFR BLD AUTO: 29 % (ref 19.6–45.3)
MCH RBC QN AUTO: 31.6 PG (ref 26.6–33)
MCHC RBC AUTO-ENTMCNC: 32.7 G/DL (ref 31.5–35.7)
MCV RBC AUTO: 96.8 FL (ref 79–97)
MONOCYTES # BLD AUTO: 0.65 10*3/MM3 (ref 0.1–0.9)
MONOCYTES NFR BLD AUTO: 5.6 % (ref 5–12)
NEUTROPHILS # BLD AUTO: 7.46 10*3/MM3 (ref 1.7–7)
NEUTROPHILS NFR BLD AUTO: 64.2 % (ref 42.7–76)
NITRITE UR QL STRIP: NEGATIVE
NRBC BLD AUTO-RTO: 0 /100 WBC (ref 0–0.2)
PH UR STRIP.AUTO: 7 [PH] (ref 5–8)
PLATELET # BLD AUTO: 338 10*3/MM3 (ref 140–450)
PMV BLD AUTO: 10 FL (ref 6–12)
POTASSIUM BLD-SCNC: 4.4 MMOL/L (ref 3.5–5.2)
PROT SERPL-MCNC: 7.3 G/DL (ref 6–8.5)
PROT UR QL STRIP: NEGATIVE
RBC # BLD AUTO: 4.33 10*6/MM3 (ref 3.77–5.28)
RBC # UR: ABNORMAL /HPF
REF LAB TEST METHOD: ABNORMAL
SODIUM BLD-SCNC: 140 MMOL/L (ref 136–145)
SP GR UR STRIP: 1.02 (ref 1–1.03)
SQUAMOUS #/AREA URNS HPF: ABNORMAL /HPF
UROBILINOGEN UR QL STRIP: ABNORMAL
WBC NRBC COR # BLD: 11.62 10*3/MM3 (ref 3.4–10.8)
WBC UR QL AUTO: ABNORMAL /HPF

## 2020-01-19 PROCEDURE — 87186 SC STD MICRODIL/AGAR DIL: CPT | Performed by: PHYSICIAN ASSISTANT

## 2020-01-19 PROCEDURE — 74176 CT ABD & PELVIS W/O CONTRAST: CPT

## 2020-01-19 PROCEDURE — 25010000002 MORPHINE PER 10 MG: Performed by: FAMILY MEDICINE

## 2020-01-19 PROCEDURE — 96374 THER/PROPH/DIAG INJ IV PUSH: CPT

## 2020-01-19 PROCEDURE — 36415 COLL VENOUS BLD VENIPUNCTURE: CPT

## 2020-01-19 PROCEDURE — 85025 COMPLETE CBC W/AUTO DIFF WBC: CPT | Performed by: FAMILY MEDICINE

## 2020-01-19 PROCEDURE — 81001 URINALYSIS AUTO W/SCOPE: CPT | Performed by: PHYSICIAN ASSISTANT

## 2020-01-19 PROCEDURE — 87086 URINE CULTURE/COLONY COUNT: CPT | Performed by: PHYSICIAN ASSISTANT

## 2020-01-19 PROCEDURE — 80053 COMPREHEN METABOLIC PANEL: CPT | Performed by: FAMILY MEDICINE

## 2020-01-19 PROCEDURE — 99283 EMERGENCY DEPT VISIT LOW MDM: CPT

## 2020-01-19 PROCEDURE — 81025 URINE PREGNANCY TEST: CPT | Performed by: PHYSICIAN ASSISTANT

## 2020-01-19 PROCEDURE — 87147 CULTURE TYPE IMMUNOLOGIC: CPT | Performed by: PHYSICIAN ASSISTANT

## 2020-01-19 PROCEDURE — 96376 TX/PRO/DX INJ SAME DRUG ADON: CPT

## 2020-01-19 PROCEDURE — 25010000002 ONDANSETRON PER 1 MG: Performed by: FAMILY MEDICINE

## 2020-01-19 PROCEDURE — 96375 TX/PRO/DX INJ NEW DRUG ADDON: CPT

## 2020-01-19 PROCEDURE — 74176 CT ABD & PELVIS W/O CONTRAST: CPT | Performed by: RADIOLOGY

## 2020-01-19 PROCEDURE — 86140 C-REACTIVE PROTEIN: CPT | Performed by: FAMILY MEDICINE

## 2020-01-19 RX ORDER — MORPHINE SULFATE 2 MG/ML
2 INJECTION, SOLUTION INTRAMUSCULAR; INTRAVENOUS ONCE
Status: COMPLETED | OUTPATIENT
Start: 2020-01-19 | End: 2020-01-19

## 2020-01-19 RX ORDER — SODIUM CHLORIDE 0.9 % (FLUSH) 0.9 %
10 SYRINGE (ML) INJECTION AS NEEDED
Status: DISCONTINUED | OUTPATIENT
Start: 2020-01-19 | End: 2020-01-19 | Stop reason: HOSPADM

## 2020-01-19 RX ORDER — ONDANSETRON 2 MG/ML
4 INJECTION INTRAMUSCULAR; INTRAVENOUS ONCE
Status: COMPLETED | OUTPATIENT
Start: 2020-01-19 | End: 2020-01-19

## 2020-01-19 RX ORDER — CEPHALEXIN 500 MG/1
500 CAPSULE ORAL 2 TIMES DAILY
Qty: 20 CAPSULE | Refills: 0 | Status: SHIPPED | OUTPATIENT
Start: 2020-01-19 | End: 2020-04-16

## 2020-01-19 RX ORDER — HYDROCODONE BITARTRATE AND ACETAMINOPHEN 7.5; 325 MG/1; MG/1
1 TABLET ORAL EVERY 8 HOURS PRN
Qty: 10 TABLET | Refills: 0 | Status: SHIPPED | OUTPATIENT
Start: 2020-01-19 | End: 2020-01-21

## 2020-01-19 RX ADMIN — MORPHINE SULFATE 2 MG: 2 INJECTION, SOLUTION INTRAMUSCULAR; INTRAVENOUS at 16:16

## 2020-01-19 RX ADMIN — SODIUM CHLORIDE 1000 ML: 9 INJECTION, SOLUTION INTRAVENOUS at 15:24

## 2020-01-19 RX ADMIN — ONDANSETRON 4 MG: 2 INJECTION INTRAMUSCULAR; INTRAVENOUS at 15:25

## 2020-01-19 RX ADMIN — MORPHINE SULFATE 4 MG: 4 INJECTION, SOLUTION INTRAMUSCULAR; INTRAVENOUS at 15:25

## 2020-01-19 NOTE — ED NOTES
Multiple attempts to obtain IV access. Lead nurse at bedside to try to obtain IV access     Usama Lino, RN  01/19/20 [id number]

## 2020-01-19 NOTE — ED NOTES
Patient discharged from ED at this time. Patient verbalized understanding of discharge instructions. Patient verbalized understanding of medication and follow up care. No needs or concerns voiced at this time. VSS. NADA.       Usama Lino, JANA  01/19/20 [id number]

## 2020-01-19 NOTE — ED PROVIDER NOTES
Subjective     History provided by:  Patient  Flank Pain   Pain location:  L flank  Pain quality: aching and stabbing    Pain radiates to:  Suprapubic region  Pain severity:  Moderate  Onset quality:  Sudden  Timing:  Constant  Progression:  Worsening  Chronicity:  Recurrent  Relieved by:  Nothing  Ineffective treatments:  NSAIDs  Associated symptoms: nausea and vomiting    Risk factors comment:  Chronic kidney stones.       Review of Systems   Gastrointestinal: Positive for nausea and vomiting.   Genitourinary: Positive for flank pain.       Past Medical History:   Diagnosis Date   • Complicated UTI (urinary tract infection)    • Kidney stones    • Migraine    • Migraines    • Pneumonia    • Polycystic kidney disease    • Strep throat        Allergies   Allergen Reactions   • Sulfa Antibiotics Anaphylaxis   • Compazine [Prochlorperazine Edisylate] Hives       Past Surgical History:   Procedure Laterality Date   •  SECTION     • COLONOSCOPY     • CYSTOSCOPY     • CYSTOSCOPY ELECTROHYDRAULIC LITHOTRIPSY     • DENTAL PROCEDURE     • EXTRACORPOREAL SHOCK WAVE LITHOTRIPSY (ESWL) Left 2018    Procedure: EXTRACORPOREAL SHOCKWAVE LITHOTRIPSY;  Surgeon: Garth Friedman MD;  Location: [facility];  Service: Urology   • KIDNEY STONE SURGERY     • LITHOTRIPSY         Family History   Problem Relation Age of Onset   • No Known Problems Father    • No Known Problems Mother        Social History     Socioeconomic History   • Marital status:      Spouse name: Not on file   • Number of children: Not on file   • Years of education: Not on file   • Highest education level: Not on file   Tobacco Use   • Smoking status: Former Smoker     Packs/day: 0.50     Years: 15.00     Pack years: 7.50     Types: Cigarettes     Last attempt to quit: 2018     Years since quittin.0   • Smokeless tobacco: Never Used   Substance and Sexual Activity   • Alcohol use: No   • Drug use: No   • Sexual activity: Yes      Partners: Male     Birth control/protection: None           Objective   Physical Exam   Constitutional: She is oriented to person, place, and time. She appears well-developed and well-nourished. No distress.   HENT:   Head: Normocephalic and atraumatic.   Right Ear: External ear normal.   Left Ear: External ear normal.   Nose: Nose normal.   Eyes: Conjunctivae are normal.   Neck: Normal range of motion. Neck supple. No JVD present. No tracheal deviation present.   Cardiovascular: Normal rate, regular rhythm and normal heart sounds.   No murmur heard.  Pulmonary/Chest: Effort normal and breath sounds normal. No respiratory distress. She has no wheezes.   Abdominal: Soft. Bowel sounds are normal. There is tenderness.   Tenderness of the left flank.    Musculoskeletal: Normal range of motion. She exhibits no edema or deformity.   Neurological: She is alert and oriented to person, place, and time. No cranial nerve deficit.   Skin: Skin is warm and dry. No rash noted. She is not diaphoretic. No erythema. No pallor.   Psychiatric: She has a normal mood and affect. Her behavior is normal. Thought content normal.   Nursing note and vitals reviewed.      Procedures           ED Course  ED Course as of Jan 19 1611   Sun Jan 19, 2020   1604 CT rad interpreted:  1. Bilateral nonobstructing kidney stones and bilateral renal cysts. No  hydronephrosis identified.  2. Otherwise no acute findings identified within the abdomen or pelvis.    [RB]      ED Course User Index  [RB] John Paul Truong II, PA                                               MDM  Number of Diagnoses or Management Options  Renal colic on left side: new and requires workup     Amount and/or Complexity of Data Reviewed  Clinical lab tests: ordered and reviewed  Tests in the radiology section of CPT®: ordered and reviewed    Risk of Complications, Morbidity, and/or Mortality  Presenting problems: moderate  Diagnostic procedures: moderate  Management options:  low    Patient Progress  Patient progress: stable      Final diagnoses:   Renal colic on left side            John Paul Truong II, PA  01/19/20 1012

## 2020-01-21 ENCOUNTER — OFFICE VISIT (OUTPATIENT)
Dept: UROLOGY | Facility: CLINIC | Age: 45
End: 2020-01-21

## 2020-01-21 VITALS — BODY MASS INDEX: 32.2 KG/M2 | WEIGHT: 175 LBS | HEIGHT: 62 IN

## 2020-01-21 DIAGNOSIS — N20.1 CALCULUS OF URETER: ICD-10-CM

## 2020-01-21 LAB — BACTERIA SPEC AEROBE CULT: ABNORMAL

## 2020-01-21 PROCEDURE — 99213 OFFICE O/P EST LOW 20 MIN: CPT | Performed by: UROLOGY

## 2020-01-21 RX ORDER — ONDANSETRON 4 MG/1
4 TABLET, FILM COATED ORAL DAILY PRN
Qty: 30 TABLET | Refills: 1 | Status: SHIPPED | OUTPATIENT
Start: 2020-01-21 | End: 2020-09-19 | Stop reason: SDUPTHER

## 2020-01-21 RX ORDER — TRAMADOL HYDROCHLORIDE 50 MG/1
50 TABLET ORAL EVERY 6 HOURS PRN
Qty: 60 TABLET | Refills: 2 | Status: SHIPPED | OUTPATIENT
Start: 2020-01-21 | End: 2020-04-16

## 2020-01-21 NOTE — PROGRESS NOTES
Chief Complaint:          Chief Complaint   Patient presents with   • Er f/u       HPI:   44 y.o. female status post lithotripsy of a left UPJ stone Friday with great results.  She feels great she's passed copious amounts of sand and I'll check her back in 3 months.  She returns today.  She had a prior as wall and 918 and did well she has no fevers and chills started with nausea vomiting and went to the [facility] where she had a CT scan showing bilateral nonobstructive nephrolithiasis.  I have the opportunity to review all the reports and labs.  She then had a urinalysis that was essentially positive for red cells but no white cells.  Her CMP was negative.  Her urine culture was negative.  She had a random repeat culture that was negative.  Pregnancy test was negative.  Her lactic acid was normal.  She had no other abnormalities.  Examination today discloses left lower quadrant abdominal pain that does not appear to be related.  If anything is more of a GI infection I will presumptively treat her with antibiotics for a short period this is not obstructive uropathy.  She returns for follow-up.  There is now no nausea no vomiting and thought that she passed a stone because this is always heralded by diarrhea.  Currently she is feeling better I told her to stay off work for a day or 2 check her back next week his long she's doing better I don't think she needs follow-up I will follow-up with her based on this.  She returns today.  She passed this tiny stone that has the appearance of uric acid.  These do not show up on plain film.  She's having a lot of spasms in her lower tract.  I suspect this is perhaps an additional stone waiting to pass certainly there is no absolute indicators for intervention I did refill her pain medication I will see her in 2 weeks I'm in a get the stone analyzed.  I suspect that this is uric acid.  Stone report came back calcium oxalate.  She still has right-sided pain with nausea  and diarrhea no fevers no chills I refilled her pain medication for short.  I'm going to recommend intervention.  She has adequate supplies of Flomax.    He returns today she has a small right ureteral stone that has not passed she wants to try to pass it as she still mired in bills from the prior ureteroscopy and imaging.  Certainly reasonable she has no absolute indicators for intervention I'm do recommend an attempt at spontaneous passage with the use of medical expulsive therapy used an off label setting and pain medication I'll follow-up with her based on this.  She returns today.  She passed a right sided stone.  It had a calcium oxalate appearance.  It was about 4 mm.  She now has left-sided pain.  I know she has some tiny nephrolithiasis and this is clearly  passing and she is drinking copious [place]pp[place] I did refill her Phenergan and she works full-time and she is very hesitant to proceed with additional imaging because of significant cost to her.  I gave her an additional short course of pain medication while she passes this left-sided stone  He brought another small stone.  She is passing one on the right now she has very metabolically active stone disease but with increasing fluids I expected to decrease we discussed medical management at length with great now because of her job she cannot leave work I gave her tramadol to try during the day for pain control  She returns today she is 3-week follow-up.  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  I recommended that she use the tramadol during the day and to use the Percocet only at night.  There is no fevers she  has no absolute indicators for intervention at this time.  She returns today she passed one yesterday she is actually doing somewhat better I reviewed it it is a 3 mm distal stone has almost a uric acid appearance now she has no fevers chills he has no nausea, vomiting diarrhea she has adequate Zofran I refilled her Pyridium for her Flomax I gave her some additional pain pills if she has severe pain as she works.  See her back in a month.  Returns today she still has 1 left-sided stone she feels a small I told her that at some point were going to need to repeat imaging.  But her problem is she cannot take off work, she can afford visits because of her job situation.  She is quite emotionally distraught about this and crying and having significant pain but states if she does not do something medically she will lose her job plan to check a film next month I refilled her Phenergan and her Percocet but gave her very small number  She returns today.  It is been a month.  She is passed her left-sided stone.  She now has significant right-sided pain for 3 days she is tearful, writhing in pain she is absolutely adamant about not wanting surgery because of the cost and the fact that she will lose her job she uses tramadol during the day to work through the day and she is given 12 Percocet over a month long.  I made sure she has Flomax.  She has adequate medicine for nausea I told her at some point in the near future I am going to have to recommend any intervention as I cannot keep continuing to manage this.  I will see her back in 2 weeks if she is not better she will get an x-ray.  She has documented CT of bilateral small renal stones so it is very conceivable she continues to pass the stones she promises she decreased the call intake and increased fluid intake.  She returns today is actually doing somewhat better the right lower quadrant pain is decreased to the point she wants to downgrade the pain medication I would get  a KUB today see her back in 3 weeks I believe is probably getting close to passing I am recommending only observation I will plan to follow back up with her in 3 weeks I will notify her of the KUB when it is available.  She returns today she passed one small stone she is having some significant spasms after but overall she tends to be doing better she is actually not crying today she is having no other complaints of but some post spasm she had a refill of her Phenergan for occasional nausea I will see her back in a month  She returns today 2 weeks early Friday morning she passed a 3 mm left stone which I examined.  She has no fevers chills she has nausea vomiting left flank pain.  She wants a refill of her nausea medication and a short additional course of pain medication again, she does not want imaging, and radiation exposure  Returns today she has a left distal stone.  I refilled her tramadol gave her a short course of Percocet for severe pain in her Zofran I will see her back in 4 weeks     She returns today.  She is sobbing in pain.  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  I gave her an additional refill I told her if she does not pass will have to go surgically and get it but will see which one happens and give her some time  She returns today sobbing.  I told her she needs to have a CT before proceeding with any other work-up to rule out stones.  She came in today accompanied by daughter and .  It was very enlightening.  Apparently she has a very decreased volume of water and very increased volume of root beer.  Today, she brought in a number of stones she passed and she feels dramatically better and abdomen to have them analyzed.  These are not the typical appearing stones.  I will see her back in 3 weeks  She returns today accompanied  by her  again she says she passed 3 stones we analyzed they were calcium oxalate.  She went to emergency room on  she got a CT for severe left flank pain showing bilateral renal cysts and tiny punctate stones she is here wanting pain medication I explained to her no stones in the ureters or obstruction and I am not can give her any more pain medicine I gave her tramadol I offered her Toradol and I will see her back in 2 weeks      Past Medical History:        Past Medical History:   Diagnosis Date   • Complicated UTI (urinary tract infection)    • Kidney stones    • Migraine    • Migraines    • Pneumonia    • Polycystic kidney disease    • Strep throat          Current Meds:     Current Outpatient Medications   Medication Sig Dispense Refill   • amitriptyline (ELAVIL) 25 MG tablet Take 25 mg by mouth every night.     • baclofen (LIORESAL) 10 MG tablet      • cephalexin (KEFLEX) 500 MG capsule Take 1 capsule by mouth 2 (Two) Times a Day. 20 capsule 0   • clonazePAM (KlonoPIN) 0.5 MG tablet Take 0.5 mg by mouth 2 (two) times a day as needed for seizures.     • gabapentin (NEURONTIN) 600 MG tablet Take 400 mg by mouth 4 (Four) Times a Day.  0   • ondansetron (ZOFRAN) 4 MG tablet Take 1 tablet by mouth Daily As Needed for Nausea or Vomiting. 30 tablet 1   • promethazine (PHENERGAN) 25 MG tablet Take 1 tablet by mouth Every 6 (Six) Hours As Needed for Nausea or Vomiting. 15 tablet 0   • tamsulosin (FLOMAX) 0.4 MG capsule 24 hr capsule Take 1 capsule by mouth Every Night. 30 capsule 5     No current facility-administered medications for this visit.         Allergies:      Allergies   Allergen Reactions   • Sulfa Antibiotics Anaphylaxis   • Compazine [Prochlorperazine Edisylate] Hives        Past Surgical History:     Past Surgical History:   Procedure Laterality Date   •  SECTION     • COLONOSCOPY     • CYSTOSCOPY     • CYSTOSCOPY ELECTROHYDRAULIC LITHOTRIPSY     • DENTAL PROCEDURE     •  EXTRACORPOREAL SHOCK WAVE LITHOTRIPSY (ESWL) Left 2018    Procedure: EXTRACORPOREAL SHOCKWAVE LITHOTRIPSY;  Surgeon: Garth Friedman MD;  Location: [facility];  Service: Urology   • KIDNEY STONE SURGERY     • LITHOTRIPSY           Social History:     Social History     Socioeconomic History   • Marital status:      Spouse name: Not on file   • Number of children: Not on file   • Years of education: Not on file   • Highest education level: Not on file   Tobacco Use   • Smoking status: Former Smoker     Packs/day: 0.50     Years: 15.00     Pack years: 7.50     Types: Cigarettes     Last attempt to quit: 2018     Years since quittin.0   • Smokeless tobacco: Never Used   Substance and Sexual Activity   • Alcohol use: No   • Drug use: No   • Sexual activity: Yes     Partners: Male     Birth control/protection: None       Family History:     Family History   Problem Relation Age of Onset   • No Known Problems Father    • No Known Problems Mother        Review of Systems:     Review of Systems   Constitutional: Negative.  Negative for activity change, appetite change, chills, diaphoresis, fatigue and unexpected weight change.   HENT: Negative for congestion, dental problem, drooling, ear discharge, ear pain, facial swelling, hearing loss, mouth sores, nosebleeds, postnasal drip, rhinorrhea, sinus pressure, sneezing, sore throat, tinnitus, trouble swallowing and voice change.    Eyes: Negative.  Negative for photophobia, pain, discharge, redness, itching and visual disturbance.   Respiratory: Negative.  Negative for apnea, cough, choking, chest tightness, shortness of breath, wheezing and stridor.    Cardiovascular: Negative.  Negative for chest pain, palpitations and leg swelling.   Gastrointestinal: Negative.  Negative for abdominal distention, abdominal pain, anal bleeding, blood in stool, constipation, diarrhea, nausea, rectal pain and vomiting.   Endocrine: Negative.  Negative for cold  intolerance, heat intolerance, polydipsia, polyphagia and polyuria.   Musculoskeletal: Negative.  Negative for arthralgias, back pain, gait problem, joint swelling, myalgias, neck pain and neck stiffness.   Skin: Negative.  Negative for color change, pallor, rash and wound.   Allergic/Immunologic: Negative.  Negative for environmental allergies, food allergies and immunocompromised state.   Neurological: Negative.  Negative for dizziness, tremors, seizures, syncope, facial asymmetry, speech difficulty, weakness, light-headedness, numbness and headaches.   Hematological: Negative.  Negative for adenopathy. Does not bruise/bleed easily.   Psychiatric/Behavioral: Negative for agitation, behavioral problems, confusion, decreased concentration, dysphoric mood, hallucinations, self-injury, sleep disturbance and suicidal ideas. The patient is not nervous/anxious and is not hyperactive.    All other systems reviewed and are negative.      Physical Exam:     Physical Exam   Constitutional: She appears well-developed and well-nourished.   HENT:   Head: Normocephalic and atraumatic.   Right Ear: External ear normal.   Left Ear: External ear normal.   Mouth/Throat: Oropharynx is clear and moist.   Eyes: Pupils are equal, round, and reactive to light. Conjunctivae are normal.   Cardiovascular: Normal rate, regular rhythm, normal heart sounds and intact distal pulses.   Pulmonary/Chest: Effort normal and breath sounds normal.   Abdominal: Soft. Bowel sounds are normal. She exhibits no distension and no mass. There is no tenderness. There is no rebound and no guarding.   Genitourinary: No vaginal discharge found.   Musculoskeletal: Normal range of motion.   Neurological: She is alert. She has normal reflexes.   Skin: Skin is warm and dry.   Psychiatric: She has a normal mood and affect. Her behavior is normal. Judgment and thought content normal.       I have reviewed the following portions of the patient's history: allergies,  current medications, past family history, past medical history, past social history, past surgical history, problem list and ROS and confirm it's accurate.      Procedure:       Assessment/Plan:   Renal calculus-we discussed the presence of the stone we discussed the various therapeutic options available including percutaneous nephrostolithotomy, lithotripsy.  We discussed the risks of lithotripsy including the passage of stones the development of a large string of stones in the distal ureter known as Steinstrasse.  In the 3% incidence of that we will need to proceed with a ureteroscopy for obstructing fragments.  Extremely rare incidence of renal hematoma.  And the significance of this.  We discussed the absolute relative indicators for intervention including the presence of sepsis, and pain we cannot control is the primary need for urgent intervention.  We discussed placement of a stent if indicated and the management of the stent as well.  She has a very metabolically active stone disease but we discussed with her family the fact that she drinks copious root beer.  Hopefully she is drinking more fluids.  Her films do not show ureteral obstruction and these are calcium oxalate stones            Patient's Body mass index is 32.01 kg/m². BMI is above normal parameters. Recommendations include: educational material.              This document has been electronically signed by LOU MONTANA MD January 21, 2020 1:23 PM

## 2020-01-22 PROBLEM — N39.0 COMPLICATED UTI (URINARY TRACT INFECTION): Status: RESOLVED | Noted: 2017-01-08 | Resolved: 2020-01-22

## 2020-02-25 ENCOUNTER — OFFICE VISIT (OUTPATIENT)
Dept: UROLOGY | Facility: CLINIC | Age: 45
End: 2020-02-25

## 2020-02-25 VITALS — HEIGHT: 62 IN | WEIGHT: 175.6 LBS | BODY MASS INDEX: 32.31 KG/M2

## 2020-02-25 DIAGNOSIS — R10.9 BILATERAL FLANK PAIN: Primary | ICD-10-CM

## 2020-02-25 DIAGNOSIS — R10.32 LEFT LOWER QUADRANT ABDOMINAL PAIN OF UNKNOWN ETIOLOGY: ICD-10-CM

## 2020-02-25 DIAGNOSIS — Z87.442 HISTORY OF KIDNEY STONES: ICD-10-CM

## 2020-02-25 PROCEDURE — 99214 OFFICE O/P EST MOD 30 MIN: CPT | Performed by: NURSE PRACTITIONER

## 2020-02-25 RX ORDER — PHENAZOPYRIDINE HYDROCHLORIDE 200 MG/1
200 TABLET, FILM COATED ORAL 3 TIMES DAILY PRN
Qty: 20 TABLET | Refills: 0 | Status: SHIPPED | OUTPATIENT
Start: 2020-02-25 | End: 2020-09-22

## 2020-02-25 RX ORDER — OXYCODONE AND ACETAMINOPHEN 10; 325 MG/1; MG/1
1 TABLET ORAL EVERY 6 HOURS PRN
Qty: 6 TABLET | Refills: 0 | Status: SHIPPED | OUTPATIENT
Start: 2020-02-25 | End: 2020-04-16

## 2020-02-25 NOTE — PROGRESS NOTES
Chief Complaint:          Chief Complaint   Patient presents with   • Flank Pain       HPI:   44 y.o. female.  Patient returns a 2-week follow-up.  She is very tearful, and sobbing uncontrollably, guarding her flank area and her groin area. She states she is in so much pain, he can barely breathe. she feels like she is passing a stone.    She reports lower quadrant pain, pelvic pain and pressure.  Burning on urination, reports she has a bam gritty feeling each time she voids.  Also symptoms have been ongoing for 3 days, she reports her tramadol has not been very helpful.    She reports bladder spasm that are frequent, she states she has been taking her Pyridium with much relief but her symptoms are repetitive.  She denies CVA tenderness, she denies gross hematuria, urine frequency, urgency, or nocturia.  She reports she has not been to go to work , due to her discomfort.     We reviewed her CT scan from few weeks ago, which showed nonobstructing bilateral stones.      Past Medical History:        Past Medical History:   Diagnosis Date   • Complicated UTI (urinary tract infection)    • Kidney stones    • Migraine    • Migraines    • Pneumonia    • Polycystic kidney disease    • Strep throat          The following portions of the patient's history were reviewed and updated as appropriate: allergies, current medications, past family history, past medical history, past social history, past surgical history and problem list.    Current Meds:     Current Outpatient Medications   Medication Sig Dispense Refill   • amitriptyline (ELAVIL) 25 MG tablet Take 25 mg by mouth every night.     • baclofen (LIORESAL) 10 MG tablet      • clonazePAM (KlonoPIN) 0.5 MG tablet Take 0.5 mg by mouth 2 (two) times a day as needed for seizures.     • gabapentin (NEURONTIN) 600 MG tablet Take 400 mg by mouth 4 (Four) Times a Day.  0   • ondansetron (ZOFRAN) 4 MG tablet Take 1 tablet by mouth Daily As Needed for Nausea or Vomiting. 30 tablet  1   • oxyCODONE-acetaminophen (PERCOCET)  MG per tablet Take 1 tablet by mouth Every 6 (Six) Hours As Needed for Moderate Pain . 6 tablet 0   • phenazopyridine (PYRIDIUM) 200 MG tablet Take 1 tablet by mouth 3 (Three) Times a Day As Needed for Bladder Spasms. 20 tablet 0   • promethazine (PHENERGAN) 25 MG tablet Take 1 tablet by mouth Every 6 (Six) Hours As Needed for Nausea or Vomiting. 15 tablet 0   • tamsulosin (FLOMAX) 0.4 MG capsule 24 hr capsule Take 1 capsule by mouth Every Night. 30 capsule 5   • traMADol (ULTRAM) 50 MG tablet Take 1 tablet by mouth Every 6 (Six) Hours As Needed for Moderate Pain . 60 tablet 2   • cephalexin (KEFLEX) 500 MG capsule Take 1 capsule by mouth 2 (Two) Times a Day. 20 capsule 0     No current facility-administered medications for this visit.         Allergies:      Allergies   Allergen Reactions   • Sulfa Antibiotics Anaphylaxis   • Compazine [Prochlorperazine Edisylate] Hives        Past Surgical History:     Past Surgical History:   Procedure Laterality Date   •  SECTION     • COLONOSCOPY     • CYSTOSCOPY     • CYSTOSCOPY ELECTROHYDRAULIC LITHOTRIPSY     • DENTAL PROCEDURE     • EXTRACORPOREAL SHOCK WAVE LITHOTRIPSY (ESWL) Left 2018    Procedure: EXTRACORPOREAL SHOCKWAVE LITHOTRIPSY;  Surgeon: Garth Friedman MD;  Location: [facility];  Service: Urology   • KIDNEY STONE SURGERY     • LITHOTRIPSY           Social History:     Social History     Socioeconomic History   • Marital status:      Spouse name: Not on file   • Number of children: Not on file   • Years of education: Not on file   • Highest education level: Not on file   Tobacco Use   • Smoking status: Former Smoker     Packs/day: 0.50     Years: 15.00     Pack years: 7.50     Types: Cigarettes     Last attempt to quit: 2018     Years since quittin.1   • Smokeless tobacco: Never Used   Substance and Sexual Activity   • Alcohol use: No   • Drug use: No   • Sexual activity: Yes      Partners: Male     Birth control/protection: None       Family History:     Family History   Problem Relation Age of Onset   • No Known Problems Father    • No Known Problems Mother        Review of Systems:    Review of Systems   Constitutional: Positive for activity change, chills and fatigue. Negative for fever.   HENT: Negative for congestion.    Eyes: Negative for blurred vision.   Gastrointestinal: Negative for abdominal pain, nausea and vomiting.   Genitourinary: Positive for difficulty urinating, flank pain, pelvic pain and pelvic pressure. Negative for dyspareunia, dysuria, frequency, genital sores, hematuria, urgency, urinary incontinence and vaginal discharge.   Musculoskeletal: Positive for back pain.   Skin: Positive for dry skin and pallor.   Neurological: Negative for dizziness, headache and confusion.   Psychiatric/Behavioral: Positive for sleep disturbance and stress. Negative for behavioral problems and decreased concentration. The patient is nervous/anxious.          Physical Exam:     Physical Exam   Constitutional: She is oriented to person, place, and time. She appears well-developed and well-nourished. No distress.   HENT:   Head: Normocephalic and atraumatic.   Eyes: Pupils are equal, round, and reactive to light. EOM are normal.   Neck: Normal range of motion. No tracheal deviation present. No thyromegaly present.   Cardiovascular: Normal rate and regular rhythm.   No murmur heard.  Pulmonary/Chest: Effort normal and breath sounds normal. No stridor. No respiratory distress. She has no wheezes.   Abdominal: Soft. Bowel sounds are normal. There is tenderness.   Genitourinary: Vagina normal. There is no tenderness on the right labia. There is no tenderness on the left labia. No vaginal discharge found.   Musculoskeletal: Normal range of motion. She exhibits tenderness. She exhibits no edema or deformity.   Neurological: She is alert and oriented to person, place, and time. No cranial nerve  deficit or sensory deficit. Coordination normal.   Skin: Skin is warm and dry. Capillary refill takes less than 2 seconds. No rash noted. No erythema. There is pallor.   Psychiatric: She has a normal mood and affect. Her behavior is normal. Judgment and thought content normal.       Procedure:     No notes on file      Assessment:     Encounter Diagnoses   Name Primary?   • Bilateral flank pain Yes   • History of kidney stones    • Left lower quadrant abdominal pain of unknown etiology      Orders Placed This Encounter   Procedures   • XR Abdomen KUB     Order Specific Question:   Reason for Exam:     Answer:   kidney stones     Order Specific Question:   Patient Pregnant     Answer:   Unknown       Plan:   Right flank pain: Patient presents with bilateral flank pain right greater than left.  She states her discomfort has been very unbearable the last 3 days.      We reviewed her and discussed her last CT scan, which showed bilateral nonobstructing kidney stones and bilateral renal cysts.  Discussed with patient this is just not the source of her discomfort, although she has a significant history of kidney stones.      Discussed her symptoms with Dr Friedman who is very familiar with this patient and her treatment of  her stones. We also discussed the medical management of stone disease and the use of medical expulsive therapy in the form of Flomax she is very farmiliar with. I also talked about nonoperative management including ambulation and increasing fluids and hot tub as being an effective adjuncts in the treatment of  stone.    She has been encouraged to utilize her tramadol as needed, and her nausea medications.  Discussed other comfort measures such as hot showers, walking, leaning her hot tub for comfort.  She is to increase her p.o. fluid intake significantly, and also utilize a Pyridium for bladder spasms.  Patient is agreeable to plan of care    Her back in 3 months for follow-up.        Patient's Body  mass index is 32.12 kg/m². BMI is above normal parameters. Recommendations include: educational material, exercise counseling and nutrition counseling.      Smoking Cessation Counseling:  Former smoker.QUIT 2018  Patient does not currently use any tobacco products.       Counseling was given to patient for the following topics instructions for management as follows: Increasing p.o. fluid intake, exercise, pain control, nausea control, and risk factor reductions including: Kidney stone prevention diet, avoiding bladder irritants such as caffeine products, soda drinks, coffee or tea.. The interim medical history and current results were reviewed.  A treatment plan with follow-up was made for Bilateral flank pain [R10.9].           This document has been electronically signed by Griselda Cheng-Akwa, APRN February 27, 2020 9:10 PM

## 2020-02-27 PROBLEM — Z87.442 HISTORY OF KIDNEY STONES: Status: ACTIVE | Noted: 2020-02-27

## 2020-02-27 PROBLEM — R10.9 BILATERAL FLANK PAIN: Status: ACTIVE | Noted: 2020-02-27

## 2020-02-28 NOTE — PATIENT INSTRUCTIONS
"Dietary Guidelines to Help Prevent Kidney Stones  Kidney stones are deposits of minerals and salts that form inside your kidneys. Your risk of developing kidney stones may be greater depending on your diet, your lifestyle, the medicines you take, and whether you have certain medical conditions. Most people can reduce their chances of developing kidney stones by following the instructions below. Depending on your overall health and the type of kidney stones you tend to develop, your dietitian may give you more specific instructions.  What are tips for following this plan?  Reading food labels  · Choose foods with \"no salt added\" or \"low-salt\" labels. Limit your sodium intake to less than 1500 mg per day.  · Choose foods with calcium for each meal and snack. Try to eat about 300 mg of calcium at each meal. Foods that contain 200-500 mg of calcium per serving include:  ? 8 oz (237 ml) of milk, fortified nondairy milk, and fortified fruit juice.  ? 8 oz (237 ml) of kefir, yogurt, and soy yogurt.  ? 4 oz (118 ml) of tofu.  ? 1 oz of cheese.  ? 1 cup (300 g) of dried figs.  ? 1 cup (91 g) of cooked broccoli.  ? 1-3 oz can of sardines or mackerel.  · Most people need 1000 to 1500 mg of calcium each day. Talk to your dietitian about how much calcium is recommended for you.  Shopping  · Buy plenty of fresh fruits and vegetables. Most people do not need to avoid fruits and vegetables, even if they contain nutrients that may contribute to kidney stones.  · When shopping for convenience foods, choose:  ? Whole pieces of fruit.  ? Premade salads with dressing on the side.  ? Low-fat fruit and yogurt smoothies.  · Avoid buying frozen meals or prepared deli foods.  · Look for foods with live cultures, such as yogurt and kefir.  Cooking  · Do not add salt to food when cooking. Place a salt shaker on the table and allow each person to add his or her own salt to taste.  · Use vegetable protein, such as beans, textured vegetable " protein (TVP), or tofu instead of meat in pasta, casseroles, and soups.  Meal planning    · Eat less salt, if told by your dietitian. To do this:  ? Avoid eating processed or premade food.  ? Avoid eating fast food.  · Eat less animal protein, including cheese, meat, poultry, or fish, if told by your dietitian. To do this:  ? Limit the number of times you have meat, poultry, fish, or cheese each week. Eat a diet free of meat at least 2 days a week.  ? Eat only one serving each day of meat, poultry, fish, or seafood.  ? When you prepare animal protein, cut pieces into small portion sizes. For most meat and fish, one serving is about the size of one deck of cards.  · Eat at least 5 servings of fresh fruits and vegetables each day. To do this:  ? Keep fruits and vegetables on hand for snacks.  ? Eat 1 piece of fruit or a handful of berries with breakfast.  ? Have a salad and fruit at lunch.  ? Have two kinds of vegetables at dinner.  · Limit foods that are high in a substance called oxalate. These include:  ? Spinach.  ? Rhubarb.  ? Beets.  ? Potato chips and french fries.  ? Nuts.  · If you regularly take a diuretic medicine, make sure to eat at least 1-2 fruits or vegetables high in potassium each day. These include:  ? Avocado.  ? Banana.  ? Orange, prune, carrot, or tomato juice.  ? Baked potato.  ? Cabbage.  ? Beans and split peas.  General instructions    · Drink enough fluid to keep your urine clear or pale yellow. This is the most important thing you can do.  · Talk to your health care provider and dietitian about taking daily supplements. Depending on your health and the cause of your kidney stones, you may be advised:  ? Not to take supplements with vitamin C.  ? To take a calcium supplement.  ? To take a daily probiotic supplement.  ? To take other supplements such as magnesium, fish oil, or vitamin B6.  · Take all medicines and supplements as told by your health care provider.  · Limit alcohol intake to no  more than 1 drink a day for nonpregnant women and 2 drinks a day for men. One drink equals 12 oz of beer, 5 oz of wine, or 1½ oz of hard liquor.  · Lose weight if told by your health care provider. Work with your dietitian to find strategies and an eating plan that works best for you.  What foods are not recommended?  Limit your intake of the following foods, or as told by your dietitian. Talk to your dietitian about specific foods you should avoid based on the type of kidney stones and your overall health.  Grains  Breads. Bagels. Rolls. Baked goods. Salted crackers. Cereal. Pasta.  Vegetables  Spinach. Rhubarb. Beets. Canned vegetables. Pickles. Olives.  Meats and other protein foods  Nuts. Nut butters. Large portions of meat, poultry, or fish. Salted or cured meats. Deli meats. Hot dogs. Sausages.  Dairy  Cheese.  Beverages  Regular soft drinks. Regular vegetable juice.  Seasonings and other foods  Seasoning blends with salt. Salad dressings. Canned soups. Soy sauce. Ketchup. Barbecue sauce. Canned pasta sauce. Casseroles. Pizza. Lasagna. Frozen meals. Potato chips. French fries.  Summary  · You can reduce your risk of kidney stones by making changes to your diet.  · The most important thing you can do is drink enough fluid. You should drink enough fluid to keep your urine clear or pale yellow.  · Ask your health care provider or dietitian how much protein from animal sources you should eat each day, and also how much salt and calcium you should have each day.  This information is not intended to replace advice given to you by your health care provider. Make sure you discuss any questions you have with your health care provider.  Document Released: 04/13/2012 Document Revised: 11/28/2017 Document Reviewed: 11/28/2017  [employer] Patient Education © 2020 [employer]

## 2020-04-16 ENCOUNTER — OFFICE VISIT (OUTPATIENT)
Dept: UROLOGY | Facility: CLINIC | Age: 45
End: 2020-04-16

## 2020-04-16 VITALS — WEIGHT: 174 LBS | BODY MASS INDEX: 32.02 KG/M2 | HEIGHT: 62 IN | TEMPERATURE: 98.4 F

## 2020-04-16 DIAGNOSIS — N20.0 BILATERAL NEPHROLITHIASIS: ICD-10-CM

## 2020-04-16 DIAGNOSIS — R10.32 LEFT LOWER QUADRANT ABDOMINAL PAIN: Primary | ICD-10-CM

## 2020-04-16 PROCEDURE — 99213 OFFICE O/P EST LOW 20 MIN: CPT | Performed by: NURSE PRACTITIONER

## 2020-04-16 RX ORDER — GABAPENTIN 400 MG/1
400 CAPSULE ORAL 4 TIMES DAILY
COMMUNITY
Start: 2020-03-10

## 2020-04-16 RX ORDER — KETOROLAC TROMETHAMINE 10 MG/1
10 TABLET, FILM COATED ORAL EVERY 6 HOURS PRN
Qty: 16 TABLET | Refills: 0 | Status: SHIPPED | OUTPATIENT
Start: 2020-04-16 | End: 2020-09-22

## 2020-04-16 RX ORDER — KETOROLAC TROMETHAMINE 10 MG/1
10 TABLET, FILM COATED ORAL EVERY 6 HOURS PRN
Qty: 16 TABLET | Refills: 0 | Status: SHIPPED | OUTPATIENT
Start: 2020-04-16 | End: 2020-04-16 | Stop reason: SDUPTHER

## 2020-04-16 RX ORDER — OXYCODONE AND ACETAMINOPHEN 10; 325 MG/1; MG/1
1 TABLET ORAL EVERY 6 HOURS PRN
Qty: 8 TABLET | Refills: 0 | Status: SHIPPED | OUTPATIENT
Start: 2020-04-16 | End: 2020-05-12

## 2020-04-16 RX ORDER — TRAMADOL HYDROCHLORIDE 50 MG/1
50 TABLET ORAL EVERY 6 HOURS PRN
Qty: 60 TABLET | Refills: 2 | Status: SHIPPED | OUTPATIENT
Start: 2020-04-16 | End: 2020-05-12

## 2020-04-16 NOTE — PROGRESS NOTES
Chief Complaint:          Chief Complaint   Patient presents with   • Flank Pain       HPI:   44 y.o. female.  Patient returns to clinic today.  She states she has been miserable the last three days. She complaints of left lower quadrant pain, bladder spasms and discomfort. She is passing small tiny stone fragments consistently. She reports her pain has been intermittent, but sharp. She has urine frequency and urgency when that occurs. She denies dysuria, burning on urination and  Hematuria.    She denies having any CVA tenderness or flank pain. She does not have N/V/D. She denies chills or fevers. She has been drinking a lot more fluids, hence her urine frequency she states. She has a remarkable history of kidney stones. Her last CT scan of abdomen and pelvis in January showed Bilateral nonobstructing kidney stones and bilateral renal cysts.     Will repeat a KUB If her pain/discomfort persists.    Past Medical History:        Past Medical History:   Diagnosis Date   • Complicated UTI (urinary tract infection)    • Kidney stones    • Migraine    • Migraines    • Pneumonia    • Polycystic kidney disease    • Strep throat          The following portions of the patient's history were reviewed and updated as appropriate: allergies, current medications, past family history, past medical history, past social history, past surgical history and problem list.    Current Meds:     Current Outpatient Medications   Medication Sig Dispense Refill   • amitriptyline (ELAVIL) 25 MG tablet Take 25 mg by mouth every night.     • baclofen (LIORESAL) 10 MG tablet      • clonazePAM (KlonoPIN) 0.5 MG tablet Take 0.5 mg by mouth 2 (two) times a day as needed for seizures.     • gabapentin (NEURONTIN) 400 MG capsule      • ondansetron (ZOFRAN) 4 MG tablet Take 1 tablet by mouth Daily As Needed for Nausea or Vomiting. 30 tablet 1   • phenazopyridine (PYRIDIUM) 200 MG tablet Take 1 tablet by mouth 3 (Three) Times a Day As Needed for Bladder  Spasms. 20 tablet 0   • promethazine (PHENERGAN) 25 MG tablet Take 1 tablet by mouth Every 6 (Six) Hours As Needed for Nausea or Vomiting. 15 tablet 0   • tamsulosin (FLOMAX) 0.4 MG capsule 24 hr capsule Take 1 capsule by mouth Every Night. 30 capsule 5   • ketorolac (TORADOL) 10 MG tablet Take 1 tablet by mouth Every 6 (Six) Hours As Needed for Moderate Pain . 16 tablet 0   • oxyCODONE-acetaminophen (Percocet)  MG per tablet Take 1 tablet by mouth Every 6 (Six) Hours As Needed for Moderate Pain . 8 tablet 0   • traMADol (ULTRAM) 50 MG tablet Take 1 tablet by mouth Every 6 (Six) Hours As Needed for Moderate Pain . 60 tablet 2     No current facility-administered medications for this visit.         Allergies:      Allergies   Allergen Reactions   • Sulfa Antibiotics Anaphylaxis   • Compazine [Prochlorperazine Edisylate] Hives        Past Surgical History:     Past Surgical History:   Procedure Laterality Date   •  SECTION     • COLONOSCOPY     • CYSTOSCOPY     • CYSTOSCOPY ELECTROHYDRAULIC LITHOTRIPSY     • DENTAL PROCEDURE     • EXTRACORPOREAL SHOCK WAVE LITHOTRIPSY (ESWL) Left 2018    Procedure: EXTRACORPOREAL SHOCKWAVE LITHOTRIPSY;  Surgeon: Garth Friedman MD;  Location: [facility];  Service: Urology   • KIDNEY STONE SURGERY     • LITHOTRIPSY           Social History:     Social History     Socioeconomic History   • Marital status:      Spouse name: Not on file   • Number of children: Not on file   • Years of education: Not on file   • Highest education level: Not on file   Tobacco Use   • Smoking status: Former Smoker     Packs/day: 0.50     Years: 15.00     Pack years: 7.50     Types: Cigarettes     Last attempt to quit: 2018     Years since quittin.2   • Smokeless tobacco: Never Used   Substance and Sexual Activity   • Alcohol use: No   • Drug use: No   • Sexual activity: Yes     Partners: Male     Birth control/protection: None       Family History:     Family History      Problem Relation Age of Onset   • No Known Problems Father    • No Known Problems Mother        Review of Systems:     Review of Systems   Constitutional: Positive for activity change. Negative for chills and fever.   HENT: Negative for sinus pressure.    Respiratory: Negative for cough.    Cardiovascular: Negative for leg swelling.   Gastrointestinal: Positive for abdominal pain.   Genitourinary: Positive for frequency, pelvic pain, pelvic pressure and urgency. Negative for dysuria.   Musculoskeletal: Positive for back pain.   Neurological: Negative for headache.   Psychiatric/Behavioral: Positive for sleep disturbance and stress. The patient is nervous/anxious.         Physical Exam:     Physical Exam   Constitutional: She is oriented to person, place, and time. She appears well-developed and well-nourished. She appears distressed.   HENT:   Head: Normocephalic and atraumatic.   Eyes: Pupils are equal, round, and reactive to light. EOM are normal.   Neck: Normal range of motion. No tracheal deviation present. No thyromegaly present.   Cardiovascular: Normal rate and regular rhythm.   No murmur heard.  Pulmonary/Chest: Effort normal and breath sounds normal. No stridor. No respiratory distress. She has no wheezes.   Abdominal: Soft. Bowel sounds are normal. There is tenderness.   Genitourinary: Vagina normal. There is no tenderness on the right labia. There is no tenderness on the left labia. No vaginal discharge found.   Musculoskeletal: Normal range of motion. She exhibits tenderness. She exhibits no edema or deformity.   Neurological: She is alert and oriented to person, place, and time. No cranial nerve deficit or sensory deficit. Coordination normal.   Skin: Skin is warm and dry. Capillary refill takes less than 2 seconds. No rash noted. No erythema. There is pallor.   Psychiatric: She has a normal mood and affect. Her behavior is normal. Judgment and thought content normal.         Procedure:        Assessment/Plan:     Left Lower Quadrant Pain/Kidney Stones: Patient presents with LLQ pain 8/10 that has been consistent x 3 days. She is sent bent over in exam room and rocking back /forth due to her discomfort. She is sobbing in tears and looks miserable and pitiful. She reports she is completely out of her pain meds and Tramadol. She has a history of kidney stones and reports passing tiny stones these last few days, making her pain worse.     We also discussed the medical management of stone disease and the use of medical expulsive therapy in the form of Flomax she is very farmiliar with. I also talked about nonoperative management including ambulation and increasing fluids and hot tub as being an effective adjuncts in the treatment of  stone.    Discussed her symptoms with Dr Friedman who is very familiar with this patient and her treatment of  her stones. He has been kind enough to refill her Tramadol, and also gave her a few Percocet to use as needed for severe pain ONLY.   She has been encouraged to utilize her tramadol first as needed, and her nausea medications.      Discussed other comfort measures such as hot showers, walking, using her hot tub for comfort.  She is to increase her p.o. fluid intake to at least 1-2 litres significantly, and also utilize a Pyridium for bladder spasms.  Patient is agreeable to plan of care     Will see Her back in 3 months for follow-up.       Patient reports that she is not currently experiencing any symptoms of urinary incontinence.      Patient's Body mass index is 31.83 kg/m². BMI is above normal parameters. Recommendations include: educational material, exercise counseling and nutrition counseling.    Smoking Cessation Counseling:  Former smoker. Quit 2018  Patient does not currently use any tobacco products.     Counseling was given to patient for the following topics instructions for management as follows: Walking as much as tolerated, Increasing po fluids, hot  tubs, nausea and pain control and risk factor reductions including: bladder irritants such as caffiene products, soda drinks, spicy foods, citrusy foods. The interim medical history and current results were reviewed.  A treatment plan with follow-up was made for Left lower quadrant abdominal pain [R10.32].         This document has been electronically signed by Griselda Cheng-Akwa, APRN April 16, 2020 09:04

## 2020-05-07 ENCOUNTER — APPOINTMENT (OUTPATIENT)
Dept: GENERAL RADIOLOGY | Facility: HOSPITAL | Age: 45
End: 2020-05-07

## 2020-05-07 ENCOUNTER — HOSPITAL ENCOUNTER (EMERGENCY)
Facility: HOSPITAL | Age: 45
Discharge: HOME | End: 2020-05-07
Attending: EMERGENCY MEDICINE | Admitting: EMERGENCY MEDICINE

## 2020-05-07 VITALS
DIASTOLIC BLOOD PRESSURE: 79 MMHG | OXYGEN SATURATION: 100 % | WEIGHT: 175 LBS | SYSTOLIC BLOOD PRESSURE: 115 MMHG | HEIGHT: 62 IN | RESPIRATION RATE: 18 BRPM | HEART RATE: 73 BPM | TEMPERATURE: 98.2 F | BODY MASS INDEX: 32.2 KG/M2

## 2020-05-07 DIAGNOSIS — N23 RENAL COLIC: Primary | ICD-10-CM

## 2020-05-07 DIAGNOSIS — N30.01 ACUTE CYSTITIS WITH HEMATURIA: ICD-10-CM

## 2020-05-07 LAB
6-ACETYL MORPHINE: NEGATIVE
ALBUMIN SERPL-MCNC: 4.09 G/DL (ref 3.5–5.2)
ALBUMIN/GLOB SERPL: 1.4 G/DL
ALP SERPL-CCNC: 60 U/L (ref 39–117)
ALT SERPL W P-5'-P-CCNC: 8 U/L (ref 1–33)
AMPHET+METHAMPHET UR QL: NEGATIVE
ANION GAP SERPL CALCULATED.3IONS-SCNC: 13.6 MMOL/L (ref 5–15)
AST SERPL-CCNC: 12 U/L (ref 1–32)
B-HCG UR QL: NEGATIVE
BACTERIA UR QL AUTO: ABNORMAL /HPF
BARBITURATES UR QL SCN: NEGATIVE
BASOPHILS # BLD AUTO: 0.03 10*3/MM3 (ref 0–0.2)
BASOPHILS NFR BLD AUTO: 0.3 % (ref 0–1.5)
BENZODIAZ UR QL SCN: NEGATIVE
BILIRUB SERPL-MCNC: 0.5 MG/DL (ref 0.2–1.2)
BILIRUB UR QL STRIP: NEGATIVE
BUN BLD-MCNC: 11 MG/DL (ref 6–20)
BUN/CREAT SERPL: 12.5 (ref 7–25)
BUPRENORPHINE SERPL-MCNC: NEGATIVE NG/ML
CALCIUM SPEC-SCNC: 9.2 MG/DL (ref 8.6–10.5)
CANNABINOIDS SERPL QL: NEGATIVE
CHLORIDE SERPL-SCNC: 109 MMOL/L (ref 98–107)
CLARITY UR: CLEAR
CO2 SERPL-SCNC: 20.4 MMOL/L (ref 22–29)
COCAINE UR QL: NEGATIVE
COLOR UR: ABNORMAL
CREAT BLD-MCNC: 0.88 MG/DL (ref 0.57–1)
DEPRECATED RDW RBC AUTO: 45.1 FL (ref 37–54)
EOSINOPHIL # BLD AUTO: 0.1 10*3/MM3 (ref 0–0.4)
EOSINOPHIL NFR BLD AUTO: 1 % (ref 0.3–6.2)
ERYTHROCYTE [DISTWIDTH] IN BLOOD BY AUTOMATED COUNT: 12.6 % (ref 12.3–15.4)
GFR SERPL CREATININE-BSD FRML MDRD: 70 ML/MIN/1.73
GLOBULIN UR ELPH-MCNC: 3 GM/DL
GLUCOSE BLD-MCNC: 102 MG/DL (ref 65–99)
GLUCOSE UR STRIP-MCNC: NEGATIVE MG/DL
HCT VFR BLD AUTO: 38.6 % (ref 34–46.6)
HGB BLD-MCNC: 12.8 G/DL (ref 12–15.9)
HGB UR QL STRIP.AUTO: ABNORMAL
HYALINE CASTS UR QL AUTO: ABNORMAL /LPF
IMM GRANULOCYTES # BLD AUTO: 0.03 10*3/MM3 (ref 0–0.05)
IMM GRANULOCYTES NFR BLD AUTO: 0.3 % (ref 0–0.5)
KETONES UR QL STRIP: ABNORMAL
LEUKOCYTE ESTERASE UR QL STRIP.AUTO: ABNORMAL
LIPASE SERPL-CCNC: 11 U/L (ref 13–60)
LYMPHOCYTES # BLD AUTO: 3.31 10*3/MM3 (ref 0.7–3.1)
LYMPHOCYTES NFR BLD AUTO: 33.1 % (ref 19.6–45.3)
MCH RBC QN AUTO: 32.2 PG (ref 26.6–33)
MCHC RBC AUTO-ENTMCNC: 33.2 G/DL (ref 31.5–35.7)
MCV RBC AUTO: 97.2 FL (ref 79–97)
METHADONE UR QL SCN: NEGATIVE
MONOCYTES # BLD AUTO: 0.72 10*3/MM3 (ref 0.1–0.9)
MONOCYTES NFR BLD AUTO: 7.2 % (ref 5–12)
NEUTROPHILS # BLD AUTO: 5.82 10*3/MM3 (ref 1.7–7)
NEUTROPHILS NFR BLD AUTO: 58.1 % (ref 42.7–76)
NITRITE UR QL STRIP: NEGATIVE
NRBC BLD AUTO-RTO: 0 /100 WBC (ref 0–0.2)
OPIATES UR QL: POSITIVE
OXYCODONE UR QL SCN: NEGATIVE
PCP UR QL SCN: NEGATIVE
PH UR STRIP.AUTO: <=5 [PH] (ref 5–8)
PLATELET # BLD AUTO: 270 10*3/MM3 (ref 140–450)
PMV BLD AUTO: 9.1 FL (ref 6–12)
POTASSIUM BLD-SCNC: 3.5 MMOL/L (ref 3.5–5.2)
PROT SERPL-MCNC: 7.1 G/DL (ref 6–8.5)
PROT UR QL STRIP: ABNORMAL
RBC # BLD AUTO: 3.97 10*6/MM3 (ref 3.77–5.28)
RBC # UR: ABNORMAL /HPF
REF LAB TEST METHOD: ABNORMAL
SODIUM BLD-SCNC: 143 MMOL/L (ref 136–145)
SP GR UR STRIP: >1.03 (ref 1–1.03)
SQUAMOUS #/AREA URNS HPF: ABNORMAL /HPF
UROBILINOGEN UR QL STRIP: ABNORMAL
WBC NRBC COR # BLD: 10.01 10*3/MM3 (ref 3.4–10.8)
WBC UR QL AUTO: ABNORMAL /HPF

## 2020-05-07 PROCEDURE — 80307 DRUG TEST PRSMV CHEM ANLYZR: CPT | Performed by: PHYSICIAN ASSISTANT

## 2020-05-07 PROCEDURE — 74018 RADEX ABDOMEN 1 VIEW: CPT

## 2020-05-07 PROCEDURE — 96365 THER/PROPH/DIAG IV INF INIT: CPT

## 2020-05-07 PROCEDURE — 74018 RADEX ABDOMEN 1 VIEW: CPT | Performed by: RADIOLOGY

## 2020-05-07 PROCEDURE — 96375 TX/PRO/DX INJ NEW DRUG ADDON: CPT

## 2020-05-07 PROCEDURE — 25010000002 ONDANSETRON PER 1 MG: Performed by: EMERGENCY MEDICINE

## 2020-05-07 PROCEDURE — 25010000002 CEFTRIAXONE PER 250 MG: Performed by: PHYSICIAN ASSISTANT

## 2020-05-07 PROCEDURE — 85025 COMPLETE CBC W/AUTO DIFF WBC: CPT | Performed by: EMERGENCY MEDICINE

## 2020-05-07 PROCEDURE — 99284 EMERGENCY DEPT VISIT MOD MDM: CPT

## 2020-05-07 PROCEDURE — 25010000002 HYDROMORPHONE 1 MG/ML SOLUTION: Performed by: EMERGENCY MEDICINE

## 2020-05-07 PROCEDURE — 83690 ASSAY OF LIPASE: CPT | Performed by: EMERGENCY MEDICINE

## 2020-05-07 PROCEDURE — 80053 COMPREHEN METABOLIC PANEL: CPT | Performed by: EMERGENCY MEDICINE

## 2020-05-07 PROCEDURE — 87086 URINE CULTURE/COLONY COUNT: CPT | Performed by: EMERGENCY MEDICINE

## 2020-05-07 PROCEDURE — 81025 URINE PREGNANCY TEST: CPT | Performed by: EMERGENCY MEDICINE

## 2020-05-07 PROCEDURE — 81001 URINALYSIS AUTO W/SCOPE: CPT | Performed by: EMERGENCY MEDICINE

## 2020-05-07 PROCEDURE — 25010000002 MORPHINE PER 10 MG: Performed by: EMERGENCY MEDICINE

## 2020-05-07 RX ORDER — SODIUM CHLORIDE 0.9 % (FLUSH) 0.9 %
10 SYRINGE (ML) INJECTION AS NEEDED
Status: DISCONTINUED | OUTPATIENT
Start: 2020-05-07 | End: 2020-05-07 | Stop reason: HOSPADM

## 2020-05-07 RX ORDER — CEPHALEXIN 500 MG/1
500 CAPSULE ORAL 2 TIMES DAILY
Qty: 14 CAPSULE | Refills: 0 | Status: ON HOLD | OUTPATIENT
Start: 2020-05-07 | End: 2020-05-15

## 2020-05-07 RX ORDER — HYDROCODONE BITARTRATE AND ACETAMINOPHEN 5; 325 MG/1; MG/1
1 TABLET ORAL EVERY 6 HOURS PRN
Qty: 8 TABLET | Refills: 0 | Status: SHIPPED | OUTPATIENT
Start: 2020-05-07 | End: 2020-05-12

## 2020-05-07 RX ORDER — ONDANSETRON 2 MG/ML
4 INJECTION INTRAMUSCULAR; INTRAVENOUS ONCE
Status: COMPLETED | OUTPATIENT
Start: 2020-05-07 | End: 2020-05-07

## 2020-05-07 RX ADMIN — SODIUM CHLORIDE 1000 ML: 9 INJECTION, SOLUTION INTRAVENOUS at 16:54

## 2020-05-07 RX ADMIN — CEFTRIAXONE 1 G: 1 INJECTION, POWDER, FOR SOLUTION INTRAMUSCULAR; INTRAVENOUS at 19:42

## 2020-05-07 RX ADMIN — HYDROMORPHONE HYDROCHLORIDE 1 MG: 1 INJECTION, SOLUTION INTRAMUSCULAR; INTRAVENOUS; SUBCUTANEOUS at 18:01

## 2020-05-07 RX ADMIN — MORPHINE SULFATE 4 MG: 4 INJECTION, SOLUTION INTRAMUSCULAR; INTRAVENOUS at 16:56

## 2020-05-07 RX ADMIN — ONDANSETRON HYDROCHLORIDE 4 MG: 2 SOLUTION INTRAMUSCULAR; INTRAVENOUS at 16:55

## 2020-05-07 NOTE — ED PROVIDER NOTES
Subjective   44-year-old female who comes in with chief complaint left flank pain for the last several days.  Patient states the pain is sharp, stabbing pain that radiates to her left lower abdomen.  Patient does states she has history of kidney stones as well as complicated UTIs.  States she sees urologist Dr. Duque for these.      History provided by:  Patient   used: No    Flank Pain   Pain location:  L flank  Pain quality: sharp and stabbing    Pain radiates to:  LLQ  Pain severity:  Moderate  Onset quality:  Gradual  Duration:  2 days  Timing:  Intermittent  Progression:  Worsening  Chronicity:  New  Context: not alcohol use, not awakening from sleep, not medication withdrawal, not recent illness, not retching, not sick contacts, not suspicious food intake and not trauma    Relieved by:  Nothing  Worsened by:  Nothing  Ineffective treatments:  None tried  Associated symptoms: nausea and vomiting    Associated symptoms: no anorexia, no chest pain, no chills, no dysuria, no fever, no flatus and no hematemesis    Risk factors: no alcohol abuse, no aspirin use, has not had multiple surgeries and no NSAID use        Review of Systems   Constitutional: Negative.  Negative for activity change, appetite change, chills and fever.   HENT: Negative.  Negative for congestion, drooling, ear discharge, ear pain and facial swelling.    Eyes: Negative.  Negative for photophobia and itching.   Respiratory: Negative.  Negative for apnea.    Cardiovascular: Negative.  Negative for chest pain, palpitations and leg swelling.   Gastrointestinal: Positive for nausea and vomiting. Negative for anorexia, flatus and hematemesis.   Genitourinary: Positive for flank pain. Negative for dysuria.   Musculoskeletal: Positive for back pain. Negative for arthralgias, gait problem, joint swelling and myalgias.   Hematological: Negative.    Psychiatric/Behavioral: Negative.    All other systems reviewed and are  negative.      Past Medical History:   Diagnosis Date   • Complicated UTI (urinary tract infection)    • Kidney stones    • Migraine    • Migraines    • Pneumonia    • Polycystic kidney disease    • Strep throat        Allergies   Allergen Reactions   • Sulfa Antibiotics Anaphylaxis   • Compazine [Prochlorperazine Edisylate] Hives       Past Surgical History:   Procedure Laterality Date   •  SECTION     • COLONOSCOPY     • CYSTOSCOPY     • CYSTOSCOPY ELECTROHYDRAULIC LITHOTRIPSY     • DENTAL PROCEDURE     • EXTRACORPOREAL SHOCK WAVE LITHOTRIPSY (ESWL) Left 2018    Procedure: EXTRACORPOREAL SHOCKWAVE LITHOTRIPSY;  Surgeon: Garth Friedman MD;  Location: [facility];  Service: Urology   • KIDNEY STONE SURGERY     • LITHOTRIPSY         Family History   Problem Relation Age of Onset   • No Known Problems Father    • No Known Problems Mother        Social History     Socioeconomic History   • Marital status:      Spouse name: Not on file   • Number of children: Not on file   • Years of education: Not on file   • Highest education level: Not on file   Tobacco Use   • Smoking status: Former Smoker     Packs/day: 0.50     Years: 15.00     Pack years: 7.50     Types: Cigarettes     Last attempt to quit: 2018     Years since quittin.3   • Smokeless tobacco: Never Used   Substance and Sexual Activity   • Alcohol use: No   • Drug use: No   • Sexual activity: Yes     Partners: Male     Birth control/protection: None           Objective   Physical Exam   Constitutional: She is oriented to person, place, and time. She appears well-developed and well-nourished. No distress.   HENT:   Head: Normocephalic and atraumatic.   Right Ear: External ear normal.   Left Ear: External ear normal.   Nose: Nose normal.   Mouth/Throat: Oropharynx is clear and moist. No oropharyngeal exudate.   Eyes: Pupils are equal, round, and reactive to light. Conjunctivae and EOM are normal. Right eye exhibits no discharge.  Left eye exhibits no discharge. No scleral icterus.   Neck: Normal range of motion. Neck supple. No JVD present. No tracheal deviation present. No thyromegaly present.   Cardiovascular: Normal rate, regular rhythm, normal heart sounds and intact distal pulses. Exam reveals no gallop and no friction rub.   No murmur heard.  Pulmonary/Chest: Effort normal and breath sounds normal. No stridor. No respiratory distress. She has no wheezes. She has no rales. She exhibits no tenderness.   Abdominal: Soft. Bowel sounds are normal. She exhibits no distension and no mass. There is no tenderness. There is no rebound and no guarding. No hernia.   Musculoskeletal: Normal range of motion. She exhibits no edema, tenderness or deformity.   Left CVA tenderness.    Lymphadenopathy:     She has no cervical adenopathy.   Neurological: She is alert and oriented to person, place, and time. She displays normal reflexes. No cranial nerve deficit or sensory deficit. She exhibits normal muscle tone. Coordination normal.   Skin: Skin is warm and dry. Capillary refill takes less than 2 seconds. No rash noted. She is not diaphoretic. No erythema. No pallor.   Psychiatric: She has a normal mood and affect. Her behavior is normal. Judgment and thought content normal.   Nursing note and vitals reviewed.      Procedures           ED Course  ED Course as of May 07 2012   Thu May 07, 2020   1748 Endorsed to joseph guerrier     []   1940 IMPRESSION:  Overall nonspecific bowel gas pattern. Scattered fecal  material within the colon.     This report was finalized on 5/7/2020 7:29 PM by Dr. Rick Martinez MD.      XR Abdomen KUB []   2011 Discussed plan of care with patient.  Advised return back to the ED with any worsening symptoms.  Advised to follow-up with Dr. Padilla in 1 day    []      ED Course User Index  [] Akhil Guerrier PA-C  [] Joseph Guerrier PA-C MDM    Final diagnoses:   Renal  colic   Acute cystitis with hematuria            Zonia Barajas PA-C  05/07/20 2012

## 2020-05-07 NOTE — ED NOTES
Report received from Fifi Torre RN at this time.  Pt resting quietly on stretcher with no complaints; pt denies flank pain at this time after pain medication administration.  Pt AAOx4 with no resp distress noted, respirations even and unlabored.  Pt denies any needs at this time.  Skin PWD. Will continue to monitor and follow plan of care.  Bed rails up x2, bed in lowest position, call light in reach.           Elisa Rivas, RN  05/07/20 [id number]

## 2020-05-09 LAB — BACTERIA SPEC AEROBE CULT: NORMAL

## 2020-05-12 ENCOUNTER — OFFICE VISIT (OUTPATIENT)
Dept: UROLOGY | Facility: CLINIC | Age: 45
End: 2020-05-12

## 2020-05-12 ENCOUNTER — HOSPITAL ENCOUNTER (OUTPATIENT)
Dept: GENERAL RADIOLOGY | Facility: HOSPITAL | Age: 45
Discharge: HOME | End: 2020-05-12
Admitting: UROLOGY

## 2020-05-12 VITALS — WEIGHT: 198 LBS | BODY MASS INDEX: 36.44 KG/M2 | TEMPERATURE: 98 F | HEIGHT: 62 IN

## 2020-05-12 DIAGNOSIS — Z87.442 HISTORY OF KIDNEY STONES: Primary | ICD-10-CM

## 2020-05-12 DIAGNOSIS — Z87.442 HISTORY OF KIDNEY STONES: ICD-10-CM

## 2020-05-12 PROCEDURE — 74018 RADEX ABDOMEN 1 VIEW: CPT

## 2020-05-12 PROCEDURE — 99214 OFFICE O/P EST MOD 30 MIN: CPT | Performed by: NURSE PRACTITIONER

## 2020-05-12 PROCEDURE — 74018 RADEX ABDOMEN 1 VIEW: CPT | Performed by: RADIOLOGY

## 2020-05-12 RX ORDER — OXYCODONE AND ACETAMINOPHEN 10; 325 MG/1; MG/1
1 TABLET ORAL EVERY 6 HOURS PRN
Qty: 8 TABLET | Refills: 0 | Status: SHIPPED | OUTPATIENT
Start: 2020-05-12 | End: 2020-05-28 | Stop reason: SDUPTHER

## 2020-05-12 RX ORDER — MELOXICAM 15 MG/1
TABLET ORAL
COMMUNITY
Start: 2020-05-11 | End: 2020-05-12

## 2020-05-12 RX ORDER — GENTAMICIN SULFATE 80 MG/100ML
80 INJECTION, SOLUTION INTRAVENOUS ONCE
Status: CANCELLED | OUTPATIENT
Start: 2020-05-12 | End: 2020-05-12

## 2020-05-12 NOTE — H&P (VIEW-ONLY)
Chief Complaint:          Chief Complaint   Patient presents with   • Nephrolithiasis     4 mnth f/u       HPI:   44 y.o. female.  Patient presents to the clinic today with left flank pain. She is an ER follow up.  Patient reports she went to the ED on 05/07/2020 secondary to very sharp and stabbing 10/10 pain to her flank area and lower abdomen. She reports feeling miserable at that time.    Today she reports the pain has been intermittent and very colicky 5/10. She reports urinary symptoms of frequency, urgency, but denies dysuria, gross hematuria and burning on urination. She has had chills, no fevers, nausea, no vomiting. She states her pain/discomfort is interfering with her work and rest. She is desires of a surgical intervention. Patient has a significant history of kidney stones.    Will get a KUB.      Past Medical History:        Past Medical History:   Diagnosis Date   • Complicated UTI (urinary tract infection)    • Kidney stones    • Migraine    • Migraines    • Pneumonia    • Polycystic kidney disease    • Strep throat          Current Meds:     Current Outpatient Medications   Medication Sig Dispense Refill   • amitriptyline (ELAVIL) 25 MG tablet Take 25 mg by mouth every night.     • baclofen (LIORESAL) 10 MG tablet Take 10 mg by mouth 3 (Three) Times a Day.     • cephalexin (KEFLEX) 500 MG capsule Take 1 capsule by mouth 2 (Two) Times a Day for 7 days. 14 capsule 0   • clonazePAM (KlonoPIN) 0.5 MG tablet Take 0.5 mg by mouth 2 (two) times a day as needed for seizures.     • gabapentin (NEURONTIN) 400 MG capsule Take 400 mg by mouth 4 (Four) Times a Day.     • ketorolac (TORADOL) 10 MG tablet Take 1 tablet by mouth Every 6 (Six) Hours As Needed for Moderate Pain . 16 tablet 0   • ondansetron (ZOFRAN) 4 MG tablet Take 1 tablet by mouth Daily As Needed for Nausea or Vomiting. 30 tablet 1   • phenazopyridine (PYRIDIUM) 200 MG tablet Take 1 tablet by mouth 3 (Three) Times a Day As Needed for Bladder  Spasms. 20 tablet 0   • promethazine (PHENERGAN) 25 MG tablet Take 1 tablet by mouth Every 6 (Six) Hours As Needed for Nausea or Vomiting. 15 tablet 0     No current facility-administered medications for this visit.         Allergies:      Allergies   Allergen Reactions   • Sulfa Antibiotics Anaphylaxis   • Compazine [Prochlorperazine Edisylate] Hives        Past Surgical History:     Past Surgical History:   Procedure Laterality Date   •  SECTION     • COLONOSCOPY     • CYSTOSCOPY     • CYSTOSCOPY ELECTROHYDRAULIC LITHOTRIPSY     • DENTAL PROCEDURE     • EXTRACORPOREAL SHOCK WAVE LITHOTRIPSY (ESWL) Left 2018    Procedure: EXTRACORPOREAL SHOCKWAVE LITHOTRIPSY;  Surgeon: Garth Friedman MD;  Location: [facility];  Service: Urology   • KIDNEY STONE SURGERY     • LITHOTRIPSY           Social History:     Social History     Socioeconomic History   • Marital status:      Spouse name: Not on file   • Number of children: Not on file   • Years of education: Not on file   • Highest education level: Not on file   Tobacco Use   • Smoking status: Former Smoker     Packs/day: 0.50     Years: 15.00     Pack years: 7.50     Types: Cigarettes     Last attempt to quit: 2018     Years since quittin.3   • Smokeless tobacco: Never Used   Substance and Sexual Activity   • Alcohol use: No   • Drug use: No   • Sexual activity: Yes     Partners: Male     Birth control/protection: None       Family History:     Family History   Problem Relation Age of Onset   • No Known Problems Father    • No Known Problems Mother        Review of Systems:     Review of Systems   Constitutional: Negative for activity change, chills, fatigue and fever.   HENT: Negative for congestion, sinus pressure and sinus pain.    Eyes: Negative for discharge and itching.   Respiratory: Negative for apnea, cough, chest tightness and shortness of breath.    Cardiovascular: Negative for chest pain and leg swelling.   Gastrointestinal:  Negative for abdominal distention, abdominal pain, nausea and vomiting.   Endocrine: Negative for cold intolerance and heat intolerance.   Genitourinary: Positive for dysuria, flank pain, frequency and hematuria. Negative for difficulty urinating and urgency.   Musculoskeletal: Positive for back pain.   Skin: Positive for pallor and rash. Negative for color change.   Neurological: Negative for dizziness and headaches.   Psychiatric/Behavioral: Negative for behavioral problems and confusion. The patient is not nervous/anxious.    All other systems reviewed and are negative.      Physical Exam:     Physical Exam   Constitutional: She is oriented to person, place, and time. She appears well-developed and well-nourished. She appears distressed.   HENT:   Head: Normocephalic and atraumatic.   Eyes: Pupils are equal, round, and reactive to light. EOM are normal.   Neck: Normal range of motion. No tracheal deviation present. No thyromegaly present.   Cardiovascular: Normal rate and regular rhythm.   No murmur heard.  Pulmonary/Chest: Effort normal and breath sounds normal. No stridor. No respiratory distress. She has no wheezes.   Abdominal: Soft. Bowel sounds are normal. There is tenderness.   Genitourinary: Vagina normal. There is no tenderness on the right labia. There is no tenderness on the left labia. No vaginal discharge found.   Musculoskeletal: Normal range of motion. She exhibits tenderness. She exhibits no edema or deformity.   Neurological: She is alert and oriented to person, place, and time. No cranial nerve deficit or sensory deficit. Coordination normal.   Skin: Skin is warm and dry. Capillary refill takes less than 2 seconds. No rash noted. No erythema. No pallor.   Psychiatric: She has a normal mood and affect. Her behavior is normal. Judgment and thought content normal.         Procedure:       Assessment/Plan:      Left Flank Pain/ Left Renal Calculus: The Patient has been diagnosed with a left renal  calculus.  She presents with left colicky pain that has been intermittent occasionally sharp with pelvic pressure and discomfort ongoing for 2 weeks.    We both reviewed and discussed her KUB done today, showing a 2.5 mm left renal calculus noted. Other calcific densitiesin the left hemipelvis could represent distal calculi or potentially vascular calcifications.     We have discussed the various parameters regarding spontaneous passage including the notion that a 2-3 mm stone has a high likelihood of spontaneous passage versus a larger stone  has  being caught up in the upper areas of the urinary tract.      We also discussed the medical management of stone disease and the use of medical expulsive therapy in the form of Flomax. This is used in an off label setting. Patient is very familiar with this.  I also talked about nonoperative management including ambulation and increasing fluids and hot tub as being an effective adjuncts in the treatment of a stone.     We discussed the absolute relative indicators for intervention including the presence of sepsis, and pain we cannot control as the primary need for urgent intervention.  We discussed placement of a stent if indicated and the management of the stent as well.      ESWL-the patient is a candidate for extracorporeal shockwave lithotripsy.  We discussed the type of stone.  And the complications associated with the procedure including but not limited to pain in the flank, hematoma, spontaneous renal hemorrhage, and adequate fragmentation of stones.      I Discussed this case with Dr Friedman who is very familiar with this patient,  who gave her a refill of pain medication just in case and Nausea medication and Flomax for medical expulsive therapy.    Patient has been scheduled for Left  Extracorporal Shockwave lithotripsy with Dr. Friedman on Friday 05/15/2020       Patient reports that she is not currently experiencing any symptoms of urinary  incontinence.      Patient's Body mass index is 36.21 kg/m². BMI is above normal parameters. Recommendations include: educational material, exercise counseling and nutrition counseling.                This document has been electronically signed by Griselda Cheng-Akwa, ARNP on May 12, 2020 10:33

## 2020-05-12 NOTE — PROGRESS NOTES
Chief Complaint:          Chief Complaint   Patient presents with   • Nephrolithiasis     4 mnth f/u       HPI:   44 y.o. female.  Patient presents to the clinic today with left flank pain. She is an ER follow up.  Patient reports she went to the ED on 05/07/2020 secondary to very sharp and stabbing 10/10 pain to her flank area and lower abdomen. She reports feeling miserable at that time.    Today she reports the pain has been intermittent and very colicky 5/10. She reports urinary symptoms of frequency, urgency, but denies dysuria, gross hematuria and burning on urination. She has had chills, no fevers, nausea, no vomiting. She states her pain/discomfort is interfering with her work and rest. She is desires of a surgical intervention. Patient has a significant history of kidney stones.    Will get a KUB.      Past Medical History:        Past Medical History:   Diagnosis Date   • Complicated UTI (urinary tract infection)    • Kidney stones    • Migraine    • Migraines    • Pneumonia    • Polycystic kidney disease    • Strep throat          Current Meds:     Current Outpatient Medications   Medication Sig Dispense Refill   • amitriptyline (ELAVIL) 25 MG tablet Take 25 mg by mouth every night.     • baclofen (LIORESAL) 10 MG tablet Take 10 mg by mouth 3 (Three) Times a Day.     • cephalexin (KEFLEX) 500 MG capsule Take 1 capsule by mouth 2 (Two) Times a Day for 7 days. 14 capsule 0   • clonazePAM (KlonoPIN) 0.5 MG tablet Take 0.5 mg by mouth 2 (two) times a day as needed for seizures.     • gabapentin (NEURONTIN) 400 MG capsule Take 400 mg by mouth 4 (Four) Times a Day.     • ketorolac (TORADOL) 10 MG tablet Take 1 tablet by mouth Every 6 (Six) Hours As Needed for Moderate Pain . 16 tablet 0   • ondansetron (ZOFRAN) 4 MG tablet Take 1 tablet by mouth Daily As Needed for Nausea or Vomiting. 30 tablet 1   • phenazopyridine (PYRIDIUM) 200 MG tablet Take 1 tablet by mouth 3 (Three) Times a Day As Needed for Bladder  Spasms. 20 tablet 0   • promethazine (PHENERGAN) 25 MG tablet Take 1 tablet by mouth Every 6 (Six) Hours As Needed for Nausea or Vomiting. 15 tablet 0     No current facility-administered medications for this visit.         Allergies:      Allergies   Allergen Reactions   • Sulfa Antibiotics Anaphylaxis   • Compazine [Prochlorperazine Edisylate] Hives        Past Surgical History:     Past Surgical History:   Procedure Laterality Date   •  SECTION     • COLONOSCOPY     • CYSTOSCOPY     • CYSTOSCOPY ELECTROHYDRAULIC LITHOTRIPSY     • DENTAL PROCEDURE     • EXTRACORPOREAL SHOCK WAVE LITHOTRIPSY (ESWL) Left 2018    Procedure: EXTRACORPOREAL SHOCKWAVE LITHOTRIPSY;  Surgeon: Garth Friedman MD;  Location: [facility];  Service: Urology   • KIDNEY STONE SURGERY     • LITHOTRIPSY           Social History:     Social History     Socioeconomic History   • Marital status:      Spouse name: Not on file   • Number of children: Not on file   • Years of education: Not on file   • Highest education level: Not on file   Tobacco Use   • Smoking status: Former Smoker     Packs/day: 0.50     Years: 15.00     Pack years: 7.50     Types: Cigarettes     Last attempt to quit: 2018     Years since quittin.3   • Smokeless tobacco: Never Used   Substance and Sexual Activity   • Alcohol use: No   • Drug use: No   • Sexual activity: Yes     Partners: Male     Birth control/protection: None       Family History:     Family History   Problem Relation Age of Onset   • No Known Problems Father    • No Known Problems Mother        Review of Systems:     Review of Systems   Constitutional: Negative for activity change, chills, fatigue and fever.   HENT: Negative for congestion, sinus pressure and sinus pain.    Eyes: Negative for discharge and itching.   Respiratory: Negative for apnea, cough, chest tightness and shortness of breath.    Cardiovascular: Negative for chest pain and leg swelling.   Gastrointestinal:  Negative for abdominal distention, abdominal pain, nausea and vomiting.   Endocrine: Negative for cold intolerance and heat intolerance.   Genitourinary: Positive for dysuria, flank pain, frequency and hematuria. Negative for difficulty urinating and urgency.   Musculoskeletal: Positive for back pain.   Skin: Positive for pallor and rash. Negative for color change.   Neurological: Negative for dizziness and headaches.   Psychiatric/Behavioral: Negative for behavioral problems and confusion. The patient is not nervous/anxious.    All other systems reviewed and are negative.      Physical Exam:     Physical Exam   Constitutional: She is oriented to person, place, and time. She appears well-developed and well-nourished. She appears distressed.   HENT:   Head: Normocephalic and atraumatic.   Eyes: Pupils are equal, round, and reactive to light. EOM are normal.   Neck: Normal range of motion. No tracheal deviation present. No thyromegaly present.   Cardiovascular: Normal rate and regular rhythm.   No murmur heard.  Pulmonary/Chest: Effort normal and breath sounds normal. No stridor. No respiratory distress. She has no wheezes.   Abdominal: Soft. Bowel sounds are normal. There is tenderness.   Genitourinary: Vagina normal. There is no tenderness on the right labia. There is no tenderness on the left labia. No vaginal discharge found.   Musculoskeletal: Normal range of motion. She exhibits tenderness. She exhibits no edema or deformity.   Neurological: She is alert and oriented to person, place, and time. No cranial nerve deficit or sensory deficit. Coordination normal.   Skin: Skin is warm and dry. Capillary refill takes less than 2 seconds. No rash noted. No erythema. No pallor.   Psychiatric: She has a normal mood and affect. Her behavior is normal. Judgment and thought content normal.         Procedure:       Assessment/Plan:      Left Flank Pain/ Left Renal Calculus: The Patient has been diagnosed with a left renal  calculus.  She presents with left colicky pain that has been intermittent occasionally sharp with pelvic pressure and discomfort ongoing for 2 weeks.    We both reviewed and discussed her KUB done today, showing a 2.5 mm left renal calculus noted. Other calcific densitiesin the left hemipelvis could represent distal calculi or potentially vascular calcifications.     We have discussed the various parameters regarding spontaneous passage including the notion that a 2-3 mm stone has a high likelihood of spontaneous passage versus a larger stone  has  being caught up in the upper areas of the urinary tract.      We also discussed the medical management of stone disease and the use of medical expulsive therapy in the form of Flomax. This is used in an off label setting. Patient is very familiar with this.  I also talked about nonoperative management including ambulation and increasing fluids and hot tub as being an effective adjuncts in the treatment of a stone.     We discussed the absolute relative indicators for intervention including the presence of sepsis, and pain we cannot control as the primary need for urgent intervention.  We discussed placement of a stent if indicated and the management of the stent as well.      ESWL-the patient is a candidate for extracorporeal shockwave lithotripsy.  We discussed the type of stone.  And the complications associated with the procedure including but not limited to pain in the flank, hematoma, spontaneous renal hemorrhage, and adequate fragmentation of stones.      I Discussed this case with Dr Friedman who is very familiar with this patient,  who gave her a refill of pain medication just in case and Nausea medication and Flomax for medical expulsive therapy.    Patient has been scheduled for Left  Extracorporal Shockwave lithotripsy with Dr. Friedman on Friday 05/15/2020       Patient reports that she is not currently experiencing any symptoms of urinary  incontinence.      Patient's Body mass index is 36.21 kg/m². BMI is above normal parameters. Recommendations include: educational material, exercise counseling and nutrition counseling.                This document has been electronically signed by Griselda Cheng-Akwa, ARNP on May 12, 2020 10:33

## 2020-05-13 ENCOUNTER — TRANSCRIBE ORDERS (OUTPATIENT)
Dept: ADMINISTRATIVE | Facility: HOSPITAL | Age: 45
End: 2020-05-13

## 2020-05-13 ENCOUNTER — APPOINTMENT (OUTPATIENT)
Dept: PREADMISSION TESTING | Facility: HOSPITAL | Age: 45
End: 2020-05-13

## 2020-05-13 ENCOUNTER — LAB (OUTPATIENT)
Dept: LAB | Facility: HOSPITAL | Age: 45
End: 2020-05-13

## 2020-05-13 DIAGNOSIS — Z01.818 PRE-OPERATIVE CLEARANCE: Primary | ICD-10-CM

## 2020-05-13 DIAGNOSIS — Z01.818 PRE-OPERATIVE CLEARANCE: ICD-10-CM

## 2020-05-13 PROCEDURE — U0004 COV-19 TEST NON-CDC HGH THRU: HCPCS

## 2020-05-13 PROCEDURE — U0002 COVID-19 LAB TEST NON-CDC: HCPCS

## 2020-05-13 RX ORDER — TRAMADOL HYDROCHLORIDE 50 MG/1
50 TABLET ORAL EVERY 6 HOURS PRN
COMMUNITY
End: 2020-07-23 | Stop reason: SDUPTHER

## 2020-05-13 NOTE — DISCHARGE INSTRUCTIONS

## 2020-05-14 LAB
REF LAB TEST METHOD: NORMAL
SARS-COV-2 RNA RESP QL NAA+PROBE: NOT DETECTED

## 2020-05-15 ENCOUNTER — HOSPITAL ENCOUNTER (OUTPATIENT)
Facility: HOSPITAL | Age: 45
Discharge: HOME | End: 2020-05-15
Attending: UROLOGY | Admitting: UROLOGY

## 2020-05-15 ENCOUNTER — ANESTHESIA EVENT (OUTPATIENT)
Dept: PERIOP | Facility: HOSPITAL | Age: 45
End: 2020-05-15

## 2020-05-15 ENCOUNTER — ANESTHESIA (OUTPATIENT)
Dept: PERIOP | Facility: HOSPITAL | Age: 45
End: 2020-05-15

## 2020-05-15 VITALS
HEIGHT: 62 IN | SYSTOLIC BLOOD PRESSURE: 124 MMHG | DIASTOLIC BLOOD PRESSURE: 86 MMHG | OXYGEN SATURATION: 99 % | RESPIRATION RATE: 18 BRPM | HEART RATE: 68 BPM | TEMPERATURE: 97.9 F | BODY MASS INDEX: 31.28 KG/M2 | WEIGHT: 170 LBS

## 2020-05-15 DIAGNOSIS — Z87.442 HISTORY OF KIDNEY STONES: ICD-10-CM

## 2020-05-15 LAB
B-HCG UR QL: NEGATIVE
INTERNAL NEGATIVE CONTROL: NEGATIVE
INTERNAL POSITIVE CONTROL: POSITIVE
Lab: NORMAL

## 2020-05-15 PROCEDURE — 81025 URINE PREGNANCY TEST: CPT | Performed by: ANESTHESIOLOGY

## 2020-05-15 PROCEDURE — 25010000002 GENTAMICIN PER 80 MG: Performed by: UROLOGY

## 2020-05-15 PROCEDURE — 25010000002 ONDANSETRON PER 1 MG: Performed by: NURSE ANESTHETIST, CERTIFIED REGISTERED

## 2020-05-15 PROCEDURE — 25010000002 PROPOFOL 10 MG/ML EMULSION: Performed by: NURSE ANESTHETIST, CERTIFIED REGISTERED

## 2020-05-15 PROCEDURE — 25010000002 FENTANYL CITRATE (PF) 100 MCG/2ML SOLUTION: Performed by: ANESTHESIOLOGY

## 2020-05-15 PROCEDURE — 25010000002 MIDAZOLAM PER 1 MG: Performed by: NURSE ANESTHETIST, CERTIFIED REGISTERED

## 2020-05-15 PROCEDURE — 50590 FRAGMENTING OF KIDNEY STONE: CPT | Performed by: UROLOGY

## 2020-05-15 PROCEDURE — 25010000002 FENTANYL CITRATE (PF) 100 MCG/2ML SOLUTION: Performed by: NURSE ANESTHETIST, CERTIFIED REGISTERED

## 2020-05-15 RX ORDER — ONDANSETRON 2 MG/ML
4 INJECTION INTRAMUSCULAR; INTRAVENOUS AS NEEDED
Status: DISCONTINUED | OUTPATIENT
Start: 2020-05-15 | End: 2020-05-15 | Stop reason: HOSPADM

## 2020-05-15 RX ORDER — PROPOFOL 10 MG/ML
VIAL (ML) INTRAVENOUS AS NEEDED
Status: DISCONTINUED | OUTPATIENT
Start: 2020-05-15 | End: 2020-05-15 | Stop reason: SURG

## 2020-05-15 RX ORDER — MIDAZOLAM HYDROCHLORIDE 1 MG/ML
2 INJECTION INTRAMUSCULAR; INTRAVENOUS
Status: DISCONTINUED | OUTPATIENT
Start: 2020-05-15 | End: 2020-05-15 | Stop reason: HOSPADM

## 2020-05-15 RX ORDER — MIDAZOLAM HYDROCHLORIDE 1 MG/ML
1 INJECTION INTRAMUSCULAR; INTRAVENOUS
Status: DISCONTINUED | OUTPATIENT
Start: 2020-05-15 | End: 2020-05-15 | Stop reason: HOSPADM

## 2020-05-15 RX ORDER — SODIUM CHLORIDE 0.9 % (FLUSH) 0.9 %
10 SYRINGE (ML) INJECTION AS NEEDED
Status: DISCONTINUED | OUTPATIENT
Start: 2020-05-15 | End: 2020-05-15 | Stop reason: HOSPADM

## 2020-05-15 RX ORDER — OXYCODONE HYDROCHLORIDE AND ACETAMINOPHEN 5; 325 MG/1; MG/1
1 TABLET ORAL ONCE AS NEEDED
Status: DISCONTINUED | OUTPATIENT
Start: 2020-05-15 | End: 2020-05-15 | Stop reason: HOSPADM

## 2020-05-15 RX ORDER — IPRATROPIUM BROMIDE AND ALBUTEROL SULFATE 2.5; .5 MG/3ML; MG/3ML
3 SOLUTION RESPIRATORY (INHALATION) ONCE AS NEEDED
Status: DISCONTINUED | OUTPATIENT
Start: 2020-05-15 | End: 2020-05-15 | Stop reason: HOSPADM

## 2020-05-15 RX ORDER — SODIUM CHLORIDE, SODIUM LACTATE, POTASSIUM CHLORIDE, CALCIUM CHLORIDE 600; 310; 30; 20 MG/100ML; MG/100ML; MG/100ML; MG/100ML
125 INJECTION, SOLUTION INTRAVENOUS CONTINUOUS
Status: DISCONTINUED | OUTPATIENT
Start: 2020-05-15 | End: 2020-05-15 | Stop reason: HOSPADM

## 2020-05-15 RX ORDER — MEPERIDINE HYDROCHLORIDE 25 MG/ML
12.5 INJECTION INTRAMUSCULAR; INTRAVENOUS; SUBCUTANEOUS
Status: DISCONTINUED | OUTPATIENT
Start: 2020-05-15 | End: 2020-05-15 | Stop reason: HOSPADM

## 2020-05-15 RX ORDER — FENTANYL CITRATE 50 UG/ML
100 INJECTION, SOLUTION INTRAMUSCULAR; INTRAVENOUS
Status: DISCONTINUED | OUTPATIENT
Start: 2020-05-15 | End: 2020-05-15 | Stop reason: HOSPADM

## 2020-05-15 RX ORDER — OXYCODONE AND ACETAMINOPHEN 10; 325 MG/1; MG/1
1 TABLET ORAL EVERY 4 HOURS PRN
Qty: 20 TABLET | Refills: 0 | Status: SHIPPED | OUTPATIENT
Start: 2020-05-15 | End: 2020-05-28 | Stop reason: SDUPTHER

## 2020-05-15 RX ORDER — SODIUM CHLORIDE 0.9 % (FLUSH) 0.9 %
10 SYRINGE (ML) INJECTION EVERY 12 HOURS SCHEDULED
Status: DISCONTINUED | OUTPATIENT
Start: 2020-05-15 | End: 2020-05-15 | Stop reason: HOSPADM

## 2020-05-15 RX ORDER — FAMOTIDINE 10 MG/ML
INJECTION, SOLUTION INTRAVENOUS AS NEEDED
Status: DISCONTINUED | OUTPATIENT
Start: 2020-05-15 | End: 2020-05-15 | Stop reason: SURG

## 2020-05-15 RX ORDER — MIDAZOLAM HYDROCHLORIDE 1 MG/ML
INJECTION INTRAMUSCULAR; INTRAVENOUS AS NEEDED
Status: DISCONTINUED | OUTPATIENT
Start: 2020-05-15 | End: 2020-05-15 | Stop reason: SURG

## 2020-05-15 RX ORDER — ONDANSETRON 2 MG/ML
INJECTION INTRAMUSCULAR; INTRAVENOUS AS NEEDED
Status: DISCONTINUED | OUTPATIENT
Start: 2020-05-15 | End: 2020-05-15 | Stop reason: SURG

## 2020-05-15 RX ORDER — LIDOCAINE HYDROCHLORIDE 20 MG/ML
INJECTION, SOLUTION INFILTRATION; PERINEURAL AS NEEDED
Status: DISCONTINUED | OUTPATIENT
Start: 2020-05-15 | End: 2020-05-15 | Stop reason: SURG

## 2020-05-15 RX ORDER — GENTAMICIN SULFATE 80 MG/100ML
80 INJECTION, SOLUTION INTRAVENOUS ONCE
Status: COMPLETED | OUTPATIENT
Start: 2020-05-15 | End: 2020-05-15

## 2020-05-15 RX ORDER — FENTANYL CITRATE 50 UG/ML
INJECTION, SOLUTION INTRAMUSCULAR; INTRAVENOUS AS NEEDED
Status: DISCONTINUED | OUTPATIENT
Start: 2020-05-15 | End: 2020-05-15 | Stop reason: SURG

## 2020-05-15 RX ORDER — FENTANYL CITRATE 50 UG/ML
50 INJECTION, SOLUTION INTRAMUSCULAR; INTRAVENOUS
Status: DISCONTINUED | OUTPATIENT
Start: 2020-05-15 | End: 2020-05-15 | Stop reason: HOSPADM

## 2020-05-15 RX ADMIN — FENTANYL CITRATE 100 MCG: 50 INJECTION INTRAMUSCULAR; INTRAVENOUS at 09:38

## 2020-05-15 RX ADMIN — MIDAZOLAM HYDROCHLORIDE 2 MG: 1 INJECTION, SOLUTION INTRAMUSCULAR; INTRAVENOUS at 09:48

## 2020-05-15 RX ADMIN — ONDANSETRON 4 MG: 2 INJECTION INTRAMUSCULAR; INTRAVENOUS at 09:53

## 2020-05-15 RX ADMIN — PROPOFOL 150 MG: 10 INJECTION, EMULSION INTRAVENOUS at 09:53

## 2020-05-15 RX ADMIN — SODIUM CHLORIDE, POTASSIUM CHLORIDE, SODIUM LACTATE AND CALCIUM CHLORIDE 125 ML/HR: 600; 310; 30; 20 INJECTION, SOLUTION INTRAVENOUS at 09:37

## 2020-05-15 RX ADMIN — FAMOTIDINE 20 MG: 10 INJECTION INTRAVENOUS at 09:53

## 2020-05-15 RX ADMIN — GENTAMICIN SULFATE 80 MG: 80 INJECTION, SOLUTION INTRAVENOUS at 09:48

## 2020-05-15 RX ADMIN — LIDOCAINE HYDROCHLORIDE 60 MG: 20 INJECTION, SOLUTION INFILTRATION; PERINEURAL at 09:53

## 2020-05-15 RX ADMIN — FENTANYL CITRATE 100 MCG: 50 INJECTION INTRAMUSCULAR; INTRAVENOUS at 09:48

## 2020-05-15 NOTE — OP NOTE
EXTRACORPOREAL SHOCKWAVE LITHOTRIPSY  Procedure Note    Olivia Richmond  5/15/2020    Pre-op Diagnosis:   History of kidney stones [Z87.442]    Post-op Diagnosis:     Post-Op Diagnosis Codes:     * History of kidney stones [Z87.442]    Procedure/CPT® Codes:      Procedure(s):  EXTRACORPOREAL SHOCKWAVE LITHOTRIPSY    Surgeon(s):  Garth Friedman MD    Anesthesia: see anesthesia record  44-year-old white female with very metabolically active stone disease and significant left-sided pain from a left obstructing stone.  ESWL-the patient is a candidate for extracorporeal shockwave lithotripsy.  We discussed the type of stone.  And the complications associated with the procedure including but not limited to pain in the flank, hematoma, spontaneous renal hemorrhage, and adequate fragmentation of stones.  The need for passage of the stones.  The need for concomitant additional procedures in the range of 24% the risk of a distal fragment in the range of 3% requiring ureteroscopic removal..  Fact that sometimes a stent is indicated based on the size and the density of the stone as determined on the CAT scan.  Following an informed consent he is brought to the operative suite and underwent induction of general endotracheal anesthetic the stone was localized at F2 and a total of 3000 shockwaves administered without complication.  There was excellent fragmentation he was awake and alert return to recovery room        Staff:   Circulator: Raulito Massey RN  Scrub Person: Gemini Manzo    Estimated Blood Loss: none  Urine Voided: * No values recorded between 5/15/2020  9:48 AM and 5/15/2020 10:19 AM *    Specimens:                None      Drains: None    Findings: Great fragmentation     Blood: N/A    Complications: None    Grafts and Implants: None    Garth Friedman MD     Date: 5/15/2020  Time: 10:19

## 2020-05-15 NOTE — ANESTHESIA POSTPROCEDURE EVALUATION
Patient: Olivia Richmond    Procedure Summary     Date:  05/15/20 Room / Location:  [facility] OR 06 /  [facility] OR    Anesthesia Start:  0948 Anesthesia Stop:  1026    Procedure:  EXTRACORPOREAL SHOCKWAVE LITHOTRIPSY (Left ) Diagnosis:       History of kidney stones      (History of kidney stones [Z87.442])    Surgeon:  Garth Friedman MD Provider:  Alejo Gao MD    Anesthesia Type:  general ASA Status:  2          Anesthesia Type: general    Vitals  Vitals Value Taken Time   /86 5/15/2020 11:00 AM   Temp 97.9 °F (36.6 °C) 5/15/2020 11:00 AM   Pulse 74 5/15/2020 11:00 AM   Resp 18 5/15/2020 11:00 AM   SpO2 98 % 5/15/2020 11:00 AM           Post Anesthesia Care and Evaluation    Patient location during evaluation: PHASE II  Patient participation: complete - patient participated  Level of consciousness: awake and alert  Pain score: 1  Pain management: adequate  Airway patency: patent  Anesthetic complications: No anesthetic complications  PONV Status: controlled  Cardiovascular status: acceptable  Respiratory status: acceptable  Hydration status: acceptable

## 2020-05-15 NOTE — ANESTHESIA PREPROCEDURE EVALUATION
Anesthesia Evaluation     Patient summary reviewed and Nursing notes reviewed   history of anesthetic complications: PONV  NPO Solid Status: > 8 hours  NPO Liquid Status: > 8 hours           Airway   Mallampati: II  TM distance: >3 FB  Neck ROM: full  No difficulty expected  Dental - normal exam   (+) lower dentures and upper dentures    Pulmonary - normal exam   (+) pneumonia resolved , a smoker Former,   Cardiovascular - negative cardio ROS and normal exam        Neuro/Psych  (+) headaches,     GI/Hepatic/Renal/Endo    (+)  GERD,  renal disease stones,     Musculoskeletal     Abdominal  - normal exam    Bowel sounds: normal.   Substance History - negative use     OB/GYN negative ob/gyn ROS         Other   arthritis,                        Anesthesia Plan    ASA 2     general     intravenous induction     Anesthetic plan, all risks, benefits, and alternatives have been provided, discussed and informed consent has been obtained with: patient.    Plan discussed with CRNA.

## 2020-05-15 NOTE — ANESTHESIA PROCEDURE NOTES
Airway  Urgency: elective    Date/Time: 5/15/2020 9:53 AM  Airway not difficult    General Information and Staff    Patient location during procedure: OR  Anesthesiologist: Alejo Gao MD  CRNA: Jose Minor CRNA    Indications and Patient Condition  Indications for airway management: airway protection    Preoxygenated: yes  MILS maintained throughout  Mask difficulty assessment: 0 - not attempted    Final Airway Details  Final airway type: supraglottic airway      Successful airway: unique  Size 4    Number of attempts at approach: 1  Assessment: lips, teeth, and gum same as pre-op and atraumatic intubation    Additional Comments  Dentition & lips unchanged from preop

## 2020-05-28 ENCOUNTER — HOSPITAL ENCOUNTER (OUTPATIENT)
Dept: GENERAL RADIOLOGY | Facility: HOSPITAL | Age: 45
Discharge: HOME | End: 2020-05-28
Admitting: NURSE PRACTITIONER

## 2020-05-28 ENCOUNTER — OFFICE VISIT (OUTPATIENT)
Dept: UROLOGY | Facility: CLINIC | Age: 45
End: 2020-05-28

## 2020-05-28 VITALS — WEIGHT: 171 LBS | BODY MASS INDEX: 31.47 KG/M2 | TEMPERATURE: 98.5 F | HEIGHT: 62 IN

## 2020-05-28 DIAGNOSIS — N20.0 RENAL CALCULUS, LEFT: Primary | ICD-10-CM

## 2020-05-28 DIAGNOSIS — Z87.442 HISTORY OF KIDNEY STONES: ICD-10-CM

## 2020-05-28 PROCEDURE — 99024 POSTOP FOLLOW-UP VISIT: CPT | Performed by: NURSE PRACTITIONER

## 2020-05-28 PROCEDURE — 74018 RADEX ABDOMEN 1 VIEW: CPT

## 2020-05-28 PROCEDURE — 74018 RADEX ABDOMEN 1 VIEW: CPT | Performed by: RADIOLOGY

## 2020-05-28 RX ORDER — TAMSULOSIN HYDROCHLORIDE 0.4 MG/1
CAPSULE ORAL
COMMUNITY
Start: 2020-05-18 | End: 2020-07-23 | Stop reason: SDUPTHER

## 2020-05-28 RX ORDER — OXYCODONE AND ACETAMINOPHEN 10; 325 MG/1; MG/1
1 TABLET ORAL EVERY 6 HOURS PRN
Qty: 8 TABLET | Refills: 0 | Status: SHIPPED | OUTPATIENT
Start: 2020-05-28 | End: 2020-06-08 | Stop reason: SDUPTHER

## 2020-05-28 RX ORDER — TAMSULOSIN HYDROCHLORIDE 0.4 MG/1
1 CAPSULE ORAL NIGHTLY
Qty: 30 CAPSULE | Refills: 5 | Status: SHIPPED | OUTPATIENT
Start: 2020-05-28 | End: 2020-11-13

## 2020-05-28 NOTE — PROGRESS NOTES
Chief Complaint:          Chief Complaint   Patient presents with   • Left Renal Calculus     Post op ESWLithotripsy 5/15/20       HPI:   44 y.o. female.  Patient is seen for follow up today. She had Extracorporal Shockwave Lithotripsy  (ESWL) for left renal stones  By Dr. Firedman on 05/15/2020.  Patient has a history of a very metabolically active stone disease and significant left-sided pain from a left obstructing stone.She reports doing relatively well post procedure, but has had lower quadrant pain and discomfort, with urinary symptoms of frequency, urgency. She has not passed any stones yet she states.     She has not had any post opt complications associated with the procedure  such as  Fevers, N/V, severe  pain in the flank, hematoma, spontaneous renal hemorrhage, or the risk of distal fragments.     Will repeat a KUB.      Past Medical History:        Past Medical History:   Diagnosis Date   • Arthritis    • Complicated UTI (urinary tract infection)    • Disease of thyroid gland     nodules   • GERD (gastroesophageal reflux disease)    • Kidney stones    • Migraine    • Migraines    • Pneumonia    • Polycystic kidney disease    • PONV (postoperative nausea and vomiting)    • Strep throat        The following portions of the patient's history were reviewed and updated as appropriate: allergies, current medications, past family history, past medical history, past social history, past surgical history and problem list.    Current Meds:     Current Outpatient Medications   Medication Sig Dispense Refill   • amitriptyline (ELAVIL) 25 MG tablet Take 25 mg by mouth every night.     • baclofen (LIORESAL) 10 MG tablet Take 10 mg by mouth 3 (Three) Times a Day.     • clonazePAM (KlonoPIN) 0.5 MG tablet Take 0.5 mg by mouth 2 (two) times a day as needed for seizures.     • gabapentin (NEURONTIN) 400 MG capsule Take 400 mg by mouth 4 (Four) Times a Day.     • ketorolac (TORADOL) 10 MG tablet Take 1 tablet by mouth  Every 6 (Six) Hours As Needed for Moderate Pain . 16 tablet 0   • ondansetron (ZOFRAN) 4 MG tablet Take 1 tablet by mouth Daily As Needed for Nausea or Vomiting. 30 tablet 1   • phenazopyridine (PYRIDIUM) 200 MG tablet Take 1 tablet by mouth 3 (Three) Times a Day As Needed for Bladder Spasms. 20 tablet 0   • promethazine (PHENERGAN) 25 MG tablet Take 1 tablet by mouth Every 6 (Six) Hours As Needed for Nausea or Vomiting. 15 tablet 0   • tamsulosin (FLOMAX) 0.4 MG capsule 24 hr capsule      • traMADol (ULTRAM) 50 MG tablet Take 50 mg by mouth Every 6 (Six) Hours As Needed for Moderate Pain .     • oxyCODONE-acetaminophen (Percocet)  MG per tablet Take 1 tablet by mouth Every 6 (Six) Hours As Needed for Moderate Pain . 8 tablet 0   • tamsulosin (Flomax) 0.4 MG capsule 24 hr capsule Take 1 capsule by mouth Every Night. 30 capsule 5     No current facility-administered medications for this visit.         Allergies:      Allergies   Allergen Reactions   • Sulfa Antibiotics Anaphylaxis   • Compazine [Prochlorperazine Edisylate] Hives        Past Surgical History:     Past Surgical History:   Procedure Laterality Date   • ABDOMINAL SURGERY     •  SECTION     • COLONOSCOPY     • CYSTOSCOPY     • CYSTOSCOPY ELECTROHYDRAULIC LITHOTRIPSY     • DENTAL PROCEDURE     • DILATATION AND CURETTAGE     • DILATATION AND CURETTAGE     • ENDOSCOPY     • EXTRACORPOREAL SHOCK WAVE LITHOTRIPSY (ESWL) Left 2018    Procedure: EXTRACORPOREAL SHOCKWAVE LITHOTRIPSY;  Surgeon: Garth Friedman MD;  Location: [facility];  Service: Urology   • EXTRACORPOREAL SHOCK WAVE LITHOTRIPSY (ESWL) Left 5/15/2020    Procedure: EXTRACORPOREAL SHOCKWAVE LITHOTRIPSY;  Surgeon: Garth Friedman MD;  Location: [facility];  Service: Urology;  Laterality: Left;   • KIDNEY STONE SURGERY     • LITHOTRIPSY           Social History:     Social History     Socioeconomic History   • Marital status:      Spouse name: Not on file   •  Number of children: Not on file   • Years of education: Not on file   • Highest education level: Not on file   Tobacco Use   • Smoking status: Former Smoker     Packs/day: 0.50     Years: 15.00     Pack years: 7.50     Types: Cigarettes     Last attempt to quit: 2018     Years since quittin.4   • Smokeless tobacco: Never Used   Substance and Sexual Activity   • Alcohol use: No   • Drug use: No   • Sexual activity: Yes     Partners: Male     Birth control/protection: None       Family History:     Family History   Problem Relation Age of Onset   • No Known Problems Father    • No Known Problems Mother        Review of Systems:     Review of Systems   Constitutional: Positive for fatigue. Negative for activity change, chills and fever.   HENT: Negative for congestion and sinus pressure.    Eyes: Negative for blurred vision.   Respiratory: Negative for cough.    Cardiovascular: Negative for leg swelling.   Gastrointestinal: Positive for abdominal pain. Negative for nausea and vomiting.   Genitourinary: Positive for flank pain, frequency, hematuria, pelvic pain, pelvic pressure and urgency. Negative for difficulty urinating, dyspareunia, dysuria, genital sores, urinary incontinence and vaginal discharge.   Musculoskeletal: Positive for back pain ( left flank).   Neurological: Negative for dizziness, headache and confusion.   Psychiatric/Behavioral: Positive for stress. Negative for behavioral problems and decreased concentration. The patient is not nervous/anxious.         Physical Exam:     Physical Exam   Constitutional: She is oriented to person, place, and time. She appears well-developed and well-nourished. She appears distressed.   HENT:   Head: Normocephalic and atraumatic.   Eyes: Pupils are equal, round, and reactive to light. EOM are normal.   Neck: Normal range of motion. No tracheal deviation present. No thyromegaly present.   Cardiovascular: Normal rate and regular rhythm.   No murmur  heard.  Pulmonary/Chest: Effort normal and breath sounds normal. No stridor. No respiratory distress. She has no wheezes.   Abdominal: Soft. Bowel sounds are normal. There is tenderness.   Genitourinary: Vagina normal. There is no tenderness on the right labia. There is no tenderness on the left labia. No vaginal discharge found.   Musculoskeletal: Normal range of motion. She exhibits tenderness. She exhibits no edema or deformity.   Neurological: She is alert and oriented to person, place, and time. No cranial nerve deficit or sensory deficit. Coordination normal.   Skin: Skin is warm and dry. Capillary refill takes less than 2 seconds. No rash noted. No erythema. No pallor.   Psychiatric: She has a normal mood and affect. Her behavior is normal. Judgment and thought content normal.       Procedure:       Assessment/Plan:     Left Flank Pain/ Post ESWL 05/15/2020 :Patient with a very metabolically active stone disease and significant left-sided pain from a left obstructing stone evaluated Post opt today. She reports doing relatively well post procedure but has significant pain/discomfort this morning. She denies having any post opt complications such as fevers, chills, bleeding, hematoma.    We both reviewed her repeat KUB today which shows adequate fragmentation of the left renal calculi still in place. There is also now a  possible left distal left Ureteral stone which should pass given  time.  She has been encouraged to continue adequate p.o. fluid intake, increased activity with ambulation, use hot tubs, or hot showers, Flomax, nausea and pain control as tolerated.     We discussed a kidney stone prevention diet and things he could do to prevent future stones from developing such as  increasing p.o. fluid intake, to at least 1 to 2 L of water daily.  She is to avoid caffeine products such as cola, coffee, and to avoid soft or soda drinks and also decrease her sodium consumption as in  Fast foods, ferrara, salted  nuts, canned foods, and smoked/cured foods.     She is also to decrease his oxalate consumption, as in spinach, Neal greens, and Rhubarb.  Also important is to decrease protein intake, as in red meats, peanut butter, and also avoid nuts.     Patient is agreeable to plan of care.    Will see her back in Two weeks  with a repeat KUB     Patient reports that she is not currently experiencing any symptoms of urinary incontinence.      Patient's Body mass index is 31.28 kg/m². BMI is above normal parameters. Recommendations include: educational material, exercise counseling and nutrition counseling.    Smoking Cessation Counseling:  Never a smoker.  Patient does not currently use any tobacco products.       Counseling was given to patient for the following topics diagnostic results including: Left distal Renal Stones and instructions for management as follows: Ambulation, Fluids, pain/nausea control. The interim medical history and current results were reviewed.  A treatment plan with follow-up was made for Renal calculus, left [N20.0].         This document has been electronically signed by Griselda Cheng-Akwa, APRN May 28, 2020 09:18

## 2020-05-29 ENCOUNTER — TELEPHONE (OUTPATIENT)
Dept: UROLOGY | Facility: CLINIC | Age: 45
End: 2020-05-29

## 2020-05-29 DIAGNOSIS — N20.1 CALCULUS OF URETER: Primary | ICD-10-CM

## 2020-05-29 RX ORDER — TRAMADOL HYDROCHLORIDE 50 MG/1
50 TABLET ORAL EVERY 6 HOURS PRN
Qty: 60 TABLET | Refills: 2 | Status: SHIPPED | OUTPATIENT
Start: 2020-05-29 | End: 2020-07-23 | Stop reason: SDUPTHER

## 2020-06-08 ENCOUNTER — HOSPITAL ENCOUNTER (OUTPATIENT)
Dept: GENERAL RADIOLOGY | Facility: HOSPITAL | Age: 45
Discharge: HOME | End: 2020-06-08
Admitting: NURSE PRACTITIONER

## 2020-06-08 ENCOUNTER — OFFICE VISIT (OUTPATIENT)
Dept: UROLOGY | Facility: CLINIC | Age: 45
End: 2020-06-08

## 2020-06-08 VITALS — HEIGHT: 62 IN | BODY MASS INDEX: 31.65 KG/M2 | WEIGHT: 172 LBS | TEMPERATURE: 99.7 F

## 2020-06-08 DIAGNOSIS — N20.0 RENAL CALCULUS, LEFT: Primary | ICD-10-CM

## 2020-06-08 DIAGNOSIS — Z87.442 HISTORY OF KIDNEY STONES: ICD-10-CM

## 2020-06-08 PROCEDURE — 74018 RADEX ABDOMEN 1 VIEW: CPT

## 2020-06-08 PROCEDURE — 74018 RADEX ABDOMEN 1 VIEW: CPT | Performed by: RADIOLOGY

## 2020-06-08 PROCEDURE — 99024 POSTOP FOLLOW-UP VISIT: CPT | Performed by: NURSE PRACTITIONER

## 2020-06-08 PROCEDURE — 82365 CALCULUS SPECTROSCOPY: CPT | Performed by: NURSE PRACTITIONER

## 2020-06-08 RX ORDER — OXYCODONE AND ACETAMINOPHEN 10; 325 MG/1; MG/1
1 TABLET ORAL EVERY 6 HOURS PRN
Qty: 6 TABLET | Refills: 0 | OUTPATIENT
Start: 2020-06-08 | End: 2020-09-19

## 2020-06-08 NOTE — PROGRESS NOTES
Chief Complaint:          Chief Complaint   Patient presents with   • left Nephrolithiasis     Post op ESWL 05/22/2020       HPI:   44 y.o. female.  Patient is seen for follow up today, earlier than her scheduled appointment.  She reports she passed a stone this morning.    She had Extracorporal Shockwave Lithotripsy  (ESWL) for left renal stones  By Dr. Friedman on 05/15/2020.  Patient has a history of a very metabolically active stone disease and significant left-sided pain from a left obstructing stone.    She reports doing relatively well post procedure, but has had lower quadrant pain and discomfort, with urinary symptoms of frequency, urgency which have now resolved since she  passed  Stone early this morning.    She now has complaints of right lower quadrant abdominal pain, with significant bladder spasms.    Will repeat a KUB.      Past Medical History:        Past Medical History:   Diagnosis Date   • Arthritis    • Complicated UTI (urinary tract infection)    • Disease of thyroid gland     nodules   • GERD (gastroesophageal reflux disease)    • Kidney stones    • Migraine    • Migraines    • Pneumonia    • Polycystic kidney disease    • PONV (postoperative nausea and vomiting)    • Strep throat      The following portions of the patient's history were reviewed and updated as appropriate: allergies, current medications, past family history, past medical history, past social history, past surgical history and problem list.    Current Meds:     Current Outpatient Medications   Medication Sig Dispense Refill   • amitriptyline (ELAVIL) 25 MG tablet Take 25 mg by mouth every night.     • baclofen (LIORESAL) 10 MG tablet Take 10 mg by mouth 3 (Three) Times a Day.     • clonazePAM (KlonoPIN) 0.5 MG tablet Take 0.5 mg by mouth 2 (two) times a day as needed for seizures.     • gabapentin (NEURONTIN) 400 MG capsule Take 400 mg by mouth 4 (Four) Times a Day.     • ketorolac (TORADOL) 10 MG tablet Take 1 tablet by mouth  Every 6 (Six) Hours As Needed for Moderate Pain . 16 tablet 0   • ondansetron (ZOFRAN) 4 MG tablet Take 1 tablet by mouth Daily As Needed for Nausea or Vomiting. 30 tablet 1   • oxyCODONE-acetaminophen (Percocet)  MG per tablet Take 1 tablet by mouth Every 6 (Six) Hours As Needed for Moderate Pain . 6 tablet 0   • phenazopyridine (PYRIDIUM) 200 MG tablet Take 1 tablet by mouth 3 (Three) Times a Day As Needed for Bladder Spasms. 20 tablet 0   • promethazine (PHENERGAN) 25 MG tablet Take 1 tablet by mouth Every 6 (Six) Hours As Needed for Nausea or Vomiting. 15 tablet 0   • tamsulosin (FLOMAX) 0.4 MG capsule 24 hr capsule      • tamsulosin (Flomax) 0.4 MG capsule 24 hr capsule Take 1 capsule by mouth Every Night. 30 capsule 5   • traMADol (ULTRAM) 50 MG tablet Take 50 mg by mouth Every 6 (Six) Hours As Needed for Moderate Pain .     • traMADol (ULTRAM) 50 MG tablet Take 1 tablet by mouth Every 6 (Six) Hours As Needed for Moderate Pain . 60 tablet 2     No current facility-administered medications for this visit.         Allergies:      Allergies   Allergen Reactions   • Sulfa Antibiotics Anaphylaxis   • Compazine [Prochlorperazine Edisylate] Hives        Past Surgical History:     Past Surgical History:   Procedure Laterality Date   • ABDOMINAL SURGERY     •  SECTION     • COLONOSCOPY     • CYSTOSCOPY     • CYSTOSCOPY ELECTROHYDRAULIC LITHOTRIPSY     • DENTAL PROCEDURE     • DILATATION AND CURETTAGE     • DILATATION AND CURETTAGE     • ENDOSCOPY     • EXTRACORPOREAL SHOCK WAVE LITHOTRIPSY (ESWL) Left 2018    Procedure: EXTRACORPOREAL SHOCKWAVE LITHOTRIPSY;  Surgeon: Garth Friedman MD;  Location: [facility];  Service: Urology   • EXTRACORPOREAL SHOCK WAVE LITHOTRIPSY (ESWL) Left 5/15/2020    Procedure: EXTRACORPOREAL SHOCKWAVE LITHOTRIPSY;  Surgeon: Garth Friedman MD;  Location: [facility];  Service: Urology;  Laterality: Left;   • KIDNEY STONE SURGERY     • LITHOTRIPSY                    Social History:     Social History     Socioeconomic History   • Marital status:      Spouse name: Not on file   • Number of children: Not on file   • Years of education: Not on file   • Highest education level: Not on file   Tobacco Use   • Smoking status: Former Smoker     Packs/day: 0.50     Years: 15.00     Pack years: 7.50     Types: Cigarettes     Last attempt to quit: 2018     Years since quittin.4   • Smokeless tobacco: Never Used   Substance and Sexual Activity   • Alcohol use: No   • Drug use: No   • Sexual activity: Yes     Partners: Male     Birth control/protection: None       Family History:     Family History   Problem Relation Age of Onset   • No Known Problems Father    • No Known Problems Mother        Review of Systems:     Review of Systems   Constitutional: Positive for activity change, appetite change and fatigue. Negative for chills and fever.   HENT: Negative for congestion.    Eyes: Negative for blurred vision.   Gastrointestinal: Positive for abdominal pain ( RLQ). Negative for nausea and vomiting.   Genitourinary: Positive for flank pain, frequency, pelvic pain and pelvic pressure. Negative for difficulty urinating, dyspareunia, dysuria, genital sores, hematuria, urgency, urinary incontinence and vaginal discharge.   Musculoskeletal: Positive for back pain.   Skin: Positive for pallor.   Neurological: Negative for dizziness, headache and confusion.   Psychiatric/Behavioral: Positive for stress. Negative for behavioral problems and decreased concentration. The patient is nervous/anxious.         Physical Exam:     Physical Exam   Constitutional: She is oriented to person, place, and time. She appears well-developed and well-nourished. No distress.   HENT:   Head: Normocephalic and atraumatic.   Eyes: Pupils are equal, round, and reactive to light. EOM are normal.   Neck: Normal range of motion. No tracheal deviation present. No thyromegaly present.   Cardiovascular:  Normal rate and regular rhythm.   No murmur heard.  Pulmonary/Chest: Effort normal and breath sounds normal. No stridor. No respiratory distress. She has no wheezes.   Abdominal: Soft. Bowel sounds are normal. There is no tenderness.   Genitourinary: Vagina normal. There is no tenderness on the right labia. There is no tenderness on the left labia. No vaginal discharge found.   Musculoskeletal: Normal range of motion. She exhibits no edema or deformity.   Neurological: She is alert and oriented to person, place, and time. No cranial nerve deficit or sensory deficit. Coordination normal.   Skin: Skin is warm and dry. Capillary refill takes less than 2 seconds. No rash noted. No erythema. No pallor.   Psychiatric: She has a normal mood and affect. Her behavior is normal. Judgment and thought content normal.       Procedure:       Assessment/Plan:     Left Flank Pain/ Post ESWL 05/15/2020 :Patient with a very metabolically active stone disease and significant left-sided pain from a left obstructing stone evaluated again today.  She reports her left-sided pain has resolved with passing of a stone earlier this morning.  Now however has right lower quadrant abdominal pain and significant bladder spasms which are bothersome to her.    We both reviewed and discussed her KUB results which is completely negative for any stones.  Her abdomen shows moderate to large volume stool in her colon.  Discussed the need for bowel regimen with MiraLAX daily.    We discussed the importance of continuing a kidney stone prevention diet and things he could do to prevent future stones from developing such as  increasing p.o. fluid intake, to at least 1 to 2 L of water daily.  She is to avoid caffeine products such as cola, coffee, and to avoid soft or soda drinks and also decrease her sodium consumption as in  Fast foods, ferrara, salted nuts, canned foods, and smoked/cured foods.      She is also to decrease his oxalate consumption, as in  spinach, Neal greens, and Rhubarb.  Also important is to decrease protein intake, as in red meats, peanut butter, and also avoid nuts.      Patient is agreeable to plan of care.     Will see her back in Two weeks   if need be.     Patient reports that she is not currently experiencing any symptoms of urinary incontinence.        Patient's Body mass index is 31.28 kg/m². BMI is above normal parameters. Recommendations include: educational material, exercise counseling and nutrition counseling.     Smoking Cessation Counseling:  Never a smoker.  Patient does not currently use any tobacco products.     Counseling was given to patient for the following topics: diagnostic results, instructions for management and risk factor reductions .               This document has been electronically signed by Griselda Cheng-Akwa, APRN June 8, 2020 19:26

## 2020-06-19 LAB
COD CRY STONE QL IR: 80 %
COLOR STONE: NORMAL
COM CRY STONE QL IR: 20 %
COMPN STONE: NORMAL
LABORATORY COMMENT REPORT: NORMAL
Lab: NORMAL
Lab: NORMAL
PHOTO: NORMAL
SIZE STONE: NORMAL MM
SPECIMEN SOURCE: NORMAL
WT STONE: 23 MG

## 2020-06-22 ENCOUNTER — OFFICE VISIT (OUTPATIENT)
Dept: UROLOGY | Facility: CLINIC | Age: 45
End: 2020-06-22

## 2020-06-22 VITALS — BODY MASS INDEX: 31.65 KG/M2 | WEIGHT: 172 LBS | HEIGHT: 62 IN

## 2020-06-22 DIAGNOSIS — R10.9 BILATERAL FLANK PAIN: Primary | ICD-10-CM

## 2020-06-22 PROCEDURE — 99024 POSTOP FOLLOW-UP VISIT: CPT | Performed by: NURSE PRACTITIONER

## 2020-06-22 NOTE — PROGRESS NOTES
Chief Complaint:          Chief Complaint   Patient presents with   • Nephrolithiasis     POST ESWL       HPI:   44 y.o. female.  Patient returns for follow up today. She passed her stone two weeks ago.    She reports  Doing relatively well since passing her stone and even mowed 3 acres of land yesterday with no discomfort. However, She now has complaints of LLQ quadrant abdominal pain, with significant bladder spasms that started this morning.     She had Extracorporal Shockwave Lithotripsy  (ESWL) for left renal stones  By Dr. Friedman on 05/15/2020.  Patient has a history of a very metabolically active stone disease and significant left-sided pain from a left obstructing stone.     She reports doing relatively well post procedure, but has had intermittent lower quadrant pain and discomfort, She denies having any urinary symptoms of frequency, urgency which have now resolved since she  passed  Stone. She says she only voids One time a day, even though she has adequate PO fluid intake.      She is unable to void secondary to using the bathroom earlier.    Past Medical History:        Past Medical History:   Diagnosis Date   • Arthritis    • Complicated UTI (urinary tract infection)    • Disease of thyroid gland     nodules   • GERD (gastroesophageal reflux disease)    • Kidney stones    • Migraine    • Migraines    • Pneumonia    • Polycystic kidney disease    • PONV (postoperative nausea and vomiting)    • Strep throat          The following portions of the patient's history were reviewed and updated as appropriate: allergies, current medications, past family history, past medical history, past social history, past surgical history and problem list.    Current Meds:     Current Outpatient Medications   Medication Sig Dispense Refill   • amitriptyline (ELAVIL) 25 MG tablet Take 25 mg by mouth every night.     • baclofen (LIORESAL) 10 MG tablet Take 10 mg by mouth 3 (Three) Times a Day.     • clonazePAM (KlonoPIN)  0.5 MG tablet Take 0.5 mg by mouth 2 (two) times a day as needed for seizures.     • gabapentin (NEURONTIN) 400 MG capsule Take 400 mg by mouth 4 (Four) Times a Day.     • ketorolac (TORADOL) 10 MG tablet Take 1 tablet by mouth Every 6 (Six) Hours As Needed for Moderate Pain . 16 tablet 0   • ondansetron (ZOFRAN) 4 MG tablet Take 1 tablet by mouth Daily As Needed for Nausea or Vomiting. 30 tablet 1   • oxyCODONE-acetaminophen (Percocet)  MG per tablet Take 1 tablet by mouth Every 6 (Six) Hours As Needed for Moderate Pain . 6 tablet 0   • phenazopyridine (PYRIDIUM) 200 MG tablet Take 1 tablet by mouth 3 (Three) Times a Day As Needed for Bladder Spasms. 20 tablet 0   • promethazine (PHENERGAN) 25 MG tablet Take 1 tablet by mouth Every 6 (Six) Hours As Needed for Nausea or Vomiting. 15 tablet 0   • tamsulosin (FLOMAX) 0.4 MG capsule 24 hr capsule      • tamsulosin (Flomax) 0.4 MG capsule 24 hr capsule Take 1 capsule by mouth Every Night. 30 capsule 5   • traMADol (ULTRAM) 50 MG tablet Take 50 mg by mouth Every 6 (Six) Hours As Needed for Moderate Pain .     • traMADol (ULTRAM) 50 MG tablet Take 1 tablet by mouth Every 6 (Six) Hours As Needed for Moderate Pain . 60 tablet 2     No current facility-administered medications for this visit.         Allergies:      Allergies   Allergen Reactions   • Sulfa Antibiotics Anaphylaxis   • Compazine [Prochlorperazine Edisylate] Hives        Past Surgical History:     Past Surgical History:   Procedure Laterality Date   • ABDOMINAL SURGERY     •  SECTION     • COLONOSCOPY     • CYSTOSCOPY     • CYSTOSCOPY ELECTROHYDRAULIC LITHOTRIPSY     • DENTAL PROCEDURE     • DILATATION AND CURETTAGE     • DILATATION AND CURETTAGE     • ENDOSCOPY     • EXTRACORPOREAL SHOCK WAVE LITHOTRIPSY (ESWL) Left 2018    Procedure: EXTRACORPOREAL SHOCKWAVE LITHOTRIPSY;  Surgeon: Garth Friedman MD;  Location: [facility];  Service: Urology   • EXTRACORPOREAL SHOCK WAVE LITHOTRIPSY  (ESWL) Left 5/15/2020    Procedure: EXTRACORPOREAL SHOCKWAVE LITHOTRIPSY;  Surgeon: Garth Friedman MD;  Location: [facility];  Service: Urology;  Laterality: Left;   • KIDNEY STONE SURGERY     • LITHOTRIPSY           Social History:     Social History     Socioeconomic History   • Marital status:      Spouse name: Not on file   • Number of children: Not on file   • Years of education: Not on file   • Highest education level: Not on file   Tobacco Use   • Smoking status: Former Smoker     Packs/day: 0.50     Years: 15.00     Pack years: 7.50     Types: Cigarettes     Last attempt to quit: 2018     Years since quittin.4   • Smokeless tobacco: Never Used   Substance and Sexual Activity   • Alcohol use: No   • Drug use: No   • Sexual activity: Yes     Partners: Male     Birth control/protection: None       Family History:     Family History   Problem Relation Age of Onset   • No Known Problems Father    • No Known Problems Mother        Review of Systems:     Review of Systems   Constitutional: Positive for activity change and fatigue. Negative for chills and fever.   HENT: Negative for congestion.    Eyes: Negative for blurred vision.   Gastrointestinal: Negative for abdominal pain, nausea and vomiting.   Genitourinary: Positive for flank pain and pelvic pain. Negative for difficulty urinating, dyspareunia, dysuria, frequency, genital sores, hematuria, urgency, urinary incontinence and vaginal discharge.   Musculoskeletal: Negative for back pain.   Skin: Positive for pallor.   Neurological: Negative for dizziness, headache and confusion.   Psychiatric/Behavioral: Positive for stress. Negative for behavioral problems and decreased concentration.        Physical Exam:     Physical Exam   Constitutional: She is oriented to person, place, and time. She appears well-developed and well-nourished. She appears distressed.   HENT:   Head: Normocephalic and atraumatic.   Eyes: Pupils are equal, round, and  reactive to light. EOM are normal.   Neck: Normal range of motion. No tracheal deviation present. No thyromegaly present.   Cardiovascular: Normal rate and regular rhythm.   No murmur heard.  Pulmonary/Chest: Effort normal and breath sounds normal. No stridor. No respiratory distress. She has no wheezes.   Abdominal: Soft. Bowel sounds are normal. There is tenderness.   Genitourinary: Vagina normal. There is no tenderness on the right labia. There is no tenderness on the left labia. No vaginal discharge found.   Musculoskeletal: Normal range of motion. She exhibits tenderness. She exhibits no edema or deformity.   Neurological: She is alert and oriented to person, place, and time. No cranial nerve deficit or sensory deficit. Coordination normal.   Skin: Skin is warm and dry. Capillary refill takes less than 2 seconds. No rash noted. No erythema. No pallor.   Psychiatric: She has a normal mood and affect. Her behavior is normal. Judgment and thought content normal.       Procedure:       Assessment/Plan:   Left Flank Pain/ Post ESWL 05/15/2020 : Very pleasant patient with a very metabolically active stone disease and significant left-sided pain from a left obstructing stone evaluated again today.  We were reviewed and discussed her stone analysis, which showed 80% calcium oxalate dihydrate stone, and 20% calcium oxalate monohydrate stone.        She reports her left-sided pain had resolved with passing of a stone  2 weeks ago.   However she woke up this morning with left lower quadrant abdominal pain, pelvic pain, and significant bladder spasms which are bothersome to her. Again, we both reviewed and discussed her last KUB results which is completely negative for any stones. We discussed the importance of continuing a kidney stone prevention diet and things he could do to prevent future stones from developing such as  increasing p.o. fluid intake, to at least 1 to 2 L of water daily.      She is to avoid caffeine  products such as cola, coffee, and to avoid soft or soda drinks and also decrease her sodium consumption as in  Fast foods, ferrara, salted nuts, canned foods, and smoked/cured foods. She is also to decrease his oxalate consumption, as in spinach, Neal greens, and Rhubarb.  Also important is to decrease protein intake, as in red meats, peanut butter, and also avoid nuts.      She is to continue her PRN pain medications tramadol for her discomfort.    He may use PRN Pyridium for dysuria or bladder spasms.    Alternate warm or hot compresses to her lower left flank area for discomfort.    Patient is agreeable to plan of care.     Will see her back in in 6 weeks weeks for follow-up.    Patient reports that she is not currently experiencing any symptoms of urinary incontinence.      Patient's Body mass index is 31.45 kg/m². BMI is above normal parameters. Recommendations include: educational material, exercise counseling and pharmacological intervention.    Smoking Cessation Counseling:  Never a smoker.  Patient does not currently use any tobacco products.     Counseling was given to patient for the following topics instructions for management as follows: Start kidney stone prevention diet as discussed above, and risk factor reductions including: Caffeine products, high oxalate/high sodium foods.. The interim medical history and current results were reviewed.  A treatment plan with follow-up was made for bladder spasms, Bilateral flank pain [R10.9].          This document has been electronically signed by Griselda Cheng-Akwa, APRN June 22, 2020 13:21

## 2020-07-23 ENCOUNTER — OFFICE VISIT (OUTPATIENT)
Dept: UROLOGY | Facility: CLINIC | Age: 45
End: 2020-07-23

## 2020-07-23 ENCOUNTER — HOSPITAL ENCOUNTER (OUTPATIENT)
Dept: GENERAL RADIOLOGY | Facility: HOSPITAL | Age: 45
Discharge: HOME | End: 2020-07-23
Admitting: UROLOGY

## 2020-07-23 VITALS — TEMPERATURE: 97.8 F | BODY MASS INDEX: 31.65 KG/M2 | HEIGHT: 62 IN | WEIGHT: 172 LBS

## 2020-07-23 DIAGNOSIS — Z87.442 HISTORY OF KIDNEY STONES: ICD-10-CM

## 2020-07-23 DIAGNOSIS — N20.0 KIDNEY STONE: Primary | ICD-10-CM

## 2020-07-23 DIAGNOSIS — R10.9 LEFT FLANK PAIN: Primary | ICD-10-CM

## 2020-07-23 DIAGNOSIS — N20.1 CALCULUS OF URETER: ICD-10-CM

## 2020-07-23 DIAGNOSIS — N20.0 KIDNEY STONE: ICD-10-CM

## 2020-07-23 PROCEDURE — 74018 RADEX ABDOMEN 1 VIEW: CPT

## 2020-07-23 PROCEDURE — 99024 POSTOP FOLLOW-UP VISIT: CPT | Performed by: NURSE PRACTITIONER

## 2020-07-23 PROCEDURE — 74019 RADEX ABDOMEN 2 VIEWS: CPT | Performed by: RADIOLOGY

## 2020-07-23 RX ORDER — TRAMADOL HYDROCHLORIDE 50 MG/1
50 TABLET ORAL EVERY 6 HOURS PRN
Qty: 60 TABLET | Refills: 2 | Status: SHIPPED | OUTPATIENT
Start: 2020-07-23 | End: 2020-09-22

## 2020-07-23 NOTE — PROGRESS NOTES
Chief Complaint:          Chief Complaint   Patient presents with   • Flank Pain       HPI:   44 y.o. female.  Patient presents to clinic today requesting evaluation for left flank pain and significant muscle spasms that have become bothersome to her. She is seen leaned over on exam chair and appears to be miserable with pain and discomfort., she rates at 8/10.      She  has complaints of LLQ quadrant abdominal pain, with significant bladder spasms worse this morning. She had Extracorporal Shockwave Lithotripsy  (ESWL) for left renal stones  By Dr. Friedman on 05/15/2020.  Patient has a history of a very metabolically active stone disease and significant left-sided pain from a left obstructing stone which she since passed.     Although She reports doing relatively well post procedure, She has had significant intermittent lower quadrant pain and discomfort, She denies having any urinary symptoms of frequency, urgency or burning on urination. Denies gross hematuria, pelvic pain/pressure. She has nausea, no V/D.    Will repeat a KUB.    Past Medical History:        Past Medical History:   Diagnosis Date   • Arthritis    • Complicated UTI (urinary tract infection)    • Disease of thyroid gland     nodules   • GERD (gastroesophageal reflux disease)    • Kidney stones    • Migraine    • Migraines    • Pneumonia    • Polycystic kidney disease    • PONV (postoperative nausea and vomiting)    • Strep throat          The following portions of the patient's history were reviewed and updated as appropriate: allergies, current medications, past family history, past medical history, past social history, past surgical history and problem list.    Current Meds:     Current Outpatient Medications   Medication Sig Dispense Refill   • amitriptyline (ELAVIL) 25 MG tablet Take 25 mg by mouth every night.     • baclofen (LIORESAL) 10 MG tablet Take 10 mg by mouth 3 (Three) Times a Day.     • clonazePAM (KlonoPIN) 0.5 MG tablet Take 0.5 mg  by mouth 2 (two) times a day as needed for seizures.     • gabapentin (NEURONTIN) 400 MG capsule Take 400 mg by mouth 4 (Four) Times a Day.     • ketorolac (TORADOL) 10 MG tablet Take 1 tablet by mouth Every 6 (Six) Hours As Needed for Moderate Pain . 16 tablet 0   • ondansetron (ZOFRAN) 4 MG tablet Take 1 tablet by mouth Daily As Needed for Nausea or Vomiting. 30 tablet 1   • oxyCODONE-acetaminophen (Percocet)  MG per tablet Take 1 tablet by mouth Every 6 (Six) Hours As Needed for Moderate Pain . 6 tablet 0   • phenazopyridine (PYRIDIUM) 200 MG tablet Take 1 tablet by mouth 3 (Three) Times a Day As Needed for Bladder Spasms. 20 tablet 0   • promethazine (PHENERGAN) 25 MG tablet Take 1 tablet by mouth Every 6 (Six) Hours As Needed for Nausea or Vomiting. 15 tablet 0   • tamsulosin (Flomax) 0.4 MG capsule 24 hr capsule Take 1 capsule by mouth Every Night. 30 capsule 5   • traMADol (ULTRAM) 50 MG tablet Take 1 tablet by mouth Every 6 (Six) Hours As Needed for Moderate Pain . 60 tablet 2     No current facility-administered medications for this visit.         Allergies:      Allergies   Allergen Reactions   • Sulfa Antibiotics Anaphylaxis   • Compazine [Prochlorperazine Edisylate] Hives        Past Surgical History:     Past Surgical History:   Procedure Laterality Date   • ABDOMINAL SURGERY     •  SECTION     • COLONOSCOPY     • CYSTOSCOPY     • CYSTOSCOPY ELECTROHYDRAULIC LITHOTRIPSY     • DENTAL PROCEDURE     • DILATATION AND CURETTAGE     • DILATATION AND CURETTAGE     • ENDOSCOPY     • EXTRACORPOREAL SHOCK WAVE LITHOTRIPSY (ESWL) Left 2018    Procedure: EXTRACORPOREAL SHOCKWAVE LITHOTRIPSY;  Surgeon: Garth Friedman MD;  Location: [facility];  Service: Urology   • EXTRACORPOREAL SHOCK WAVE LITHOTRIPSY (ESWL) Left 5/15/2020    Procedure: EXTRACORPOREAL SHOCKWAVE LITHOTRIPSY;  Surgeon: Garth Friedman MD;  Location: [facility];  Service: Urology;  Laterality: Left;   • KIDNEY STONE  SURGERY     • LITHOTRIPSY           Social History:     Social History     Socioeconomic History   • Marital status:      Spouse name: Not on file   • Number of children: Not on file   • Years of education: Not on file   • Highest education level: Not on file   Tobacco Use   • Smoking status: Former Smoker     Packs/day: 0.50     Years: 15.00     Pack years: 7.50     Types: Cigarettes     Last attempt to quit: 2018     Years since quittin.5   • Smokeless tobacco: Never Used   Substance and Sexual Activity   • Alcohol use: No   • Drug use: No   • Sexual activity: Yes     Partners: Male     Birth control/protection: None       Family History:     Family History   Problem Relation Age of Onset   • No Known Problems Father    • No Known Problems Mother        Review of Systems:     Review of Systems   Constitutional: Positive for activity change and fatigue. Negative for chills and fever.   HENT: Negative for congestion and sinus pressure.    Eyes: Negative for blurred vision.   Respiratory: Negative for cough.    Cardiovascular: Negative for leg swelling.   Gastrointestinal: Positive for abdominal pain and constipation. Negative for nausea and vomiting.   Genitourinary: Positive for flank pain. Negative for difficulty urinating, dyspareunia, dysuria, frequency, genital sores, hematuria, urgency, urinary incontinence and vaginal discharge.   Musculoskeletal: Negative for back pain.   Neurological: Negative for dizziness, headache and confusion.   Psychiatric/Behavioral: Positive for sleep disturbance and stress. Negative for behavioral problems and decreased concentration. The patient is nervous/anxious.         Physical Exam:     Physical Exam   Constitutional: She is oriented to person, place, and time. She appears well-developed and well-nourished. She appears distressed.   HENT:   Head: Normocephalic and atraumatic.   Eyes: Pupils are equal, round, and reactive to light. EOM are normal.   Neck: Normal  range of motion. No tracheal deviation present. No thyromegaly present.   Cardiovascular: Normal rate and regular rhythm.   No murmur heard.  Pulmonary/Chest: Effort normal and breath sounds normal. No stridor. No respiratory distress. She has no wheezes.   Abdominal: Soft. Bowel sounds are normal. There is tenderness.   Genitourinary: Vagina normal. There is no tenderness on the right labia. There is no tenderness on the left labia. No vaginal discharge found.   Musculoskeletal: Normal range of motion. She exhibits tenderness. She exhibits no edema or deformity.   Neurological: She is alert and oriented to person, place, and time. No cranial nerve deficit or sensory deficit. Coordination normal.   Skin: Skin is warm and dry. No rash noted. No erythema. No pallor.   Psychiatric: She has a normal mood and affect. Her behavior is normal. Judgment and thought content normal.       Procedure:       Assessment/Plan:     Left Flank Pain/ Post ESWL 05/15/2020 : Very pleasant patient with a very metabolically active stone disease ,evaluated again today. She reports her left-sided pain had resolved with passing of a stone  months ago.   However she woke up this morning with left lower quadrant abdominal pain, pelvic pain, and significant bladder spasms which are bothersome to her.     Again, we both reviewed and discussed her KUB results which is completely negative for any stones. Told patient she had moderate stool in her colon and would benefit from bowel claansing/daily Miralax. We discussed the importance of continuing a kidney stone prevention diet and things she could do to prevent future stones from developing such as  increasing p.o. fluid intake, to at least 1 to 2 L of water daily.       She is to avoid caffeine products such as cola, coffee, and to avoid soft or soda drinks and also decrease her sodium consumption as in  Fast foods, ferrara, salted nuts, canned foods, and smoked/cured foods. She is also to decrease  his oxalate consumption, as in spinach, Neal greens, and Rhubarb.  Also important is to decrease protein intake, as in red meats, peanut butter, and also avoid nuts.      She is to continue her PRN pain medications tramadol for her discomfort.     She may use PRN Pyridium for dysuria or bladder spasms.     Alternate warm or hot compresses to her lower left flank area for discomfort.     Patient is agreeable to plan of care.     Will see her back in 3 months for follow-up.    Patient reports that she is not currently experiencing any symptoms of urinary incontinence.    Patient's Body mass index is 31.45 kg/m². BMI is above normal parameters. Recommendations include: educational material, exercise counseling and nutrition counseling.    Smoking Cessation Counseling:  Never a smoker.  Patient does not currently use any tobacco products.     Counseling was given to patient for the following topics instructions for management as follows: Kidney stone diet, increase PO fluid intake.. The interim medical history and current results were reviewed.  A treatment plan with follow-up was made for Left flank pain [R10.9].         This document has been electronically signed by Griselda Cheng-Akwa, APRN July 23, 2020 17:06

## 2020-09-19 ENCOUNTER — APPOINTMENT (OUTPATIENT)
Dept: GENERAL RADIOLOGY | Facility: HOSPITAL | Age: 45
End: 2020-09-19

## 2020-09-19 ENCOUNTER — HOSPITAL ENCOUNTER (EMERGENCY)
Facility: HOSPITAL | Age: 45
Discharge: HOME | End: 2020-09-19
Attending: FAMILY MEDICINE | Admitting: FAMILY MEDICINE

## 2020-09-19 VITALS
SYSTOLIC BLOOD PRESSURE: 120 MMHG | HEIGHT: 62 IN | TEMPERATURE: 99.1 F | HEART RATE: 84 BPM | DIASTOLIC BLOOD PRESSURE: 80 MMHG | RESPIRATION RATE: 18 BRPM | OXYGEN SATURATION: 98 % | BODY MASS INDEX: 32.2 KG/M2 | WEIGHT: 175 LBS

## 2020-09-19 DIAGNOSIS — N30.01 ACUTE CYSTITIS WITH HEMATURIA: Primary | ICD-10-CM

## 2020-09-19 DIAGNOSIS — R10.32 LEFT LOWER QUADRANT ABDOMINAL PAIN OF UNKNOWN ETIOLOGY: ICD-10-CM

## 2020-09-19 DIAGNOSIS — N20.1 CALCULUS OF URETER: ICD-10-CM

## 2020-09-19 LAB
ALBUMIN SERPL-MCNC: 3.97 G/DL (ref 3.5–5.2)
ALBUMIN/GLOB SERPL: 1.3 G/DL
ALP SERPL-CCNC: 96 U/L (ref 39–117)
ALT SERPL W P-5'-P-CCNC: 12 U/L (ref 1–33)
ANION GAP SERPL CALCULATED.3IONS-SCNC: 12.1 MMOL/L (ref 5–15)
AST SERPL-CCNC: 19 U/L (ref 1–32)
BACTERIA UR QL AUTO: ABNORMAL /HPF
BASOPHILS # BLD AUTO: 0.02 10*3/MM3 (ref 0–0.2)
BASOPHILS NFR BLD AUTO: 0.2 % (ref 0–1.5)
BILIRUB SERPL-MCNC: 0.2 MG/DL (ref 0–1.2)
BILIRUB UR QL STRIP: NEGATIVE
BUN SERPL-MCNC: 11 MG/DL (ref 6–20)
BUN/CREAT SERPL: 12.8 (ref 7–25)
CALCIUM SPEC-SCNC: 9.8 MG/DL (ref 8.6–10.5)
CHLORIDE SERPL-SCNC: 105 MMOL/L (ref 98–107)
CLARITY UR: ABNORMAL
CO2 SERPL-SCNC: 21.9 MMOL/L (ref 22–29)
COLOR UR: ABNORMAL
CREAT SERPL-MCNC: 0.86 MG/DL (ref 0.57–1)
D-LACTATE SERPL-SCNC: 1.4 MMOL/L (ref 0.5–2)
DEPRECATED RDW RBC AUTO: 44.8 FL (ref 37–54)
EOSINOPHIL # BLD AUTO: 0.22 10*3/MM3 (ref 0–0.4)
EOSINOPHIL NFR BLD AUTO: 1.9 % (ref 0.3–6.2)
ERYTHROCYTE [DISTWIDTH] IN BLOOD BY AUTOMATED COUNT: 12.6 % (ref 12.3–15.4)
GFR SERPL CREATININE-BSD FRML MDRD: 72 ML/MIN/1.73
GLOBULIN UR ELPH-MCNC: 3 GM/DL
GLUCOSE SERPL-MCNC: 95 MG/DL (ref 65–99)
GLUCOSE UR STRIP-MCNC: NEGATIVE MG/DL
HCG SERPL QL: NEGATIVE
HCT VFR BLD AUTO: 38.1 % (ref 34–46.6)
HGB BLD-MCNC: 12.4 G/DL (ref 12–15.9)
HGB UR QL STRIP.AUTO: ABNORMAL
HOLD SPECIMEN: NORMAL
HOLD SPECIMEN: NORMAL
HYALINE CASTS UR QL AUTO: ABNORMAL /LPF
IMM GRANULOCYTES # BLD AUTO: 0.05 10*3/MM3 (ref 0–0.05)
IMM GRANULOCYTES NFR BLD AUTO: 0.4 % (ref 0–0.5)
INR PPP: 0.91 (ref 0.9–1.1)
KETONES UR QL STRIP: NEGATIVE
LEUKOCYTE ESTERASE UR QL STRIP.AUTO: ABNORMAL
LIPASE SERPL-CCNC: 24 U/L (ref 13–60)
LYMPHOCYTES # BLD AUTO: 3.51 10*3/MM3 (ref 0.7–3.1)
LYMPHOCYTES NFR BLD AUTO: 30.9 % (ref 19.6–45.3)
MCH RBC QN AUTO: 32.3 PG (ref 26.6–33)
MCHC RBC AUTO-ENTMCNC: 32.5 G/DL (ref 31.5–35.7)
MCV RBC AUTO: 99.2 FL (ref 79–97)
MONOCYTES # BLD AUTO: 0.62 10*3/MM3 (ref 0.1–0.9)
MONOCYTES NFR BLD AUTO: 5.5 % (ref 5–12)
NEUTROPHILS NFR BLD AUTO: 6.93 10*3/MM3 (ref 1.7–7)
NEUTROPHILS NFR BLD AUTO: 61.1 % (ref 42.7–76)
NITRITE UR QL STRIP: NEGATIVE
NRBC BLD AUTO-RTO: 0 /100 WBC (ref 0–0.2)
PH UR STRIP.AUTO: 6 [PH] (ref 5–8)
PLATELET # BLD AUTO: 330 10*3/MM3 (ref 140–450)
PMV BLD AUTO: 9.3 FL (ref 6–12)
POTASSIUM SERPL-SCNC: 3.7 MMOL/L (ref 3.5–5.2)
PROT SERPL-MCNC: 7 G/DL (ref 6–8.5)
PROT UR QL STRIP: ABNORMAL
PROTHROMBIN TIME: 12.1 SECONDS (ref 11.9–14.1)
RBC # BLD AUTO: 3.84 10*6/MM3 (ref 3.77–5.28)
RBC # UR: ABNORMAL /HPF
REF LAB TEST METHOD: ABNORMAL
SODIUM SERPL-SCNC: 139 MMOL/L (ref 136–145)
SP GR UR STRIP: 1.03 (ref 1–1.03)
SQUAMOUS #/AREA URNS HPF: ABNORMAL /HPF
TROPONIN T SERPL-MCNC: <0.01 NG/ML (ref 0–0.03)
UROBILINOGEN UR QL STRIP: ABNORMAL
WBC # BLD AUTO: 11.35 10*3/MM3 (ref 3.4–10.8)
WBC UR QL AUTO: ABNORMAL /HPF
WHOLE BLOOD HOLD SPECIMEN: NORMAL
WHOLE BLOOD HOLD SPECIMEN: NORMAL

## 2020-09-19 PROCEDURE — 99284 EMERGENCY DEPT VISIT MOD MDM: CPT

## 2020-09-19 PROCEDURE — 93005 ELECTROCARDIOGRAM TRACING: CPT | Performed by: FAMILY MEDICINE

## 2020-09-19 PROCEDURE — 84484 ASSAY OF TROPONIN QUANT: CPT | Performed by: FAMILY MEDICINE

## 2020-09-19 PROCEDURE — 36415 COLL VENOUS BLD VENIPUNCTURE: CPT

## 2020-09-19 PROCEDURE — 84703 CHORIONIC GONADOTROPIN ASSAY: CPT | Performed by: FAMILY MEDICINE

## 2020-09-19 PROCEDURE — 25010000002 ONDANSETRON PER 1 MG: Performed by: FAMILY MEDICINE

## 2020-09-19 PROCEDURE — 81001 URINALYSIS AUTO W/SCOPE: CPT | Performed by: FAMILY MEDICINE

## 2020-09-19 PROCEDURE — 87040 BLOOD CULTURE FOR BACTERIA: CPT | Performed by: FAMILY MEDICINE

## 2020-09-19 PROCEDURE — 87086 URINE CULTURE/COLONY COUNT: CPT | Performed by: FAMILY MEDICINE

## 2020-09-19 PROCEDURE — 93010 ELECTROCARDIOGRAM REPORT: CPT | Performed by: SPECIALIST

## 2020-09-19 PROCEDURE — 80053 COMPREHEN METABOLIC PANEL: CPT | Performed by: FAMILY MEDICINE

## 2020-09-19 PROCEDURE — 85025 COMPLETE CBC W/AUTO DIFF WBC: CPT | Performed by: FAMILY MEDICINE

## 2020-09-19 PROCEDURE — 74018 RADEX ABDOMEN 1 VIEW: CPT

## 2020-09-19 PROCEDURE — 25010000002 CEFTRIAXONE PER 250 MG: Performed by: FAMILY MEDICINE

## 2020-09-19 PROCEDURE — 83605 ASSAY OF LACTIC ACID: CPT | Performed by: FAMILY MEDICINE

## 2020-09-19 PROCEDURE — 85610 PROTHROMBIN TIME: CPT | Performed by: FAMILY MEDICINE

## 2020-09-19 PROCEDURE — 25010000002 HYDROMORPHONE PER 4 MG: Performed by: FAMILY MEDICINE

## 2020-09-19 PROCEDURE — 96375 TX/PRO/DX INJ NEW DRUG ADDON: CPT

## 2020-09-19 PROCEDURE — 96376 TX/PRO/DX INJ SAME DRUG ADON: CPT

## 2020-09-19 PROCEDURE — 83690 ASSAY OF LIPASE: CPT | Performed by: FAMILY MEDICINE

## 2020-09-19 PROCEDURE — 96365 THER/PROPH/DIAG IV INF INIT: CPT

## 2020-09-19 RX ORDER — ONDANSETRON 4 MG/1
4 TABLET, FILM COATED ORAL DAILY PRN
Qty: 12 TABLET | Refills: 0 | Status: SHIPPED | OUTPATIENT
Start: 2020-09-19 | End: 2020-11-13

## 2020-09-19 RX ORDER — CEFDINIR 300 MG/1
300 CAPSULE ORAL 2 TIMES DAILY
Qty: 14 CAPSULE | Refills: 0 | Status: SHIPPED | OUTPATIENT
Start: 2020-09-19 | End: 2020-10-23

## 2020-09-19 RX ORDER — HYDROMORPHONE HYDROCHLORIDE 1 MG/ML
0.5 INJECTION, SOLUTION INTRAMUSCULAR; INTRAVENOUS; SUBCUTANEOUS ONCE
Status: COMPLETED | OUTPATIENT
Start: 2020-09-19 | End: 2020-09-19

## 2020-09-19 RX ORDER — SODIUM CHLORIDE 0.9 % (FLUSH) 0.9 %
10 SYRINGE (ML) INJECTION AS NEEDED
Status: DISCONTINUED | OUTPATIENT
Start: 2020-09-19 | End: 2020-09-19 | Stop reason: HOSPADM

## 2020-09-19 RX ORDER — HYDROCODONE BITARTRATE AND ACETAMINOPHEN 7.5; 325 MG/1; MG/1
1 TABLET ORAL EVERY 6 HOURS PRN
Qty: 6 TABLET | Refills: 0 | Status: SHIPPED | OUTPATIENT
Start: 2020-09-19 | End: 2021-02-18

## 2020-09-19 RX ORDER — ONDANSETRON 2 MG/ML
4 INJECTION INTRAMUSCULAR; INTRAVENOUS ONCE
Status: COMPLETED | OUTPATIENT
Start: 2020-09-19 | End: 2020-09-19

## 2020-09-19 RX ADMIN — ONDANSETRON 4 MG: 2 INJECTION INTRAMUSCULAR; INTRAVENOUS at 15:59

## 2020-09-19 RX ADMIN — HYDROMORPHONE HYDROCHLORIDE 0.5 MG: 1 INJECTION, SOLUTION INTRAMUSCULAR; INTRAVENOUS; SUBCUTANEOUS at 15:59

## 2020-09-19 RX ADMIN — SODIUM CHLORIDE 500 ML: 9 INJECTION, SOLUTION INTRAVENOUS at 15:59

## 2020-09-19 RX ADMIN — HYDROMORPHONE HYDROCHLORIDE 0.5 MG: 1 INJECTION, SOLUTION INTRAMUSCULAR; INTRAVENOUS; SUBCUTANEOUS at 17:24

## 2020-09-19 RX ADMIN — CEFTRIAXONE 1 G: 1 INJECTION, POWDER, FOR SOLUTION INTRAMUSCULAR; INTRAVENOUS at 17:03

## 2020-09-19 NOTE — ED NOTES
Went to get patients blood work. Xray is in the room at this time.      Breonna Winters  09/19/20 [id number]

## 2020-09-19 NOTE — ED NOTES
Patient discharged from ED at this time with . Patient verbalized understanding of discharge instructions. Patient verbalized understanding of medication and follow up care. No needs or concerns voiced at this time. VSS. NADA.       Usama Lino, JANA  09/19/20 [id number]

## 2020-09-19 NOTE — ED PROVIDER NOTES
Subjective   45-year-old female with a history of polycystic kidney disease kidney stones presents the emergency room with left flank pain.  Patient reports she is had multiple episodes of left flank pain related to kidney stones in the past.  She states she sees Dr. Wesley for this issue.  She reports her last lithotripsy was in May.  She states that yesterday started having left flank pain.  States that pain is radiate from her left leg down her left lower quadrant.  She states that she has had pain with urination.  She reports the pain is similar to when she had a kidney stone.  She states that she feels that she may have passed a kidney stone yesterday.  Denies fever chills.  She had nausea but no vomiting.  She does report her last bowel was today.  Due to ongoing symptoms came to the emergency room for evaluation.  She does report taking Toradol as well as tramadol prior to coming to the emergency room.      Flank Pain  Pain location:  L flank  Pain quality: sharp    Pain radiates to:  LLQ  Pain severity:  Moderate  Duration:  1 day  Timing:  Constant  Relieved by:  Nothing  Worsened by:  Urination  Ineffective treatments:  NSAIDs (toradol and tramadol)  Associated symptoms: dysuria and nausea    Associated symptoms: no anorexia, no chest pain, no chills, no constipation, no cough, no diarrhea, no fatigue, no fever, no shortness of breath, no sore throat and no vomiting        Review of Systems   Constitutional: Negative for chills, fatigue and fever.   HENT: Negative for sore throat.    Respiratory: Negative for cough and shortness of breath.    Cardiovascular: Negative for chest pain.   Gastrointestinal: Positive for nausea. Negative for anorexia, constipation, diarrhea and vomiting.   Genitourinary: Positive for dysuria and flank pain.   All other systems reviewed and are negative.      Past Medical History:   Diagnosis Date   • Arthritis    • Complicated UTI (urinary tract infection)    • Disease of  thyroid gland     nodules   • GERD (gastroesophageal reflux disease)    • Kidney stones    • Migraine    • Migraines    • Pneumonia    • Polycystic kidney disease    • PONV (postoperative nausea and vomiting)    • Strep throat        Allergies   Allergen Reactions   • Sulfa Antibiotics Anaphylaxis   • Compazine [Prochlorperazine Edisylate] Hives       Past Surgical History:   Procedure Laterality Date   • ABDOMINAL SURGERY     •  SECTION     • COLONOSCOPY     • CYSTOSCOPY     • CYSTOSCOPY ELECTROHYDRAULIC LITHOTRIPSY     • DENTAL PROCEDURE     • DILATATION AND CURETTAGE     • DILATATION AND CURETTAGE     • ENDOSCOPY     • EXTRACORPOREAL SHOCK WAVE LITHOTRIPSY (ESWL) Left 2018    Procedure: EXTRACORPOREAL SHOCKWAVE LITHOTRIPSY;  Surgeon: Garth Friedman MD;  Location: [facility];  Service: Urology   • EXTRACORPOREAL SHOCK WAVE LITHOTRIPSY (ESWL) Left 5/15/2020    Procedure: EXTRACORPOREAL SHOCKWAVE LITHOTRIPSY;  Surgeon: Garth Friedman MD;  Location: [facility];  Service: Urology;  Laterality: Left;   • KIDNEY STONE SURGERY     • LITHOTRIPSY         Family History   Problem Relation Age of Onset   • No Known Problems Father    • No Known Problems Mother        Social History     Socioeconomic History   • Marital status:      Spouse name: Not on file   • Number of children: Not on file   • Years of education: Not on file   • Highest education level: Not on file   Tobacco Use   • Smoking status: Former Smoker     Packs/day: 0.50     Years: 15.00     Pack years: 7.50     Types: Cigarettes     Quit date: 2018     Years since quittin.7   • Smokeless tobacco: Never Used   Substance and Sexual Activity   • Alcohol use: No   • Drug use: No   • Sexual activity: Yes     Partners: Male     Birth control/protection: None           Objective   Physical Exam  Vitals signs and nursing note reviewed.   Constitutional:       Appearance: She is not ill-appearing.      Comments:  Nontoxic-appearing   HENT:      Head: Normocephalic and atraumatic.      Comments: Moist mucous membranes.  Clear oropharynx.  Eyes:      General: No scleral icterus.     Extraocular Movements: Extraocular movements intact.      Pupils: Pupils are equal, round, and reactive to light.   Cardiovascular:      Rate and Rhythm: Normal rate and regular rhythm.      Heart sounds: No murmur.      Comments: No extrasystoles 2+ radial pulse 2+ dorsalis pedis pulses  Pulmonary:      Effort: Pulmonary effort is normal.      Breath sounds: Normal breath sounds. No wheezing, rhonchi or rales.      Comments: Speaks in full sentences  Abdominal:      General: Bowel sounds are normal.      Palpations: Abdomen is soft.      Tenderness: There is no abdominal tenderness. There is no right CVA tenderness, left CVA tenderness, guarding or rebound.      Comments: No guarding no rigidity   Skin:     General: Skin is warm and dry.   Neurological:      General: No focal deficit present.      Mental Status: She is alert and oriented to person, place, and time.      Cranial Nerves: No cranial nerve deficit.      Comments: Symmetric sensation light touch         Procedures           ED Course  ED Course as of Sep 19 1735   Sat Sep 19, 2020   1600 Patient declines CT imaging she states that her urologist told her not to have any further CT imaging given her history of multiple CT imaging.  Order KUB have ordered Dilaudid and Zofran we will continue to monitor    [BB]   1625 Patient was seen 11,000 patient to numerous to count red blood cells 13-20 whites and 1+ bacteria.    [BB]   1640 Patient restarted on IV Rocephin.    [BB]   1707 Xr Abdomen Kub    Result Date: 9/19/2020  Negative KUB. Signer Name: Veto Booth MD  Signed: 9/19/2020 5:04 PM  Workstation Name[id number]  [facility]        [BB]   1721 Have discussed case with Dr. Friedman and informed of findings.  Patient does not require CT imaging at this time.   Patient is no CVA tenderness.  Patient with findings of UTI on urinalysis.  KUB was unremarkable.  Patient has multiple CT scans in the past and declined CT imaging.  Patient is afebrile vital signs are stable.    [BB]   1726 SAM [id number]    [BB]   1733 I discussed with patient findings as discussed discussed with her urologist have discussed warning signs that warrant return to the emergency room patient is afebrile vitals are stable patient agreeable to plan.    [BB]      ED Course User Index  [BB] Bright Machado MD                                           [facility]    Final diagnoses:   Acute cystitis with hematuria            Bright Machado MD  09/19/20 1734

## 2020-09-20 LAB — BACTERIA SPEC AEROBE CULT: NORMAL

## 2020-09-21 ENCOUNTER — OFFICE VISIT (OUTPATIENT)
Dept: UROLOGY | Facility: CLINIC | Age: 45
End: 2020-09-21

## 2020-09-21 VITALS — HEIGHT: 62 IN | TEMPERATURE: 97.4 F | BODY MASS INDEX: 32.24 KG/M2 | WEIGHT: 175.2 LBS

## 2020-09-21 DIAGNOSIS — N20.1 LEFT URETERAL CALCULUS: ICD-10-CM

## 2020-09-21 DIAGNOSIS — R10.9 LEFT FLANK PAIN, CHRONIC: Primary | ICD-10-CM

## 2020-09-21 DIAGNOSIS — G89.29 LEFT FLANK PAIN, CHRONIC: Primary | ICD-10-CM

## 2020-09-21 DIAGNOSIS — R10.32 LEFT LOWER QUADRANT ABDOMINAL PAIN: ICD-10-CM

## 2020-09-21 PROCEDURE — 99214 OFFICE O/P EST MOD 30 MIN: CPT | Performed by: NURSE PRACTITIONER

## 2020-09-21 RX ORDER — TRAMADOL HYDROCHLORIDE 50 MG/1
50 TABLET ORAL EVERY 6 HOURS PRN
Qty: 60 TABLET | Refills: 2 | Status: SHIPPED | OUTPATIENT
Start: 2020-09-21 | End: 2021-02-18

## 2020-09-21 NOTE — PROGRESS NOTES
Chief Complaint:          Chief Complaint   Patient presents with   • Flank Pain     ER follow Up       HPI:   45 y.o. female.  Patient returns to clinic today for evaluation of left flank pain.  She is an ER follow-up.  She reports she presented to the ED 2 days ago 09/19/2020 secondary to significant left flank/left lower quadrant abdominal pain and discomfort that had become very consistent, sharp 10/10, with nausea.    She had a KUB done which was completely negative for stones.  Patient has a history of a very metabolic active stone disease.  She has had significant intermittent lower quadrant pain and discomfort with bladder spasms.  She is still having urinary symptoms of frequency, urgency, and dysuria today.  She is currently on antibiotic cefdinir for acute cystitis with hematuria done 3 days ago.     Past Medical History:        Past Medical History:   Diagnosis Date   • Arthritis    • Complicated UTI (urinary tract infection)    • Disease of thyroid gland     nodules   • GERD (gastroesophageal reflux disease)    • Kidney stones    • Migraine    • Migraines    • Pneumonia    • Polycystic kidney disease    • PONV (postoperative nausea and vomiting)    • Strep throat      The following portions of the patient's history were reviewed and updated as appropriate: allergies, current medications, past family history, past medical history, past social history, past surgical history and problem list.    Current Meds:     Current Outpatient Medications   Medication Sig Dispense Refill   • amitriptyline (ELAVIL) 25 MG tablet Take 25 mg by mouth every night.     • baclofen (LIORESAL) 10 MG tablet Take 10 mg by mouth 3 (Three) Times a Day.     • cefdinir (OMNICEF) 300 MG capsule Take 1 capsule by mouth 2 (Two) Times a Day. 14 capsule 0   • clonazePAM (KlonoPIN) 0.5 MG tablet Take 0.5 mg by mouth 2 (two) times a day as needed for seizures.     • gabapentin (NEURONTIN) 400 MG capsule Take 400 mg by mouth 4 (Four) Times  a Day.     • HYDROcodone-acetaminophen (NORCO) 7.5-325 MG per tablet Take 1 tablet by mouth Every 6 (Six) Hours As Needed for Moderate Pain . 6 tablet 0   • ondansetron (Zofran) 4 MG tablet Take 1 tablet by mouth Daily As Needed for Nausea or Vomiting. 12 tablet 0   • tamsulosin (Flomax) 0.4 MG capsule 24 hr capsule Take 1 capsule by mouth Every Night. 30 capsule 5   • traMADol (ULTRAM) 50 MG tablet Take 1 tablet by mouth Every 6 (Six) Hours As Needed for Moderate Pain . 60 tablet 2     No current facility-administered medications for this visit.         Allergies:      Allergies   Allergen Reactions   • Sulfa Antibiotics Anaphylaxis   • Compazine [Prochlorperazine Edisylate] Hives        Past Surgical History:     Past Surgical History:   Procedure Laterality Date   • ABDOMINAL SURGERY     •  SECTION     • COLONOSCOPY     • CYSTOSCOPY     • CYSTOSCOPY ELECTROHYDRAULIC LITHOTRIPSY     • DENTAL PROCEDURE     • DILATATION AND CURETTAGE     • DILATATION AND CURETTAGE     • ENDOSCOPY     • EXTRACORPOREAL SHOCK WAVE LITHOTRIPSY (ESWL) Left 2018    Procedure: EXTRACORPOREAL SHOCKWAVE LITHOTRIPSY;  Surgeon: Garth Friedman MD;  Location: [facility];  Service: Urology   • EXTRACORPOREAL SHOCK WAVE LITHOTRIPSY (ESWL) Left 5/15/2020    Procedure: EXTRACORPOREAL SHOCKWAVE LITHOTRIPSY;  Surgeon: Garth Friedman MD;  Location: [facility];  Service: Urology;  Laterality: Left;   • KIDNEY STONE SURGERY     • LITHOTRIPSY           Social History:     Social History     Socioeconomic History   • Marital status:      Spouse name: Not on file   • Number of children: Not on file   • Years of education: Not on file   • Highest education level: Not on file   Tobacco Use   • Smoking status: Former Smoker     Packs/day: 0.50     Years: 15.00     Pack years: 7.50     Types: Cigarettes     Quit date: 2018     Years since quittin.7   • Smokeless tobacco: Never Used   Substance and Sexual Activity   •  Alcohol use: No   • Drug use: No   • Sexual activity: Yes     Partners: Male     Birth control/protection: None       Family History:     Family History   Problem Relation Age of Onset   • No Known Problems Father    • No Known Problems Mother        Review of Systems:     Review of Systems   Constitutional: Positive for activity change and fatigue. Negative for chills and fever.   HENT: Negative for congestion.    Eyes: Negative for blurred vision.   Gastrointestinal: Positive for nausea. Negative for abdominal pain and vomiting.   Genitourinary: Positive for dysuria, flank pain, frequency, pelvic pain and pelvic pressure. Negative for difficulty urinating, dyspareunia, genital sores, hematuria, urgency, urinary incontinence and vaginal discharge.   Musculoskeletal: Positive for back pain.   Neurological: Negative for dizziness, headache and confusion.   Psychiatric/Behavioral: Positive for stress. Negative for behavioral problems and decreased concentration.        Physical Exam:     Physical Exam  Constitutional:       General: She is in acute distress.      Appearance: She is well-developed.   HENT:      Head: Normocephalic and atraumatic.   Eyes:      Pupils: Pupils are equal, round, and reactive to light.   Neck:      Musculoskeletal: Normal range of motion.      Thyroid: No thyromegaly.      Trachea: No tracheal deviation.   Cardiovascular:      Rate and Rhythm: Normal rate and regular rhythm.      Heart sounds: No murmur.   Pulmonary:      Effort: Pulmonary effort is normal. No respiratory distress.      Breath sounds: Normal breath sounds. No stridor. No wheezing.   Abdominal:      General: Bowel sounds are normal.      Palpations: Abdomen is soft.      Tenderness: There is abdominal tenderness.   Genitourinary:     Labia:         Right: No tenderness.         Left: No tenderness.       Vagina: Normal. No vaginal discharge.   Musculoskeletal: Normal range of motion.         General: Tenderness present. No  deformity.   Skin:     General: Skin is warm and dry.      Coloration: Skin is not pale.      Findings: No erythema or rash.   Neurological:      Mental Status: She is alert and oriented to person, place, and time.      Cranial Nerves: No cranial nerve deficit.      Sensory: No sensory deficit.      Coordination: Coordination normal.   Psychiatric:         Behavior: Behavior normal.         Thought Content: Thought content normal.         Judgment: Judgment normal.         Procedure:       Assessment/Plan:   Left Flank Pain/LLQ pain : Very pleasant patient with a very metabolically active stone disease ,evaluated again today.  She is an ER follow-up.  Although her left-sided pain had resolved with passing of a stone  months ago, she reports passing a recent stone prior to her recent ER visit.       Again, we both reviewed and discussed her KUB results done in the ED on 09/19/20, which is completely negative for any stones.    Again, we discussed the importance of continuing a kidney stone prevention diet and things she could do to prevent future stones from developing such as  increasing p.o. fluid intake, to at least 1 to 2 L of water daily.  She is to avoid caffeine products such as cola, coffee, and to avoid soft or soda drinks and also decrease her sodium consumption as in  Fast foods, ferrara, salted nuts, canned foods, and smoked/cured foods. She is also to decrease his oxalate consumption, as in spinach, Neal greens, and Rhubarb.  Also important is to decrease protein intake, as in red meats, peanut butter, and also avoid nuts.      Discussed this case with Dr. Duque who is also very familiar with this patient, and he is agreeable to it.    She is to continue her PRN pain medications tramadol for her discomfort.     She may use PRN Pyridium for dysuria or bladder spasms.     Alternate warm or hot compresses to her lower left flank area for discomfort.     Will see her back in 1 month for  follow-up.    Narcotic pain medication-patient has significant acute pain that I believe would be an indication for the use of narcotic pain medication.  I discussed the significant risks of pain medication and the fact that this will be a short only option and I will give her no more than a three-day supply of pain medication.  And I will not plan long-term medication and that this will be sent to a pain clinic if at all becomes necessary.      We discussed signing a pain medication agreement and the fact that we're going to run a state [facility] review to be sure the patient is not getting pain medication from elsewhere.  If this is the case we will not give pain medication.  As part of the patient's treatment plan of there being prescribed a controlled substance with potential for abuse.    This patient has been made aware of the appropriate dose of such medications including, the risk for somnolence, limited ability to drive and/or safety and the significant potential for overdose.  It is been made clear that these medications are for the prescribed patient only without concomitant use of alcohol or other sepsis unless prescribed by the medical provider.     Has completed prescribing agreement detailing the terms of continue prescribing him a controlled substance.  Including monitoring Sam ports, the possibility of urine drug screens and pedal counts.  The patient is aware that we reviewed SAM reports on a regular basis and scan them into the chart.  History and physical examination exhibited continued safe and appropriate use of controlled substances.    Also discussed the fact that the new [place] legislation allows only a three-day prescription for pain medication.  In this situation he will be referred to a chronic pain clinic.    Patient is agreeable to plan of care.    Patient reports that she is not currently experiencing any symptoms of urinary incontinence.    Patient's Body mass index is 32.12  kg/m². BMI is above normal parameters. Recommendations include: educational material, exercise counseling and nutrition counseling.  Smoking Cessation Counseling:  Never a smoker.  Patient does not currently use any tobacco products.      Counseling was given to patient for the following topics instructions for management as follows: Inceased PO fluids intake, increased ambulation, warm compresses to flank area, pain/nausea control. and risk factor reductions including: high oxalate foods, high sodium foods, protain, caffiene products, citrusy/spicy foods. The interim medical history and current results were reviewed.  A treatment plan with follow-up was made for LLQ abdominal pain,  Left flank pain, chronic [R10.9, G89.29].         This document has been electronically signed by Griselda Cheng-Akwa, APRN September 22, 2020 18:30 EDT

## 2020-09-21 NOTE — TELEPHONE ENCOUNTER
Called [facility] pharmacy and called in Tramadol 50 mg with 2 refills with # 60. The pharmacy had not receive the medication that Dr bradley sent in.

## 2020-09-24 LAB
BACTERIA SPEC AEROBE CULT: NORMAL
BACTERIA SPEC AEROBE CULT: NORMAL

## 2020-10-23 ENCOUNTER — HOSPITAL ENCOUNTER (OUTPATIENT)
Dept: GENERAL RADIOLOGY | Facility: HOSPITAL | Age: 45
Discharge: HOME | End: 2020-10-23
Admitting: NURSE PRACTITIONER

## 2020-10-23 ENCOUNTER — OFFICE VISIT (OUTPATIENT)
Dept: UROLOGY | Facility: CLINIC | Age: 45
End: 2020-10-23

## 2020-10-23 VITALS — TEMPERATURE: 98.8 F | WEIGHT: 170 LBS | HEIGHT: 62 IN | BODY MASS INDEX: 31.28 KG/M2

## 2020-10-23 DIAGNOSIS — N39.0 URINARY TRACT INFECTION WITHOUT HEMATURIA, SITE UNSPECIFIED: ICD-10-CM

## 2020-10-23 DIAGNOSIS — R10.9 RIGHT FLANK PAIN: ICD-10-CM

## 2020-10-23 DIAGNOSIS — N30.01 ACUTE CYSTITIS WITH HEMATURIA: Primary | ICD-10-CM

## 2020-10-23 LAB
BILIRUB BLD-MCNC: NEGATIVE MG/DL
CLARITY, POC: CLEAR
COLOR UR: YELLOW
GLUCOSE UR STRIP-MCNC: NEGATIVE MG/DL
KETONES UR QL: NEGATIVE
LEUKOCYTE EST, POC: NEGATIVE
NITRITE UR-MCNC: NEGATIVE MG/ML
PH UR: 6 [PH] (ref 5–8)
PROT UR STRIP-MCNC: NEGATIVE MG/DL
RBC # UR STRIP: ABNORMAL /UL
SP GR UR: 1.02 (ref 1–1.03)
UROBILINOGEN UR QL: NORMAL

## 2020-10-23 PROCEDURE — 81003 URINALYSIS AUTO W/O SCOPE: CPT | Performed by: NURSE PRACTITIONER

## 2020-10-23 PROCEDURE — 99214 OFFICE O/P EST MOD 30 MIN: CPT | Performed by: NURSE PRACTITIONER

## 2020-10-23 PROCEDURE — 87086 URINE CULTURE/COLONY COUNT: CPT | Performed by: NURSE PRACTITIONER

## 2020-10-23 PROCEDURE — 74018 RADEX ABDOMEN 1 VIEW: CPT | Performed by: RADIOLOGY

## 2020-10-23 PROCEDURE — 74018 RADEX ABDOMEN 1 VIEW: CPT

## 2020-10-23 NOTE — PROGRESS NOTES
Chief Complaint:          Chief Complaint   Patient presents with   • Nephrolithiasis /flank pain     Acute cystitis with hematuria /bladder pain       HPI:   45 y.o. female.  Very pleasant female with significant history of kidney stones returns to clinic for evaluation today,  With complaints of right flank pain, right lower quadrant abdominal pain, bladder pain, pelvic pressure and discomfort.    She reports a restless night, with urinary symptoms of frequency, urgency, and burning on urination.  She reports dysuria, flank pain, bladder spasms, very consistent with when she passes a stone.      However, she denies any relief with urination.  She denies any episodes of gross hematuria, denies nausea/vomiting/diarrhea.  No chills or fevers, no CVA tenderness.  Her urine dipstick today is complete negative for any infection, she has 3+ microscopic hematuria.    We will repeat a KUB    Past Medical History:        Past Medical History:   Diagnosis Date   • Arthritis    • Complicated UTI (urinary tract infection)    • Disease of thyroid gland     nodules   • GERD (gastroesophageal reflux disease)    • Kidney stones    • Migraine    • Migraines    • Pneumonia    • Polycystic kidney disease    • PONV (postoperative nausea and vomiting)    • Strep throat      The following portions of the patient's history were reviewed and updated as appropriate: allergies, current medications, past family history, past medical history, past social history, past surgical history and problem list.    Current Meds:     Current Outpatient Medications   Medication Sig Dispense Refill   • amitriptyline (ELAVIL) 25 MG tablet Take 25 mg by mouth every night.     • baclofen (LIORESAL) 10 MG tablet Take 10 mg by mouth 3 (Three) Times a Day.     • clonazePAM (KlonoPIN) 0.5 MG tablet Take 0.5 mg by mouth 2 (two) times a day as needed for seizures.     • gabapentin (NEURONTIN) 400 MG capsule Take 400 mg by mouth 4 (Four) Times a Day.     •  HYDROcodone-acetaminophen (NORCO) 7.5-325 MG per tablet Take 1 tablet by mouth Every 6 (Six) Hours As Needed for Moderate Pain . 6 tablet 0   • ondansetron (Zofran) 4 MG tablet Take 1 tablet by mouth Daily As Needed for Nausea or Vomiting. 12 tablet 0   • tamsulosin (Flomax) 0.4 MG capsule 24 hr capsule Take 1 capsule by mouth Every Night. 30 capsule 5   • traMADol (ULTRAM) 50 MG tablet Take 1 tablet by mouth Every 6 (Six) Hours As Needed for Moderate Pain . 60 tablet 2     No current facility-administered medications for this visit.         Allergies:      Allergies   Allergen Reactions   • Sulfa Antibiotics Anaphylaxis   • Compazine [Prochlorperazine Edisylate] Hives        Past Surgical History:     Past Surgical History:   Procedure Laterality Date   • ABDOMINAL SURGERY     •  SECTION     • COLONOSCOPY     • CYSTOSCOPY     • CYSTOSCOPY ELECTROHYDRAULIC LITHOTRIPSY     • DENTAL PROCEDURE     • DILATATION AND CURETTAGE     • DILATATION AND CURETTAGE     • ENDOSCOPY     • EXTRACORPOREAL SHOCK WAVE LITHOTRIPSY (ESWL) Left 2018    Procedure: EXTRACORPOREAL SHOCKWAVE LITHOTRIPSY;  Surgeon: Garth Friedman MD;  Location: [facility];  Service: Urology   • EXTRACORPOREAL SHOCK WAVE LITHOTRIPSY (ESWL) Left 5/15/2020    Procedure: EXTRACORPOREAL SHOCKWAVE LITHOTRIPSY;  Surgeon: Garth Friedman MD;  Location: [facility];  Service: Urology;  Laterality: Left;   • KIDNEY STONE SURGERY     • LITHOTRIPSY           Social History:     Social History     Socioeconomic History   • Marital status:      Spouse name: Not on file   • Number of children: Not on file   • Years of education: Not on file   • Highest education level: Not on file   Tobacco Use   • Smoking status: Former Smoker     Packs/day: 0.50     Years: 15.00     Pack years: 7.50     Types: Cigarettes     Quit date: 2018     Years since quittin.8   • Smokeless tobacco: Never Used   Substance and Sexual Activity   • Alcohol use:  No   • Drug use: No   • Sexual activity: Yes     Partners: Male     Birth control/protection: None       Family History:     Family History   Problem Relation Age of Onset   • No Known Problems Father    • No Known Problems Mother        Review of Systems:     Review of Systems   Constitutional: Positive for activity change and fatigue. Negative for chills and fever.   HENT: Negative for congestion.    Eyes: Negative for blurred vision.   Respiratory: Negative for cough, shortness of breath and wheezing.    Cardiovascular: Negative for leg swelling.   Gastrointestinal: Positive for nausea. Negative for abdominal pain and vomiting.   Genitourinary: Positive for decreased urine volume, difficulty urinating, dysuria, flank pain, frequency, hematuria, pelvic pain, pelvic pressure and urgency. Negative for dyspareunia, genital sores, urinary incontinence and vaginal discharge.   Musculoskeletal: Positive for back pain. Negative for gait problem.   Neurological: Negative for dizziness, headache and confusion.   Psychiatric/Behavioral: Positive for sleep disturbance and stress. Negative for behavioral problems and decreased concentration.        Physical Exam:     Physical Exam  Constitutional:       General: She is not in acute distress.     Appearance: She is well-developed. She is ill-appearing.   HENT:      Head: Normocephalic and atraumatic.   Eyes:      Pupils: Pupils are equal, round, and reactive to light.   Neck:      Musculoskeletal: Normal range of motion.      Thyroid: No thyromegaly.      Trachea: No tracheal deviation.   Cardiovascular:      Rate and Rhythm: Normal rate and regular rhythm.      Heart sounds: No murmur.   Pulmonary:      Effort: Pulmonary effort is normal. No respiratory distress.      Breath sounds: Normal breath sounds. No stridor. No wheezing.   Abdominal:      General: Bowel sounds are normal. There is distension.      Palpations: Abdomen is soft.      Tenderness: There is abdominal  tenderness.   Genitourinary:     Labia:         Right: No tenderness.         Left: No tenderness.       Vagina: Normal. No vaginal discharge.   Musculoskeletal: Normal range of motion.         General: Tenderness present. No deformity.   Skin:     General: Skin is warm and dry.      Capillary Refill: Capillary refill takes less than 2 seconds.      Coloration: Skin is not pale.      Findings: No erythema or rash.   Neurological:      Mental Status: She is alert and oriented to person, place, and time.      Cranial Nerves: No cranial nerve deficit.      Sensory: No sensory deficit.      Coordination: Coordination normal.   Psychiatric:         Behavior: Behavior normal.         Thought Content: Thought content normal.         Judgment: Judgment normal.         Procedure:       Assessment/Plan:     Acute cystitis with hematuria /flank pain,/bladder spasms: The patient reports intermittent urinary symptoms that have become very bothersome to her.  She recently completed antibiotic therapy for acute cystitis during her last ER visit, however her urine dipstick today showed 3+ microscopic hematuria otherwise negative for any infection.    We also both reviewed/discussed her KUB results which is completely negative for any stones. Again, we discussed the importance of continuing a kidney stone prevention diet and things she could do to prevent future stones from developing such as  increasing p.o. fluid intake, to at least 1 to 2 L of water daily.  She is to avoid caffeine products such as cola, coffee, and to avoid soft or soda drinks and also decrease her sodium consumption as in  Fast foods, ferrara, salted nuts, canned foods, and smoked/cured foods. She is also to decrease his oxalate consumption, as in spinach, Neal greens, and Rhubarb.  Also important is to decrease protein intake, as in red meats, peanut butter, and also avoid nuts.                PLAN  We will send her urine for culture, I will call her with  results if any positive bacterial growth.    Encourage increasing her p.o. fluid intake to at least 1 to 2 L daily, and avoiding bladder irritants -Cystitis    She is to continue her PRN pain medications tramadol for her discomfort.  May alternate with Tylenol or Motrin as needed.    Continue Pyridium as needed dysuria/bladder spasms.     Alternate warm or hot compresses to her lower left flank area for discomfort.     Will see her back on  AS NEEDED BASIS-PRN    Patient is agreeable plan of care.    Patient reports that she is not currently experiencing any symptoms of urinary incontinence.    Patient's Body mass index is 31.16 kg/m². BMI is above normal parameters. Recommendations include: educational material, exercise counseling and nutrition counseling.    Smoking Cessation Counseling:  Former smoker.  Patient does not currently use any tobacco products.     Counseling was given to patient for the following topics diagnostic results including: Acute cystitis with hematuria, kidney stones and risk factor reductions including: High oxalate/sodium/protein foods, bladder irritants such as caffeine products, coffee, soda drinks, citrusy foods, spicy foods.. The interim medical history and current results were reviewed.  A treatment plan with follow-up was made for bilateral flank pain, history of kidney stones, Acute cystitis with hematuria [N30.01].         This document has been electronically signed by Griselda Cheng-Akwa, APRN October 26, 2020 20:37 EDT

## 2020-10-24 LAB — BACTERIA SPEC AEROBE CULT: NO GROWTH

## 2020-10-26 ENCOUNTER — TELEPHONE (OUTPATIENT)
Dept: UROLOGY | Facility: CLINIC | Age: 45
End: 2020-10-26

## 2020-10-26 NOTE — TELEPHONE ENCOUNTER
CALLED PATIENT TO CHECK ON HOW HE WAS DOING AND TO DISCUSS URINE CULTURE RESULTS NEGATIVE FOR ANY GROWTH AT THIS TIME.          Urine Culture No growth          Resulting Agency: [facility]         Specimen Collected: 10/23/20 14:32

## 2020-10-27 NOTE — PATIENT INSTRUCTIONS
Interstitial Cystitis    Interstitial cystitis is inflammation of the bladder. This may cause pain in the bladder area as well as a frequent and urgent need to urinate. The bladder is a hollow organ in the lower part of the abdomen. It stores urine after the urine is made in the kidneys.  The severity of interstitial cystitis can vary from person to person. You may have flare-ups, and then your symptoms may go away for a while. For many people, it becomes a long-term (chronic) problem.  What are the causes?  The cause of this condition is not known.  What increases the risk?  The following factors may make you more likely to develop this condition:  · You are female.  · You have fibromyalgia.  · You have irritable bowel syndrome (IBS).  · You have endometriosis.  This condition may be aggravated by:  · Stress.  · Smoking.  · Spicy foods.  What are the signs or symptoms?  Symptoms of interstitial cystitis vary, and they can change over time. Symptoms may include:  · Discomfort or pain in the bladder area, which is in the lower abdomen. Pain can range from mild to severe. The pain may change in intensity as the bladder fills with urine or as it empties.  · Pain in the pelvic area, between the hip bones.  · An urgent need to urinate.  · Frequent urination.  · Pain during urination.  · Pain during sex.  · Blood in the urine.  For women, symptoms often get worse during menstruation.  How is this diagnosed?  This condition is diagnosed based on your symptoms, your medical history, and a physical exam. You may have tests to rule out other conditions, such as:  · Urine tests.  · Cystoscopy. For this test, a tool similar to a very thin telescope is used to look into your bladder.  · Biopsy. This involves taking a sample of tissue from the bladder to be examined under a microscope.  How is this treated?  There is no cure for this condition, but treatment can help you control your symptoms. Work closely with your health care  provider to find the most effective treatments for you. Treatment options may include:  · Medicines to relieve pain and reduce how often you feel the need to urinate.  · Learning ways to control when you urinate (bladder training).  · Lifestyle changes, such as changing your diet or taking steps to control stress.  · Using a device that provides electrical stimulation to your nerves, which can relieve pain (neuromodulation therapy). The device is placed on your back, where it blocks the nerves that cause you to feel pain in your bladder area.  · A procedure that stretches your bladder by filling it with air or fluid.  · Surgery. This is rare. It is only done for extreme cases, if other treatments do not help.  Follow these instructions at home:  Bladder training    · Use bladder training techniques as directed. Techniques may include:  ? Urinating at scheduled times.  ? Training yourself to delay urination.  ? Doing exercises (Kegel exercises) to strengthen the muscles that control urine flow.  · Keep a bladder diary.  ? Write down the times that you urinate and any symptoms that you have. This can help you find out which foods, liquids, or activities make your symptoms worse.  ? Use your bladder diary to schedule bathroom trips. If you are away from home, plan to be near a bathroom at each of your scheduled times.  · Make sure that you urinate just before you leave the house and just before you go to bed.  Eating and drinking  · Make dietary changes as recommended by your health care provider. You may need to avoid:  ? Spicy foods.  ? Foods that contain a lot of potassium.  · Limit your intake of beverages that make you need to urinate. These include:  ? Caffeinated beverages like soda, coffee, and tea.  ? Alcohol.  General instructions  · Take over-the-counter and prescription medicines only as told by your health care provider.  · Do not drink alcohol.  · You can try a warm or cool compress over your bladder for  comfort.  · Avoid wearing tight clothing.  · Do not use any products that contain nicotine or tobacco, such as cigarettes and e-cigarettes. If you need help quitting, ask your health care provider.  · Keep all follow-up visits as told by your health care provider. This is important.  Contact a health care provider if you have:  · Symptoms that do not get better with treatment.  · Pain or discomfort that gets worse.  · More frequent urges to urinate.  · A fever.  Get help right away if:  · You have no control over when you urinate.  Summary  · Interstitial cystitis is inflammation of the bladder.  · This condition may cause pain in the bladder area as well as a frequent and urgent need to urinate.  · You may have flare-ups of the condition, and then it may go away for a while. For many people, it becomes a long-term (chronic) problem.  · There is no cure for interstitial cystitis, but treatment methods are available to control your symptoms.  This information is not intended to replace advice given to you by your health care provider. Make sure you discuss any questions you have with your health care provider.  Document Released: 08/18/2005 Document Revised: 11/30/2018 Document Reviewed: 11/12/2018  [employer] Patient Education © 2020 [employer]

## 2020-10-31 ENCOUNTER — HOSPITAL ENCOUNTER (EMERGENCY)
Facility: HOSPITAL | Age: 45
Discharge: HOME | End: 2020-10-31
Attending: EMERGENCY MEDICINE | Admitting: EMERGENCY MEDICINE

## 2020-10-31 ENCOUNTER — APPOINTMENT (OUTPATIENT)
Dept: CT IMAGING | Facility: HOSPITAL | Age: 45
End: 2020-10-31

## 2020-10-31 VITALS
SYSTOLIC BLOOD PRESSURE: 121 MMHG | BODY MASS INDEX: 32.2 KG/M2 | OXYGEN SATURATION: 97 % | WEIGHT: 175 LBS | TEMPERATURE: 98.2 F | HEIGHT: 62 IN | HEART RATE: 71 BPM | DIASTOLIC BLOOD PRESSURE: 77 MMHG | RESPIRATION RATE: 16 BRPM

## 2020-10-31 DIAGNOSIS — K59.00 CONSTIPATION, UNSPECIFIED CONSTIPATION TYPE: Primary | ICD-10-CM

## 2020-10-31 LAB
ALBUMIN SERPL-MCNC: 4.14 G/DL (ref 3.5–5.2)
ALBUMIN/GLOB SERPL: 1.6 G/DL
ALP SERPL-CCNC: 76 U/L (ref 39–117)
ALT SERPL W P-5'-P-CCNC: 12 U/L (ref 1–33)
ANION GAP SERPL CALCULATED.3IONS-SCNC: 10.3 MMOL/L (ref 5–15)
AST SERPL-CCNC: 18 U/L (ref 1–32)
BACTERIA UR QL AUTO: ABNORMAL /HPF
BASOPHILS # BLD AUTO: 0.03 10*3/MM3 (ref 0–0.2)
BASOPHILS NFR BLD AUTO: 0.3 % (ref 0–1.5)
BILIRUB SERPL-MCNC: 0.3 MG/DL (ref 0–1.2)
BILIRUB UR QL STRIP: NEGATIVE
BUN SERPL-MCNC: 10 MG/DL (ref 6–20)
BUN/CREAT SERPL: 12.7 (ref 7–25)
CALCIUM SPEC-SCNC: 9.7 MG/DL (ref 8.6–10.5)
CHLORIDE SERPL-SCNC: 103 MMOL/L (ref 98–107)
CLARITY UR: ABNORMAL
CO2 SERPL-SCNC: 25.7 MMOL/L (ref 22–29)
COLOR UR: YELLOW
CREAT SERPL-MCNC: 0.79 MG/DL (ref 0.57–1)
DEPRECATED RDW RBC AUTO: 43.5 FL (ref 37–54)
EOSINOPHIL # BLD AUTO: 0.2 10*3/MM3 (ref 0–0.4)
EOSINOPHIL NFR BLD AUTO: 2 % (ref 0.3–6.2)
ERYTHROCYTE [DISTWIDTH] IN BLOOD BY AUTOMATED COUNT: 12.1 % (ref 12.3–15.4)
GFR SERPL CREATININE-BSD FRML MDRD: 79 ML/MIN/1.73
GLOBULIN UR ELPH-MCNC: 2.7 GM/DL
GLUCOSE SERPL-MCNC: 136 MG/DL (ref 65–99)
GLUCOSE UR STRIP-MCNC: NEGATIVE MG/DL
HCT VFR BLD AUTO: 39.7 % (ref 34–46.6)
HGB BLD-MCNC: 12.8 G/DL (ref 12–15.9)
HGB UR QL STRIP.AUTO: ABNORMAL
HYALINE CASTS UR QL AUTO: ABNORMAL /LPF
IMM GRANULOCYTES # BLD AUTO: 0.04 10*3/MM3 (ref 0–0.05)
IMM GRANULOCYTES NFR BLD AUTO: 0.4 % (ref 0–0.5)
KETONES UR QL STRIP: NEGATIVE
LEUKOCYTE ESTERASE UR QL STRIP.AUTO: NEGATIVE
LYMPHOCYTES # BLD AUTO: 3.14 10*3/MM3 (ref 0.7–3.1)
LYMPHOCYTES NFR BLD AUTO: 31.8 % (ref 19.6–45.3)
MCH RBC QN AUTO: 31.6 PG (ref 26.6–33)
MCHC RBC AUTO-ENTMCNC: 32.2 G/DL (ref 31.5–35.7)
MCV RBC AUTO: 98 FL (ref 79–97)
MONOCYTES # BLD AUTO: 0.64 10*3/MM3 (ref 0.1–0.9)
MONOCYTES NFR BLD AUTO: 6.5 % (ref 5–12)
NEUTROPHILS NFR BLD AUTO: 5.83 10*3/MM3 (ref 1.7–7)
NEUTROPHILS NFR BLD AUTO: 59 % (ref 42.7–76)
NITRITE UR QL STRIP: NEGATIVE
NRBC BLD AUTO-RTO: 0 /100 WBC (ref 0–0.2)
PH UR STRIP.AUTO: 6.5 [PH] (ref 5–8)
PLATELET # BLD AUTO: 294 10*3/MM3 (ref 140–450)
PMV BLD AUTO: 9.2 FL (ref 6–12)
POTASSIUM SERPL-SCNC: 4 MMOL/L (ref 3.5–5.2)
PROT SERPL-MCNC: 6.8 G/DL (ref 6–8.5)
PROT UR QL STRIP: NEGATIVE
RBC # BLD AUTO: 4.05 10*6/MM3 (ref 3.77–5.28)
RBC # UR: ABNORMAL /HPF
REF LAB TEST METHOD: ABNORMAL
SODIUM SERPL-SCNC: 139 MMOL/L (ref 136–145)
SP GR UR STRIP: 1.02 (ref 1–1.03)
SQUAMOUS #/AREA URNS HPF: ABNORMAL /HPF
UROBILINOGEN UR QL STRIP: ABNORMAL
WBC # BLD AUTO: 9.88 10*3/MM3 (ref 3.4–10.8)
WBC UR QL AUTO: ABNORMAL /HPF

## 2020-10-31 PROCEDURE — 99283 EMERGENCY DEPT VISIT LOW MDM: CPT

## 2020-10-31 PROCEDURE — 74176 CT ABD & PELVIS W/O CONTRAST: CPT | Performed by: RADIOLOGY

## 2020-10-31 PROCEDURE — 81001 URINALYSIS AUTO W/SCOPE: CPT | Performed by: PHYSICIAN ASSISTANT

## 2020-10-31 PROCEDURE — 74176 CT ABD & PELVIS W/O CONTRAST: CPT

## 2020-10-31 PROCEDURE — 80053 COMPREHEN METABOLIC PANEL: CPT | Performed by: PHYSICIAN ASSISTANT

## 2020-10-31 PROCEDURE — 85025 COMPLETE CBC W/AUTO DIFF WBC: CPT | Performed by: PHYSICIAN ASSISTANT

## 2020-10-31 RX ORDER — POLYETHYLENE GLYCOL 3350 17 G/17G
17 POWDER, FOR SOLUTION ORAL DAILY
Qty: 289 G | Refills: 0 | Status: SHIPPED | OUTPATIENT
Start: 2020-10-31 | End: 2021-02-18

## 2020-10-31 RX ORDER — SODIUM CHLORIDE 0.9 % (FLUSH) 0.9 %
10 SYRINGE (ML) INJECTION AS NEEDED
Status: DISCONTINUED | OUTPATIENT
Start: 2020-10-31 | End: 2020-10-31 | Stop reason: HOSPADM

## 2020-10-31 RX ADMIN — SODIUM CHLORIDE 1000 ML: 9 INJECTION, SOLUTION INTRAVENOUS at 15:03

## 2020-11-13 ENCOUNTER — HOSPITAL ENCOUNTER (EMERGENCY)
Facility: HOSPITAL | Age: 45
Discharge: HOME | End: 2020-11-13
Attending: EMERGENCY MEDICINE | Admitting: STUDENT IN AN ORGANIZED HEALTH CARE EDUCATION/TRAINING PROGRAM

## 2020-11-13 ENCOUNTER — APPOINTMENT (OUTPATIENT)
Dept: CT IMAGING | Facility: HOSPITAL | Age: 45
End: 2020-11-13

## 2020-11-13 VITALS
OXYGEN SATURATION: 99 % | WEIGHT: 165 LBS | BODY MASS INDEX: 29.23 KG/M2 | TEMPERATURE: 98.6 F | SYSTOLIC BLOOD PRESSURE: 116 MMHG | HEIGHT: 63 IN | HEART RATE: 70 BPM | RESPIRATION RATE: 18 BRPM | DIASTOLIC BLOOD PRESSURE: 84 MMHG

## 2020-11-13 DIAGNOSIS — N20.1 URETEROLITHIASIS: Primary | ICD-10-CM

## 2020-11-13 LAB
ALBUMIN SERPL-MCNC: 4.37 G/DL (ref 3.5–5.2)
ALBUMIN/GLOB SERPL: 1.5 G/DL
ALP SERPL-CCNC: 73 U/L (ref 39–117)
ALT SERPL W P-5'-P-CCNC: 9 U/L (ref 1–33)
ANION GAP SERPL CALCULATED.3IONS-SCNC: 10.4 MMOL/L (ref 5–15)
AST SERPL-CCNC: 18 U/L (ref 1–32)
B-HCG UR QL: NEGATIVE
BACTERIA UR QL AUTO: ABNORMAL /HPF
BASOPHILS # BLD AUTO: 0.03 10*3/MM3 (ref 0–0.2)
BASOPHILS NFR BLD AUTO: 0.4 % (ref 0–1.5)
BILIRUB SERPL-MCNC: 0.3 MG/DL (ref 0–1.2)
BILIRUB UR QL STRIP: NEGATIVE
BUN SERPL-MCNC: 11 MG/DL (ref 6–20)
BUN/CREAT SERPL: 11.7 (ref 7–25)
CALCIUM SPEC-SCNC: 9.5 MG/DL (ref 8.6–10.5)
CHLORIDE SERPL-SCNC: 104 MMOL/L (ref 98–107)
CLARITY UR: ABNORMAL
CO2 SERPL-SCNC: 24.6 MMOL/L (ref 22–29)
COLOR UR: ABNORMAL
CREAT SERPL-MCNC: 0.94 MG/DL (ref 0.57–1)
DEPRECATED RDW RBC AUTO: 41.9 FL (ref 37–54)
EOSINOPHIL # BLD AUTO: 0.15 10*3/MM3 (ref 0–0.4)
EOSINOPHIL NFR BLD AUTO: 1.8 % (ref 0.3–6.2)
ERYTHROCYTE [DISTWIDTH] IN BLOOD BY AUTOMATED COUNT: 11.9 % (ref 12.3–15.4)
GFR SERPL CREATININE-BSD FRML MDRD: 65 ML/MIN/1.73
GLOBULIN UR ELPH-MCNC: 2.9 GM/DL
GLUCOSE SERPL-MCNC: 81 MG/DL (ref 65–99)
GLUCOSE UR STRIP-MCNC: NEGATIVE MG/DL
HCT VFR BLD AUTO: 36.9 % (ref 34–46.6)
HGB BLD-MCNC: 12.3 G/DL (ref 12–15.9)
HGB UR QL STRIP.AUTO: ABNORMAL
HOLD SPECIMEN: NORMAL
HOLD SPECIMEN: NORMAL
HYALINE CASTS UR QL AUTO: ABNORMAL /LPF
IMM GRANULOCYTES # BLD AUTO: 0.02 10*3/MM3 (ref 0–0.05)
IMM GRANULOCYTES NFR BLD AUTO: 0.2 % (ref 0–0.5)
KETONES UR QL STRIP: NEGATIVE
LEUKOCYTE ESTERASE UR QL STRIP.AUTO: ABNORMAL
LIPASE SERPL-CCNC: 10 U/L (ref 13–60)
LYMPHOCYTES # BLD AUTO: 3.97 10*3/MM3 (ref 0.7–3.1)
LYMPHOCYTES NFR BLD AUTO: 47.1 % (ref 19.6–45.3)
MCH RBC QN AUTO: 32.1 PG (ref 26.6–33)
MCHC RBC AUTO-ENTMCNC: 33.3 G/DL (ref 31.5–35.7)
MCV RBC AUTO: 96.3 FL (ref 79–97)
MONOCYTES # BLD AUTO: 0.72 10*3/MM3 (ref 0.1–0.9)
MONOCYTES NFR BLD AUTO: 8.5 % (ref 5–12)
NEUTROPHILS NFR BLD AUTO: 3.54 10*3/MM3 (ref 1.7–7)
NEUTROPHILS NFR BLD AUTO: 42 % (ref 42.7–76)
NITRITE UR QL STRIP: POSITIVE
NRBC BLD AUTO-RTO: 0 /100 WBC (ref 0–0.2)
PH UR STRIP.AUTO: 5.5 [PH] (ref 5–8)
PLATELET # BLD AUTO: 282 10*3/MM3 (ref 140–450)
PMV BLD AUTO: 9.6 FL (ref 6–12)
POTASSIUM SERPL-SCNC: 3.4 MMOL/L (ref 3.5–5.2)
PROT SERPL-MCNC: 7.3 G/DL (ref 6–8.5)
PROT UR QL STRIP: NEGATIVE
RBC # BLD AUTO: 3.83 10*6/MM3 (ref 3.77–5.28)
RBC # UR: ABNORMAL /HPF
REF LAB TEST METHOD: ABNORMAL
SODIUM SERPL-SCNC: 139 MMOL/L (ref 136–145)
SP GR UR STRIP: 1.01 (ref 1–1.03)
SQUAMOUS #/AREA URNS HPF: ABNORMAL /HPF
UROBILINOGEN UR QL STRIP: ABNORMAL
WBC # BLD AUTO: 8.43 10*3/MM3 (ref 3.4–10.8)
WBC UR QL AUTO: ABNORMAL /HPF
WHOLE BLOOD HOLD SPECIMEN: NORMAL
WHOLE BLOOD HOLD SPECIMEN: NORMAL

## 2020-11-13 PROCEDURE — 74176 CT ABD & PELVIS W/O CONTRAST: CPT | Performed by: RADIOLOGY

## 2020-11-13 PROCEDURE — 74176 CT ABD & PELVIS W/O CONTRAST: CPT

## 2020-11-13 PROCEDURE — 25010000002 MORPHINE PER 10 MG: Performed by: EMERGENCY MEDICINE

## 2020-11-13 PROCEDURE — 96375 TX/PRO/DX INJ NEW DRUG ADDON: CPT

## 2020-11-13 PROCEDURE — 87040 BLOOD CULTURE FOR BACTERIA: CPT | Performed by: PHYSICIAN ASSISTANT

## 2020-11-13 PROCEDURE — 25010000002 KETOROLAC TROMETHAMINE PER 15 MG: Performed by: STUDENT IN AN ORGANIZED HEALTH CARE EDUCATION/TRAINING PROGRAM

## 2020-11-13 PROCEDURE — 99284 EMERGENCY DEPT VISIT MOD MDM: CPT

## 2020-11-13 PROCEDURE — 81025 URINE PREGNANCY TEST: CPT | Performed by: PHYSICIAN ASSISTANT

## 2020-11-13 PROCEDURE — 85025 COMPLETE CBC W/AUTO DIFF WBC: CPT | Performed by: PHYSICIAN ASSISTANT

## 2020-11-13 PROCEDURE — 25010000002 CEFTRIAXONE PER 250 MG: Performed by: PHYSICIAN ASSISTANT

## 2020-11-13 PROCEDURE — 83690 ASSAY OF LIPASE: CPT | Performed by: PHYSICIAN ASSISTANT

## 2020-11-13 PROCEDURE — 80053 COMPREHEN METABOLIC PANEL: CPT | Performed by: PHYSICIAN ASSISTANT

## 2020-11-13 PROCEDURE — 96365 THER/PROPH/DIAG IV INF INIT: CPT

## 2020-11-13 PROCEDURE — 81001 URINALYSIS AUTO W/SCOPE: CPT | Performed by: PHYSICIAN ASSISTANT

## 2020-11-13 PROCEDURE — 96376 TX/PRO/DX INJ SAME DRUG ADON: CPT

## 2020-11-13 PROCEDURE — 25010000002 HYDROMORPHONE 1 MG/ML SOLUTION: Performed by: STUDENT IN AN ORGANIZED HEALTH CARE EDUCATION/TRAINING PROGRAM

## 2020-11-13 PROCEDURE — 25010000002 ONDANSETRON PER 1 MG: Performed by: PHYSICIAN ASSISTANT

## 2020-11-13 RX ORDER — SODIUM CHLORIDE 0.9 % (FLUSH) 0.9 %
10 SYRINGE (ML) INJECTION AS NEEDED
Status: DISCONTINUED | OUTPATIENT
Start: 2020-11-13 | End: 2020-11-13 | Stop reason: HOSPADM

## 2020-11-13 RX ORDER — HYDROCODONE BITARTRATE AND ACETAMINOPHEN 7.5; 325 MG/1; MG/1
1 TABLET ORAL EVERY 6 HOURS PRN
Qty: 12 TABLET | Refills: 0 | Status: SHIPPED | OUTPATIENT
Start: 2020-11-13 | End: 2020-11-16

## 2020-11-13 RX ORDER — KETOROLAC TROMETHAMINE 30 MG/ML
30 INJECTION, SOLUTION INTRAMUSCULAR; INTRAVENOUS EVERY 6 HOURS PRN
Status: DISCONTINUED | OUTPATIENT
Start: 2020-11-13 | End: 2020-11-13 | Stop reason: HOSPADM

## 2020-11-13 RX ORDER — TAMSULOSIN HYDROCHLORIDE 0.4 MG/1
1 CAPSULE ORAL DAILY
Qty: 30 CAPSULE | Refills: 0 | Status: SHIPPED | OUTPATIENT
Start: 2020-11-13 | End: 2021-06-24 | Stop reason: SDUPTHER

## 2020-11-13 RX ORDER — ONDANSETRON 2 MG/ML
4 INJECTION INTRAMUSCULAR; INTRAVENOUS ONCE
Status: COMPLETED | OUTPATIENT
Start: 2020-11-13 | End: 2020-11-13

## 2020-11-13 RX ORDER — MORPHINE SULFATE 2 MG/ML
2 INJECTION, SOLUTION INTRAMUSCULAR; INTRAVENOUS ONCE
Status: COMPLETED | OUTPATIENT
Start: 2020-11-13 | End: 2020-11-13

## 2020-11-13 RX ORDER — ONDANSETRON 4 MG/1
4 TABLET, ORALLY DISINTEGRATING ORAL EVERY 6 HOURS PRN
Qty: 12 TABLET | Refills: 0 | Status: SHIPPED | OUTPATIENT
Start: 2020-11-13 | End: 2021-06-24 | Stop reason: SDUPTHER

## 2020-11-13 RX ORDER — CIPROFLOXACIN 500 MG/1
500 TABLET, FILM COATED ORAL 2 TIMES DAILY
Qty: 14 TABLET | Refills: 0 | Status: SHIPPED | OUTPATIENT
Start: 2020-11-13 | End: 2020-11-20

## 2020-11-13 RX ADMIN — MORPHINE SULFATE 2 MG: 2 INJECTION, SOLUTION INTRAMUSCULAR; INTRAVENOUS at 18:07

## 2020-11-13 RX ADMIN — SODIUM CHLORIDE 1000 ML: 9 INJECTION, SOLUTION INTRAVENOUS at 18:07

## 2020-11-13 RX ADMIN — ONDANSETRON 4 MG: 2 INJECTION INTRAMUSCULAR; INTRAVENOUS at 18:07

## 2020-11-13 RX ADMIN — KETOROLAC TROMETHAMINE 30 MG: 30 INJECTION, SOLUTION INTRAMUSCULAR; INTRAVENOUS at 20:50

## 2020-11-13 RX ADMIN — HYDROMORPHONE HYDROCHLORIDE 1 MG: 1 INJECTION, SOLUTION INTRAMUSCULAR; INTRAVENOUS; SUBCUTANEOUS at 19:29

## 2020-11-13 RX ADMIN — CEFTRIAXONE 1 G: 1 INJECTION, POWDER, FOR SOLUTION INTRAMUSCULAR; INTRAVENOUS at 20:50

## 2020-11-13 RX ADMIN — HYDROMORPHONE HYDROCHLORIDE 1 MG: 1 INJECTION, SOLUTION INTRAMUSCULAR; INTRAVENOUS; SUBCUTANEOUS at 20:50

## 2020-11-14 NOTE — ED NOTES
Patient is ready for discharge once antibiotic finishes infusing     Spray, JANA Armstrong  11/13/20 1712

## 2020-11-14 NOTE — ED PROVIDER NOTES
Subjective     History provided by:  Patient   used: No    Flank Pain  Pain location:  L flank  Pain quality: sharp    Pain radiates to:  LLQ and groin  Onset quality:  Sudden  Duration:  1 day  Timing:  Constant  Progression:  Worsening  Chronicity:  New  Relieved by:  Nothing  Worsened by:  Nothing  Ineffective treatments:  None tried  Associated symptoms: dysuria        Review of Systems   Constitutional: Negative.    HENT: Negative.    Eyes: Negative.    Respiratory: Negative.    Cardiovascular: Negative.    Gastrointestinal: Negative.    Endocrine: Negative.    Genitourinary: Positive for dysuria, flank pain and frequency.   Skin: Negative.    Allergic/Immunologic: Negative.    Neurological: Negative.    Hematological: Negative.    Psychiatric/Behavioral: Negative.    All other systems reviewed and are negative.      Past Medical History:   Diagnosis Date   • Arthritis    • Complicated UTI (urinary tract infection)    • Disease of thyroid gland     nodules   • GERD (gastroesophageal reflux disease)    • Kidney stones    • Migraine    • Migraines    • Pneumonia    • Polycystic kidney disease    • PONV (postoperative nausea and vomiting)    • Strep throat        Allergies   Allergen Reactions   • Sulfa Antibiotics Anaphylaxis   • Compazine [Prochlorperazine Edisylate] Hives       Past Surgical History:   Procedure Laterality Date   • ABDOMINAL SURGERY     •  SECTION     • COLONOSCOPY     • CYSTOSCOPY     • CYSTOSCOPY ELECTROHYDRAULIC LITHOTRIPSY     • DENTAL PROCEDURE     • DILATATION AND CURETTAGE     • DILATATION AND CURETTAGE     • ENDOSCOPY     • EXTRACORPOREAL SHOCK WAVE LITHOTRIPSY (ESWL) Left 2018    Procedure: EXTRACORPOREAL SHOCKWAVE LITHOTRIPSY;  Surgeon: Garth Friedman MD;  Location: [facility];  Service: Urology   • EXTRACORPOREAL SHOCK WAVE LITHOTRIPSY (ESWL) Left 5/15/2020    Procedure: EXTRACORPOREAL SHOCKWAVE LITHOTRIPSY;  Surgeon: Garth Friedman,  MD;  Location: [facility];  Service: Urology;  Laterality: Left;   • KIDNEY STONE SURGERY     • LITHOTRIPSY         Family History   Problem Relation Age of Onset   • No Known Problems Father    • No Known Problems Mother        Social History     Socioeconomic History   • Marital status:      Spouse name: Not on file   • Number of children: Not on file   • Years of education: Not on file   • Highest education level: Not on file   Tobacco Use   • Smoking status: Former Smoker     Packs/day: 0.50     Years: 15.00     Pack years: 7.50     Types: Cigarettes     Quit date: 2018     Years since quittin.8   • Smokeless tobacco: Never Used   Substance and Sexual Activity   • Alcohol use: No   • Drug use: No   • Sexual activity: Yes     Partners: Male     Birth control/protection: None           Objective   Physical Exam  Vitals signs and nursing note reviewed.   Constitutional:       Appearance: Normal appearance. She is obese.   HENT:      Head: Normocephalic and atraumatic.      Right Ear: External ear normal.      Left Ear: External ear normal.      Nose: Nose normal.      Mouth/Throat:      Mouth: Mucous membranes are moist.      Pharynx: Oropharynx is clear.   Eyes:      Extraocular Movements: Extraocular movements intact.      Conjunctiva/sclera: Conjunctivae normal.      Pupils: Pupils are equal, round, and reactive to light.   Neck:      Musculoskeletal: Normal range of motion and neck supple.   Cardiovascular:      Rate and Rhythm: Normal rate and regular rhythm.      Pulses: Normal pulses.      Heart sounds: Normal heart sounds.   Pulmonary:      Effort: Pulmonary effort is normal.      Breath sounds: Normal breath sounds.   Abdominal:      General: Abdomen is flat. Bowel sounds are normal.      Tenderness: There is abdominal tenderness.      Comments: Left flank, LLQ    Musculoskeletal: Normal range of motion.   Skin:     General: Skin is warm and dry.      Capillary Refill: Capillary refill takes  less than 2 seconds.   Neurological:      General: No focal deficit present.      Mental Status: She is alert and oriented to person, place, and time. Mental status is at baseline.   Psychiatric:         Mood and Affect: Mood normal.         Behavior: Behavior normal.         Thought Content: Thought content normal.         Judgment: Judgment normal.         Procedures           ED Course  ED Course as of Nov 13 2105 Fri Nov 13, 2020 2029 IMPRESSION:    4 mm left distal ureteral calculus causing moderate left  hydronephrosis.     This report was finalized on 11/13/2020 8:26 PM by Dr. Rick Martinez MD.      CT Abdomen Pelvis Stone Protocol [ML]   2104 SAM reviewed. No active prescriptions.  Patient instructed to f/u with [facility]. Discussed sx that would warrant return to the ED.     [ML]      ED Course User Index  [ML] Yudith Santos PA                                           MDM  Number of Diagnoses or Management Options  Ureterolithiasis:      Amount and/or Complexity of Data Reviewed  Clinical lab tests: ordered and reviewed  Tests in the radiology section of CPT®: ordered and reviewed    Risk of Complications, Morbidity, and/or Mortality  Presenting problems: low  Diagnostic procedures: low  Management options: low    Patient Progress  Patient progress: improved      Final diagnoses:   Ureterolithiasis            Yudith Santos PA  11/13/20 2105

## 2020-11-16 ENCOUNTER — HOSPITAL ENCOUNTER (OUTPATIENT)
Dept: GENERAL RADIOLOGY | Facility: HOSPITAL | Age: 45
Discharge: HOME | End: 2020-11-16

## 2020-11-16 ENCOUNTER — LAB (OUTPATIENT)
Dept: LAB | Facility: HOSPITAL | Age: 45
End: 2020-11-16

## 2020-11-16 ENCOUNTER — OFFICE VISIT (OUTPATIENT)
Dept: UROLOGY | Facility: CLINIC | Age: 45
End: 2020-11-16

## 2020-11-16 VITALS — HEIGHT: 63 IN | TEMPERATURE: 98.4 F | WEIGHT: 164.9 LBS | BODY MASS INDEX: 29.22 KG/M2

## 2020-11-16 DIAGNOSIS — R10.32 LEFT LOWER QUADRANT ABDOMINAL PAIN: ICD-10-CM

## 2020-11-16 DIAGNOSIS — N20.1 LEFT URETERAL CALCULUS: ICD-10-CM

## 2020-11-16 DIAGNOSIS — N20.1 LEFT URETERAL CALCULUS: Primary | ICD-10-CM

## 2020-11-16 PROCEDURE — 99214 OFFICE O/P EST MOD 30 MIN: CPT | Performed by: NURSE PRACTITIONER

## 2020-11-16 PROCEDURE — 74018 RADEX ABDOMEN 1 VIEW: CPT | Performed by: RADIOLOGY

## 2020-11-16 PROCEDURE — 74018 RADEX ABDOMEN 1 VIEW: CPT

## 2020-11-16 PROCEDURE — C9803 HOPD COVID-19 SPEC COLLECT: HCPCS

## 2020-11-16 PROCEDURE — U0004 COV-19 TEST NON-CDC HGH THRU: HCPCS

## 2020-11-16 RX ORDER — GENTAMICIN SULFATE 80 MG/100ML
80 INJECTION, SOLUTION INTRAVENOUS ONCE
Status: CANCELLED | OUTPATIENT
Start: 2020-11-16 | End: 2020-11-16

## 2020-11-16 NOTE — H&P (VIEW-ONLY)
Chief Complaint:          Chief Complaint   Patient presents with   • Nephrolithiasis       HPI:   45 y.o. female with a significant history of recurrent kidney stone returns to clinic today with  at side for evaluation.    She is an ER follow-up for kidney stones.  Patient states she presented to the [facility] ED on Friday 11/12/20, secondary to very sharp 10/10, aching and very consistent left flank pain radiating to her suprapubic region.  She has a significant history of kidney stones.    She had a CT scan done in the ED that showed a 4 mm left distal ureteral calculus, causing moderate left hydronephrosis.  2.2 cm right renal cyst with some peripheral calcifications noted.  Also nonobstructive calculi in both kidneys.    On presentation to clinic, she is very tearful, sitting bended over in exam room.  She reports significant discomfort 7/10.  She reports left flank pain that is still very colicky, constant but occasionally very sharp.  She reports left lower quadrant pain, significant pressure/discomfort still in her left lower abdomen and pelvic region.  She still has nausea, no vomiting, denies chills or fevers.  She reports urinary symptoms of frequency, urgency, and dysuria.  She has a frequent urge to void, but reports that she is unable to urinate at this time.  She she reports burning on urination,  but denies any episodes of gross hematuria.    We will repeat a KUB      Past Medical History:        Past Medical History:   Diagnosis Date   • Arthritis    • Complicated UTI (urinary tract infection)    • Disease of thyroid gland     nodules   • GERD (gastroesophageal reflux disease)    • Kidney stones    • Migraine    • Migraines    • Pneumonia    • Polycystic kidney disease    • PONV (postoperative nausea and vomiting)    • Strep throat      The following portions of the patient's history were reviewed and updated as appropriate: allergies, current medications, past family history, past medical  history, past social history, past surgical history and problem list.    Current Meds:     Current Outpatient Medications   Medication Sig Dispense Refill   • amitriptyline (ELAVIL) 25 MG tablet Take 25 mg by mouth every night.     • baclofen (LIORESAL) 10 MG tablet Take 10 mg by mouth 3 (Three) Times a Day.     • ciprofloxacin (CIPRO) 500 MG tablet Take 1 tablet by mouth 2 (Two) Times a Day for 7 days. 14 tablet 0   • clonazePAM (KlonoPIN) 0.5 MG tablet Take 0.5 mg by mouth 2 (two) times a day as needed for seizures.     • gabapentin (NEURONTIN) 400 MG capsule Take 400 mg by mouth 4 (Four) Times a Day.     • HYDROcodone-acetaminophen (NORCO) 7.5-325 MG per tablet Take 1 tablet by mouth Every 6 (Six) Hours As Needed for Moderate Pain . 6 tablet 0   • HYDROcodone-acetaminophen (NORCO) 7.5-325 MG per tablet Take 1 tablet by mouth Every 6 (Six) Hours As Needed for Moderate Pain  for up to 3 days. 12 tablet 0   • ondansetron ODT (ZOFRAN-ODT) 4 MG disintegrating tablet Place 1 tablet on the tongue Every 6 (Six) Hours As Needed for Nausea or Vomiting. 12 tablet 0   • polyethylene glycol (MIRALAX) 17 GM/SCOOP powder Take 17 g by mouth Daily. 289 g 0   • tamsulosin (FLOMAX) 0.4 MG capsule 24 hr capsule Take 1 capsule by mouth Daily. 30 capsule 0   • traMADol (ULTRAM) 50 MG tablet Take 1 tablet by mouth Every 6 (Six) Hours As Needed for Moderate Pain . 60 tablet 2     No current facility-administered medications for this visit.         Allergies:      Allergies   Allergen Reactions   • Sulfa Antibiotics Anaphylaxis   • Compazine [Prochlorperazine Edisylate] Hives        Past Surgical History:     Past Surgical History:   Procedure Laterality Date   • ABDOMINAL SURGERY     •  SECTION     • COLONOSCOPY     • CYSTOSCOPY     • CYSTOSCOPY ELECTROHYDRAULIC LITHOTRIPSY     • DENTAL PROCEDURE     • DILATATION AND CURETTAGE     • DILATATION AND CURETTAGE     • ENDOSCOPY     • EXTRACORPOREAL SHOCK WAVE LITHOTRIPSY (ESWL) Left  2018    Procedure: EXTRACORPOREAL SHOCKWAVE LITHOTRIPSY;  Surgeon: Garth Friedman MD;  Location: [facility] OR;  Service: Urology   • EXTRACORPOREAL SHOCK WAVE LITHOTRIPSY (ESWL) Left 5/15/2020    Procedure: EXTRACORPOREAL SHOCKWAVE LITHOTRIPSY;  Surgeon: Garth Friedman MD;  Location: [facility];  Service: Urology;  Laterality: Left;   • KIDNEY STONE SURGERY     • LITHOTRIPSY           Social History:     Social History     Socioeconomic History   • Marital status:      Spouse name: Not on file   • Number of children: Not on file   • Years of education: Not on file   • Highest education level: Not on file   Tobacco Use   • Smoking status: Former Smoker     Packs/day: 0.50     Years: 15.00     Pack years: 7.50     Types: Cigarettes     Quit date: 2018     Years since quittin.8   • Smokeless tobacco: Never Used   Substance and Sexual Activity   • Alcohol use: No   • Drug use: No   • Sexual activity: Yes     Partners: Male     Birth control/protection: None       Family History:     Family History   Problem Relation Age of Onset   • No Known Problems Father    • No Known Problems Mother        Review of Systems:     Review of Systems   Constitutional: Positive for activity change, appetite change and fatigue. Negative for chills and fever.   HENT: Negative for congestion.    Eyes: Negative for blurred vision.   Gastrointestinal: Positive for abdominal pain and nausea. Negative for vomiting.   Genitourinary: Positive for dysuria, flank pain, frequency, pelvic pain, pelvic pressure and urgency. Negative for difficulty urinating, dyspareunia, genital sores, hematuria, urinary incontinence and vaginal discharge.   Musculoskeletal: Positive for back pain.   Skin: Positive for dry skin and pallor.   Neurological: Negative for dizziness, headache and confusion.   Psychiatric/Behavioral: Positive for sleep disturbance and stress. Negative for behavioral problems and decreased concentration. The  patient is nervous/anxious.         Physical Exam:     Physical Exam  Constitutional:       General: She is not in acute distress.     Appearance: She is well-developed. She is ill-appearing.   HENT:      Head: Normocephalic and atraumatic.   Eyes:      Pupils: Pupils are equal, round, and reactive to light.   Neck:      Musculoskeletal: Normal range of motion.      Thyroid: No thyromegaly.      Trachea: No tracheal deviation.   Cardiovascular:      Rate and Rhythm: Normal rate and regular rhythm.      Heart sounds: No murmur.   Pulmonary:      Effort: Pulmonary effort is normal. No respiratory distress.      Breath sounds: Normal breath sounds. No stridor. No wheezing.   Abdominal:      General: Bowel sounds are normal. There is distension.      Palpations: Abdomen is soft.      Tenderness: There is abdominal tenderness. There is guarding ( Left Flank, LLQ abdomen).   Genitourinary:     Labia:         Right: No tenderness.         Left: No tenderness.       Vagina: Normal. No vaginal discharge.   Musculoskeletal: Normal range of motion.         General: Tenderness present. No deformity.   Skin:     General: Skin is warm and dry.      Coloration: Skin is pale.      Findings: No erythema or rash.   Neurological:      Mental Status: She is alert and oriented to person, place, and time.      Cranial Nerves: No cranial nerve deficit.      Sensory: No sensory deficit.      Coordination: Coordination normal.   Psychiatric:         Behavior: Behavior normal.         Thought Content: Thought content normal.         Judgment: Judgment normal.         Procedure:       Assessment/Plan:        ASSESSMENT  Left Flank Pain / Bilateral Nephrolithiasis / Left Distal Ureteral Calculus : 44-year-old female with very metabolic active stone disease presents to clinic today for evaluation of her very significant left-sided pain from a left obstructing stone.  She is apparently uncomfortable, and reports her pain and discomfort as  colicky in nature, constant, constant 7 and 10.    We will reviewed/discussed her CT scan and repeat KUB results today which  showed a 4 mm left distal ureteral calculus, causing moderate left hydronephrosis.  2.2 cm right renal cyst with some peripheral calcifications noted.  Also nonobstructive calculi in both kidneys.     We have discussed the various parameters regarding spontaneous passage including the notion that a 1-3 mm stone has a high likelihood of spontaneous passage versus a larger stone being caught up in the upper areas of the urinary tract.  We also discussed the medical management of stone disease and the use of medical expulsive therapy in the form of Flomax. This is used in an off label setting. I also talked about nonoperative management including ambulation and increasing fluids to atleast 2-3L, and hot tubs as being an effective adjuncts in the treatment of a ureteral stone.  Patient is very familiar with this.     We discussed the absolute relative indicators for intervention including the presence of sepsis, and pain we cannot control as the primary need for urgent intervention.     I Discussed this case with Dr. Friedman, who is very familiar this patient, and he is agreeable with plan of care.  Due to the significant discomfort,       PLAN  Patient has been scheduled for Left ureteroscopy laser lithotripsy with possible left stent on Wednesday, 11/18/2020.    Patient has adequate narcotic pain control from the ER, and as needed tramadol to utilize for discomfort if need be.    Also encouraged to continue Flomax, nausea medicine, alternating NSAIDs/Tylenol for breakthrough pain.    We will see at follow-up,    Patient is agreeable WITH plan of care.    Patient reports that she is not currently experiencing any symptoms of urinary incontinence.    Patient's Body mass index is 29.22 kg/m². BMI is above normal parameters. Recommendations include: educational material, exercise counseling and  nutrition counseling.    Smoking Cessation Counseling:  Former smoker.  Patient does not currently use any tobacco products.     Counseling was given to patient for the following topics diagnostic results including: Bilateral Nephrolithiaisis, Left Distal Ureteral stone, instructions for management as follows: PO fluids, Pain/Nausea control and impressions as follows: Scheduled for Left Ureteroscopy 11/18/20. The interim medical history and current results were reviewed.  A treatment plan with follow-up was made for Left ureteral calculus [N20.1].             This document has been electronically signed by Griselda Cheng-Akwa, APRN November 16, 2020 14:26 EST

## 2020-11-16 NOTE — PROGRESS NOTES
Chief Complaint:          Chief Complaint   Patient presents with   • Nephrolithiasis       HPI:   45 y.o. female with a significant history of recurrent kidney stone returns to clinic today with  at side for evaluation.    She is an ER follow-up for kidney stones.  Patient states she presented to the [facility] ED on Friday 11/12/20, secondary to very sharp 10/10, aching and very consistent left flank pain radiating to her suprapubic region.  She has a significant history of kidney stones.    She had a CT scan done in the ED that showed a 4 mm left distal ureteral calculus, causing moderate left hydronephrosis.  2.2 cm right renal cyst with some peripheral calcifications noted.  Also nonobstructive calculi in both kidneys.    On presentation to clinic, she is very tearful, sitting bended over in exam room.  She reports significant discomfort 7/10.  She reports left flank pain that is still very colicky, constant but occasionally very sharp.  She reports left lower quadrant pain, significant pressure/discomfort still in her left lower abdomen and pelvic region.  She still has nausea, no vomiting, denies chills or fevers.  She reports urinary symptoms of frequency, urgency, and dysuria.  She has a frequent urge to void, but reports that she is unable to urinate at this time.  She she reports burning on urination,  but denies any episodes of gross hematuria.    We will repeat a KUB      Past Medical History:        Past Medical History:   Diagnosis Date   • Arthritis    • Complicated UTI (urinary tract infection)    • Disease of thyroid gland     nodules   • GERD (gastroesophageal reflux disease)    • Kidney stones    • Migraine    • Migraines    • Pneumonia    • Polycystic kidney disease    • PONV (postoperative nausea and vomiting)    • Strep throat      The following portions of the patient's history were reviewed and updated as appropriate: allergies, current medications, past family history, past medical  history, past social history, past surgical history and problem list.    Current Meds:     Current Outpatient Medications   Medication Sig Dispense Refill   • amitriptyline (ELAVIL) 25 MG tablet Take 25 mg by mouth every night.     • baclofen (LIORESAL) 10 MG tablet Take 10 mg by mouth 3 (Three) Times a Day.     • ciprofloxacin (CIPRO) 500 MG tablet Take 1 tablet by mouth 2 (Two) Times a Day for 7 days. 14 tablet 0   • clonazePAM (KlonoPIN) 0.5 MG tablet Take 0.5 mg by mouth 2 (two) times a day as needed for seizures.     • gabapentin (NEURONTIN) 400 MG capsule Take 400 mg by mouth 4 (Four) Times a Day.     • HYDROcodone-acetaminophen (NORCO) 7.5-325 MG per tablet Take 1 tablet by mouth Every 6 (Six) Hours As Needed for Moderate Pain . 6 tablet 0   • HYDROcodone-acetaminophen (NORCO) 7.5-325 MG per tablet Take 1 tablet by mouth Every 6 (Six) Hours As Needed for Moderate Pain  for up to 3 days. 12 tablet 0   • ondansetron ODT (ZOFRAN-ODT) 4 MG disintegrating tablet Place 1 tablet on the tongue Every 6 (Six) Hours As Needed for Nausea or Vomiting. 12 tablet 0   • polyethylene glycol (MIRALAX) 17 GM/SCOOP powder Take 17 g by mouth Daily. 289 g 0   • tamsulosin (FLOMAX) 0.4 MG capsule 24 hr capsule Take 1 capsule by mouth Daily. 30 capsule 0   • traMADol (ULTRAM) 50 MG tablet Take 1 tablet by mouth Every 6 (Six) Hours As Needed for Moderate Pain . 60 tablet 2     No current facility-administered medications for this visit.         Allergies:      Allergies   Allergen Reactions   • Sulfa Antibiotics Anaphylaxis   • Compazine [Prochlorperazine Edisylate] Hives        Past Surgical History:     Past Surgical History:   Procedure Laterality Date   • ABDOMINAL SURGERY     •  SECTION     • COLONOSCOPY     • CYSTOSCOPY     • CYSTOSCOPY ELECTROHYDRAULIC LITHOTRIPSY     • DENTAL PROCEDURE     • DILATATION AND CURETTAGE     • DILATATION AND CURETTAGE     • ENDOSCOPY     • EXTRACORPOREAL SHOCK WAVE LITHOTRIPSY (ESWL) Left  2018    Procedure: EXTRACORPOREAL SHOCKWAVE LITHOTRIPSY;  Surgeon: Garth Friedman MD;  Location: [facility] OR;  Service: Urology   • EXTRACORPOREAL SHOCK WAVE LITHOTRIPSY (ESWL) Left 5/15/2020    Procedure: EXTRACORPOREAL SHOCKWAVE LITHOTRIPSY;  Surgeon: Garth Friedman MD;  [facility];  Service: Urology;  Laterality: Left;   • KIDNEY STONE SURGERY     • LITHOTRIPSY           Social History:     Social History     Socioeconomic History   • Marital status:      Spouse name: Not on file   • Number of children: Not on file   • Years of education: Not on file   • Highest education level: Not on file   Tobacco Use   • Smoking status: Former Smoker     Packs/day: 0.50     Years: 15.00     Pack years: 7.50     Types: Cigarettes     Quit date: 2018     Years since quittin.8   • Smokeless tobacco: Never Used   Substance and Sexual Activity   • Alcohol use: No   • Drug use: No   • Sexual activity: Yes     Partners: Male     Birth control/protection: None       Family History:     Family History   Problem Relation Age of Onset   • No Known Problems Father    • No Known Problems Mother        Review of Systems:     Review of Systems   Constitutional: Positive for activity change, appetite change and fatigue. Negative for chills and fever.   HENT: Negative for congestion.    Eyes: Negative for blurred vision.   Gastrointestinal: Positive for abdominal pain and nausea. Negative for vomiting.   Genitourinary: Positive for dysuria, flank pain, frequency, pelvic pain, pelvic pressure and urgency. Negative for difficulty urinating, dyspareunia, genital sores, hematuria, urinary incontinence and vaginal discharge.   Musculoskeletal: Positive for back pain.   Skin: Positive for dry skin and pallor.   Neurological: Negative for dizziness, headache and confusion.   Psychiatric/Behavioral: Positive for sleep disturbance and stress. Negative for behavioral problems and decreased concentration. The  patient is nervous/anxious.         Physical Exam:     Physical Exam  Constitutional:       General: She is not in acute distress.     Appearance: She is well-developed. She is ill-appearing.   HENT:      Head: Normocephalic and atraumatic.   Eyes:      Pupils: Pupils are equal, round, and reactive to light.   Neck:      Musculoskeletal: Normal range of motion.      Thyroid: No thyromegaly.      Trachea: No tracheal deviation.   Cardiovascular:      Rate and Rhythm: Normal rate and regular rhythm.      Heart sounds: No murmur.   Pulmonary:      Effort: Pulmonary effort is normal. No respiratory distress.      Breath sounds: Normal breath sounds. No stridor. No wheezing.   Abdominal:      General: Bowel sounds are normal. There is distension.      Palpations: Abdomen is soft.      Tenderness: There is abdominal tenderness. There is guarding ( Left Flank, LLQ abdomen).   Genitourinary:     Labia:         Right: No tenderness.         Left: No tenderness.       Vagina: Normal. No vaginal discharge.   Musculoskeletal: Normal range of motion.         General: Tenderness present. No deformity.   Skin:     General: Skin is warm and dry.      Coloration: Skin is pale.      Findings: No erythema or rash.   Neurological:      Mental Status: She is alert and oriented to person, place, and time.      Cranial Nerves: No cranial nerve deficit.      Sensory: No sensory deficit.      Coordination: Coordination normal.   Psychiatric:         Behavior: Behavior normal.         Thought Content: Thought content normal.         Judgment: Judgment normal.         Procedure:       Assessment/Plan:        ASSESSMENT  Left Flank Pain / Bilateral Nephrolithiasis / Left Distal Ureteral Calculus : 44-year-old female with very metabolic active stone disease presents to clinic today for evaluation of her very significant left-sided pain from a left obstructing stone.  She is apparently uncomfortable, and reports her pain and discomfort as  colicky in nature, constant, constant 7 and 10.    We will reviewed/discussed her CT scan and repeat KUB results today which  showed a 4 mm left distal ureteral calculus, causing moderate left hydronephrosis.  2.2 cm right renal cyst with some peripheral calcifications noted.  Also nonobstructive calculi in both kidneys.     We have discussed the various parameters regarding spontaneous passage including the notion that a 1-3 mm stone has a high likelihood of spontaneous passage versus a larger stone being caught up in the upper areas of the urinary tract.  We also discussed the medical management of stone disease and the use of medical expulsive therapy in the form of Flomax. This is used in an off label setting. I also talked about nonoperative management including ambulation and increasing fluids to atleast 2-3L, and hot tubs as being an effective adjuncts in the treatment of a ureteral stone.  Patient is very familiar with this.     We discussed the absolute relative indicators for intervention including the presence of sepsis, and pain we cannot control as the primary need for urgent intervention.     I Discussed this case with Dr. Friedman, who is very familiar this patient, and he is agreeable with plan of care.  Due to the significant discomfort,       PLAN  Patient has been scheduled for Left ureteroscopy laser lithotripsy with possible left stent on Wednesday, 11/18/2020.    Patient has adequate narcotic pain control from the ER, and as needed tramadol to utilize for discomfort if need be.    Also encouraged to continue Flomax, nausea medicine, alternating NSAIDs/Tylenol for breakthrough pain.    We will see at follow-up,    Patient is agreeable WITH plan of care.    Patient reports that she is not currently experiencing any symptoms of urinary incontinence.    Patient's Body mass index is 29.22 kg/m². BMI is above normal parameters. Recommendations include: educational material, exercise counseling and  nutrition counseling.    Smoking Cessation Counseling:  Former smoker.  Patient does not currently use any tobacco products.     Counseling was given to patient for the following topics diagnostic results including: Bilateral Nephrolithiaisis, Left Distal Ureteral stone, instructions for management as follows: PO fluids, Pain/Nausea control and impressions as follows: Scheduled for Left Ureteroscopy 11/18/20. The interim medical history and current results were reviewed.  A treatment plan with follow-up was made for Left ureteral calculus [N20.1].             This document has been electronically signed by Griselda Cheng-Akwa, APRN November 16, 2020 14:26 EST

## 2020-11-17 LAB — SARS-COV-2 RNA RESP QL NAA+PROBE: NOT DETECTED

## 2020-11-18 ENCOUNTER — APPOINTMENT (OUTPATIENT)
Dept: GENERAL RADIOLOGY | Facility: HOSPITAL | Age: 45
End: 2020-11-18

## 2020-11-18 ENCOUNTER — ANESTHESIA (OUTPATIENT)
Dept: PERIOP | Facility: HOSPITAL | Age: 45
End: 2020-11-18

## 2020-11-18 ENCOUNTER — HOSPITAL ENCOUNTER (OUTPATIENT)
Facility: HOSPITAL | Age: 45
Setting detail: HOSPITAL OUTPATIENT SURGERY
Discharge: HOME | End: 2020-11-18
Attending: UROLOGY | Admitting: UROLOGY

## 2020-11-18 ENCOUNTER — ANESTHESIA EVENT (OUTPATIENT)
Dept: PERIOP | Facility: HOSPITAL | Age: 45
End: 2020-11-18

## 2020-11-18 VITALS
WEIGHT: 168.2 LBS | SYSTOLIC BLOOD PRESSURE: 128 MMHG | HEIGHT: 62 IN | OXYGEN SATURATION: 98 % | BODY MASS INDEX: 30.95 KG/M2 | TEMPERATURE: 97.7 F | HEART RATE: 78 BPM | RESPIRATION RATE: 18 BRPM | DIASTOLIC BLOOD PRESSURE: 79 MMHG

## 2020-11-18 DIAGNOSIS — N20.1 LEFT URETERAL CALCULUS: ICD-10-CM

## 2020-11-18 DIAGNOSIS — R10.32 LEFT LOWER QUADRANT ABDOMINAL PAIN: ICD-10-CM

## 2020-11-18 LAB
B-HCG UR QL: NEGATIVE
BACTERIA SPEC AEROBE CULT: NORMAL
BACTERIA SPEC AEROBE CULT: NORMAL
INTERNAL NEGATIVE CONTROL: NEGATIVE
INTERNAL POSITIVE CONTROL: POSITIVE
Lab: NORMAL

## 2020-11-18 PROCEDURE — 25010000002 MIDAZOLAM PER 1 MG: Performed by: NURSE ANESTHETIST, CERTIFIED REGISTERED

## 2020-11-18 PROCEDURE — 25010000002 FENTANYL CITRATE (PF) 100 MCG/2ML SOLUTION: Performed by: NURSE ANESTHETIST, CERTIFIED REGISTERED

## 2020-11-18 PROCEDURE — 25010000002 ONDANSETRON PER 1 MG: Performed by: NURSE ANESTHETIST, CERTIFIED REGISTERED

## 2020-11-18 PROCEDURE — 25010000002 KETOROLAC TROMETHAMINE PER 15 MG: Performed by: NURSE ANESTHETIST, CERTIFIED REGISTERED

## 2020-11-18 PROCEDURE — 82365 CALCULUS SPECTROSCOPY: CPT | Performed by: UROLOGY

## 2020-11-18 PROCEDURE — 25010000002 GENTAMICIN PER 80 MG: Performed by: NURSE PRACTITIONER

## 2020-11-18 PROCEDURE — 81025 URINE PREGNANCY TEST: CPT | Performed by: ANESTHESIOLOGY

## 2020-11-18 PROCEDURE — 25010000002 FENTANYL CITRATE (PF) 100 MCG/2ML SOLUTION: Performed by: ANESTHESIOLOGY

## 2020-11-18 PROCEDURE — 52356 CYSTO/URETERO W/LITHOTRIPSY: CPT | Performed by: UROLOGY

## 2020-11-18 RX ORDER — DROPERIDOL 2.5 MG/ML
0.62 INJECTION, SOLUTION INTRAMUSCULAR; INTRAVENOUS ONCE AS NEEDED
Status: DISCONTINUED | OUTPATIENT
Start: 2020-11-18 | End: 2020-11-18 | Stop reason: HOSPADM

## 2020-11-18 RX ORDER — OXYCODONE HYDROCHLORIDE AND ACETAMINOPHEN 5; 325 MG/1; MG/1
1 TABLET ORAL ONCE AS NEEDED
Status: COMPLETED | OUTPATIENT
Start: 2020-11-18 | End: 2020-11-18

## 2020-11-18 RX ORDER — ONDANSETRON 2 MG/ML
4 INJECTION INTRAMUSCULAR; INTRAVENOUS AS NEEDED
Status: DISCONTINUED | OUTPATIENT
Start: 2020-11-18 | End: 2020-11-18 | Stop reason: HOSPADM

## 2020-11-18 RX ORDER — MIDAZOLAM HYDROCHLORIDE 1 MG/ML
1 INJECTION INTRAMUSCULAR; INTRAVENOUS
Status: DISCONTINUED | OUTPATIENT
Start: 2020-11-18 | End: 2020-11-18 | Stop reason: HOSPADM

## 2020-11-18 RX ORDER — ONDANSETRON 2 MG/ML
INJECTION INTRAMUSCULAR; INTRAVENOUS AS NEEDED
Status: DISCONTINUED | OUTPATIENT
Start: 2020-11-18 | End: 2020-11-18 | Stop reason: SURG

## 2020-11-18 RX ORDER — IPRATROPIUM BROMIDE AND ALBUTEROL SULFATE 2.5; .5 MG/3ML; MG/3ML
3 SOLUTION RESPIRATORY (INHALATION) ONCE AS NEEDED
Status: DISCONTINUED | OUTPATIENT
Start: 2020-11-18 | End: 2020-11-18 | Stop reason: HOSPADM

## 2020-11-18 RX ORDER — KETOROLAC TROMETHAMINE 30 MG/ML
30 INJECTION, SOLUTION INTRAMUSCULAR; INTRAVENOUS EVERY 6 HOURS PRN
Status: COMPLETED | OUTPATIENT
Start: 2020-11-18 | End: 2020-11-18

## 2020-11-18 RX ORDER — OXYCODONE AND ACETAMINOPHEN 10; 325 MG/1; MG/1
1 TABLET ORAL EVERY 4 HOURS PRN
Qty: 12 TABLET | Refills: 0 | Status: SHIPPED | OUTPATIENT
Start: 2020-11-18 | End: 2021-02-18

## 2020-11-18 RX ORDER — SODIUM CHLORIDE, SODIUM LACTATE, POTASSIUM CHLORIDE, CALCIUM CHLORIDE 600; 310; 30; 20 MG/100ML; MG/100ML; MG/100ML; MG/100ML
100 INJECTION, SOLUTION INTRAVENOUS ONCE AS NEEDED
Status: DISCONTINUED | OUTPATIENT
Start: 2020-11-18 | End: 2020-11-18 | Stop reason: HOSPADM

## 2020-11-18 RX ORDER — GENTAMICIN SULFATE 80 MG/100ML
80 INJECTION, SOLUTION INTRAVENOUS ONCE
Status: COMPLETED | OUTPATIENT
Start: 2020-11-18 | End: 2020-11-18

## 2020-11-18 RX ORDER — FENTANYL CITRATE 50 UG/ML
INJECTION, SOLUTION INTRAMUSCULAR; INTRAVENOUS AS NEEDED
Status: DISCONTINUED | OUTPATIENT
Start: 2020-11-18 | End: 2020-11-18 | Stop reason: SURG

## 2020-11-18 RX ORDER — PHENAZOPYRIDINE HYDROCHLORIDE 200 MG/1
200 TABLET, FILM COATED ORAL 3 TIMES DAILY PRN
COMMUNITY
End: 2021-02-18 | Stop reason: SDUPTHER

## 2020-11-18 RX ORDER — SODIUM CHLORIDE, SODIUM LACTATE, POTASSIUM CHLORIDE, CALCIUM CHLORIDE 600; 310; 30; 20 MG/100ML; MG/100ML; MG/100ML; MG/100ML
125 INJECTION, SOLUTION INTRAVENOUS ONCE
Status: COMPLETED | OUTPATIENT
Start: 2020-11-18 | End: 2020-11-18

## 2020-11-18 RX ORDER — FAMOTIDINE 10 MG/ML
INJECTION, SOLUTION INTRAVENOUS AS NEEDED
Status: DISCONTINUED | OUTPATIENT
Start: 2020-11-18 | End: 2020-11-18 | Stop reason: SURG

## 2020-11-18 RX ORDER — MIDAZOLAM HYDROCHLORIDE 1 MG/ML
INJECTION INTRAMUSCULAR; INTRAVENOUS AS NEEDED
Status: DISCONTINUED | OUTPATIENT
Start: 2020-11-18 | End: 2020-11-18 | Stop reason: SURG

## 2020-11-18 RX ORDER — SODIUM CHLORIDE, SODIUM LACTATE, POTASSIUM CHLORIDE, CALCIUM CHLORIDE 600; 310; 30; 20 MG/100ML; MG/100ML; MG/100ML; MG/100ML
INJECTION, SOLUTION INTRAVENOUS CONTINUOUS PRN
Status: DISCONTINUED | OUTPATIENT
Start: 2020-11-18 | End: 2020-11-18 | Stop reason: SURG

## 2020-11-18 RX ORDER — SODIUM CHLORIDE 0.9 % (FLUSH) 0.9 %
10 SYRINGE (ML) INJECTION EVERY 12 HOURS SCHEDULED
Status: DISCONTINUED | OUTPATIENT
Start: 2020-11-18 | End: 2020-11-18 | Stop reason: HOSPADM

## 2020-11-18 RX ORDER — ATROPA BELLADONNA AND OPIUM 16.2; 3 MG/1; MG/1
SUPPOSITORY RECTAL AS NEEDED
Status: DISCONTINUED | OUTPATIENT
Start: 2020-11-18 | End: 2020-11-18 | Stop reason: HOSPADM

## 2020-11-18 RX ORDER — IBUPROFEN 600 MG/1
600 TABLET ORAL EVERY 6 HOURS PRN
COMMUNITY
End: 2021-02-18

## 2020-11-18 RX ORDER — MAGNESIUM HYDROXIDE 1200 MG/15ML
LIQUID ORAL AS NEEDED
Status: DISCONTINUED | OUTPATIENT
Start: 2020-11-18 | End: 2020-11-18 | Stop reason: HOSPADM

## 2020-11-18 RX ORDER — SODIUM CHLORIDE 0.9 % (FLUSH) 0.9 %
10 SYRINGE (ML) INJECTION AS NEEDED
Status: DISCONTINUED | OUTPATIENT
Start: 2020-11-18 | End: 2020-11-18 | Stop reason: HOSPADM

## 2020-11-18 RX ORDER — FENTANYL CITRATE 50 UG/ML
50 INJECTION, SOLUTION INTRAMUSCULAR; INTRAVENOUS
Status: DISCONTINUED | OUTPATIENT
Start: 2020-11-18 | End: 2020-11-18 | Stop reason: HOSPADM

## 2020-11-18 RX ORDER — FENTANYL CITRATE 50 UG/ML
100 INJECTION, SOLUTION INTRAMUSCULAR; INTRAVENOUS ONCE
Status: COMPLETED | OUTPATIENT
Start: 2020-11-18 | End: 2020-11-18

## 2020-11-18 RX ADMIN — KETOROLAC TROMETHAMINE 30 MG: 30 INJECTION, SOLUTION INTRAMUSCULAR at 14:41

## 2020-11-18 RX ADMIN — FAMOTIDINE 20 MG: 10 INJECTION INTRAVENOUS at 13:56

## 2020-11-18 RX ADMIN — GENTAMICIN SULFATE 80 MG: 80 INJECTION, SOLUTION INTRAVENOUS at 13:58

## 2020-11-18 RX ADMIN — ONDANSETRON 4 MG: 2 INJECTION INTRAMUSCULAR; INTRAVENOUS at 13:56

## 2020-11-18 RX ADMIN — FENTANYL CITRATE 100 MCG: 50 INJECTION INTRAMUSCULAR; INTRAVENOUS at 13:56

## 2020-11-18 RX ADMIN — SODIUM CHLORIDE, POTASSIUM CHLORIDE, SODIUM LACTATE AND CALCIUM CHLORIDE: 600; 310; 30; 20 INJECTION, SOLUTION INTRAVENOUS at 13:56

## 2020-11-18 RX ADMIN — FENTANYL CITRATE 50 MCG: 50 INJECTION INTRAMUSCULAR; INTRAVENOUS at 14:36

## 2020-11-18 RX ADMIN — MIDAZOLAM HYDROCHLORIDE 2 MG: 1 INJECTION, SOLUTION INTRAMUSCULAR; INTRAVENOUS at 13:56

## 2020-11-18 RX ADMIN — OXYCODONE HYDROCHLORIDE AND ACETAMINOPHEN 1 TABLET: 5; 325 TABLET ORAL at 15:00

## 2020-11-18 RX ADMIN — SODIUM CHLORIDE, POTASSIUM CHLORIDE, SODIUM LACTATE AND CALCIUM CHLORIDE 125 ML/HR: 600; 310; 30; 20 INJECTION, SOLUTION INTRAVENOUS at 12:43

## 2020-11-18 RX ADMIN — FENTANYL CITRATE 100 MCG: 50 INJECTION INTRAMUSCULAR; INTRAVENOUS at 12:48

## 2020-11-18 NOTE — ANESTHESIA PROCEDURE NOTES
Airway  Date/Time: 11/18/2020 2:02 PM    General Information and Staff    Patient location during procedure: OR  CRNA: Justin Griffiths CRNA    Indications and Patient Condition    Preoxygenated: yes  Mask difficulty assessment: 0 - not attempted    Final Airway Details  Final airway type: supraglottic airway      Successful airway: unique  Size 4    Number of attempts at approach: 1  Assessment: lips, teeth, and gum same as pre-op

## 2020-11-18 NOTE — ANESTHESIA PREPROCEDURE EVALUATION
Anesthesia Evaluation     Patient summary reviewed and Nursing notes reviewed   history of anesthetic complications: PONV  NPO Solid Status: > 8 hours  NPO Liquid Status: > 8 hours           Airway   Mallampati: II  TM distance: >3 FB  Neck ROM: full  No difficulty expected  Dental - normal exam   (+) lower dentures and upper dentures    Pulmonary - normal exam   (+) pneumonia resolved , a smoker Former, recent URI,   Cardiovascular - negative cardio ROS and normal exam        Neuro/Psych  (+) headaches,     GI/Hepatic/Renal/Endo    (+)  GERD,  renal disease stones,     Musculoskeletal     Abdominal  - normal exam    Bowel sounds: normal.   Substance History - negative use     OB/GYN negative ob/gyn ROS         Other   arthritis,                        Anesthesia Plan    ASA 2     general     intravenous induction     Anesthetic plan, all risks, benefits, and alternatives have been provided, discussed and informed consent has been obtained with: patient.    Plan discussed with CRNA.

## 2020-11-18 NOTE — ANESTHESIA POSTPROCEDURE EVALUATION
Patient: Olivia Richmond    Procedure Summary     Date: 11/18/20 Room / Location: [facility] OR 06 /  [facility] OR    Anesthesia Start: 1357 Anesthesia Stop: 1419    Procedure: CYSTOSCOPY URETEROSCOPY LASER LITHOTRIPSY LEFT STENT (Left ) Diagnosis:       Left ureteral calculus      Left lower quadrant abdominal pain      (Left ureteral calculus [N20.1])      (Left lower quadrant abdominal pain [R10.32])    Surgeon: Garth Friedman MD Provider: Alejo Gao MD    Anesthesia Type: general ASA Status: 2          Anesthesia Type: general    Vitals  Vitals Value Taken Time   /71 11/18/20 1430   Temp 97.2 °F (36.2 °C) 11/18/20 1420   Pulse 77 11/18/20 1430   Resp 12 11/18/20 1430   SpO2 100 % 11/18/20 1430           Post Anesthesia Care and Evaluation    Patient location during evaluation: PHASE II  Patient participation: complete - patient participated  Level of consciousness: awake and alert  Pain score: 1  Pain management: adequate  Airway patency: patent  Anesthetic complications: No anesthetic complications  PONV Status: controlled  Cardiovascular status: acceptable  Respiratory status: acceptable  Hydration status: acceptable

## 2020-11-18 NOTE — OP NOTE
CYSTOSCOPY URETEROSCOPY LASER LITHOTRIPSY  Procedure Note    Olivia Richmond  11/18/2020    Pre-op Diagnosis:   Left ureteral calculus [N20.1]  Left lower quadrant abdominal pain [R10.32]    Post-op Diagnosis:     Post-Op Diagnosis Codes:     * Left ureteral calculus [N20.1]     * Left lower quadrant abdominal pain [R10.32]    Procedure/CPT® Codes:  45-year-old white female with very active metabolically stone disease with a left distal 4 to 5 mm stone with nonpassage presents for ureteroscopy following an informed consent brought the operative suite prepped and draped in a sterile fashion he was the [place] 4.5 Bruneian ureteroscope identified the stone lying in the distal ureter used the laser broke up in numerous pieces and extracted HP sequentially sending them for Pathologic confirmation no complications were encountered she tolerated it well a belladonna and opium suppository was inserted the rectum for postop comfort    Procedure(s):  CYSTOSCOPY URETEROSCOPY LASER LITHOTRIPSY LEFT STENT    Surgeon(s):  Garth Friedman MD    Anesthesia: see anesthesia record    Staff:   Circulator: Sophy Sheffield RN; Kasandra Perez RN  Scrub Person: Rohini Rivas  Assistant: Daria Box    Estimated Blood Loss: none  Urine Voided: * No values recorded between 11/18/2020  1:57 PM and 11/18/2020  2:12 PM *    Specimens:                ID Type Source Tests Collected by Time   1 : Left Kidney Stone Calculus Kidney, Left STONE ANALYSIS aGrth Friedman MD 11/18/2020 1411         Drains: None    Findings: Good fragmentation and complete removal of the stone    Blood: N/A    Complications: None    Grafts and Implants: None    Garth Friedman MD     Date: 11/18/2020  Time: 14:16 EST

## 2020-11-20 ENCOUNTER — OFFICE VISIT (OUTPATIENT)
Dept: UROLOGY | Facility: CLINIC | Age: 45
End: 2020-11-20

## 2020-11-20 VITALS — TEMPERATURE: 97.9 F | HEIGHT: 62 IN | WEIGHT: 174 LBS | BODY MASS INDEX: 32.02 KG/M2

## 2020-11-20 DIAGNOSIS — N20.1 LEFT URETERAL CALCULUS: Primary | ICD-10-CM

## 2020-11-20 PROCEDURE — 99214 OFFICE O/P EST MOD 30 MIN: CPT | Performed by: NURSE PRACTITIONER

## 2020-11-20 RX ORDER — MELOXICAM 15 MG/1
1 TABLET ORAL DAILY
COMMUNITY
Start: 2020-10-09 | End: 2021-02-18

## 2020-11-20 RX ORDER — TRAMADOL HYDROCHLORIDE 50 MG/1
50 TABLET ORAL EVERY 6 HOURS PRN
Qty: 60 TABLET | Refills: 2 | Status: SHIPPED | OUTPATIENT
Start: 2020-11-20 | End: 2021-02-18 | Stop reason: SDUPTHER

## 2020-11-20 NOTE — PROGRESS NOTES
Chief Complaint:          Chief Complaint   Patient presents with   • Left Ureteral Calculus/Left Flank Pain     Post-op Follow-up Uresterecopy 11/18/20       HPI:   45 y.o. female.45 y.o. female with significant metabolic active stone disease presents follow-up today.    SHe had Cystoscopy, Ureteroscopy Laser Lithotripsy for Left 4-5 mm painful Distal UVJ  Stone By Dr. Friedman on 11/18/2020.      She reports doing relatively well post procedure and is in no apparent distress upon exam today. Patient however still reports Left lower quadrant discomfort. She denies any urinary symptoms of dysuria, frequency,urgency or gross hematuria.    She has not had any post opt complications associated with the procedure  such as  Fevers, N/V, severe  pain in the groin/flank area, hematoma, spontaneous renal hemorrhage.    Will repeat a KUB    Past Medical History:        Past Medical History:   Diagnosis Date   • Arthritis     back   • Complicated UTI (urinary tract infection)    • Disease of thyroid gland     nodules   • GERD (gastroesophageal reflux disease)    • Insomnia    • Kidney stones    • Migraine    • Migraines    • Pneumonia    • Polycystic kidney disease    • PONV (postoperative nausea and vomiting)    • Strep throat          The following portions of the patient's history were reviewed and updated as appropriate: allergies, current medications, past family history, past medical history, past social history, past surgical history and problem list.    Current Meds:     Current Outpatient Medications   Medication Sig Dispense Refill   • amitriptyline (ELAVIL) 25 MG tablet Take 25 mg by mouth every night.     • baclofen (LIORESAL) 10 MG tablet Take 10 mg by mouth 3 (Three) Times a Day.     • clonazePAM (KlonoPIN) 0.5 MG tablet Take 0.5 mg by mouth 2 (two) times a day as needed for seizures.     • gabapentin (NEURONTIN) 400 MG capsule Take 400 mg by mouth 4 (Four) Times a Day.     • HYDROcodone-acetaminophen (NORCO)  7.5-325 MG per tablet Take 1 tablet by mouth Every 6 (Six) Hours As Needed for Moderate Pain . 6 tablet 0   • ibuprofen (ADVIL,MOTRIN) 600 MG tablet Take 600 mg by mouth Every 6 (Six) Hours As Needed for Mild Pain .     • meloxicam (MOBIC) 15 MG tablet Take 1 tablet by mouth Daily.     • ondansetron ODT (ZOFRAN-ODT) 4 MG disintegrating tablet Place 1 tablet on the tongue Every 6 (Six) Hours As Needed for Nausea or Vomiting. 12 tablet 0   • oxyCODONE-acetaminophen (Percocet)  MG per tablet Take 1 tablet by mouth Every 4 (Four) Hours As Needed for Moderate Pain  (Pain). 12 tablet 0   • phenazopyridine (PYRIDIUM) 200 MG tablet Take 200 mg by mouth 3 (Three) Times a Day As Needed for Bladder Spasms.     • polyethylene glycol (MIRALAX) 17 GM/SCOOP powder Take 17 g by mouth Daily. 289 g 0   • tamsulosin (FLOMAX) 0.4 MG capsule 24 hr capsule Take 1 capsule by mouth Daily. 30 capsule 0   • traMADol (ULTRAM) 50 MG tablet Take 1 tablet by mouth Every 6 (Six) Hours As Needed for Moderate Pain . 60 tablet 2   • traMADol (ULTRAM) 50 MG tablet Take 1 tablet by mouth Every 6 (Six) Hours As Needed for Moderate Pain . 60 tablet 2     No current facility-administered medications for this visit.         Allergies:      Allergies   Allergen Reactions   • Sulfa Antibiotics Anaphylaxis   • Compazine [Prochlorperazine Edisylate] Hives        Past Surgical History:     Past Surgical History:   Procedure Laterality Date   • ABDOMINAL SURGERY     •  SECTION     • COLONOSCOPY     • CYSTOSCOPY     • CYSTOSCOPY ELECTROHYDRAULIC LITHOTRIPSY     • CYSTOSCOPY URETEROSCOPY LASER LITHOTRIPSY Left 2020    Procedure: CYSTOSCOPY URETEROSCOPY LASER LITHOTRIPSY;  Surgeon: Garth Friedman MD;  Location: [facility];  Service: Urology;  Laterality: Left;   • DENTAL PROCEDURE     • DILATATION AND CURETTAGE     • DILATATION AND CURETTAGE     • ENDOSCOPY     • EXTRACORPOREAL SHOCK WAVE LITHOTRIPSY (ESWL) Left 2018    Procedure:  EXTRACORPOREAL SHOCKWAVE LITHOTRIPSY;  Surgeon: Garth Friedman MD;  Location: [facility] OR;  Service: Urology   • EXTRACORPOREAL SHOCK WAVE LITHOTRIPSY (ESWL) Left 5/15/2020    Procedure: EXTRACORPOREAL SHOCKWAVE LITHOTRIPSY;  Surgeon: Garth Friedman MD;  Location: [facility];  Service: Urology;  Laterality: Left;   • KIDNEY STONE SURGERY     • LITHOTRIPSY           Social History:     Social History     Socioeconomic History   • Marital status:      Spouse name: Not on file   • Number of children: Not on file   • Years of education: Not on file   • Highest education level: Not on file   Tobacco Use   • Smoking status: Former Smoker     Packs/day: 0.50     Years: 15.00     Pack years: 7.50     Types: Cigarettes     Quit date: 2018     Years since quittin.9   • Smokeless tobacco: Never Used   Substance and Sexual Activity   • Alcohol use: No   • Drug use: No   • Sexual activity: Yes     Partners: Male     Birth control/protection: None       Family History:     Family History   Problem Relation Age of Onset   • No Known Problems Father    • No Known Problems Mother        Review of Systems:     Review of Systems   Constitutional: Positive for activity change and fatigue. Negative for chills and fever.   HENT: Negative for congestion and sinus pressure.    Eyes: Negative for blurred vision.   Respiratory: Negative for cough.    Cardiovascular: Negative for leg swelling.   Gastrointestinal: Positive for abdominal pain. Negative for nausea and vomiting.   Genitourinary: Positive for flank pain, pelvic pain and pelvic pressure. Negative for difficulty urinating, dyspareunia, dysuria, frequency, genital sores, hematuria, urgency, urinary incontinence and vaginal discharge.   Musculoskeletal: Positive for back pain.   Neurological: Negative for dizziness, headache and confusion.   Psychiatric/Behavioral: Negative for behavioral problems and decreased concentration. The patient is not  nervous/anxious.         Physical Exam:     Physical Exam  Constitutional:       Appearance: She is ill-appearing.   Abdominal:      Tenderness: There is abdominal tenderness.   Musculoskeletal:         General: Tenderness present.   Skin:     Capillary Refill: Capillary refill takes less than 2 seconds.         Procedure:       Assessment/Plan:         ASSESSMENT   Left Ureteral Calculus/Left Flank Pain: Patient presents for follow up post Ureteroscopy for a painful 5 mm left  distal  Ureteral stone by Dr bradley on 11/18/20. She denies any post opt complications from procedure and reports doing well. Patient states she is here for stent removal. Informed patient there is no stent in place.                                PLAN  We Discussed a kidney stone prevention diet to include increasing p.o. fluid intake, to at least 1 to 2 L of water daily.  She is to avoid caffeine products such as cola, coffee, and to avoid soft or soda drinks.  She is to decrease her sodium consumption as in  Fast foods, ferrara, salted nuts, canned foods, and smoked/cured foods.     She is also to decrease her oxalate consumption, as in spinach, Neal greens, and Rhubarb.  Also important is to decrease protein intake, as in red meats, peanut butter, and also avoid nuts.    Continue Conservative therapy with PRN pain meds -Tramadol given at this time.    As part of this patient's treatment plan Ishe has been  prescribed controlled substance-Tramadol. The patient has been made aware of appropiate use of such medications, including potential risk of somnolence, limited ability to drive and /or work safely, and potential for dependence or overdose. It has also been made clear that these medications are for use by this patient only, without concomitant use of alcohol or other substances unless prescribed.      Patient has completed prescribing agreement detailing terms of continued prescribing of controlled substances, including monitoring  SAM reports, urine drug screening, and pill counts if necessary. The patient is aware that inapropiate use will result in cessation of prescribing such medications.     Will see her back in 3 months for follow up or sooner if need be,    Patient is Agreeable with plan of care.    Patient reports that she is not currently experiencing any symptoms of urinary incontinence.    Patient's Body mass index is 31.83 kg/m². BMI is above normal parameters. Recommendations include: educational material, exercise counseling and nutrition counseling.  Smoking Cessation Counseling:  Former smoker.QUIT 2018  Patient does not currently use any tobacco products.      Counseling was given to patient for the following topics diagnostic results including: lEFT DISTAL uRETERAL sTONE -resolved, fLANK pAIN, nEPHROLITHIASISI, instructions for management as follows: increase po fliuds, ambulation, warm compresses to flank, flomax, pain/nausea control and risk factor reductions including: Caffiene products: sweet teas, coffee, soda drinks, citrusy, spicy foods. The interim medical history and current results were reviewed.  A treatment plan with follow-up was made for: Nephrolithiasis,  Left ureteral calculus [N20.1].              This document has been electronically signed by Griselda Cheng-Akwa, APRN November 28, 2020 04:52 EST

## 2020-11-27 LAB
CALCIUM OXALATE DIHYDRATE MFR STONE IR: 80 %
COLOR STONE: YELLOW
COM MFR STONE: 20 %
COMPN STONE: NORMAL
LABORATORY COMMENT REPORT: NORMAL
Lab: NORMAL
Lab: NORMAL
PHOTO: NORMAL
SIZE STONE: NORMAL MM
SPEC SOURCE SUBJ: NORMAL
WT STONE: 7 MG

## 2021-02-18 ENCOUNTER — HOSPITAL ENCOUNTER (OUTPATIENT)
Dept: GENERAL RADIOLOGY | Facility: HOSPITAL | Age: 46
Discharge: HOME | End: 2021-02-18
Admitting: NURSE PRACTITIONER

## 2021-02-18 ENCOUNTER — OFFICE VISIT (OUTPATIENT)
Dept: UROLOGY | Facility: CLINIC | Age: 46
End: 2021-02-18

## 2021-02-18 VITALS — WEIGHT: 174 LBS | BODY MASS INDEX: 32.02 KG/M2 | TEMPERATURE: 97.4 F | HEIGHT: 62 IN

## 2021-02-18 DIAGNOSIS — N20.1 LEFT URETERAL CALCULUS: ICD-10-CM

## 2021-02-18 DIAGNOSIS — N30.01 ACUTE CYSTITIS WITH HEMATURIA: ICD-10-CM

## 2021-02-18 DIAGNOSIS — R30.0 DYSURIA: ICD-10-CM

## 2021-02-18 DIAGNOSIS — N20.0 KIDNEY STONE: ICD-10-CM

## 2021-02-18 DIAGNOSIS — R10.9 BILATERAL FLANK PAIN: Primary | ICD-10-CM

## 2021-02-18 LAB
BILIRUB BLD-MCNC: NEGATIVE MG/DL
CLARITY, POC: CLEAR
COLOR UR: YELLOW
GLUCOSE UR STRIP-MCNC: NEGATIVE MG/DL
KETONES UR QL: NEGATIVE
LEUKOCYTE EST, POC: NEGATIVE
NITRITE UR-MCNC: NEGATIVE MG/ML
PH UR: 6.5 [PH] (ref 5–8)
PROT UR STRIP-MCNC: NEGATIVE MG/DL
RBC # UR STRIP: ABNORMAL /UL
SP GR UR: 1.01 (ref 1–1.03)
UROBILINOGEN UR QL: NORMAL

## 2021-02-18 PROCEDURE — 74019 RADEX ABDOMEN 2 VIEWS: CPT | Performed by: RADIOLOGY

## 2021-02-18 PROCEDURE — 99214 OFFICE O/P EST MOD 30 MIN: CPT | Performed by: NURSE PRACTITIONER

## 2021-02-18 PROCEDURE — 81003 URINALYSIS AUTO W/O SCOPE: CPT | Performed by: NURSE PRACTITIONER

## 2021-02-18 PROCEDURE — 74018 RADEX ABDOMEN 1 VIEW: CPT

## 2021-02-18 PROCEDURE — 87086 URINE CULTURE/COLONY COUNT: CPT | Performed by: NURSE PRACTITIONER

## 2021-02-18 RX ORDER — PHENAZOPYRIDINE HYDROCHLORIDE 200 MG/1
200 TABLET, FILM COATED ORAL 3 TIMES DAILY PRN
Qty: 20 TABLET | Refills: 0 | Status: SHIPPED | OUTPATIENT
Start: 2021-02-18 | End: 2021-11-19 | Stop reason: SDUPTHER

## 2021-02-18 RX ORDER — TRAMADOL HYDROCHLORIDE 50 MG/1
50 TABLET ORAL EVERY 6 HOURS PRN
Qty: 60 TABLET | Refills: 2 | Status: SHIPPED | OUTPATIENT
Start: 2021-02-18 | End: 2021-06-24 | Stop reason: SDUPTHER

## 2021-02-18 NOTE — PROGRESS NOTES
Chief Complaint:          Chief Complaint   Patient presents with   • BILATERAL FLANK PAIN/ NEPHROLITHIASIS     NEPHROLITHIAISIS/FLANK PAIN/URINE FREQUENCY       HPI:   45 y.o. female, very pleasant patient with a history of metabolic active stone disease returns to clinic today with complaints of bilateral flank pain, right flank greater than left flank that has been ongoing for the last 3 days.  Patient describes her discomfort as colicky in nature, intermittent but occasionally sharp 7/10 discomfort.  She reports this is becoming very bothersome to her, as is very consistent to her prior kidney stones.    She reports urinary symptoms of frequency, urgency, and dysuria, She reports pelvic pressure and suprapubic discomfort.  She has had lower back pain, but denies CVA tenderness.  She denies burning on urination, gross hematuria, she has had nausea with no vomiting, and denies chills and fevers.    Her urine dipstick is complete negative for any infection, she has 2+ microscopic hematuria.    We will get a KUB      Past Medical History:        Past Medical History:   Diagnosis Date   • Arthritis     back   • Complicated UTI (urinary tract infection)    • Disease of thyroid gland     nodules   • GERD (gastroesophageal reflux disease)    • Insomnia    • Kidney stones    • Migraine    • Migraines    • Pneumonia    • Polycystic kidney disease    • PONV (postoperative nausea and vomiting)    • Strep throat      The following portions of the patient's history were reviewed and updated as appropriate: allergies, current medications, past family history, past medical history, past social history, past surgical history and problem list.    Current Meds:     Current Outpatient Medications   Medication Sig Dispense Refill   • amitriptyline (ELAVIL) 25 MG tablet Take 25 mg by mouth every night.     • baclofen (LIORESAL) 10 MG tablet Take 10 mg by mouth 3 (Three) Times a Day.     • clonazePAM (KlonoPIN) 0.5 MG tablet Take 0.5  mg by mouth 2 (two) times a day as needed for seizures.     • gabapentin (NEURONTIN) 400 MG capsule Take 400 mg by mouth 4 (Four) Times a Day.     • ondansetron ODT (ZOFRAN-ODT) 4 MG disintegrating tablet Place 1 tablet on the tongue Every 6 (Six) Hours As Needed for Nausea or Vomiting. 12 tablet 0   • phenazopyridine (PYRIDIUM) 200 MG tablet Take 200 mg by mouth 3 (Three) Times a Day As Needed for Bladder Spasms.     • tamsulosin (FLOMAX) 0.4 MG capsule 24 hr capsule Take 1 capsule by mouth Daily. 30 capsule 0   • traMADol (ULTRAM) 50 MG tablet Take 1 tablet by mouth Every 6 (Six) Hours As Needed for Moderate Pain . 60 tablet 2     No current facility-administered medications for this visit.         Allergies:      Allergies   Allergen Reactions   • Sulfa Antibiotics Anaphylaxis   • Compazine [Prochlorperazine Edisylate] Hives        Past Surgical History:     Past Surgical History:   Procedure Laterality Date   • ABDOMINAL SURGERY     •  SECTION     • COLONOSCOPY     • CYSTOSCOPY     • CYSTOSCOPY ELECTROHYDRAULIC LITHOTRIPSY     • CYSTOSCOPY URETEROSCOPY LASER LITHOTRIPSY Left 2020    Procedure: CYSTOSCOPY URETEROSCOPY LASER LITHOTRIPSY;  Surgeon: Garth Friedman MD;  Location: [facility];  Service: Urology;  Laterality: Left;   • DENTAL PROCEDURE     • DILATATION AND CURETTAGE     • DILATATION AND CURETTAGE     • ENDOSCOPY     • EXTRACORPOREAL SHOCK WAVE LITHOTRIPSY (ESWL) Left 2018    Procedure: EXTRACORPOREAL SHOCKWAVE LITHOTRIPSY;  Surgeon: Garth Friedman MD;  Location: [facility];  Service: Urology   • EXTRACORPOREAL SHOCK WAVE LITHOTRIPSY (ESWL) Left 5/15/2020    Procedure: EXTRACORPOREAL SHOCKWAVE LITHOTRIPSY;  Surgeon: Garth Friedman MD;  Location: [facility];  Service: Urology;  Laterality: Left;   • KIDNEY STONE SURGERY     • LITHOTRIPSY           Social History:     Social History     Socioeconomic History   • Marital status:      Spouse name: Not on file     • Number of children: Not on file   • Years of education: Not on file   • Highest education level: Not on file   Tobacco Use   • Smoking status: Former Smoker     Packs/day: 0.50     Years: 15.00     Pack years: 7.50     Types: Cigarettes     Quit date: 1/1/2018     Years since quitting: 3.1   • Smokeless tobacco: Never Used   Substance and Sexual Activity   • Alcohol use: No   • Drug use: No   • Sexual activity: Yes     Partners: Male     Birth control/protection: None       Family History:     Family History   Problem Relation Age of Onset   • No Known Problems Father    • No Known Problems Mother        Review of Systems:     Review of Systems   Constitutional: Positive for activity change, appetite change and fatigue. Negative for chills and fever.   HENT: Negative for congestion.    Eyes: Negative for blurred vision.   Gastrointestinal: Positive for abdominal pain and nausea. Negative for vomiting.   Genitourinary: Positive for dysuria, flank pain, frequency, hematuria, pelvic pain, pelvic pressure and urgency. Negative for difficulty urinating, dyspareunia, genital sores, urinary incontinence and vaginal discharge.   Musculoskeletal: Positive for back pain.   Skin: Positive for pallor.   Neurological: Negative for dizziness, headache and confusion.   Psychiatric/Behavioral: Positive for sleep disturbance and stress. Negative for behavioral problems and decreased concentration.        Physical Exam:     Physical Exam  Constitutional:       General: She is not in acute distress.     Appearance: She is well-developed. She is ill-appearing.   HENT:      Head: Normocephalic and atraumatic.   Eyes:      Pupils: Pupils are equal, round, and reactive to light.   Neck:      Musculoskeletal: Normal range of motion.      Thyroid: No thyromegaly.      Trachea: No tracheal deviation.   Cardiovascular:      Rate and Rhythm: Normal rate and regular rhythm.      Heart sounds: No murmur.   Pulmonary:      Effort: Pulmonary  effort is normal. No respiratory distress.      Breath sounds: Normal breath sounds. No stridor. No wheezing.   Abdominal:      General: Bowel sounds are normal.      Palpations: Abdomen is soft.      Tenderness: There is abdominal tenderness. There is guarding (RLQ).   Genitourinary:     Labia:         Right: No tenderness.         Left: No tenderness.       Vagina: Normal. No vaginal discharge.   Musculoskeletal: Normal range of motion.         General: Tenderness ( RIGHT FLANK > LEFT FLANK) present. No deformity.   Skin:     General: Skin is warm and dry.      Capillary Refill: Capillary refill takes less than 2 seconds.      Coloration: Skin is not pale.      Findings: No erythema or rash.   Neurological:      Mental Status: She is alert and oriented to person, place, and time.      Cranial Nerves: No cranial nerve deficit.      Sensory: No sensory deficit.      Motor: Weakness present.      Coordination: Coordination normal.   Psychiatric:         Behavior: Behavior normal.         Thought Content: Thought content normal.         Judgment: Judgment normal.         Procedure:       Assessment/Plan:        ASSESSMENT  Bilateral flank pain/Nephrolithiasis/Acute Cystitis with Hematuria: Pleasant patient with A significant history of metabolic active stone disease, returns to clinic today for evaluation.  She reports bilateral flank pain, right side greater than left side that have become very bothersome to her, also with  urinary symptoms of frequency, urgency and dysuria, nausea without vomiting.     We both reviewed, and discussed her repeat KUB done today which showed no suspicious calcifications identified on today's study.     Discussed Microscopic Hematuria with patient. She has been symptomatic. Pt educated on possible causes such as infection in the bladder, kidney, or bladder discomfort, trauma. vigorous exercise, viral illness, such as hepatitis a virus that causes liver disease and inflammation of the  liver and sexual activity.       PLAN  I Discussed this case with Dr Friedman, who is agreeable to the plan.     We sent her urine for culture, I will call her with results if any bacterial growth    PYRIDIUM 200 MG  PRN DYSURIA     She has adequate narcotic pain control -Tramadol 50 mg twice daily as needed to use  for breakthrough pain, just in case    Continue Zofran 4 mg p.o. every 12 hours as needed-nausea    Discussed a kidney stone prevention diet to include increasing p.o. fluid intake, to at least 1 to 2 L of water daily.  She is to avoid caffeine products such as cola, coffee, and to avoid soft or soda drinks, citrus/spicy foods.    She is to decrease her sodium consumption as in  Fast foods, ferrara, salted nuts, canned foods, and smoked/cured foods. She is also to decrease her oxalate consumption, as in spinach, Neal greens, and Rhubarb.  Also important is to decrease protein intake, as in red meats, peanut butter, and also avoid nuts.    We will follow-up in 6 months with repeat KUB    As part of this patient's treatment plan I am prescribing controlled substances. The patient has been made aware of appropiate use of such medications, including potential risk of somnolence, limited ability to drive and /or work safely, and potential for dependence or overdose. It has also been made clear that these medications are for use by this patient only, without concomitant use of alcohol or other substances unless prescribed.      Patient has completed prescribing agreement detailing terms of continued prescribing of controlled substances, including monitoring SAM reports, urine drug screening, and pill counts if necessary. The patient is aware that inapropiate use will result in cessation of prescribing such medications. Patient is Agreeable with plan of care.    Patient IS Agreeable to plan of care    Patient reports that she is not currently experiencing any symptoms of urinary incontinence.    Patient's  Body mass index is 31.82 kg/m². BMI is above normal parameters. Recommendations include: educational material, exercise counseling and nutrition counseling.    Smoking Cessation Counseling:  Former smoker.  Patient does not currently use any tobacco products.     Counseling was given to patient for the following topics diagnostic results including: Bilateral flank pain, history of kidney stones, acute cystitis with hematuria., instructions for management as follows: Increase p.o. fluid intake, kidney stone prevention diet, pain control, nausea control warm compresses to flank area. and risk factor reductions including: High oxalates, high sodium, high protein foods, caffeine products. The interim medical history and current results were reviewed.  A treatment plan with follow-up was made for: Acute cystitis with hematuria, bilateral flank pain, Bilateral flank pain [R10.9].         This document has been electronically signed by Griselda Cheng-Akwa, APRN February 18, 2021 17:41 EST

## 2021-02-18 NOTE — PATIENT INSTRUCTIONS
"Dietary Guidelines to Help Prevent Kidney Stones  Kidney stones are deposits of minerals and salts that form inside your kidneys. Your risk of developing kidney stones may be greater depending on your diet, your lifestyle, the medicines you take, and whether you have certain medical conditions. Most people can reduce their chances of developing kidney stones by following the instructions below. Depending on your overall health and the type of kidney stones you tend to develop, your dietitian may give you more specific instructions.  What are tips for following this plan?  Reading food labels  · Choose foods with \"no salt added\" or \"low-salt\" labels. Limit your sodium intake to less than 1500 mg per day.  · Choose foods with calcium for each meal and snack. Try to eat about 300 mg of calcium at each meal. Foods that contain 200-500 mg of calcium per serving include:  ? 8 oz (237 ml) of milk, fortified nondairy milk, and fortified fruit juice.  ? 8 oz (237 ml) of kefir, yogurt, and soy yogurt.  ? 4 oz (118 ml) of tofu.  ? 1 oz of cheese.  ? 1 cup (300 g) of dried figs.  ? 1 cup (91 g) of cooked broccoli.  ? 1-3 oz can of sardines or mackerel.  · Most people need 1000 to 1500 mg of calcium each day. Talk to your dietitian about how much calcium is recommended for you.  Shopping  · Buy plenty of fresh fruits and vegetables. Most people do not need to avoid fruits and vegetables, even if they contain nutrients that may contribute to kidney stones.  · When shopping for convenience foods, choose:  ? Whole pieces of fruit.  ? Premade salads with dressing on the side.  ? Low-fat fruit and yogurt smoothies.  · Avoid buying frozen meals or prepared deli foods.  · Look for foods with live cultures, such as yogurt and kefir.  Cooking  · Do not add salt to food when cooking. Place a salt shaker on the table and allow each person to add his or her own salt to taste.  · Use vegetable protein, such as beans, textured vegetable " protein (TVP), or tofu instead of meat in pasta, casseroles, and soups.  Meal planning    · Eat less salt, if told by your dietitian. To do this:  ? Avoid eating processed or premade food.  ? Avoid eating fast food.  · Eat less animal protein, including cheese, meat, poultry, or fish, if told by your dietitian. To do this:  ? Limit the number of times you have meat, poultry, fish, or cheese each week. Eat a diet free of meat at least 2 days a week.  ? Eat only one serving each day of meat, poultry, fish, or seafood.  ? When you prepare animal protein, cut pieces into small portion sizes. For most meat and fish, one serving is about the size of one deck of cards.  · Eat at least 5 servings of fresh fruits and vegetables each day. To do this:  ? Keep fruits and vegetables on hand for snacks.  ? Eat 1 piece of fruit or a handful of berries with breakfast.  ? Have a salad and fruit at lunch.  ? Have two kinds of vegetables at dinner.  · Limit foods that are high in a substance called oxalate. These include:  ? Spinach.  ? Rhubarb.  ? Beets.  ? Potato chips and french fries.  ? Nuts.  · If you regularly take a diuretic medicine, make sure to eat at least 1-2 fruits or vegetables high in potassium each day. These include:  ? Avocado.  ? Banana.  ? Orange, prune, carrot, or tomato juice.  ? Baked potato.  ? Cabbage.  ? Beans and split peas.  General instructions    · Drink enough fluid to keep your urine clear or pale yellow. This is the most important thing you can do.  · Talk to your health care provider and dietitian about taking daily supplements. Depending on your health and the cause of your kidney stones, you may be advised:  ? Not to take supplements with vitamin C.  ? To take a calcium supplement.  ? To take a daily probiotic supplement.  ? To take other supplements such as magnesium, fish oil, or vitamin B6.  · Take all medicines and supplements as told by your health care provider.  · Limit alcohol intake to no  more than 1 drink a day for nonpregnant women and 2 drinks a day for men. One drink equals 12 oz of beer, 5 oz of wine, or 1½ oz of hard liquor.  · Lose weight if told by your health care provider. Work with your dietitian to find strategies and an eating plan that works best for you.  What foods are not recommended?  Limit your intake of the following foods, or as told by your dietitian. Talk to your dietitian about specific foods you should avoid based on the type of kidney stones and your overall health.  Grains  Breads. Bagels. Rolls. Baked goods. Salted crackers. Cereal. Pasta.  Vegetables  Spinach. Rhubarb. Beets. Canned vegetables. Pickles. Olives.  Meats and other protein foods  Nuts. Nut butters. Large portions of meat, poultry, or fish. Salted or cured meats. Deli meats. Hot dogs. Sausages.  Dairy  Cheese.  Beverages  Regular soft drinks. Regular vegetable juice.  Seasonings and other foods  Seasoning blends with salt. Salad dressings. Canned soups. Soy sauce. Ketchup. Barbecue sauce. Canned pasta sauce. Casseroles. Pizza. Lasagna. Frozen meals. Potato chips. French fries.  Summary  · You can reduce your risk of kidney stones by making changes to your diet.  · The most important thing you can do is drink enough fluid. You should drink enough fluid to keep your urine clear or pale yellow.  · Ask your health care provider or dietitian how much protein from animal sources you should eat each day, and also how much salt and calcium you should have each day.  This information is not intended to replace advice given to you by your health care provider. Make sure you discuss any questions you have with your health care provider.  Document Revised: 04/08/2020 Document Reviewed: 11/28/2017  [employer]vier Patient Education © 2020 [employer]      Low-Purine Eating Plan  A low-purine eating plan involves making food choices to limit your intake of purine. Purine is a kind of uric acid. Too much uric acid in your blood  can cause certain conditions, such as gout and kidney stones. Eating a low-purine diet can help control these conditions.  What are tips for following this plan?  Reading food labels    · Avoid foods with saturated or Trans fat.  · Check the ingredient list of grains-based foods, such as bread and cereal, to make sure that they contain whole grains.  · Check the ingredient list of sauces or soups to make sure they do not contain meat or fish.  · When choosing soft drinks, check the ingredient list to make sure they do not contain high-fructose corn syrup.  Shopping  · Buy plenty of fresh fruits and vegetables.  · Avoid buying canned or fresh fish.  · Buy dairy products labeled as low-fat or nonfat.  · Avoid buying premade or processed foods. These foods are often high in fat, salt (sodium), and added sugar.  Cooking  · Use olive oil instead of butter when cooking. Oils like olive oil, canola oil, and sunflower oil contain healthy fats.  Meal planning  · Learn which foods do or do not affect you. If you find out that a food tends to cause your gout symptoms to flare up, avoid eating that food. You can enjoy foods that do not cause problems. If you have any questions about a food item, talk with your dietitian or health care provider.  · Limit foods high in fat, especially saturated fat. Fat makes it harder for your body to get rid of uric acid.  · Choose foods that are lower in fat and are lean sources of protein.  General guidelines  · Limit alcohol intake to no more than 1 drink a day for nonpregnant women and 2 drinks a day for men. One drink equals 12 oz of beer, 5 oz of wine, or 1½ oz of hard liquor. Alcohol can affect the way your body gets rid of uric acid.  · Drink plenty of water to keep your urine clear or pale yellow. Fluids can help remove uric acid from your body.  · If directed by your health care provider, take a vitamin C supplement.  · Work with your health care provider and dietitian to develop a  plan to achieve or maintain a healthy weight. Losing weight can help reduce uric acid in your blood.  What foods are recommended?  The items listed may not be a complete list. Talk with your dietitian about what dietary choices are best for you.  Foods low in purines  Foods low in purines do not need to be limited. These include:  · All fruits.  · All low-purine vegetables, pickles, and olives.  · Breads, pasta, rice, cornbread, and popcorn. Cake and other baked goods.  · All dairy foods.  · Eggs, nuts, and nut butters.  · Spices and condiments, such as salt, herbs, and vinegar.  · Plant oils, butter, and margarine.  · Water, sugar-free soft drinks, tea, coffee, and cocoa.  · Vegetable-based soups, broths, sauces, and gravies.  Foods moderate in purines  Foods moderate in purines should be limited to the amounts listed.  · ½ cup of asparagus, cauliflower, spinach, mushrooms, or green peas, each day.  · 2/3 cup uncooked oatmeal, each day.  · ¼ cup dry wheat bran or wheat germ, each day.  · 2-3 ounces of meat or poultry, each day.  · 4-6 ounces of shellfish, such as crab, lobster, oysters, or shrimp, each day.  · 1 cup cooked beans, peas, or lentils, each day.  · Soup, broths, or bouillon made from meat or fish. Limit these foods as much as possible.  What foods are not recommended?  The items listed may not be a complete list. Talk with your dietitian about what dietary choices are best for you.  Limit your intake of foods high in purines, including:  · Beer and other alcohol.  · Meat-based gravy or sauce.  · Canned or fresh fish, such as:  ? Anchovies, sardines, herring, and tuna.  ? Mussels and scallops.  ? Codfish, trout, and matt.  · Hernandez.  · Organ meats, such as:  ? Liver or kidney.  ? Tripe.  ? Sweetbreads (thymus gland or pancreas).  · Wild game or goose.  · Yeast or yeast extract supplements.  · Drinks sweetened with high-fructose corn syrup.  Summary  · Eating a low-purine diet can help control  conditions caused by too much uric acid in the body, such as gout or kidney stones.  · Choose low-purine foods, limit alcohol, and limit foods high in fat.  · You will learn over time which foods do or do not affect you. If you find out that a food tends to cause your gout symptoms to flare up, avoid eating that food.  This information is not intended to replace advice given to you by your health care provider. Make sure you discuss any questions you have with your health care provider.  Document Revised: 11/30/2018 Document Reviewed: 01/31/2018  [employer] Patient Education © 2020 [employer]    Discussed a kidney stone prevention diet to include increasing p.o. fluid intake, to at least 1 to 2 L of water daily.  She is to avoid caffeine products such as cola, coffee, and to avoid soft or soda drinks.  She is to decrease her sodium consumption as in  Fast foods, ferrara, salted nuts, canned foods, and smoked/cured foods. She is also to decrease her oxalate consumption, as in spinach, Neal greens, and Rhubarb.  Also important is to decrease protein intake, as in red meats, peanut butter, and also avoid nuts.    Urinary Frequency, Adult  Urinary frequency means urinating more often than usual. You may urinate every 1-2 hours even though you drink a normal amount of fluid and do not have a bladder infection or condition. Although you urinate more often than normal, the total amount of urine produced in a day is normal.  With urinary frequency, you may have an urgent need to urinate often. The stress and anxiety of needing to find a bathroom quickly can make this urge worse. This condition may go away on its own or you may need treatment at home. Home treatment may include bladder training, exercises, taking medicines, or making changes to your diet.  Follow these instructions at home:  Bladder health    · Keep a bladder diary if told by your health care provider. Keep track of:  ? What you eat and drink.  ? How often  you urinate.  ? How much you urinate.  · Follow a bladder training program if told by your health care provider. This may include:  ? Learning to delay going to the bathroom.  ? Double urinating (voiding). This helps if you are not completely emptying your bladder.  ? Scheduled voiding.  · Do Kegel exercises as told by your health care provider. Kegel exercises strengthen the muscles that help control urination, which may help the condition.  Eating and drinking  · If told by your health care provider, make diet changes, such as:  ? Avoiding caffeine.  ? Drinking fewer fluids, especially alcohol.  ? Not drinking in the evening.  ? Avoiding foods or drinks that may irritate the bladder. These include coffee, tea, soda, artificial sweeteners, citrus, tomato-based foods, and chocolate.  ? Eating foods that help prevent or ease constipation. Constipation can make this condition worse. Your health care provider may recommend that you:  § Drink enough fluid to keep your urine pale yellow.  § Take over-the-counter or prescription medicines.  § Eat foods that are high in fiber, such as beans, whole grains, and fresh fruits and vegetables.  § Limit foods that are high in fat and processed sugars, such as fried or sweet foods.  General instructions  · Take over-the-counter and prescription medicines only as told by your health care provider.  · Keep all follow-up visits as told by your health care provider. This is important.  Contact a health care provider if:  · You start urinating more often.  · You feel pain or irritation when you urinate.  · You notice blood in your urine.  · Your urine looks cloudy.  · You develop a fever.  · You begin vomiting.  Get help right away if:  · You are unable to urinate.  Summary  · Urinary frequency means urinating more often than usual. With urinary frequency, you may urinate every 1-2 hours even though you drink a normal amount of fluid and do not have a bladder infection or other bladder  condition.  · Your health care provider may recommend that you keep a bladder diary, follow a bladder training program, or make dietary changes.  · If told by your health care provider, do Kegel exercises to strengthen the muscles that help control urination.  · Take over-the-counter and prescription medicines only as told by your health care provider.  · Contact a health care provider if your symptoms do not improve or get worse.  This information is not intended to replace advice given to you by your health care provider. Make sure you discuss any questions you have with your health care provider.  Document Revised: 06/27/2019 Document Reviewed: 06/27/2019  [employer] Patient Education © 2020 [employer]    Dysuria  Dysuria is pain or discomfort while urinating. The pain or discomfort may be felt in the part of your body that drains urine from the bladder (urethra) or in the surrounding tissue of the genitals. The pain may also be felt in the groin area, lower abdomen, or lower back. You may have to urinate frequently or have the sudden feeling that you have to urinate (urgency). Dysuria can affect both men and women, but it is more common in women.  Dysuria can be caused by many different things, including:  · Urinary tract infection.  · Kidney stones or bladder stones.  · Certain sexually transmitted infections (STIs), such as chlamydia.  · Dehydration.  · Inflammation of the tissues of the vagina.  · Use of certain medicines.  · Use of certain soaps or scented products that cause irritation.  Follow these instructions at home:  General instructions  · Watch your condition for any changes.  · Urinate often. Avoid holding urine for long periods of time.  · After a bowel movement or urination, women should cleanse from front to back, using each tissue only once.  · Urinate after sexual intercourse.  · Keep all follow-up visits as told by your health care provider. This is important.  · If you had any tests done to  find the cause of dysuria, it is up to you to get your test results. Ask your health care provider, or the department that is doing the test, when your results will be ready.  Eating and drinking    · Drink enough fluid to keep your urine pale yellow.  · Avoid caffeine, tea, and alcohol. They can irritate the bladder and make dysuria worse. In men, alcohol may irritate the prostate.  Medicines  · Take over-the-counter and prescription medicines only as told by your health care provider.  · If you were prescribed an antibiotic medicine, take it as told by your health care provider. Do not stop taking the antibiotic even if you start to feel better.  Contact a health care provider if:  · You have a fever.  · You develop pain in your back or sides.  · You have nausea or vomiting.  · You have blood in your urine.  · You are not urinating as often as you usually do.  Get help right away if:  · Your pain is severe and not relieved with medicines.  · You cannot eat or drink without vomiting.  · You are confused.  · You have a rapid heartbeat while at rest.  · You have shaking or chills.  · You feel extremely weak.  Summary  · Dysuria is pain or discomfort while urinating. Many different conditions can lead to dysuria.  · If you have dysuria, you may have to urinate frequently or have the sudden feeling that you have to urinate (urgency).  · Watch your condition for any changes. Keep all follow-up visits as told by your health care provider.  · Make sure that you urinate often and drink enough fluid to keep your urine pale yellow.  This information is not intended to replace advice given to you by your health care provider. Make sure you discuss any questions you have with your health care provider.  Document Revised: 11/30/2018 Document Reviewed: 10/04/2018  [employer] Patient Education © 2020 [employer]

## 2021-02-19 LAB — BACTERIA SPEC AEROBE CULT: NO GROWTH

## 2021-03-18 ENCOUNTER — BULK ORDERING (OUTPATIENT)
Dept: CASE MANAGEMENT | Facility: OTHER | Age: 46
End: 2021-03-18

## 2021-03-18 DIAGNOSIS — Z23 IMMUNIZATION DUE: ICD-10-CM

## 2021-04-04 ENCOUNTER — APPOINTMENT (OUTPATIENT)
Dept: CT IMAGING | Facility: HOSPITAL | Age: 46
End: 2021-04-04

## 2021-04-04 ENCOUNTER — HOSPITAL ENCOUNTER (EMERGENCY)
Facility: HOSPITAL | Age: 46
Discharge: HOME | End: 2021-04-04
Attending: STUDENT IN AN ORGANIZED HEALTH CARE EDUCATION/TRAINING PROGRAM | Admitting: STUDENT IN AN ORGANIZED HEALTH CARE EDUCATION/TRAINING PROGRAM

## 2021-04-04 ENCOUNTER — APPOINTMENT (OUTPATIENT)
Dept: GENERAL RADIOLOGY | Facility: HOSPITAL | Age: 46
End: 2021-04-04

## 2021-04-04 VITALS
TEMPERATURE: 98.1 F | RESPIRATION RATE: 22 BRPM | BODY MASS INDEX: 33.13 KG/M2 | OXYGEN SATURATION: 100 % | SYSTOLIC BLOOD PRESSURE: 115 MMHG | DIASTOLIC BLOOD PRESSURE: 83 MMHG | HEIGHT: 62 IN | HEART RATE: 83 BPM | WEIGHT: 180 LBS

## 2021-04-04 DIAGNOSIS — N20.0 NEPHROLITHIASIS: Primary | ICD-10-CM

## 2021-04-04 DIAGNOSIS — N23 RENAL COLIC ON LEFT SIDE: ICD-10-CM

## 2021-04-04 LAB
ALBUMIN SERPL-MCNC: 4.45 G/DL (ref 3.5–5.2)
ALBUMIN/GLOB SERPL: 1.3 G/DL
ALP SERPL-CCNC: 74 U/L (ref 39–117)
ALT SERPL W P-5'-P-CCNC: 10 U/L (ref 1–33)
ANION GAP SERPL CALCULATED.3IONS-SCNC: 12.6 MMOL/L (ref 5–15)
AST SERPL-CCNC: 23 U/L (ref 1–32)
BACTERIA UR QL AUTO: ABNORMAL /HPF
BASOPHILS # BLD AUTO: 0.03 10*3/MM3 (ref 0–0.2)
BASOPHILS NFR BLD AUTO: 0.2 % (ref 0–1.5)
BILIRUB SERPL-MCNC: 0.7 MG/DL (ref 0–1.2)
BILIRUB UR QL STRIP: NEGATIVE
BUN SERPL-MCNC: 8 MG/DL (ref 6–20)
BUN/CREAT SERPL: 9.4 (ref 7–25)
CALCIUM SPEC-SCNC: 9.4 MG/DL (ref 8.6–10.5)
CHLORIDE SERPL-SCNC: 103 MMOL/L (ref 98–107)
CLARITY UR: CLEAR
CO2 SERPL-SCNC: 21.4 MMOL/L (ref 22–29)
COLOR UR: YELLOW
CREAT SERPL-MCNC: 0.85 MG/DL (ref 0.57–1)
CRP SERPL-MCNC: <0.3 MG/DL (ref 0–0.5)
D-LACTATE SERPL-SCNC: 2.5 MMOL/L (ref 0.5–2)
DEPRECATED RDW RBC AUTO: 43.5 FL (ref 37–54)
EOSINOPHIL # BLD AUTO: 0.14 10*3/MM3 (ref 0–0.4)
EOSINOPHIL NFR BLD AUTO: 0.9 % (ref 0.3–6.2)
ERYTHROCYTE [DISTWIDTH] IN BLOOD BY AUTOMATED COUNT: 13 % (ref 12.3–15.4)
ERYTHROCYTE [SEDIMENTATION RATE] IN BLOOD: 9 MM/HR (ref 0–20)
FLUAV RNA RESP QL NAA+PROBE: NOT DETECTED
FLUBV RNA RESP QL NAA+PROBE: NOT DETECTED
GFR SERPL CREATININE-BSD FRML MDRD: 73 ML/MIN/1.73
GLOBULIN UR ELPH-MCNC: 3.5 GM/DL
GLUCOSE SERPL-MCNC: 97 MG/DL (ref 65–99)
GLUCOSE UR STRIP-MCNC: NEGATIVE MG/DL
HCG SERPL QL: NEGATIVE
HCT VFR BLD AUTO: 43.6 % (ref 34–46.6)
HGB BLD-MCNC: 14.4 G/DL (ref 12–15.9)
HGB UR QL STRIP.AUTO: ABNORMAL
HOLD SPECIMEN: NORMAL
HOLD SPECIMEN: NORMAL
HYALINE CASTS UR QL AUTO: ABNORMAL /LPF
IMM GRANULOCYTES # BLD AUTO: 0.07 10*3/MM3 (ref 0–0.05)
IMM GRANULOCYTES NFR BLD AUTO: 0.4 % (ref 0–0.5)
INR PPP: 0.99 (ref 0.9–1.1)
KETONES UR QL STRIP: NEGATIVE
LEUKOCYTE ESTERASE UR QL STRIP.AUTO: NEGATIVE
LYMPHOCYTES # BLD AUTO: 4.46 10*3/MM3 (ref 0.7–3.1)
LYMPHOCYTES NFR BLD AUTO: 27.3 % (ref 19.6–45.3)
MCH RBC QN AUTO: 30.4 PG (ref 26.6–33)
MCHC RBC AUTO-ENTMCNC: 33 G/DL (ref 31.5–35.7)
MCV RBC AUTO: 92.2 FL (ref 79–97)
MONOCYTES # BLD AUTO: 0.91 10*3/MM3 (ref 0.1–0.9)
MONOCYTES NFR BLD AUTO: 5.6 % (ref 5–12)
NEUTROPHILS NFR BLD AUTO: 10.73 10*3/MM3 (ref 1.7–7)
NEUTROPHILS NFR BLD AUTO: 65.6 % (ref 42.7–76)
NITRITE UR QL STRIP: NEGATIVE
NRBC BLD AUTO-RTO: 0 /100 WBC (ref 0–0.2)
PH UR STRIP.AUTO: 6 [PH] (ref 5–8)
PLATELET # BLD AUTO: 335 10*3/MM3 (ref 140–450)
PMV BLD AUTO: 9.5 FL (ref 6–12)
POTASSIUM SERPL-SCNC: 4.4 MMOL/L (ref 3.5–5.2)
PROT SERPL-MCNC: 7.9 G/DL (ref 6–8.5)
PROT UR QL STRIP: ABNORMAL
PROTHROMBIN TIME: 12.9 SECONDS (ref 11.9–14.1)
RBC # BLD AUTO: 4.73 10*6/MM3 (ref 3.77–5.28)
RBC # UR: ABNORMAL /HPF
REF LAB TEST METHOD: ABNORMAL
SARS-COV-2 RNA RESP QL NAA+PROBE: NOT DETECTED
SODIUM SERPL-SCNC: 137 MMOL/L (ref 136–145)
SP GR UR STRIP: 1.02 (ref 1–1.03)
SQUAMOUS #/AREA URNS HPF: ABNORMAL /HPF
UROBILINOGEN UR QL STRIP: ABNORMAL
WBC # BLD AUTO: 16.34 10*3/MM3 (ref 3.4–10.8)
WBC UR QL AUTO: ABNORMAL /HPF
WHOLE BLOOD HOLD SPECIMEN: NORMAL

## 2021-04-04 PROCEDURE — 85610 PROTHROMBIN TIME: CPT | Performed by: STUDENT IN AN ORGANIZED HEALTH CARE EDUCATION/TRAINING PROGRAM

## 2021-04-04 PROCEDURE — 25010000002 HYDROMORPHONE PER 4 MG: Performed by: STUDENT IN AN ORGANIZED HEALTH CARE EDUCATION/TRAINING PROGRAM

## 2021-04-04 PROCEDURE — 86140 C-REACTIVE PROTEIN: CPT | Performed by: STUDENT IN AN ORGANIZED HEALTH CARE EDUCATION/TRAINING PROGRAM

## 2021-04-04 PROCEDURE — 93005 ELECTROCARDIOGRAM TRACING: CPT | Performed by: STUDENT IN AN ORGANIZED HEALTH CARE EDUCATION/TRAINING PROGRAM

## 2021-04-04 PROCEDURE — 74176 CT ABD & PELVIS W/O CONTRAST: CPT

## 2021-04-04 PROCEDURE — 83605 ASSAY OF LACTIC ACID: CPT | Performed by: STUDENT IN AN ORGANIZED HEALTH CARE EDUCATION/TRAINING PROGRAM

## 2021-04-04 PROCEDURE — 71045 X-RAY EXAM CHEST 1 VIEW: CPT

## 2021-04-04 PROCEDURE — 85025 COMPLETE CBC W/AUTO DIFF WBC: CPT | Performed by: STUDENT IN AN ORGANIZED HEALTH CARE EDUCATION/TRAINING PROGRAM

## 2021-04-04 PROCEDURE — 93010 ELECTROCARDIOGRAM REPORT: CPT | Performed by: INTERNAL MEDICINE

## 2021-04-04 PROCEDURE — 96365 THER/PROPH/DIAG IV INF INIT: CPT

## 2021-04-04 PROCEDURE — 81001 URINALYSIS AUTO W/SCOPE: CPT | Performed by: STUDENT IN AN ORGANIZED HEALTH CARE EDUCATION/TRAINING PROGRAM

## 2021-04-04 PROCEDURE — 84703 CHORIONIC GONADOTROPIN ASSAY: CPT | Performed by: STUDENT IN AN ORGANIZED HEALTH CARE EDUCATION/TRAINING PROGRAM

## 2021-04-04 PROCEDURE — 25010000002 CEFTRIAXONE PER 250 MG: Performed by: STUDENT IN AN ORGANIZED HEALTH CARE EDUCATION/TRAINING PROGRAM

## 2021-04-04 PROCEDURE — 85652 RBC SED RATE AUTOMATED: CPT | Performed by: STUDENT IN AN ORGANIZED HEALTH CARE EDUCATION/TRAINING PROGRAM

## 2021-04-04 PROCEDURE — 87636 SARSCOV2 & INF A&B AMP PRB: CPT | Performed by: STUDENT IN AN ORGANIZED HEALTH CARE EDUCATION/TRAINING PROGRAM

## 2021-04-04 PROCEDURE — 87040 BLOOD CULTURE FOR BACTERIA: CPT | Performed by: STUDENT IN AN ORGANIZED HEALTH CARE EDUCATION/TRAINING PROGRAM

## 2021-04-04 PROCEDURE — 80053 COMPREHEN METABOLIC PANEL: CPT | Performed by: STUDENT IN AN ORGANIZED HEALTH CARE EDUCATION/TRAINING PROGRAM

## 2021-04-04 PROCEDURE — 96375 TX/PRO/DX INJ NEW DRUG ADDON: CPT

## 2021-04-04 PROCEDURE — 25010000002 ONDANSETRON PER 1 MG: Performed by: STUDENT IN AN ORGANIZED HEALTH CARE EDUCATION/TRAINING PROGRAM

## 2021-04-04 PROCEDURE — 99285 EMERGENCY DEPT VISIT HI MDM: CPT

## 2021-04-04 RX ORDER — ONDANSETRON 2 MG/ML
4 INJECTION INTRAMUSCULAR; INTRAVENOUS ONCE
Status: COMPLETED | OUTPATIENT
Start: 2021-04-04 | End: 2021-04-04

## 2021-04-04 RX ORDER — HYDROMORPHONE HYDROCHLORIDE 1 MG/ML
0.5 INJECTION, SOLUTION INTRAMUSCULAR; INTRAVENOUS; SUBCUTANEOUS ONCE
Status: COMPLETED | OUTPATIENT
Start: 2021-04-04 | End: 2021-04-04

## 2021-04-04 RX ORDER — SODIUM CHLORIDE 0.9 % (FLUSH) 0.9 %
10 SYRINGE (ML) INJECTION AS NEEDED
Status: DISCONTINUED | OUTPATIENT
Start: 2021-04-04 | End: 2021-04-04 | Stop reason: HOSPADM

## 2021-04-04 RX ORDER — HYDROCODONE BITARTRATE AND ACETAMINOPHEN 7.5; 325 MG/1; MG/1
1 TABLET ORAL EVERY 6 HOURS PRN
Qty: 12 TABLET | Refills: 0 | Status: SHIPPED | OUTPATIENT
Start: 2021-04-04 | End: 2021-04-07

## 2021-04-04 RX ORDER — CEFDINIR 300 MG/1
300 CAPSULE ORAL 2 TIMES DAILY
Qty: 10 CAPSULE | Refills: 0 | Status: SHIPPED | OUTPATIENT
Start: 2021-04-04 | End: 2021-08-31

## 2021-04-04 RX ADMIN — ONDANSETRON 4 MG: 2 INJECTION INTRAMUSCULAR; INTRAVENOUS at 15:05

## 2021-04-04 RX ADMIN — SODIUM CHLORIDE 1000 ML: 9 INJECTION, SOLUTION INTRAVENOUS at 15:05

## 2021-04-04 RX ADMIN — HYDROMORPHONE HYDROCHLORIDE 0.5 MG: 1 INJECTION, SOLUTION INTRAMUSCULAR; INTRAVENOUS; SUBCUTANEOUS at 15:05

## 2021-04-04 RX ADMIN — CEFTRIAXONE 1 G: 1 INJECTION, POWDER, FOR SOLUTION INTRAMUSCULAR; INTRAVENOUS at 17:54

## 2021-04-04 NOTE — ED PROVIDER NOTES
Subjective   History of Present Illness  This 45-year-old female with a past medical history significant for polycystic kidney disease and multiple kidney stones presents emergency department today for evaluation of left-sided flank pain that radiates to the left groin. She denies any dysuria or gross hematuria. Her pain starts in her back and wraps around goes down the front and radiates into the labia and over her bladder. She does not have any fever or chills she says it does not hurt when she urinates. Has a history of multiple lithotripsies she follows with our local urologist here. No syncope no chest pain no shortness of breath no recent illness  Review of Systems   Constitutional: Negative.  Negative for chills and fever.   HENT: Negative.    Eyes: Negative.    Respiratory: Negative.    Cardiovascular: Negative.    Gastrointestinal: Positive for abdominal pain. Negative for blood in stool, constipation, diarrhea, nausea and vomiting.   Endocrine: Negative.    Genitourinary: Positive for flank pain. Negative for dysuria and vaginal bleeding.   Skin: Negative.    Allergic/Immunologic: Negative.    Neurological: Negative.  Negative for syncope.   Hematological: Negative.    Psychiatric/Behavioral: Negative.        Past Medical History:   Diagnosis Date   • Arthritis     back   • Complicated UTI (urinary tract infection)    • Disease of thyroid gland     nodules   • GERD (gastroesophageal reflux disease)    • Insomnia    • Kidney stones    • Migraine    • Migraines    • Pneumonia    • Polycystic kidney disease    • PONV (postoperative nausea and vomiting)    • Strep throat        Allergies   Allergen Reactions   • Sulfa Antibiotics Anaphylaxis   • Compazine [Prochlorperazine Edisylate] Hives       Past Surgical History:   Procedure Laterality Date   • ABDOMINAL SURGERY     •  SECTION     • COLONOSCOPY     • CYSTOSCOPY     • CYSTOSCOPY ELECTROHYDRAULIC LITHOTRIPSY     • CYSTOSCOPY URETEROSCOPY LASER  LITHOTRIPSY Left 11/18/2020    Procedure: CYSTOSCOPY URETEROSCOPY LASER LITHOTRIPSY;  Surgeon: Garth Friedman MD;  Location: [facility];  Service: Urology;  Laterality: Left;   • DENTAL PROCEDURE     • DILATATION AND CURETTAGE     • DILATATION AND CURETTAGE     • ENDOSCOPY     • EXTRACORPOREAL SHOCK WAVE LITHOTRIPSY (ESWL) Left 9/7/2018    Procedure: EXTRACORPOREAL SHOCKWAVE LITHOTRIPSY;  Surgeon: Garth Friedman MD;  Location: [facility] OR;  Service: Urology   • EXTRACORPOREAL SHOCK WAVE LITHOTRIPSY (ESWL) Left 5/15/2020    Procedure: EXTRACORPOREAL SHOCKWAVE LITHOTRIPSY;  Surgeon: Garth Friedman MD;  Location: [facility];  Service: Urology;  Laterality: Left;   • KIDNEY STONE SURGERY     • LITHOTRIPSY         Family History   Problem Relation Age of Onset   • No Known Problems Father    • No Known Problems Mother        Social History     Socioeconomic History   • Marital status:      Spouse name: Not on file   • Number of children: Not on file   • Years of education: Not on file   • Highest education level: Not on file   Tobacco Use   • Smoking status: Former Smoker     Packs/day: 0.50     Years: 15.00     Pack years: 7.50     Types: Cigarettes     Quit date: 1/1/2018     Years since quitting: 3.2   • Smokeless tobacco: Never Used   Vaping Use   • Vaping Use: Never used   Substance and Sexual Activity   • Alcohol use: No   • Drug use: No   • Sexual activity: Yes     Partners: Male     Birth control/protection: None           Objective   Physical Exam  Vitals and nursing note reviewed. Exam conducted with a chaperone present.   Constitutional:       Comments: Patient appears uncomfortable, non toxic appearing.    HENT:      Head: Normocephalic and atraumatic.      Right Ear: External ear normal.      Left Ear: External ear normal.      Nose: Nose normal.      Mouth/Throat:      Mouth: Mucous membranes are moist.      Pharynx: Oropharynx is clear.   Eyes:      Extraocular Movements:  Extraocular movements intact.      Pupils: Pupils are equal, round, and reactive to light.   Cardiovascular:      Rate and Rhythm: Normal rate and regular rhythm.      Pulses: Normal pulses.      Heart sounds: Normal heart sounds.   Pulmonary:      Effort: Pulmonary effort is normal.      Breath sounds: Normal breath sounds.   Abdominal:      General: Abdomen is flat. Bowel sounds are normal.      Palpations: Abdomen is soft.      Comments: No cva tenderness, mild suprapubic tenderness.    Musculoskeletal:         General: Normal range of motion.      Cervical back: Normal range of motion and neck supple.   Skin:     General: Skin is warm and dry.      Capillary Refill: Capillary refill takes less than 2 seconds.   Neurological:      General: No focal deficit present.      Mental Status: She is oriented to person, place, and time.   Psychiatric:         Mood and Affect: Mood normal.         Behavior: Behavior normal.         Thought Content: Thought content normal.         Judgment: Judgment normal.         Procedures           ED Course  ED Course as of Apr 04 2309   Sun Apr 04, 2021 2308 Unremarkable stay in the ED pain is well controlled. No fever or tachycardia. Patient is feeling well and would like to be discharged home.     [JM]      ED Course User Index  [JM] Lavon Mock DO                                           MDM    Final diagnoses:   Nephrolithiasis   Renal colic on left side       ED Disposition  ED Disposition     ED Disposition Condition Comment    Discharge Stable           Ronnie Brady,   [address] [place] [id number]  [phone]    Schedule an appointment as soon as possible for a visit in 1 day      [facility] ED  [address] [place] [id number] [phone]    Please return to the emergency department if you are having any worsening symptoms., As needed         Medication List      New Prescriptions    cefdinir 300 MG capsule  Commonly known as:  OMNICEF  Take 1 capsule by mouth 2 (Two) Times a Day.     HYDROcodone-acetaminophen 7.5-325 MG per tablet  Commonly known as: NORCO  Take 1 tablet by mouth Every 6 (Six) Hours As Needed for Moderate Pain  or Severe Pain  for up to 3 days.           Where to Get Your Medications      These medications were sent to [facility] - [address] [facility] - [phone] [facility] [phone] [address] [place] [address]    Phone: [phone]   · HYDROcodone-acetaminophen 7.5-325 MG per tablet     You can get these medications from any pharmacy    Bring a paper prescription for each of these medications  · cefdinir 300 MG capsule          Lavon Mock,   04/04/21 1096

## 2021-04-06 LAB
BACTERIA SPEC AEROBE CULT: ABNORMAL
GRAM STN SPEC: ABNORMAL
ISOLATED FROM: ABNORMAL
QT INTERVAL: 408 MS
QTC INTERVAL: 470 MS

## 2021-04-09 LAB — BACTERIA SPEC AEROBE CULT: NORMAL

## 2021-05-20 ENCOUNTER — OFFICE VISIT (OUTPATIENT)
Dept: UROLOGY | Facility: CLINIC | Age: 46
End: 2021-05-20

## 2021-05-20 ENCOUNTER — HOSPITAL ENCOUNTER (OUTPATIENT)
Dept: GENERAL RADIOLOGY | Facility: HOSPITAL | Age: 46
Discharge: HOME | End: 2021-05-20
Admitting: UROLOGY

## 2021-05-20 VITALS — HEIGHT: 62 IN | BODY MASS INDEX: 33.1 KG/M2 | TEMPERATURE: 98.1 F | WEIGHT: 179.9 LBS

## 2021-05-20 DIAGNOSIS — N20.1 LEFT URETERAL CALCULUS: ICD-10-CM

## 2021-05-20 DIAGNOSIS — N20.0 KIDNEY STONE: Primary | ICD-10-CM

## 2021-05-20 DIAGNOSIS — Z87.442 HISTORY OF KIDNEY STONES: ICD-10-CM

## 2021-05-20 PROCEDURE — 99214 OFFICE O/P EST MOD 30 MIN: CPT | Performed by: UROLOGY

## 2021-05-20 PROCEDURE — 74018 RADEX ABDOMEN 1 VIEW: CPT

## 2021-05-20 PROCEDURE — 74018 RADEX ABDOMEN 1 VIEW: CPT | Performed by: RADIOLOGY

## 2021-05-20 RX ORDER — TAMSULOSIN HYDROCHLORIDE 0.4 MG/1
1 CAPSULE ORAL NIGHTLY
Qty: 30 CAPSULE | Refills: 5 | Status: SHIPPED | OUTPATIENT
Start: 2021-05-20 | End: 2021-08-31 | Stop reason: SDUPTHER

## 2021-05-20 RX ORDER — PROMETHAZINE HYDROCHLORIDE 25 MG/1
25 TABLET ORAL EVERY 6 HOURS PRN
Qty: 21 TABLET | Refills: 2 | Status: SHIPPED | OUTPATIENT
Start: 2021-05-20

## 2021-05-20 RX ORDER — TRAMADOL HYDROCHLORIDE 50 MG/1
50 TABLET ORAL EVERY 6 HOURS PRN
Qty: 60 TABLET | Refills: 2 | Status: SHIPPED | OUTPATIENT
Start: 2021-05-20 | End: 2021-08-31 | Stop reason: SDUPTHER

## 2021-05-20 RX ORDER — OXYCODONE AND ACETAMINOPHEN 10; 325 MG/1; MG/1
1 TABLET ORAL EVERY 6 HOURS PRN
Qty: 16 TABLET | Refills: 0 | Status: SHIPPED | OUTPATIENT
Start: 2021-05-20 | End: 2021-06-24 | Stop reason: SDUPTHER

## 2021-06-24 ENCOUNTER — OFFICE VISIT (OUTPATIENT)
Dept: UROLOGY | Facility: CLINIC | Age: 46
End: 2021-06-24

## 2021-06-24 VITALS — BODY MASS INDEX: 32.94 KG/M2 | HEIGHT: 62 IN | WEIGHT: 179 LBS

## 2021-06-24 DIAGNOSIS — N20.1 LEFT URETERAL CALCULUS: ICD-10-CM

## 2021-06-24 DIAGNOSIS — N20.0 KIDNEY STONE: ICD-10-CM

## 2021-06-24 PROCEDURE — 99214 OFFICE O/P EST MOD 30 MIN: CPT | Performed by: UROLOGY

## 2021-06-24 RX ORDER — ONDANSETRON 4 MG/1
4 TABLET, ORALLY DISINTEGRATING ORAL EVERY 6 HOURS PRN
Qty: 12 TABLET | Refills: 0 | Status: SHIPPED | OUTPATIENT
Start: 2021-06-24 | End: 2021-11-19 | Stop reason: SDUPTHER

## 2021-06-24 RX ORDER — TRAMADOL HYDROCHLORIDE 50 MG/1
50 TABLET ORAL EVERY 6 HOURS PRN
Qty: 60 TABLET | Refills: 2 | Status: SHIPPED | OUTPATIENT
Start: 2021-06-24 | End: 2021-09-01 | Stop reason: SDUPTHER

## 2021-06-24 RX ORDER — TAMSULOSIN HYDROCHLORIDE 0.4 MG/1
1 CAPSULE ORAL DAILY
Qty: 30 CAPSULE | Refills: 0 | Status: SHIPPED | OUTPATIENT
Start: 2021-06-24 | End: 2021-08-31 | Stop reason: SDUPTHER

## 2021-06-24 RX ORDER — OXYCODONE AND ACETAMINOPHEN 10; 325 MG/1; MG/1
1 TABLET ORAL EVERY 6 HOURS PRN
Qty: 16 TABLET | Refills: 0 | Status: SHIPPED | OUTPATIENT
Start: 2021-06-24 | End: 2021-08-31

## 2021-06-24 NOTE — PROGRESS NOTES
Chief Complaint:          Chief Complaint   Patient presents with   • Flank Pain       HPI:   45 y.o. female very well-known to me with very metabolically active stone disease and medullary sponge kidney presents today with a left side severe distal ureteral calculus.  Ureteral calculus-patient has been diagnosed with a ureteral calculus.  We have discussed the various parameters regarding spontaneous passage including the notion that a 2 mm stone has a high likelihood of spontaneous passage versus a larger stone being caught up in the upper areas of the urinary tract.  We also discussed the medical management of stone disease and the use of medical expulsive therapy in the form of Flomax.  This is used in an off label setting.  We discussed the indicators for intervention including  absolute indicators such as sepsis and uncontrollable severe pain as well as  the relative indicators of moderate pain that is well-controlled with various analgesia.  I also talked about nonoperative management including ambulation and increasing fluids and hot tub as being an effective adjuncts in the treatment of a ureteral stone.      Past Medical History:        Past Medical History:   Diagnosis Date   • Arthritis     back   • Complicated UTI (urinary tract infection)    • Disease of thyroid gland     nodules   • GERD (gastroesophageal reflux disease)    • Insomnia    • Kidney stones    • Migraine    • Migraines    • Pneumonia    • Polycystic kidney disease    • PONV (postoperative nausea and vomiting)    • Strep throat          Current Meds:     Current Outpatient Medications   Medication Sig Dispense Refill   • amitriptyline (ELAVIL) 25 MG tablet Take 25 mg by mouth every night.     • baclofen (LIORESAL) 10 MG tablet Take 10 mg by mouth 3 (Three) Times a Day.     • clonazePAM (KlonoPIN) 0.5 MG tablet Take 0.5 mg by mouth 2 (two) times a day as needed for seizures.     • gabapentin (NEURONTIN) 400 MG capsule Take 400 mg by mouth 4  (Four) Times a Day.     • ondansetron ODT (ZOFRAN-ODT) 4 MG disintegrating tablet Place 1 tablet on the tongue Every 6 (Six) Hours As Needed for Nausea or Vomiting. 12 tablet 0   • oxyCODONE-acetaminophen (Percocet)  MG per tablet Take 1 tablet by mouth Every 6 (Six) Hours As Needed for Moderate Pain . 16 tablet 0   • phenazopyridine (PYRIDIUM) 200 MG tablet Take 1 tablet by mouth 3 (Three) Times a Day As Needed for Bladder Spasms. 20 tablet 0   • promethazine (PHENERGAN) 25 MG tablet Take 1 tablet by mouth Every 6 (Six) Hours As Needed for Nausea or Vomiting. 21 tablet 2   • tamsulosin (FLOMAX) 0.4 MG capsule 24 hr capsule Take 1 capsule by mouth Daily. 30 capsule 0   • tamsulosin (Flomax) 0.4 MG capsule 24 hr capsule Take 1 capsule by mouth Every Night. 30 capsule 5   • traMADol (ULTRAM) 50 MG tablet Take 1 tablet by mouth Every 6 (Six) Hours As Needed for Moderate Pain . 60 tablet 2   • traMADol (ULTRAM) 50 MG tablet Take 1 tablet by mouth Every 6 (Six) Hours As Needed for Moderate Pain . 60 tablet 2   • cefdinir (OMNICEF) 300 MG capsule Take 1 capsule by mouth 2 (Two) Times a Day. 10 capsule 0     No current facility-administered medications for this visit.        Allergies:      Allergies   Allergen Reactions   • Sulfa Antibiotics Anaphylaxis   • Compazine [Prochlorperazine Edisylate] Hives        Past Surgical History:     Past Surgical History:   Procedure Laterality Date   • ABDOMINAL SURGERY     •  SECTION     • COLONOSCOPY     • CYSTOSCOPY     • CYSTOSCOPY ELECTROHYDRAULIC LITHOTRIPSY     • CYSTOSCOPY URETEROSCOPY LASER LITHOTRIPSY Left 2020    Procedure: CYSTOSCOPY URETEROSCOPY LASER LITHOTRIPSY;  Surgeon: Garth Friedman MD;  Location: [facility];  Service: Urology;  Laterality: Left;   • DENTAL PROCEDURE     • DILATATION AND CURETTAGE     • DILATATION AND CURETTAGE     • ENDOSCOPY     • EXTRACORPOREAL SHOCK WAVE LITHOTRIPSY (ESWL) Left 2018    Procedure: EXTRACORPOREAL  SHOCKWAVE LITHOTRIPSY;  Surgeon: Garth Friedman MD;  Location: [facility];  Service: Urology   • EXTRACORPOREAL SHOCK WAVE LITHOTRIPSY (ESWL) Left 5/15/2020    Procedure: EXTRACORPOREAL SHOCKWAVE LITHOTRIPSY;  Surgeon: Garth Friedman MD;  Location: [facility];  Service: Urology;  Laterality: Left;   • KIDNEY STONE SURGERY     • LITHOTRIPSY           Social History:     Social History     Socioeconomic History   • Marital status:      Spouse name: Not on file   • Number of children: Not on file   • Years of education: Not on file   • Highest education level: Not on file   Tobacco Use   • Smoking status: Former Smoker     Packs/day: 0.50     Years: 15.00     Pack years: 7.50     Types: Cigarettes     Quit date: 1/1/2018     Years since quitting: 3.4   • Smokeless tobacco: Never Used   Vaping Use   • Vaping Use: Never used   Substance and Sexual Activity   • Alcohol use: No   • Drug use: No   • Sexual activity: Yes     Partners: Male     Birth control/protection: None       Family History:     Family History   Problem Relation Age of Onset   • No Known Problems Father    • No Known Problems Mother        Review of Systems:     Review of Systems   Constitutional: Negative.  Negative for activity change, appetite change, chills, diaphoresis, fatigue and unexpected weight change.   HENT: Negative for congestion, dental problem, drooling, ear discharge, ear pain, facial swelling, hearing loss, mouth sores, nosebleeds, postnasal drip, rhinorrhea, sinus pressure, sneezing, sore throat, tinnitus, trouble swallowing and voice change.    Eyes: Negative.  Negative for photophobia, pain, discharge, redness, itching and visual disturbance.   Respiratory: Negative.  Negative for apnea, cough, choking, chest tightness, shortness of breath, wheezing and stridor.    Cardiovascular: Negative.  Negative for chest pain, palpitations and leg swelling.   Gastrointestinal: Negative.  Negative for abdominal distention,  abdominal pain, anal bleeding, blood in stool, constipation, diarrhea, nausea, rectal pain and vomiting.   Endocrine: Negative.  Negative for cold intolerance, heat intolerance, polydipsia, polyphagia and polyuria.   Genitourinary: Positive for flank pain.   Musculoskeletal: Negative for arthralgias, back pain, gait problem, joint swelling, myalgias, neck pain and neck stiffness.   Skin: Negative.  Negative for color change, pallor, rash and wound.   Allergic/Immunologic: Negative.  Negative for environmental allergies, food allergies and immunocompromised state.   Neurological: Negative.  Negative for dizziness, tremors, seizures, syncope, facial asymmetry, speech difficulty, weakness, light-headedness, numbness and headaches.   Hematological: Negative.  Negative for adenopathy. Does not bruise/bleed easily.   Psychiatric/Behavioral: Negative for agitation, behavioral problems, confusion, decreased concentration, dysphoric mood, hallucinations, self-injury, sleep disturbance and suicidal ideas. The patient is not nervous/anxious and is not hyperactive.    All other systems reviewed and are negative.      Physical Exam:     Physical Exam  Constitutional:       Appearance: She is well-developed.   HENT:      Head: Normocephalic and atraumatic.      Right Ear: External ear normal.      Left Ear: External ear normal.   Eyes:      Conjunctiva/sclera: Conjunctivae normal.      Pupils: Pupils are equal, round, and reactive to light.   Cardiovascular:      Rate and Rhythm: Normal rate and regular rhythm.      Heart sounds: Normal heart sounds.   Pulmonary:      Effort: Pulmonary effort is normal.      Breath sounds: Normal breath sounds.   Abdominal:      General: Bowel sounds are normal. There is no distension.      Palpations: Abdomen is soft. There is no mass.      Tenderness: There is no abdominal tenderness. There is no guarding or rebound.   Genitourinary:     Vagina: No vaginal discharge.      Comments: Crying in  pain and writhing  Musculoskeletal:         General: Normal range of motion.   Skin:     General: Skin is warm and dry.   Neurological:      Mental Status: She is alert.      Deep Tendon Reflexes: Reflexes are normal and symmetric.   Psychiatric:         Behavior: Behavior normal.         Thought Content: Thought content normal.         Judgment: Judgment normal.         I have reviewed the following portions of the patient's history: allergies, current medications, past family history, past medical history, past social history, past surgical history, problem list and ROS and confirm it's accurate.      Procedure:       Assessment/Plan:   Ureteral calculus-patient has been diagnosed with a ureteral calculus.  We have discussed the various parameters regarding spontaneous passage including the notion that a 2 mm stone has a high likelihood of spontaneous passage versus a larger stone being caught up in the upper areas of the urinary tract.  We also discussed the medical management of stone disease and the use of medical expulsive therapy in the form of Flomax.  This is used in an off label setting.  We discussed the indicators for intervention including  absolute indicators such as sepsis and uncontrollable severe pain as well as  the relative indicators of moderate pain that is well-controlled with various analgesia.  I also talked about nonoperative management including ambulation and increasing fluids and hot tub as being an effective adjuncts in the treatment of a ureteral stone.  These are very small stones with a high likelihood of spontaneous passage  Narcotic pain medication-patient has significant acute pain that I believe would be an indication for the use of narcotic pain medication.  I discussed the significant risks of pain medication and the fact that this will be a short only option and I will give her no more than a three-day supply of pain medication.  And I will not plan long-term medication and  that this will be sent to a pain clinic if at all becomes necessary.  We discussed signing a pain medication agreement and the fact that we're going to run a state SAM review to be sure the patient is not getting pain medication from elsewhere.  If this is the case we will not give pain medication.  As part of the patient's treatment plan of there being prescribed a controlled substance with potential for abuse.  This patient has been wait aware of the appropriate dose of such medications including, the risk for somnolence, limited ability to drive and/or safety and the significant potential for overdose.  It is been made clear that these medications are for the prescribed patient only without concomitant use of alcohol or other sepsis unless prescribed by the medical provider.  Has completed prescribing agreement detailing the terms of continue prescribing him a controlled substance.  Including monitoring Sam ports, the possibility of urine drug screens and pedal counts.  The patient is aware that we reviewed SAM reports on a regular basis and scan them into the chart.  History and physical examination exhibited continued safe and appropriate use of controlled substances.  Also discussed the fact that the new [place] legislation allows only a three-day prescription for pain medication.  In this situation he will be referred to a chronic pain clinic.                This document has been electronically signed by LOU MONTANA MD June 24, 2021 13:15 EDT

## 2021-08-31 ENCOUNTER — HOSPITAL ENCOUNTER (OUTPATIENT)
Dept: GENERAL RADIOLOGY | Facility: HOSPITAL | Age: 46
Discharge: HOME | End: 2021-08-31
Admitting: NURSE PRACTITIONER

## 2021-08-31 ENCOUNTER — OFFICE VISIT (OUTPATIENT)
Dept: UROLOGY | Facility: CLINIC | Age: 46
End: 2021-08-31

## 2021-08-31 VITALS — WEIGHT: 170 LBS | BODY MASS INDEX: 31.28 KG/M2 | HEIGHT: 62 IN

## 2021-08-31 DIAGNOSIS — N39.0 URINARY TRACT INFECTION WITH HEMATURIA, SITE UNSPECIFIED: ICD-10-CM

## 2021-08-31 DIAGNOSIS — R10.9 BILATERAL FLANK PAIN: ICD-10-CM

## 2021-08-31 DIAGNOSIS — N20.0 KIDNEY STONES: ICD-10-CM

## 2021-08-31 DIAGNOSIS — R35.0 URINE FREQUENCY: ICD-10-CM

## 2021-08-31 DIAGNOSIS — R31.9 URINARY TRACT INFECTION WITH HEMATURIA, SITE UNSPECIFIED: ICD-10-CM

## 2021-08-31 DIAGNOSIS — N20.0 KIDNEY STONES: Primary | ICD-10-CM

## 2021-08-31 DIAGNOSIS — N20.1 LEFT URETERAL CALCULUS: ICD-10-CM

## 2021-08-31 DIAGNOSIS — N20.0 RIGHT NEPHROLITHIASIS: Primary | ICD-10-CM

## 2021-08-31 LAB
BILIRUB BLD-MCNC: NEGATIVE MG/DL
CLARITY, POC: ABNORMAL
COLOR UR: YELLOW
GLUCOSE UR STRIP-MCNC: NEGATIVE MG/DL
KETONES UR QL: NEGATIVE
LEUKOCYTE EST, POC: ABNORMAL
NITRITE UR-MCNC: NEGATIVE MG/ML
PH UR: 6 [PH] (ref 5–8)
PROT UR STRIP-MCNC: NEGATIVE MG/DL
RBC # UR STRIP: ABNORMAL /UL
SP GR UR: 1.02 (ref 1–1.03)
UROBILINOGEN UR QL: NORMAL

## 2021-08-31 PROCEDURE — 87086 URINE CULTURE/COLONY COUNT: CPT | Performed by: NURSE PRACTITIONER

## 2021-08-31 PROCEDURE — 81003 URINALYSIS AUTO W/O SCOPE: CPT | Performed by: NURSE PRACTITIONER

## 2021-08-31 PROCEDURE — 74018 RADEX ABDOMEN 1 VIEW: CPT

## 2021-08-31 PROCEDURE — 74018 RADEX ABDOMEN 1 VIEW: CPT | Performed by: RADIOLOGY

## 2021-08-31 PROCEDURE — 99214 OFFICE O/P EST MOD 30 MIN: CPT | Performed by: NURSE PRACTITIONER

## 2021-08-31 RX ORDER — TRAMADOL HYDROCHLORIDE 50 MG/1
50 TABLET ORAL EVERY 6 HOURS PRN
Qty: 60 TABLET | Refills: 2 | Status: CANCELLED | OUTPATIENT
Start: 2021-08-31

## 2021-08-31 RX ORDER — TAMSULOSIN HYDROCHLORIDE 0.4 MG/1
1 CAPSULE ORAL DAILY
Qty: 30 CAPSULE | Refills: 0 | Status: SHIPPED | OUTPATIENT
Start: 2021-08-31 | End: 2021-11-19 | Stop reason: SDUPTHER

## 2021-09-01 DIAGNOSIS — N20.1 LEFT URETERAL CALCULUS: ICD-10-CM

## 2021-09-01 LAB — BACTERIA SPEC AEROBE CULT: NO GROWTH

## 2021-09-01 NOTE — TELEPHONE ENCOUNTER
Pt forgot to tell Dr. Friedman that she needed a refill called in on the Tramadol to [facility].

## 2021-09-02 RX ORDER — TRAMADOL HYDROCHLORIDE 50 MG/1
50 TABLET ORAL EVERY 6 HOURS PRN
Qty: 60 TABLET | Refills: 2 | Status: SHIPPED | OUTPATIENT
Start: 2021-09-02 | End: 2021-11-19 | Stop reason: SDUPTHER

## 2021-09-07 NOTE — PATIENT INSTRUCTIONS
"Textbook of Natural Medicine (5th ed., pp. 9916-3307.e3). [place], [place]: Elsevier.\">   Dietary Guidelines to Help Prevent Kidney Stones  Kidney stones are deposits of minerals and salts that form inside your kidneys. Your risk of developing kidney stones may be greater depending on your diet, your lifestyle, the medicines you take, and whether you have certain medical conditions. Most people can lower their chances of developing kidney stones by following the instructions below. Your dietitian may give you more specific instructions depending on your overall health and the type of kidney stones you tend to develop.  What are tips for following this plan?  Reading food labels    · Choose foods with \"no salt added\" or \"low-salt\" labels. Limit your salt (sodium) intake to less than 1,500 mg a day.  · Choose foods with calcium for each meal and snack. Try to eat about 300 mg of calcium at each meal. Foods that contain 200-500 mg of calcium a serving include:  ? 8 oz (237 mL) of milk, calcium-fortifiednon-dairy milk, and calcium-fortifiedfruit juice. Calcium-fortified means that calcium has been added to these drinks.  ? 8 oz (237 mL) of kefir, yogurt, and soy yogurt.  ? 4 oz (114 g) of tofu.  ? 1 oz (28 g) of cheese.  ? 1 cup (150 g) of dried figs.  ? 1 cup (91 g) of cooked broccoli.  ? One 3 oz (85 g) can of sardines or mackerel.  Most people need 1,000-1,500 mg of calcium a day. Talk to your dietitian about how much calcium is recommended for you.  Shopping  · Buy plenty of fresh fruits and vegetables. Most people do not need to avoid fruits and vegetables, even if these foods contain nutrients that may contribute to kidney stones.  · When shopping for convenience foods, choose:  ? Whole pieces of fruit.  ? Pre-made salads with dressing on the side.  ? Low-fat fruit and yogurt smoothies.  · Avoid buying frozen meals or prepared deli foods. These can be high in sodium.  · Look for foods with live cultures, such as " yogurt and kefir.  · Choose high-fiber grains, such as whole-wheat breads, oat bran, and wheat cereals.  Cooking  · Do not add salt to food when cooking. Place a salt shaker on the table and allow each person to add his or her own salt to taste.  · Use vegetable protein, such as beans, textured vegetable protein (TVP), or tofu, instead of meat in pasta, casseroles, and soups.  Meal planning  · Eat less salt, if told by your dietitian. To do this:  ? Avoid eating processed or pre-made food.  ? Avoid eating fast food.  · Eat less animal protein, including cheese, meat, poultry, or fish, if told by your dietitian. To do this:  ? Limit the number of times you have meat, poultry, fish, or cheese each week. Eat a diet free of meat at least 2 days a week.  ? Eat only one serving each day of meat, poultry, fish, or seafood.  ? When you prepare animal protein, cut pieces into small portion sizes. For most meat and fish, one serving is about the size of the palm of your hand.  · Eat at least five servings of fresh fruits and vegetables each day. To do this:  ? Keep fruits and vegetables on hand for snacks.  ? Eat one piece of fruit or a handful of berries with breakfast.  ? Have a salad and fruit at lunch.  ? Have two kinds of vegetables at dinner.  · Limit foods that are high in a substance called oxalate. These include:  ? Spinach (cooked), rhubarb, beets, sweet potatoes, and Swiss chard.  ? Peanuts.  ? Potato chips, french fries, and baked potatoes with skin on.  ? Nuts and nut products.  ? Chocolate.  · If you regularly take a diuretic medicine, make sure to eat at least 1 or 2 servings of fruits or vegetables that are high in potassium each day. These include:  ? Avocado.  ? Banana.  ? Orange, prune, carrot, or tomato juice.  ? Baked potato.  ? Cabbage.  ? Beans and split peas.  Lifestyle    · Drink enough fluid to keep your urine pale yellow. This is the most important thing you can do. Spread your fluid intake  throughout the day.  · If you drink alcohol:  ? Limit how much you use to:  § 0-1 drink a day for women who are not pregnant.  § 0-2 drinks a day for men.  ? Be aware of how much alcohol is in your drink. In the [place] one drink equals one 12 oz bottle of beer (355 mL), one 5 oz glass of wine (148 mL), or one 1½ oz glass of hard liquor (44 mL).  · Lose weight if told by your health care provider. Work with your dietitian to find an eating plan and weight loss strategies that work best for you.  General information  · Talk to your health care provider and dietitian about taking daily supplements. You may be told the following depending on your health and the cause of your kidney stones:  ? Not to take supplements with vitamin C.  ? To take a calcium supplement.  ? To take a daily probiotic supplement.  ? To take other supplements such as magnesium, fish oil, or vitamin B6.  · Take over-the-counter and prescription medicines only as told by your health care provider. These include supplements.  What foods should I limit?  Limit your intake of the following foods, or eat them as told by your dietitian.  Vegetables  Spinach. Rhubarb. Beets. Canned vegetables. Pickles. Olives. Baked potatoes with skin.  Grains  Wheat bran. Baked goods. Salted crackers. Cereals high in sugar.  Meats and other proteins  Nuts. Nut butters. Large portions of meat, poultry, or fish. Salted, precooked, or cured meats, such as sausages, meat loaves, and hot dogs.  Dairy  Cheese.  Beverages  Regular soft drinks. Regular vegetable juice.  Seasonings and condiments  Seasoning blends with salt. Salad dressings. Soy sauce. Ketchup. Barbecue sauce.  Other foods  Canned soups. Canned pasta sauce. Casseroles. Pizza. Lasagna. Frozen meals. Potato chips. French fries.  The items listed above may not be a complete list of foods and beverages you should limit. Contact a dietitian for more information.  What foods should I avoid?  Talk to your dietitian  about specific foods you should avoid based on the type of kidney stones you have and your overall health.  Fruits  Grapefruit.  The item listed above may not be a complete list of foods and beverages you should avoid. Contact a dietitian for more information.  Summary  · Kidney stones are deposits of minerals and salts that form inside your kidneys.  · You can lower your risk of kidney stones by making changes to your diet.  · The most important thing you can do is drink enough fluid. Drink enough fluid to keep your urine pale yellow.  · Talk to your dietitian about how much calcium you should have each day, and eat less salt and animal protein as told by your dietitian.  This information is not intended to replace advice given to you by your health care provider. Make sure you discuss any questions you have with your health care provider.  Document Revised: 12/10/2020 Document Reviewed: 12/10/2020  [employer] Patient Education © 2021 [employer]      Low-Purine Eating Plan  A low-purine eating plan involves making food choices to limit your intake of purine. Purine is a kind of uric acid. Too much uric acid in your blood can cause certain conditions, such as gout and kidney stones. Eating a low-purine diet can help control these conditions.  What are tips for following this plan?  Reading food labels    · Avoid foods with saturated or Trans fat.  · Check the ingredient list of grains-based foods, such as bread and cereal, to make sure that they contain whole grains.  · Check the ingredient list of sauces or soups to make sure they do not contain meat or fish.  · When choosing soft drinks, check the ingredient list to make sure they do not contain high-fructose corn syrup.  Shopping  · Buy plenty of fresh fruits and vegetables.  · Avoid buying canned or fresh fish.  · Buy dairy products labeled as low-fat or nonfat.  · Avoid buying premade or processed foods. These foods are often high in fat, salt (sodium), and  added sugar.  Cooking  · Use olive oil instead of butter when cooking. Oils like olive oil, canola oil, and sunflower oil contain healthy fats.  Meal planning  · Learn which foods do or do not affect you. If you find out that a food tends to cause your gout symptoms to flare up, avoid eating that food. You can enjoy foods that do not cause problems. If you have any questions about a food item, talk with your dietitian or health care provider.  · Limit foods high in fat, especially saturated fat. Fat makes it harder for your body to get rid of uric acid.  · Choose foods that are lower in fat and are lean sources of protein.  General guidelines  · Limit alcohol intake to no more than 1 drink a day for nonpregnant women and 2 drinks a day for men. One drink equals 12 oz of beer, 5 oz of wine, or 1½ oz of hard liquor. Alcohol can affect the way your body gets rid of uric acid.  · Drink plenty of water to keep your urine clear or pale yellow. Fluids can help remove uric acid from your body.  · If directed by your health care provider, take a vitamin C supplement.  · Work with your health care provider and dietitian to develop a plan to achieve or maintain a healthy weight. Losing weight can help reduce uric acid in your blood.  What foods are recommended?  The items listed may not be a complete list. Talk with your dietitian about what dietary choices are best for you.  Foods low in purines  Foods low in purines do not need to be limited. These include:  · All fruits.  · All low-purine vegetables, pickles, and olives.  · Breads, pasta, rice, cornbread, and popcorn. Cake and other baked goods.  · All dairy foods.  · Eggs, nuts, and nut butters.  · Spices and condiments, such as salt, herbs, and vinegar.  · Plant oils, butter, and margarine.  · Water, sugar-free soft drinks, tea, coffee, and cocoa.  · Vegetable-based soups, broths, sauces, and gravies.  Foods moderate in purines  Foods moderate in purines should be limited  to the amounts listed.  · ½ cup of asparagus, cauliflower, spinach, mushrooms, or green peas, each day.  · 2/3 cup uncooked oatmeal, each day.  · ¼ cup dry wheat bran or wheat germ, each day.  · 2-3 ounces of meat or poultry, each day.  · 4-6 ounces of shellfish, such as crab, lobster, oysters, or shrimp, each day.  · 1 cup cooked beans, peas, or lentils, each day.  · Soup, broths, or bouillon made from meat or fish. Limit these foods as much as possible.  What foods are not recommended?  The items listed may not be a complete list. Talk with your dietitian about what dietary choices are best for you.  Limit your intake of foods high in purines, including:  · Beer and other alcohol.  · Meat-based gravy or sauce.  · Canned or fresh fish, such as:  ? Anchovies, sardines, herring, and tuna.  ? Mussels and scallops.  ? Codfish, trout, and matt.  · Hernandez.  · Organ meats, such as:  ? Liver or kidney.  ? Tripe.  ? Sweetbreads (thymus gland or pancreas).  · Wild game or goose.  · Yeast or yeast extract supplements.  · Drinks sweetened with high-fructose corn syrup.  Summary  · Eating a low-purine diet can help control conditions caused by too much uric acid in the body, such as gout or kidney stones.  · Choose low-purine foods, limit alcohol, and limit foods high in fat.  · You will learn over time which foods do or do not affect you. If you find out that a food tends to cause your gout symptoms to flare up, avoid eating that food.  This information is not intended to replace advice given to you by your health care provider. Make sure you discuss any questions you have with your health care provider.  Document Revised: 11/30/2018 Document Reviewed: 01/31/2018  [employer] Patient Education © 2021 [employer]      Flank Pain, Adult  Flank pain is pain in your side. The flank is the area of your side between your upper belly (abdomen) and your back. The pain may occur over a short time (acute), or it may be long-term or  come back often (chronic). It may be mild or very bad. Pain in this area can be caused by many different things.  Follow these instructions at home:    · Drink enough fluid to keep your pee (urine) clear or pale yellow.  · Rest as told by your doctor.  · Take over-the-counter and prescription medicines only as told by your doctor.  · Keep a journal to keep track of:  ? What has caused your flank pain.  ? What has made it feel better.  · Keep all follow-up visits as told by your doctor. This is important.  Contact a doctor if:  · Medicine does not help your pain.  · You have new symptoms.  · Your pain gets worse.  · You have a fever.  · Your symptoms last longer than 2-3 days.  · You have trouble peeing.  · You are peeing more often than normal.  Get help right away if:  · You have trouble breathing.  · You are short of breath.  · Your belly hurts, or it is swollen or red.  · You feel sick to your stomach (nauseous).  · You throw up (vomit).  · You feel like you will pass out, or you do pass out (faint).  · You have blood in your pee.  Summary  · Flank pain is pain in your side. The flank is the area of your side between your upper belly (abdomen) and your back.  · Flank pain may occur over a short time (acute), or it may be long-term or come back often (chronic). It may be mild or very bad.  · Pain in this area can be caused by many different things.  · Contact your doctor if your symptoms get worse or they last longer than 2-3 days.  This information is not intended to replace advice given to you by your health care provider. Make sure you discuss any questions you have with your health care provider.  Document Revised: 11/30/2018 Document Reviewed: 04/09/2018  [employer] Patient Education © 2021 [employer] Inc.

## 2021-10-25 ENCOUNTER — APPOINTMENT (OUTPATIENT)
Dept: CT IMAGING | Facility: HOSPITAL | Age: 46
End: 2021-10-25

## 2021-10-25 ENCOUNTER — APPOINTMENT (OUTPATIENT)
Dept: GENERAL RADIOLOGY | Facility: HOSPITAL | Age: 46
End: 2021-10-25

## 2021-10-25 ENCOUNTER — HOSPITAL ENCOUNTER (EMERGENCY)
Facility: HOSPITAL | Age: 46
Discharge: HOME | End: 2021-10-25
Attending: EMERGENCY MEDICINE | Admitting: EMERGENCY MEDICINE

## 2021-10-25 VITALS
WEIGHT: 180 LBS | DIASTOLIC BLOOD PRESSURE: 93 MMHG | HEIGHT: 62 IN | BODY MASS INDEX: 33.13 KG/M2 | OXYGEN SATURATION: 95 % | SYSTOLIC BLOOD PRESSURE: 124 MMHG | RESPIRATION RATE: 19 BRPM | TEMPERATURE: 98.5 F | HEART RATE: 81 BPM

## 2021-10-25 DIAGNOSIS — N39.0 BACTERIAL UTI: ICD-10-CM

## 2021-10-25 DIAGNOSIS — A49.9 BACTERIAL UTI: ICD-10-CM

## 2021-10-25 DIAGNOSIS — N20.1 LEFT URETERAL STONE: Primary | ICD-10-CM

## 2021-10-25 LAB
ALBUMIN SERPL-MCNC: 3.98 G/DL (ref 3.5–5.2)
ALBUMIN/GLOB SERPL: 1.4 G/DL
ALP SERPL-CCNC: 62 U/L (ref 39–117)
ALT SERPL W P-5'-P-CCNC: 8 U/L (ref 1–33)
AMPHET+METHAMPHET UR QL: NEGATIVE
AMPHETAMINES UR QL: NEGATIVE
ANION GAP SERPL CALCULATED.3IONS-SCNC: 10.8 MMOL/L (ref 5–15)
AST SERPL-CCNC: 14 U/L (ref 1–32)
BACTERIA UR QL AUTO: ABNORMAL /HPF
BARBITURATES UR QL SCN: NEGATIVE
BASOPHILS # BLD AUTO: 0.03 10*3/MM3 (ref 0–0.2)
BASOPHILS NFR BLD AUTO: 0.2 % (ref 0–1.5)
BENZODIAZ UR QL SCN: NEGATIVE
BILIRUB SERPL-MCNC: 0.3 MG/DL (ref 0–1.2)
BILIRUB UR QL STRIP: NEGATIVE
BUN SERPL-MCNC: 9 MG/DL (ref 6–20)
BUN/CREAT SERPL: 11 (ref 7–25)
BUPRENORPHINE SERPL-MCNC: NEGATIVE NG/ML
CALCIUM SPEC-SCNC: 8.6 MG/DL (ref 8.6–10.5)
CANNABINOIDS SERPL QL: NEGATIVE
CHLORIDE SERPL-SCNC: 107 MMOL/L (ref 98–107)
CK SERPL-CCNC: 60 U/L (ref 20–180)
CLARITY UR: CLEAR
CO2 SERPL-SCNC: 23.2 MMOL/L (ref 22–29)
COCAINE UR QL: NEGATIVE
COLOR UR: ABNORMAL
CREAT SERPL-MCNC: 0.82 MG/DL (ref 0.57–1)
CRP SERPL-MCNC: <0.3 MG/DL (ref 0–0.5)
D-LACTATE SERPL-SCNC: 1.1 MMOL/L (ref 0.5–2)
DEPRECATED RDW RBC AUTO: 44.3 FL (ref 37–54)
EOSINOPHIL # BLD AUTO: 0.16 10*3/MM3 (ref 0–0.4)
EOSINOPHIL NFR BLD AUTO: 1.3 % (ref 0.3–6.2)
ERYTHROCYTE [DISTWIDTH] IN BLOOD BY AUTOMATED COUNT: 12.8 % (ref 12.3–15.4)
ERYTHROCYTE [SEDIMENTATION RATE] IN BLOOD: 9 MM/HR (ref 0–20)
ETHANOL BLD-MCNC: <10 MG/DL (ref 0–10)
ETHANOL UR QL: <0.01 %
GFR SERPL CREATININE-BSD FRML MDRD: 75 ML/MIN/1.73
GLOBULIN UR ELPH-MCNC: 2.9 GM/DL
GLUCOSE SERPL-MCNC: 92 MG/DL (ref 65–99)
GLUCOSE UR STRIP-MCNC: NEGATIVE MG/DL
HCT VFR BLD AUTO: 37.3 % (ref 34–46.6)
HGB BLD-MCNC: 12.2 G/DL (ref 12–15.9)
HGB UR QL STRIP.AUTO: NEGATIVE
HOLD SPECIMEN: NORMAL
HYALINE CASTS UR QL AUTO: ABNORMAL /LPF
IMM GRANULOCYTES # BLD AUTO: 0.06 10*3/MM3 (ref 0–0.05)
IMM GRANULOCYTES NFR BLD AUTO: 0.5 % (ref 0–0.5)
KETONES UR QL STRIP: NEGATIVE
LEUKOCYTE ESTERASE UR QL STRIP.AUTO: ABNORMAL
LYMPHOCYTES # BLD AUTO: 3.78 10*3/MM3 (ref 0.7–3.1)
LYMPHOCYTES NFR BLD AUTO: 30.8 % (ref 19.6–45.3)
MAGNESIUM SERPL-MCNC: 1.8 MG/DL (ref 1.6–2.6)
MCH RBC QN AUTO: 31 PG (ref 26.6–33)
MCHC RBC AUTO-ENTMCNC: 32.7 G/DL (ref 31.5–35.7)
MCV RBC AUTO: 94.9 FL (ref 79–97)
METHADONE UR QL SCN: NEGATIVE
MONOCYTES # BLD AUTO: 0.69 10*3/MM3 (ref 0.1–0.9)
MONOCYTES NFR BLD AUTO: 5.6 % (ref 5–12)
NEUTROPHILS NFR BLD AUTO: 61.6 % (ref 42.7–76)
NEUTROPHILS NFR BLD AUTO: 7.55 10*3/MM3 (ref 1.7–7)
NITRITE UR QL STRIP: POSITIVE
NRBC BLD AUTO-RTO: 0 /100 WBC (ref 0–0.2)
NT-PROBNP SERPL-MCNC: 87.5 PG/ML (ref 0–450)
OPIATES UR QL: POSITIVE
OXYCODONE UR QL SCN: NEGATIVE
PCP UR QL SCN: NEGATIVE
PH UR STRIP.AUTO: 5.5 [PH] (ref 5–8)
PLATELET # BLD AUTO: 287 10*3/MM3 (ref 140–450)
PMV BLD AUTO: 9.2 FL (ref 6–12)
POTASSIUM SERPL-SCNC: 3.9 MMOL/L (ref 3.5–5.2)
PROPOXYPH UR QL: NEGATIVE
PROT SERPL-MCNC: 6.9 G/DL (ref 6–8.5)
PROT UR QL STRIP: NEGATIVE
RBC # BLD AUTO: 3.93 10*6/MM3 (ref 3.77–5.28)
RBC # UR: ABNORMAL /HPF
REF LAB TEST METHOD: ABNORMAL
SODIUM SERPL-SCNC: 141 MMOL/L (ref 136–145)
SP GR UR STRIP: 1.01 (ref 1–1.03)
SQUAMOUS #/AREA URNS HPF: ABNORMAL /HPF
TRICYCLICS UR QL SCN: POSITIVE
TROPONIN T SERPL-MCNC: <0.01 NG/ML (ref 0–0.03)
TROPONIN T SERPL-MCNC: <0.01 NG/ML (ref 0–0.03)
UROBILINOGEN UR QL STRIP: ABNORMAL
WBC # BLD AUTO: 12.27 10*3/MM3 (ref 3.4–10.8)
WBC UR QL AUTO: ABNORMAL /HPF
WHOLE BLOOD HOLD SPECIMEN: NORMAL

## 2021-10-25 PROCEDURE — 25010000002 HYDROMORPHONE 1 MG/ML SOLUTION: Performed by: EMERGENCY MEDICINE

## 2021-10-25 PROCEDURE — 83735 ASSAY OF MAGNESIUM: CPT | Performed by: EMERGENCY MEDICINE

## 2021-10-25 PROCEDURE — 86140 C-REACTIVE PROTEIN: CPT | Performed by: EMERGENCY MEDICINE

## 2021-10-25 PROCEDURE — 85652 RBC SED RATE AUTOMATED: CPT | Performed by: EMERGENCY MEDICINE

## 2021-10-25 PROCEDURE — 80306 DRUG TEST PRSMV INSTRMNT: CPT | Performed by: EMERGENCY MEDICINE

## 2021-10-25 PROCEDURE — 82077 ASSAY SPEC XCP UR&BREATH IA: CPT | Performed by: EMERGENCY MEDICINE

## 2021-10-25 PROCEDURE — 96374 THER/PROPH/DIAG INJ IV PUSH: CPT

## 2021-10-25 PROCEDURE — 25010000002 ONDANSETRON PER 1 MG: Performed by: EMERGENCY MEDICINE

## 2021-10-25 PROCEDURE — 99284 EMERGENCY DEPT VISIT MOD MDM: CPT

## 2021-10-25 PROCEDURE — 96375 TX/PRO/DX INJ NEW DRUG ADDON: CPT

## 2021-10-25 PROCEDURE — 83605 ASSAY OF LACTIC ACID: CPT | Performed by: EMERGENCY MEDICINE

## 2021-10-25 PROCEDURE — 82550 ASSAY OF CK (CPK): CPT | Performed by: EMERGENCY MEDICINE

## 2021-10-25 PROCEDURE — 71045 X-RAY EXAM CHEST 1 VIEW: CPT | Performed by: RADIOLOGY

## 2021-10-25 PROCEDURE — 74176 CT ABD & PELVIS W/O CONTRAST: CPT | Performed by: RADIOLOGY

## 2021-10-25 PROCEDURE — 84484 ASSAY OF TROPONIN QUANT: CPT | Performed by: EMERGENCY MEDICINE

## 2021-10-25 PROCEDURE — 83880 ASSAY OF NATRIURETIC PEPTIDE: CPT | Performed by: EMERGENCY MEDICINE

## 2021-10-25 PROCEDURE — 80053 COMPREHEN METABOLIC PANEL: CPT | Performed by: EMERGENCY MEDICINE

## 2021-10-25 PROCEDURE — 71045 X-RAY EXAM CHEST 1 VIEW: CPT

## 2021-10-25 PROCEDURE — 74176 CT ABD & PELVIS W/O CONTRAST: CPT

## 2021-10-25 PROCEDURE — 93010 ELECTROCARDIOGRAM REPORT: CPT | Performed by: INTERNAL MEDICINE

## 2021-10-25 PROCEDURE — 93005 ELECTROCARDIOGRAM TRACING: CPT | Performed by: EMERGENCY MEDICINE

## 2021-10-25 PROCEDURE — 81001 URINALYSIS AUTO W/SCOPE: CPT | Performed by: EMERGENCY MEDICINE

## 2021-10-25 PROCEDURE — 85025 COMPLETE CBC W/AUTO DIFF WBC: CPT | Performed by: EMERGENCY MEDICINE

## 2021-10-25 PROCEDURE — 36415 COLL VENOUS BLD VENIPUNCTURE: CPT

## 2021-10-25 RX ORDER — NITROFURANTOIN 25; 75 MG/1; MG/1
100 CAPSULE ORAL 2 TIMES DAILY
Qty: 20 CAPSULE | Refills: 0 | Status: SHIPPED | OUTPATIENT
Start: 2021-10-25 | End: 2021-11-04

## 2021-10-25 RX ORDER — SODIUM CHLORIDE 0.9 % (FLUSH) 0.9 %
10 SYRINGE (ML) INJECTION AS NEEDED
Status: DISCONTINUED | OUTPATIENT
Start: 2021-10-25 | End: 2021-10-25 | Stop reason: HOSPADM

## 2021-10-25 RX ORDER — ONDANSETRON 2 MG/ML
4 INJECTION INTRAMUSCULAR; INTRAVENOUS ONCE
Status: COMPLETED | OUTPATIENT
Start: 2021-10-25 | End: 2021-10-25

## 2021-10-25 RX ORDER — HYDROCODONE BITARTRATE AND ACETAMINOPHEN 5; 325 MG/1; MG/1
1 TABLET ORAL EVERY 6 HOURS PRN
Qty: 10 TABLET | Refills: 0 | Status: SHIPPED | OUTPATIENT
Start: 2021-10-25 | End: 2022-03-01

## 2021-10-25 RX ORDER — HYDROCODONE BITARTRATE AND ACETAMINOPHEN 10; 325 MG/1; MG/1
1 TABLET ORAL ONCE
Status: COMPLETED | OUTPATIENT
Start: 2021-10-25 | End: 2021-10-25

## 2021-10-25 RX ADMIN — HYDROMORPHONE HYDROCHLORIDE 1 MG: 1 INJECTION, SOLUTION INTRAMUSCULAR; INTRAVENOUS; SUBCUTANEOUS at 10:10

## 2021-10-25 RX ADMIN — ONDANSETRON 4 MG: 2 INJECTION INTRAMUSCULAR; INTRAVENOUS at 10:11

## 2021-10-25 RX ADMIN — HYDROCODONE BITARTRATE AND ACETAMINOPHEN 1 TABLET: 10; 325 TABLET ORAL at 12:41

## 2021-10-25 RX ADMIN — SODIUM CHLORIDE 1000 ML: 9 INJECTION, SOLUTION INTRAVENOUS at 10:09

## 2021-10-25 NOTE — ED NOTES
PT STILL AT CT WILL COLLECT EKG AND LABS WHEN SHE RETURNS       Kasandra Roman, PCT  10/25/21 0959

## 2021-10-25 NOTE — ED PROVIDER NOTES
Subjective     History provided by:  Patient   used: No    Flank Pain  Pain location:  L flank  Pain quality: aching, cramping and dull    Pain radiates to:  Does not radiate  Pain severity:  Mild  Onset quality:  Gradual  Timing:  Constant  Progression:  Worsening  Chronicity:  Chronic  Context: not alcohol use, not awakening from sleep, not diet changes, not eating, not laxative use, not medication withdrawal, not previous surgeries, not recent illness, not recent sexual activity, not recent travel, not retching, not sick contacts, not suspicious food intake and not trauma    Relieved by:  Nothing  Worsened by:  Nothing  Ineffective treatments:  None tried  Associated symptoms: nausea    Associated symptoms: no anorexia, no belching, no chest pain, no chills, no constipation, no cough, no diarrhea, no dysuria, no fatigue, no fever, no flatus, no hematemesis, no hematochezia, no hematuria, no melena, no shortness of breath, no sore throat, no vaginal bleeding, no vaginal discharge and no vomiting    Risk factors: obesity    Risk factors: no alcohol abuse, no aspirin use, not elderly, has not had multiple surgeries, no NSAID use, not pregnant and no recent hospitalization        Review of Systems   Constitutional: Negative for activity change, appetite change, chills, diaphoresis, fatigue and fever.   HENT: Negative for congestion, ear pain and sore throat.    Eyes: Negative for redness.   Respiratory: Negative for cough, chest tightness, shortness of breath and wheezing.    Cardiovascular: Negative for chest pain, palpitations and leg swelling.   Gastrointestinal: Positive for abdominal pain and nausea. Negative for anorexia, constipation, diarrhea, flatus, hematemesis, hematochezia, melena and vomiting.   Genitourinary: Positive for flank pain. Negative for dysuria, hematuria, urgency, vaginal bleeding and vaginal discharge.   Musculoskeletal: Negative for arthralgias, back pain, myalgias and  neck pain.   Skin: Negative for pallor, rash and wound.   Neurological: Negative for dizziness, speech difficulty, weakness and headaches.   Psychiatric/Behavioral: Negative for agitation, behavioral problems, confusion and decreased concentration.   All other systems reviewed and are negative.      Past Medical History:   Diagnosis Date   • Arthritis     back   • Complicated UTI (urinary tract infection)    • Disease of thyroid gland     nodules   • GERD (gastroesophageal reflux disease)    • Insomnia    • Kidney stones    • Migraine    • Migraines    • Pneumonia    • Polycystic kidney disease    • PONV (postoperative nausea and vomiting)    • Strep throat        Allergies   Allergen Reactions   • Sulfa Antibiotics Anaphylaxis   • Compazine [Prochlorperazine Edisylate] Hives       Past Surgical History:   Procedure Laterality Date   • ABDOMINAL SURGERY     •  SECTION     • COLONOSCOPY     • CYSTOSCOPY     • CYSTOSCOPY ELECTROHYDRAULIC LITHOTRIPSY     • CYSTOSCOPY URETEROSCOPY LASER LITHOTRIPSY Left 2020    Procedure: CYSTOSCOPY URETEROSCOPY LASER LITHOTRIPSY;  Surgeon: Garth Friedman MD;  Location: [facility];  Service: Urology;  Laterality: Left;   • DENTAL PROCEDURE     • DILATATION AND CURETTAGE     • DILATATION AND CURETTAGE     • ENDOSCOPY     • EXTRACORPOREAL SHOCK WAVE LITHOTRIPSY (ESWL) Left 2018    Procedure: EXTRACORPOREAL SHOCKWAVE LITHOTRIPSY;  Surgeon: Garth Friedman MD;  Location: [facility];  Service: Urology   • EXTRACORPOREAL SHOCK WAVE LITHOTRIPSY (ESWL) Left 5/15/2020    Procedure: EXTRACORPOREAL SHOCKWAVE LITHOTRIPSY;  Surgeon: Garth Friedman MD;  Location: [facility];  Service: Urology;  Laterality: Left;   • KIDNEY STONE SURGERY     • LITHOTRIPSY         Family History   Problem Relation Age of Onset   • No Known Problems Father    • No Known Problems Mother        Social History     Socioeconomic History   • Marital status:    Tobacco Use   •  Smoking status: Former Smoker     Packs/day: 0.50     Years: 15.00     Pack years: 7.50     Types: Cigarettes     Quit date: 1/1/2018     Years since quitting: 3.8   • Smokeless tobacco: Never Used   Vaping Use   • Vaping Use: Never used   Substance and Sexual Activity   • Alcohol use: No   • Drug use: No   • Sexual activity: Yes     Partners: Male     Birth control/protection: None           Objective   Physical Exam  Vitals and nursing note reviewed.   Constitutional:       General: She is not in acute distress.     Appearance: Normal appearance. She is well-developed. She is not toxic-appearing or diaphoretic.   HENT:      Head: Normocephalic and atraumatic.      Right Ear: External ear normal.      Left Ear: External ear normal.      Nose: Nose normal.      Mouth/Throat:      Pharynx: No oropharyngeal exudate.      Tonsils: No tonsillar exudate.   Eyes:      General: Lids are normal.      Conjunctiva/sclera: Conjunctivae normal.      Pupils: Pupils are equal, round, and reactive to light.   Neck:      Thyroid: No thyromegaly.   Cardiovascular:      Rate and Rhythm: Normal rate and regular rhythm.      Pulses: Normal pulses.      Heart sounds: Normal heart sounds, S1 normal and S2 normal.   Pulmonary:      Effort: Pulmonary effort is normal. No tachypnea or respiratory distress.      Breath sounds: Normal breath sounds. No decreased breath sounds, wheezing or rales.   Chest:      Chest wall: No tenderness.   Abdominal:      General: Bowel sounds are normal. There is no distension.      Palpations: Abdomen is soft.      Tenderness: There is abdominal tenderness. There is left CVA tenderness. There is no guarding or rebound.   Musculoskeletal:         General: No tenderness or deformity. Normal range of motion.      Cervical back: Full passive range of motion without pain, normal range of motion and neck supple.   Lymphadenopathy:      Cervical: No cervical adenopathy.   Skin:     General: Skin is warm and dry.       Coloration: Skin is not pale.      Findings: No erythema or rash.   Neurological:      Mental Status: She is alert and oriented to person, place, and time.      GCS: GCS eye subscore is 4. GCS verbal subscore is 5. GCS motor subscore is 6.      Cranial Nerves: No cranial nerve deficit.      Sensory: No sensory deficit.   Psychiatric:         Speech: Speech normal.         Behavior: Behavior normal.         Thought Content: Thought content normal.         Judgment: Judgment normal.         Procedures           ED Course  ED Course as of 10/25/21 1214   Mon Oct 25, 2021   1209 CT Abdomen Pelvis Without Contrast  IMPRESSION:  1.  1 mm calcification in the distal left ureter with only minimal  left-sided ureteral dilatation. No significant obstructive uropathy is  otherwise noted.  2.  Lateral nonobstructing kidney stones.  3.  Otherwise stable exam with other nonacute findings as above. [ES]   1209 XR Chest 1 View  IMPRESSION:    Stable chest. No acute cardiopulmonary findings. [ES]   1210 ECG 12 Lead  Normal sinus rhythm  Normal ECG  When compared with ECG of 04-APR-2021 15:34,  ST less depressed in Anterior leads  Vent. rate 78 BPM  NM interval 146 ms  QRS duration 76 ms  QT/QTcB 404/460 ms  P-R-T axes 44 28 47 [ES]      ED Course User Index  [ES] Santos Burroughs MD                                           MDM  Number of Diagnoses or Management Options  Bacterial UTI: new and requires workup  Left ureteral stone: new and requires workup     Amount and/or Complexity of Data Reviewed  Clinical lab tests: reviewed and ordered  Tests in the radiology section of CPT®: reviewed and ordered  Tests in the medicine section of CPT®: reviewed and ordered  Review and summarize past medical records: yes  Independent visualization of images, tracings, or specimens: yes    Risk of Complications, Morbidity, and/or Mortality  Presenting problems: moderate  Diagnostic procedures: moderate  Management options:  moderate    Patient Progress  Patient progress: stable      Final diagnoses:   Left ureteral stone   Bacterial UTI       ED Disposition  ED Disposition     ED Disposition Condition Comment    Discharge Stable           Garth Friedman MD  [address] [facility]  [address]  [facility] [id number] [phone]    Schedule an appointment as soon as possible for a visit in 1 day  EVALUATE         Medication List      New Prescriptions    HYDROcodone-acetaminophen 5-325 MG per tablet  Commonly known as: NORCO  Take 1 tablet by mouth Every 6 (Six) Hours As Needed for Severe Pain .     nitrofurantoin (macrocrystal-monohydrate) 100 MG capsule  Commonly known as: MACROBID  Take 1 capsule by mouth 2 (Two) Times a Day for 10 days.           Where to Get Your Medications      These medications were sent to [address] - [phone] [facility] [phone] [facility] [address] [place] [address]    Phone: [phone]   · HYDROcodone-acetaminophen 5-325 MG per tablet  · nitrofurantoin (macrocrystal-monohydrate) 100 MG capsule          Santos Burroughs MD  10/25/21 7688

## 2021-10-26 LAB
QT INTERVAL: 404 MS
QTC INTERVAL: 460 MS

## 2021-11-19 ENCOUNTER — OFFICE VISIT (OUTPATIENT)
Dept: UROLOGY | Facility: CLINIC | Age: 46
End: 2021-11-19

## 2021-11-19 ENCOUNTER — HOSPITAL ENCOUNTER (OUTPATIENT)
Dept: GENERAL RADIOLOGY | Facility: HOSPITAL | Age: 46
Discharge: HOME | End: 2021-11-19
Admitting: NURSE PRACTITIONER

## 2021-11-19 VITALS — WEIGHT: 179.9 LBS | HEIGHT: 62 IN | BODY MASS INDEX: 33.1 KG/M2

## 2021-11-19 DIAGNOSIS — N20.0 KIDNEY STONE: ICD-10-CM

## 2021-11-19 DIAGNOSIS — R35.0 URINE FREQUENCY: ICD-10-CM

## 2021-11-19 DIAGNOSIS — R10.9 BILATERAL FLANK PAIN: ICD-10-CM

## 2021-11-19 DIAGNOSIS — R31.9 URINARY TRACT INFECTION WITH HEMATURIA, SITE UNSPECIFIED: ICD-10-CM

## 2021-11-19 DIAGNOSIS — N20.1 LEFT URETERAL CALCULUS: ICD-10-CM

## 2021-11-19 DIAGNOSIS — R10.9 LEFT FLANK PAIN: ICD-10-CM

## 2021-11-19 DIAGNOSIS — N30.01 ACUTE CYSTITIS WITH HEMATURIA: ICD-10-CM

## 2021-11-19 DIAGNOSIS — R30.0 DYSURIA: ICD-10-CM

## 2021-11-19 DIAGNOSIS — N39.0 URINARY TRACT INFECTION WITH HEMATURIA, SITE UNSPECIFIED: ICD-10-CM

## 2021-11-19 DIAGNOSIS — R10.9 LEFT FLANK PAIN: Primary | ICD-10-CM

## 2021-11-19 PROCEDURE — 74018 RADEX ABDOMEN 1 VIEW: CPT | Performed by: RADIOLOGY

## 2021-11-19 PROCEDURE — 87086 URINE CULTURE/COLONY COUNT: CPT | Performed by: NURSE PRACTITIONER

## 2021-11-19 PROCEDURE — 74018 RADEX ABDOMEN 1 VIEW: CPT

## 2021-11-19 PROCEDURE — 96372 THER/PROPH/DIAG INJ SC/IM: CPT | Performed by: NURSE PRACTITIONER

## 2021-11-19 PROCEDURE — 99214 OFFICE O/P EST MOD 30 MIN: CPT | Performed by: NURSE PRACTITIONER

## 2021-11-19 RX ORDER — TAMSULOSIN HYDROCHLORIDE 0.4 MG/1
1 CAPSULE ORAL DAILY
Qty: 30 CAPSULE | Refills: 0 | Status: SHIPPED | OUTPATIENT
Start: 2021-11-19 | End: 2022-03-01 | Stop reason: SDUPTHER

## 2021-11-19 RX ORDER — KETOROLAC TROMETHAMINE 30 MG/ML
30 INJECTION, SOLUTION INTRAMUSCULAR; INTRAVENOUS EVERY 6 HOURS PRN
Status: SHIPPED | OUTPATIENT
Start: 2021-11-19 | End: 2021-11-24

## 2021-11-19 RX ORDER — ONDANSETRON 4 MG/1
4 TABLET, ORALLY DISINTEGRATING ORAL EVERY 6 HOURS PRN
Qty: 12 TABLET | Refills: 0 | Status: SHIPPED | OUTPATIENT
Start: 2021-11-19 | End: 2022-03-01 | Stop reason: SDUPTHER

## 2021-11-19 RX ORDER — TRAMADOL HYDROCHLORIDE 50 MG/1
50 TABLET ORAL EVERY 6 HOURS PRN
Qty: 60 TABLET | Refills: 2 | Status: SHIPPED | OUTPATIENT
Start: 2021-11-19 | End: 2022-02-17 | Stop reason: SDUPTHER

## 2021-11-19 RX ORDER — KETOROLAC TROMETHAMINE 10 MG/1
10 TABLET, FILM COATED ORAL EVERY 6 HOURS PRN
Qty: 16 TABLET | Refills: 0 | Status: SHIPPED | OUTPATIENT
Start: 2021-11-19 | End: 2022-02-17 | Stop reason: SDUPTHER

## 2021-11-19 RX ORDER — PHENAZOPYRIDINE HYDROCHLORIDE 200 MG/1
200 TABLET, FILM COATED ORAL 3 TIMES DAILY PRN
Qty: 20 TABLET | Refills: 0 | Status: SHIPPED | OUTPATIENT
Start: 2021-11-19

## 2021-11-19 RX ADMIN — KETOROLAC TROMETHAMINE 30 MG: 30 INJECTION, SOLUTION INTRAMUSCULAR; INTRAVENOUS at 08:56

## 2021-11-19 NOTE — PATIENT INSTRUCTIONS
"Textbook of Natural Medicine (5th ed., pp. 4510-7628.e3). [place], [place]: [employer].\">   Dietary Guidelines to Help Prevent Kidney Stones  Kidney stones are deposits of minerals and salts that form inside your kidneys. Your risk of developing kidney stones may be greater depending on your diet, your lifestyle, the medicines you take, and whether you have certain medical conditions. Most people can lower their chances of developing kidney stones by following the instructions below. Your dietitian may give you more specific instructions depending on your overall health and the type of kidney stones you tend to develop.  What are tips for following this plan?  Reading food labels    · Choose foods with \"no salt added\" or \"low-salt\" labels. Limit your salt (sodium) intake to less than 1,500 mg a day.  · Choose foods with calcium for each meal and snack. Try to eat about 300 mg of calcium at each meal. Foods that contain 200-500 mg of calcium a serving include:  ? 8 oz (237 mL) of milk, calcium-fortifiednon-dairy milk, and calcium-fortifiedfruit juice. Calcium-fortified means that calcium has been added to these drinks.  ? 8 oz (237 mL) of kefir, yogurt, and soy yogurt.  ? 4 oz (114 g) of tofu.  ? 1 oz (28 g) of cheese.  ? 1 cup (150 g) of dried figs.  ? 1 cup (91 g) of cooked broccoli.  ? One 3 oz (85 g) can of sardines or mackerel.  Most people need 1,000-1,500 mg of calcium a day. Talk to your dietitian about how much calcium is recommended for you.  Shopping  · Buy plenty of fresh fruits and vegetables. Most people do not need to avoid fruits and vegetables, even if these foods contain nutrients that may contribute to kidney stones.  · When shopping for convenience foods, choose:  ? Whole pieces of fruit.  ? Pre-made salads with dressing on the side.  ? Low-fat fruit and yogurt smoothies.  · Avoid buying frozen meals or prepared deli foods. These can be high in sodium.  · Look for foods with live cultures, such as " yogurt and kefir.  · Choose high-fiber grains, such as whole-wheat breads, oat bran, and wheat cereals.  Cooking  · Do not add salt to food when cooking. Place a salt shaker on the table and allow each person to add his or her own salt to taste.  · Use vegetable protein, such as beans, textured vegetable protein (TVP), or tofu, instead of meat in pasta, casseroles, and soups.  Meal planning  · Eat less salt, if told by your dietitian. To do this:  ? Avoid eating processed or pre-made food.  ? Avoid eating fast food.  · Eat less animal protein, including cheese, meat, poultry, or fish, if told by your dietitian. To do this:  ? Limit the number of times you have meat, poultry, fish, or cheese each week. Eat a diet free of meat at least 2 days a week.  ? Eat only one serving each day of meat, poultry, fish, or seafood.  ? When you prepare animal protein, cut pieces into small portion sizes. For most meat and fish, one serving is about the size of the palm of your hand.  · Eat at least five servings of fresh fruits and vegetables each day. To do this:  ? Keep fruits and vegetables on hand for snacks.  ? Eat one piece of fruit or a handful of berries with breakfast.  ? Have a salad and fruit at lunch.  ? Have two kinds of vegetables at dinner.  · Limit foods that are high in a substance called oxalate. These include:  ? Spinach (cooked), rhubarb, beets, sweet potatoes, and Swiss chard.  ? Peanuts.  ? Potato chips, french fries, and baked potatoes with skin on.  ? Nuts and nut products.  ? Chocolate.  · If you regularly take a diuretic medicine, make sure to eat at least 1 or 2 servings of fruits or vegetables that are high in potassium each day. These include:  ? Avocado.  ? Banana.  ? Orange, prune, carrot, or tomato juice.  ? Baked potato.  ? Cabbage.  ? Beans and split peas.  Lifestyle    · Drink enough fluid to keep your urine pale yellow. This is the most important thing you can do. Spread your fluid intake  throughout the day.  · If you drink alcohol:  ? Limit how much you use to:  § 0-1 drink a day for women who are not pregnant.  § 0-2 drinks a day for men.  ? Be aware of how much alcohol is in your drink. In the [place] one drink equals one 12 oz bottle of beer (355 mL), one 5 oz glass of wine (148 mL), or one 1½ oz glass of hard liquor (44 mL).  · Lose weight if told by your health care provider. Work with your dietitian to find an eating plan and weight loss strategies that work best for you.    General information  · Talk to your health care provider and dietitian about taking daily supplements. You may be told the following depending on your health and the cause of your kidney stones:  ? Not to take supplements with vitamin C.  ? To take a calcium supplement.  ? To take a daily probiotic supplement.  ? To take other supplements such as magnesium, fish oil, or vitamin B6.  · Take over-the-counter and prescription medicines only as told by your health care provider. These include supplements.  What foods should I limit?  Limit your intake of the following foods, or eat them as told by your dietitian.  Vegetables  Spinach. Rhubarb. Beets. Canned vegetables. Pickles. Olives. Baked potatoes with skin.  Grains  Wheat bran. Baked goods. Salted crackers. Cereals high in sugar.  Meats and other proteins  Nuts. Nut butters. Large portions of meat, poultry, or fish. Salted, precooked, or cured meats, such as sausages, meat loaves, and hot dogs.  Dairy  Cheese.  Beverages  Regular soft drinks. Regular vegetable juice.  Seasonings and condiments  Seasoning blends with salt. Salad dressings. Soy sauce. Ketchup. Barbecue sauce.  Other foods  Canned soups. Canned pasta sauce. Casseroles. Pizza. Lasagna. Frozen meals. Potato chips. French fries.  The items listed above may not be a complete list of foods and beverages you should limit. Contact a dietitian for more information.  What foods should I avoid?  Talk to your dietitian  about specific foods you should avoid based on the type of kidney stones you have and your overall health.  Fruits  Grapefruit.  The item listed above may not be a complete list of foods and beverages you should avoid. Contact a dietitian for more information.  Summary  · Kidney stones are deposits of minerals and salts that form inside your kidneys.  · You can lower your risk of kidney stones by making changes to your diet.  · The most important thing you can do is drink enough fluid. Drink enough fluid to keep your urine pale yellow.  · Talk to your dietitian about how much calcium you should have each day, and eat less salt and animal protein as told by your dietitian.  This information is not intended to replace advice given to you by your health care provider. Make sure you discuss any questions you have with your health care provider.  Document Revised: 12/10/2020 Document Reviewed: 12/10/2020  [employer] Patient Education © 2021 [employer]      Low-Purine Eating Plan  A low-purine eating plan involves making food choices to limit your intake of purine. Purine is a kind of uric acid. Too much uric acid in your blood can cause certain conditions, such as gout and kidney stones. Eating a low-purine diet can help control these conditions.  What are tips for following this plan?  Reading food labels  · Avoid foods with saturated or Trans fat.  · Check the ingredient list of grains-based foods, such as bread and cereal, to make sure that they contain whole grains.  · Check the ingredient list of sauces or soups to make sure they do not contain meat or fish.  · When choosing soft drinks, check the ingredient list to make sure they do not contain high-fructose corn syrup.  Shopping    · Buy plenty of fresh fruits and vegetables.  · Avoid buying canned or fresh fish.  · Buy dairy products labeled as low-fat or nonfat.  · Avoid buying premade or processed foods. These foods are often high in fat, salt (sodium), and  added sugar.    Cooking  · Use olive oil instead of butter when cooking. Oils like olive oil, canola oil, and sunflower oil contain healthy fats.  Meal planning  · Learn which foods do or do not affect you. If you find out that a food tends to cause your gout symptoms to flare up, avoid eating that food. You can enjoy foods that do not cause problems. If you have any questions about a food item, talk with your dietitian or health care provider.  · Limit foods high in fat, especially saturated fat. Fat makes it harder for your body to get rid of uric acid.  · Choose foods that are lower in fat and are lean sources of protein.  General guidelines  · Limit alcohol intake to no more than 1 drink a day for nonpregnant women and 2 drinks a day for men. One drink equals 12 oz of beer, 5 oz of wine, or 1½ oz of hard liquor. Alcohol can affect the way your body gets rid of uric acid.  · Drink plenty of water to keep your urine clear or pale yellow. Fluids can help remove uric acid from your body.  · If directed by your health care provider, take a vitamin C supplement.  · Work with your health care provider and dietitian to develop a plan to achieve or maintain a healthy weight. Losing weight can help reduce uric acid in your blood.  What foods are recommended?  The items listed may not be a complete list. Talk with your dietitian about what dietary choices are best for you.  Foods low in purines  Foods low in purines do not need to be limited. These include:  · All fruits.  · All low-purine vegetables, pickles, and olives.  · Breads, pasta, rice, cornbread, and popcorn. Cake and other baked goods.  · All dairy foods.  · Eggs, nuts, and nut butters.  · Spices and condiments, such as salt, herbs, and vinegar.  · Plant oils, butter, and margarine.  · Water, sugar-free soft drinks, tea, coffee, and cocoa.  · Vegetable-based soups, broths, sauces, and gravies.  Foods moderate in purines  Foods moderate in purines should be  limited to the amounts listed.  · ½ cup of asparagus, cauliflower, spinach, mushrooms, or green peas, each day.  · 2/3 cup uncooked oatmeal, each day.  · ¼ cup dry wheat bran or wheat germ, each day.  · 2-3 ounces of meat or poultry, each day.  · 4-6 ounces of shellfish, such as crab, lobster, oysters, or shrimp, each day.  · 1 cup cooked beans, peas, or lentils, each day.  · Soup, broths, or bouillon made from meat or fish. Limit these foods as much as possible.  What foods are not recommended?  The items listed may not be a complete list. Talk with your dietitian about what dietary choices are best for you.  Limit your intake of foods high in purines, including:  · Beer and other alcohol.  · Meat-based gravy or sauce.  · Canned or fresh fish, such as:  ? Anchovies, sardines, herring, and tuna.  ? Mussels and scallops.  ? Codfish, trout, and matt.  · Hernandez.  · Organ meats, such as:  ? Liver or kidney.  ? Tripe.  ? Sweetbreads (thymus gland or pancreas).  · Wild game or goose.  · Yeast or yeast extract supplements.  · Drinks sweetened with high-fructose corn syrup.  Summary  · Eating a low-purine diet can help control conditions caused by too much uric acid in the body, such as gout or kidney stones.  · Choose low-purine foods, limit alcohol, and limit foods high in fat.  · You will learn over time which foods do or do not affect you. If you find out that a food tends to cause your gout symptoms to flare up, avoid eating that food.  This information is not intended to replace advice given to you by your health care provider. Make sure you discuss any questions you have with your health care provider.  Document Revised: 04/01/2021 Document Reviewed: 04/01/2021  [employer] Patient Education © 2021 [employer]    Dysuria  Dysuria is pain or discomfort while urinating. The pain or discomfort may be felt in the part of your body that drains urine from the bladder (urethra) or in the surrounding tissue of the genitals.  The pain may also be felt in the groin area, lower abdomen, or lower back. You may have to urinate frequently or have the sudden feeling that you have to urinate (urgency). Dysuria can affect both men and women, but it is more common in women.  Dysuria can be caused by many different things, including:  · Urinary tract infection.  · Kidney stones or bladder stones.  · Certain sexually transmitted infections (STIs), such as chlamydia.  · Dehydration.  · Inflammation of the tissues of the vagina.  · Use of certain medicines.  · Use of certain soaps or scented products that cause irritation.  Follow these instructions at home:  General instructions  · Watch your condition for any changes.  · Urinate often. Avoid holding urine for long periods of time.  · After a bowel movement or urination, women should cleanse from front to back, using each tissue only once.  · Urinate after sexual intercourse.  · Keep all follow-up visits as told by your health care provider. This is important.  · If you had any tests done to find the cause of dysuria, it is up to you to get your test results. Ask your health care provider, or the department that is doing the test, when your results will be ready.  Eating and drinking    · Drink enough fluid to keep your urine pale yellow.  · Avoid caffeine, tea, and alcohol. They can irritate the bladder and make dysuria worse. In men, alcohol may irritate the prostate.    Medicines  · Take over-the-counter and prescription medicines only as told by your health care provider.  · If you were prescribed an antibiotic medicine, take it as told by your health care provider. Do not stop taking the antibiotic even if you start to feel better.  Contact a health care provider if:  · You have a fever.  · You develop pain in your back or sides.  · You have nausea or vomiting.  · You have blood in your urine.  · You are not urinating as often as you usually do.  Get help right away if:  · Your pain is severe  and not relieved with medicines.  · You cannot eat or drink without vomiting.  · You are confused.  · You have a rapid heartbeat while at rest.  · You have shaking or chills.  · You feel extremely weak.  Summary  · Dysuria is pain or discomfort while urinating. Many different conditions can lead to dysuria.  · If you have dysuria, you may have to urinate frequently or have the sudden feeling that you have to urinate (urgency).  · Watch your condition for any changes. Keep all follow-up visits as told by your health care provider.  · Make sure that you urinate often and drink enough fluid to keep your urine pale yellow.  This information is not intended to replace advice given to you by your health care provider. Make sure you discuss any questions you have with your health care provider.  Document Revised: 11/30/2018 Document Reviewed: 10/04/2018  [employer] Patient Education © 2021 [employer]  Flank Pain, Adult  Flank pain is pain in your side. The flank is the area of your side between your upper belly (abdomen) and your back. The pain may occur over a short time (acute), or it may be long-term or come back often (chronic). It may be mild or very bad. Pain in this area can be caused by many different things.  Follow these instructions at home:    · Drink enough fluid to keep your pee (urine) clear or pale yellow.  · Rest as told by your doctor.  · Take over-the-counter and prescription medicines only as told by your doctor.  · Keep a journal to keep track of:  ? What has caused your flank pain.  ? What has made it feel better.  · Keep all follow-up visits as told by your doctor. This is important.  Contact a doctor if:  · Medicine does not help your pain.  · You have new symptoms.  · Your pain gets worse.  · You have a fever.  · Your symptoms last longer than 2-3 days.  · You have trouble peeing.  · You are peeing more often than normal.  Get help right away if:  · You have trouble breathing.  · You are short of  breath.  · Your belly hurts, or it is swollen or red.  · You feel sick to your stomach (nauseous).  · You throw up (vomit).  · You feel like you will pass out, or you do pass out (faint).  · You have blood in your pee.  Summary  · Flank pain is pain in your side. The flank is the area of your side between your upper belly (abdomen) and your back.  · Flank pain may occur over a short time (acute), or it may be long-term or come back often (chronic). It may be mild or very bad.  · Pain in this area can be caused by many different things.  · Contact your doctor if your symptoms get worse or they last longer than 2-3 days.  This information is not intended to replace advice given to you by your health care provider. Make sure you discuss any questions you have with your health care provider.  Document Revised: 11/30/2018 Document Reviewed: 04/09/2018  [employer] Patient Education © 2021 [employer]

## 2021-11-19 NOTE — PROGRESS NOTES
Chief Complaint  Right Nephrolithiasis/Left Flank pain/Acute Cystitis (2 week follow up/left Reteral stone CT ER 10/25)    Devonte Richmond presents to [facility] for   History of Present Illness    Ms Olivia Richmond, is a pleasant female with a significant history of recurrent kidney stones/MSK, who returns to clinic today for evaluation.She reports severe left groin/flank pain that has worsened since last night. She is also  an ER follow-up for kidney stones.  Patient states she presented to the [facility] [place] ED on 10/25/21 secondary to very sharp 10/10, aching and very consistent left flank pain radiating to her suprapubic region, with nausea, vomiting and urinary frequency and dysuria.     She had a CT scan done in the ED that showed a 1 mm left distal ureteral calculus, causing moderate left hydronephrosis.She had right nonobstructing Nerocalcinosis, A  2.2 cm right renal cyst with some peripheral calcifications noted. Her repeat KUb today is Unremarkable.                            IMPRESSION:  Persistent stone in the intrarenal collecting system of the  right kidney. The small 1 mm stone in the distal left ureter is not  demonstratable on the KUB     Today 11/19/21, On presentation to clinic, she is very tearful, sitting curled over in exam room with her eys red shot from crying in pain, she is miserable today.  She reports significant discomfort 7/10.  She reports left flank pain that is still very colicky, constant but occasionally very sharp.  She reports left lower quadrant pain, significant pressure/discomfort still in her left lower abdomen and pelvic region.  She still has nausea, no vomiting, denies chills or fevers.  She reports urinary symptoms of frequency, urgency, and dysuria.  She has a frequent urge to void,  She she reports burning on urination,  but denies any episodes of gross hematuria.She took some pyridium with no relief. Will send  "urine for culture.    Objective   Vital Signs:   Ht 157.5 cm (62.01\")   Wt 81.6 kg (179 lb 14.3 oz)   BMI 32.89 kg/m²     Physical Exam  Constitutional:       General: She is in acute distress.      Appearance: She is well-developed. She is obese. She is ill-appearing.   HENT:      Head: Normocephalic and atraumatic.   Eyes:      Pupils: Pupils are equal, round, and reactive to light.   Neck:      Thyroid: No thyromegaly.      Trachea: No tracheal deviation.   Cardiovascular:      Rate and Rhythm: Normal rate and regular rhythm.      Heart sounds: No murmur heard.      Pulmonary:      Effort: Pulmonary effort is normal. No respiratory distress.      Breath sounds: Normal breath sounds. No stridor. No wheezing.   Abdominal:      General: Bowel sounds are normal. There is distension.      Palpations: Abdomen is soft.      Tenderness: There is abdominal tenderness. There is right CVA tenderness, left CVA tenderness and guarding.   Genitourinary:     Labia:         Right: No tenderness.         Left: No tenderness.       Vagina: Normal. No vaginal discharge.      Comments: Left>right flank pain  Musculoskeletal:         General: Tenderness present. No deformity. Normal range of motion.      Cervical back: Normal range of motion.   Skin:     General: Skin is warm and dry.      Capillary Refill: Capillary refill takes less than 2 seconds.      Coloration: Skin is pale.      Findings: No erythema or rash.   Neurological:      Mental Status: She is alert and oriented to person, place, and time.      Cranial Nerves: No cranial nerve deficit.      Sensory: No sensory deficit.      Motor: Weakness present.      Coordination: Coordination normal.   Psychiatric:         Behavior: Behavior normal.         Thought Content: Thought content normal.         Judgment: Judgment normal.        Result Review :     CMP    CMP 4/4/21 10/25/21   Glucose 97 92   BUN 8 9   Creatinine 0.85 0.82   eGFR Non African Am 73 75   Sodium 137 141 "   Potassium 4.4 3.9   Chloride 103 107   Calcium 9.4 8.6   Albumin 4.45 3.98   Total Bilirubin 0.7 0.3   Alkaline Phosphatase 74 62   AST (SGOT) 23 14   ALT (SGPT) 10 8      Comments are available for some flowsheets but are not being displayed.           CBC w/diff    CBC w/Diff 4/4/21 10/25/21   WBC 16.34 (A) 12.27 (A)   RBC 4.73 3.93   Hemoglobin 14.4 12.2   Hematocrit 43.6 37.3   MCV 92.2 94.9   MCH 30.4 31.0   MCHC 33.0 32.7   RDW 13.0 12.8   Platelets 335 287   Neutrophil Rel % 65.6 61.6   Immature Granulocyte Rel % 0.4 0.5   Lymphocyte Rel % 27.3 30.8   Monocyte Rel % 5.6 5.6   Eosinophil Rel % 0.9 1.3   Basophil Rel % 0.2 0.2   (A) Abnormal value            Renal Profile    Renal Profile 4/4/21 10/25/21   BUN 8 9   Creatinine 0.85 0.82   eGFR Non African Am 73 75           UA    Urinalysis 4/4/21 4/4/21 8/31/21 10/25/21 10/25/21    1406 1406  1104 1104   Squamous Epithelial Cells, UA  21-30 (A)   7-12 (A)   Specific Ewing, UA 1.016   1.010    Ketones, UA Negative  Negative Negative    Blood, UA Small (1+) (A)   Negative    Leukocytes, UA Negative  Trace (A) Small (1+) (A)    Nitrite, UA Negative   Positive (A)    RBC, UA  6-12 (A)   0-2   WBC, UA  0-2   3-5 (A)   Bacteria, UA  Trace (A)   1+ (A)   (A) Abnormal value            Urine Culture    Urine Culture 2/18/21 8/31/21   Urine Culture No growth No growth           Data reviewed: Radiologic studies kub done today 11/19, reviewed ct done 10/25 in ED          Assessment and Plan    Problem List Items Addressed This Visit        Gastrointestinal Abdominal     Left flank pain - Primary    Relevant Medications    ketorolac (TORADOL) 10 MG tablet    tamsulosin (FLOMAX) 0.4 MG capsule 24 hr capsule    phenazopyridine (PYRIDIUM) 200 MG tablet    ondansetron ODT (ZOFRAN-ODT) 4 MG disintegrating tablet    ketorolac (TORADOL) injection 30 mg    Other Relevant Orders    XR abdomen kub (Completed)    Urine Culture - Urine, Urine, Clean Catch       Genitourinary and  Reproductive     Left ureteral calculus    Relevant Medications    tamsulosin (FLOMAX) 0.4 MG capsule 24 hr capsule    ondansetron ODT (ZOFRAN-ODT) 4 MG disintegrating tablet    traMADol (ULTRAM) 50 MG tablet      Other Visit Diagnoses     Urine frequency        Relevant Medications    tamsulosin (FLOMAX) 0.4 MG capsule 24 hr capsule    Urinary tract infection with hematuria, site unspecified        Relevant Medications    tamsulosin (FLOMAX) 0.4 MG capsule 24 hr capsule    Bilateral flank pain        Relevant Medications    tamsulosin (FLOMAX) 0.4 MG capsule 24 hr capsule    ketorolac (TORADOL) injection 30 mg    Acute cystitis with hematuria        Relevant Medications    tamsulosin (FLOMAX) 0.4 MG capsule 24 hr capsule    phenazopyridine (PYRIDIUM) 200 MG tablet    Dysuria        Relevant Medications    phenazopyridine (PYRIDIUM) 200 MG tablet    Kidney stone        Relevant Medications    ketorolac (TORADOL) 10 MG tablet    tamsulosin (FLOMAX) 0.4 MG capsule 24 hr capsule    phenazopyridine (PYRIDIUM) 200 MG tablet    ondansetron ODT (ZOFRAN-ODT) 4 MG disintegrating tablet    traMADol (ULTRAM) 50 MG tablet                                                  ASSESSMENT  Left Flank Pain / Bilateral Nephrolithiasis / Left Distal Ureteral Calculus  44-year-old female with very metabolic active stone disease presents to clinic today for evaluation of her very significant left-sided pain from a left obstructing stone.  She is apparently uncomfortable, and reports her pain and discomfort as colicky in nature, constant, constant 7 and 10.     We have discussed the various parameters regarding spontaneous passage including the notion that a TINY  1-3 mm stone has a 95% high likelihood of spontaneous passage versus a larger stone being caught up in the upper areas of the urinary tract.  We also discussed the medical management of stone disease and the use of medical expulsive therapy in the form of Flomax. This is used in an  off label setting. I also talked about nonoperative management including ambulation and increasing fluids to atleast 2-3L, and hot tubs as being an effective adjuncts in the treatment of a ureteral stone.  Patient is very familiar with this.      We discussed the absolute relative indicators for intervention including the presence of sepsis, and pain we cannot control as the primary need for urgent intervention.      I Discussed this case with Dr. Friedman, who is very familiar this patient, and he is agreeable with plan of care.  Due to the significant discomfort,                                         PLAN  WILL MANAGE HER PAIN AND DISCOMFORT AS TOLERATED. Patient HAD some adequate narcotic pain control from the ER, COMPLETED SHE STATES. ALSO GIVEN as needed Tramadol to utilize for discomfort if need be.    IM TORODOL GIVEN IN CLINIC TODAY X 1 DOSE,    Sent her urine for culture, will call with results if any positive bacteria growth contributing to her dysuria.    Encouraged to continue conservative therapy at home, increasing PO fluids, ambulation, hot baths/showers as tolerated.     Also encouraged to continue Flomax, nausea medicine, alternating NSAIDs/Tylenol for breakthrough pain.     We will see at follow-up,     Patient is agreeable WITH plan of care.     Patient reports that she is not currently experiencing any symptoms of urinary incontinence.     Patient reports that she is not currently experiencing any symptoms of urinary incontinence.    Patient's Body mass index is 32.89 kg/m². indicating that she is obese (BMI >30). Obesity-related health conditions include the following: hypertension, GERD and lower extremity venous stasis disease. Obesity is improving with lifestyle modifications. BMI is 32.89. We discussed portion control and increasing exercise..  Smoking Cessation Counseling:  Former smoker.  Patient does not currently use any tobacco products.     Follow Up   Return in about 3 months (around  2/19/2022) for RECURRENT UTI/DYSURIA/DETRUSSOR INSTABILITY/FLANK PAIN.     Patient was given instructions and counseling regarding her condition or for health maintenance advice. Please see specific information pulled into the AVS if appropriate.

## 2021-11-20 LAB — BACTERIA SPEC AEROBE CULT: NO GROWTH

## 2022-02-17 ENCOUNTER — HOSPITAL ENCOUNTER (OUTPATIENT)
Dept: GENERAL RADIOLOGY | Facility: HOSPITAL | Age: 47
Discharge: HOME | End: 2022-02-17

## 2022-02-17 ENCOUNTER — OFFICE VISIT (OUTPATIENT)
Dept: UROLOGY | Facility: CLINIC | Age: 47
End: 2022-02-17

## 2022-02-17 VITALS — HEIGHT: 62 IN | BODY MASS INDEX: 32.94 KG/M2 | WEIGHT: 179 LBS

## 2022-02-17 DIAGNOSIS — R10.9 LEFT FLANK PAIN: ICD-10-CM

## 2022-02-17 DIAGNOSIS — N20.0 KIDNEY STONE: ICD-10-CM

## 2022-02-17 DIAGNOSIS — N20.1 LEFT URETERAL CALCULUS: ICD-10-CM

## 2022-02-17 DIAGNOSIS — N20.1 LEFT URETERAL CALCULUS: Primary | ICD-10-CM

## 2022-02-17 PROCEDURE — 99213 OFFICE O/P EST LOW 20 MIN: CPT | Performed by: UROLOGY

## 2022-02-17 PROCEDURE — 74018 RADEX ABDOMEN 1 VIEW: CPT

## 2022-02-17 PROCEDURE — 74018 RADEX ABDOMEN 1 VIEW: CPT | Performed by: RADIOLOGY

## 2022-02-17 RX ORDER — TRAMADOL HYDROCHLORIDE 50 MG/1
50 TABLET ORAL EVERY 6 HOURS PRN
Qty: 60 TABLET | Refills: 2 | Status: SHIPPED | OUTPATIENT
Start: 2022-02-17 | End: 2022-04-20 | Stop reason: SDUPTHER

## 2022-02-17 RX ORDER — OXYCODONE AND ACETAMINOPHEN 10; 325 MG/1; MG/1
1 TABLET ORAL EVERY 6 HOURS PRN
Qty: 8 TABLET | Refills: 0 | Status: SHIPPED | OUTPATIENT
Start: 2022-02-17 | End: 2022-03-01

## 2022-02-17 RX ORDER — KETOROLAC TROMETHAMINE 10 MG/1
10 TABLET, FILM COATED ORAL EVERY 6 HOURS PRN
Qty: 16 TABLET | Refills: 0 | Status: SHIPPED | OUTPATIENT
Start: 2022-02-17 | End: 2022-04-20 | Stop reason: SDUPTHER

## 2022-02-17 NOTE — PROGRESS NOTES
Chief Complaint:          Chief Complaint   Patient presents with   • Nephrolithiasis     follow up        HPI:   46 y.o. female today.  She passed a small stone which she showed me.  She has had several others but overall she is doing much better.  Ureteral calculus-patient has been diagnosed with a ureteral calculus.  We have discussed the various parameters regarding spontaneous passage including the notion that a 2 mm stone has a high likelihood of spontaneous passage versus a larger stone being caught up in the upper areas of the urinary tract.  We also discussed the medical management of stone disease and the use of medical expulsive therapy in the form of Flomax.  This is used in an off label setting.  We discussed the indicators for intervention including  absolute indicators such as sepsis and uncontrollable severe pain, as well as the relative indicators of moderate pain that is well-controlled with various analgesia.  I also talked about nonoperative management including ambulation and increasing fluids and hot tub as being an effective adjuncts in the treatment of a ureteral stone.      Past Medical History:        Past Medical History:   Diagnosis Date   • Arthritis     back   • Complicated UTI (urinary tract infection)    • Disease of thyroid gland     nodules   • GERD (gastroesophageal reflux disease)    • Insomnia    • Kidney stones    • Migraine    • Migraines    • Pneumonia    • Polycystic kidney disease    • PONV (postoperative nausea and vomiting)    • Strep throat          Current Meds:     Current Outpatient Medications   Medication Sig Dispense Refill   • amitriptyline (ELAVIL) 25 MG tablet Take 25 mg by mouth every night.     • baclofen (LIORESAL) 10 MG tablet Take 10 mg by mouth 3 (Three) Times a Day.     • clonazePAM (KlonoPIN) 0.5 MG tablet Take 0.5 mg by mouth 2 (two) times a day as needed for seizures.     • gabapentin (NEURONTIN) 400 MG capsule Take 400 mg by mouth 4 (Four) Times a Day.      • HYDROcodone-acetaminophen (NORCO) 5-325 MG per tablet Take 1 tablet by mouth Every 6 (Six) Hours As Needed for Severe Pain . 10 tablet 0   • ketorolac (TORADOL) 10 MG tablet Take 1 tablet by mouth Every 6 (Six) Hours As Needed for Moderate Pain . 16 tablet 0   • ondansetron ODT (ZOFRAN-ODT) 4 MG disintegrating tablet Place 1 tablet on the tongue Every 6 (Six) Hours As Needed for Nausea or Vomiting. 12 tablet 0   • phenazopyridine (PYRIDIUM) 200 MG tablet Take 1 tablet by mouth 3 (Three) Times a Day As Needed for Bladder Spasms. 20 tablet 0   • promethazine (PHENERGAN) 25 MG tablet Take 1 tablet by mouth Every 6 (Six) Hours As Needed for Nausea or Vomiting. 21 tablet 2   • tamsulosin (FLOMAX) 0.4 MG capsule 24 hr capsule Take 1 capsule by mouth Daily. 30 capsule 0   • traMADol (ULTRAM) 50 MG tablet Take 1 tablet by mouth Every 6 (Six) Hours As Needed for Moderate Pain . 60 tablet 2     No current facility-administered medications for this visit.        Allergies:      Allergies   Allergen Reactions   • Sulfa Antibiotics Anaphylaxis   • Compazine [Prochlorperazine Edisylate] Hives        Past Surgical History:     Past Surgical History:   Procedure Laterality Date   • ABDOMINAL SURGERY     •  SECTION     • COLONOSCOPY     • CYSTOSCOPY     • CYSTOSCOPY ELECTROHYDRAULIC LITHOTRIPSY     • CYSTOSCOPY URETEROSCOPY LASER LITHOTRIPSY Left 2020    Procedure: CYSTOSCOPY URETEROSCOPY LASER LITHOTRIPSY;  Surgeon: Garth Friedman MD;  Location: [facility];  Service: Urology;  Laterality: Left;   • DENTAL PROCEDURE     • DILATATION AND CURETTAGE     • DILATATION AND CURETTAGE     • ENDOSCOPY     • EXTRACORPOREAL SHOCK WAVE LITHOTRIPSY (ESWL) Left 2018    Procedure: EXTRACORPOREAL SHOCKWAVE LITHOTRIPSY;  Surgeon: Garth Friedman MD;  Location: [facility];  Service: Urology   • EXTRACORPOREAL SHOCK WAVE LITHOTRIPSY (ESWL) Left 5/15/2020    Procedure: EXTRACORPOREAL SHOCKWAVE LITHOTRIPSY;   Surgeon: Garth Friedman MD;  Location: [facility];  Service: Urology;  Laterality: Left;   • KIDNEY STONE SURGERY     • LITHOTRIPSY           Social History:     Social History     Socioeconomic History   • Marital status:    Tobacco Use   • Smoking status: Former Smoker     Packs/day: 0.50     Years: 15.00     Pack years: 7.50     Types: Cigarettes     Quit date: 2018     Years since quittin.1   • Smokeless tobacco: Never Used   Vaping Use   • Vaping Use: Never used   Substance and Sexual Activity   • Alcohol use: No   • Drug use: No   • Sexual activity: Yes     Partners: Male     Birth control/protection: None       Family History:     Family History   Problem Relation Age of Onset   • No Known Problems Father    • No Known Problems Mother        Review of Systems:     Review of Systems   Constitutional: Negative.  Negative for activity change, appetite change, chills, diaphoresis, fatigue and unexpected weight change.   HENT: Negative for congestion, dental problem, drooling, ear discharge, ear pain, facial swelling, hearing loss, mouth sores, nosebleeds, postnasal drip, rhinorrhea, sinus pressure, sneezing, sore throat, tinnitus, trouble swallowing and voice change.    Eyes: Negative.  Negative for photophobia, pain, discharge, redness, itching and visual disturbance.   Respiratory: Negative.  Negative for apnea, cough, choking, chest tightness, shortness of breath, wheezing and stridor.    Cardiovascular: Negative.  Negative for chest pain, palpitations and leg swelling.   Gastrointestinal: Negative.  Negative for abdominal distention, abdominal pain, anal bleeding, blood in stool, constipation, diarrhea, nausea, rectal pain and vomiting.   Endocrine: Negative.  Negative for cold intolerance, heat intolerance, polydipsia, polyphagia and polyuria.   Musculoskeletal: Negative.  Negative for arthralgias, back pain, gait problem, joint swelling, myalgias, neck pain and neck stiffness.   Skin:  Negative.  Negative for color change, pallor, rash and wound.   Allergic/Immunologic: Negative.  Negative for environmental allergies, food allergies and immunocompromised state.   Neurological: Negative.  Negative for dizziness, tremors, seizures, syncope, facial asymmetry, speech difficulty, weakness, light-headedness, numbness and headaches.   Hematological: Negative.  Negative for adenopathy. Does not bruise/bleed easily.   Psychiatric/Behavioral: Negative for agitation, behavioral problems, confusion, decreased concentration, dysphoric mood, hallucinations, self-injury, sleep disturbance and suicidal ideas. The patient is not nervous/anxious and is not hyperactive.    All other systems reviewed and are negative.      Physical Exam:     Physical Exam  Constitutional:       Appearance: She is well-developed.   HENT:      Head: Normocephalic and atraumatic.      Right Ear: External ear normal.      Left Ear: External ear normal.   Eyes:      Conjunctiva/sclera: Conjunctivae normal.      Pupils: Pupils are equal, round, and reactive to light.   Cardiovascular:      Rate and Rhythm: Normal rate and regular rhythm.      Heart sounds: Normal heart sounds.   Pulmonary:      Effort: Pulmonary effort is normal.      Breath sounds: Normal breath sounds.   Abdominal:      General: Bowel sounds are normal. There is no distension.      Palpations: Abdomen is soft. There is no mass.      Tenderness: There is no abdominal tenderness. There is no guarding or rebound.   Genitourinary:     Vagina: No vaginal discharge.   Musculoskeletal:         General: Normal range of motion.   Skin:     General: Skin is warm and dry.   Neurological:      Mental Status: She is alert.      Deep Tendon Reflexes: Reflexes are normal and symmetric.   Psychiatric:         Behavior: Behavior normal.         Thought Content: Thought content normal.         Judgment: Judgment normal.         I have reviewed the following portions of the patient's  history: Allergies, current medications, past family history, past medical history, past social history, past surgical history, problem list, and ROS and confirm it is accurate.      Procedure:       Assessment/Plan:   Ureteral calculus-patient has been diagnosed with a ureteral calculus.  We have discussed the various parameters regarding spontaneous passage including the notion that a 2 mm stone has a high likelihood of spontaneous passage versus a larger stone being caught up in the upper areas of the urinary tract.  We also discussed the medical management of stone disease and the use of medical expulsive therapy in the form of Flomax.  This is used in an off label setting.  We discussed the indicators for intervention including  absolute indicators such as sepsis and uncontrollable severe pain, as well as the relative indicators of moderate pain that is well-controlled with various analgesia.  I also talked about nonoperative management including ambulation and increasing fluids and hot tub as being an effective adjuncts in the treatment of a ureteral stone.  There are very small they have a high likelihood of spontaneous passage  Narcotic pain medication-patient has significant acute pain that I believe would be an indication for the use of narcotic pain medication.  I discussed the significant risks of pain medication and the fact that this will be a short only option and I will give her no more than a three-day supply of pain medication, I will not plan long-term medication, and that this will be sent to a pain clinic if at all becomes necessary.  We discussed signing a pain medication agreement and the fact that we're going to run a state [facility] review to be sure the patient is not getting pain medication from elsewhere.  If this is the case, we will not give pain medication as part of the patient's treatment plan of there being prescribed a controlled substance with potential for abuse.  This patient has  been well aware of the appropriate dose of such medications including the risks for somnolence, limited ability to drive and/or safety and the significant potential for overdose.  It has been made clear that these medications are for the prescribed patient only without concomitant use of alcohol or other substance unless prescribed by the medical provider.  Has completed prescribing agreement detailing the terms of continue prescribing him a controlled substance including monitoring Sam reports, the possibility of urine drug screens, and pill counts.  The patient is aware that we review SAM reports on a regular basis and scan them into the chart.  History and physical examination exhibited continued safe and appropriate use of controlled substances. We also discussed the fact that the new [place] legislation allows only a three-day prescription for pain medication.  In this situation he will be referred to a chronic pain clinic.  Metabolic stone disease-discussed her stone analysis, discussed work-up including a 24-hour Litholink where indicated.  Discussed medical therapy including thiazide and Urocit-K that are specific to specific types of stones, discussed increasing fluids and avoidance of cola products and anything containing high amounts of oxalates.  She is doing dramatically better with her dietary and fluid intake                    This document has been electronically signed by LOU MONTANA MD February 17, 2022 12:50 EST

## 2022-03-01 ENCOUNTER — OFFICE VISIT (OUTPATIENT)
Dept: UROLOGY | Facility: CLINIC | Age: 47
End: 2022-03-01

## 2022-03-01 VITALS — HEIGHT: 62 IN | BODY MASS INDEX: 32.94 KG/M2 | WEIGHT: 179 LBS

## 2022-03-01 DIAGNOSIS — R10.9 LEFT FLANK PAIN: ICD-10-CM

## 2022-03-01 DIAGNOSIS — N20.1 LEFT URETERAL CALCULUS: ICD-10-CM

## 2022-03-01 DIAGNOSIS — R30.0 DYSURIA: ICD-10-CM

## 2022-03-01 DIAGNOSIS — R10.32 LEFT LOWER QUADRANT ABDOMINAL PAIN: Primary | ICD-10-CM

## 2022-03-01 DIAGNOSIS — Z87.442 HISTORY OF KIDNEY STONES: ICD-10-CM

## 2022-03-01 PROCEDURE — 99214 OFFICE O/P EST MOD 30 MIN: CPT | Performed by: NURSE PRACTITIONER

## 2022-03-01 RX ORDER — ONDANSETRON 4 MG/1
4 TABLET, ORALLY DISINTEGRATING ORAL EVERY 6 HOURS PRN
Qty: 12 TABLET | Refills: 0 | Status: SHIPPED | OUTPATIENT
Start: 2022-03-01 | End: 2022-11-10 | Stop reason: SDUPTHER

## 2022-03-01 RX ORDER — TAMSULOSIN HYDROCHLORIDE 0.4 MG/1
1 CAPSULE ORAL DAILY
Qty: 30 CAPSULE | Refills: 0 | Status: SHIPPED | OUTPATIENT
Start: 2022-03-01 | End: 2022-07-28 | Stop reason: SDUPTHER

## 2022-03-01 NOTE — PROGRESS NOTES
No chief complaint on file.      Olivia Richmond is a 46 y.o. female who presents to the office today for follow up appointment for No chief complaint on file.  .    HPI          Review of Systems   Constitutional: Negative for chills, fatigue and fever.   Respiratory: Negative for cough, shortness of breath and wheezing.    Gastrointestinal: Positive for nausea. Negative for abdominal pain and vomiting.   Genitourinary: Negative for flank pain.   Neurological: Negative for headaches.   Psychiatric/Behavioral: Negative for confusion.       ACTIVE PROBLEMS:   Specialty Problems        Urology Problems    Calculus of ureter        Left ureteral calculus              PAST MEDICAL HISTORY:  Past Medical History:   Diagnosis Date   • Arthritis     back   • Complicated UTI (urinary tract infection)    • Disease of thyroid gland     nodules   • GERD (gastroesophageal reflux disease)    • Insomnia    • Kidney stones    • Migraine    • Migraines    • Pneumonia    • Polycystic kidney disease    • PONV (postoperative nausea and vomiting)    • Strep throat        SURGICAL HISTORY:  Past Surgical History:   Procedure Laterality Date   • ABDOMINAL SURGERY     •  SECTION     • COLONOSCOPY     • CYSTOSCOPY     • CYSTOSCOPY ELECTROHYDRAULIC LITHOTRIPSY     • CYSTOSCOPY URETEROSCOPY LASER LITHOTRIPSY Left 2020    Procedure: CYSTOSCOPY URETEROSCOPY LASER LITHOTRIPSY;  Surgeon: Garth Friedman MD;  Location: [facility];  Service: Urology;  Laterality: Left;   • DENTAL PROCEDURE     • DILATATION AND CURETTAGE     • DILATATION AND CURETTAGE     • ENDOSCOPY     • EXTRACORPOREAL SHOCK WAVE LITHOTRIPSY (ESWL) Left 2018    Procedure: EXTRACORPOREAL SHOCKWAVE LITHOTRIPSY;  Surgeon: Garth Friedman MD;  Location: [facility];  Service: Urology   • EXTRACORPOREAL SHOCK WAVE LITHOTRIPSY (ESWL) Left 5/15/2020    Procedure: EXTRACORPOREAL SHOCKWAVE LITHOTRIPSY;  Surgeon: Garth Friedman MD;  Location: [facility] OR;   Service: Urology;  Laterality: Left;   • KIDNEY STONE SURGERY     • LITHOTRIPSY         FAMILY HISTORY:  Family History   Problem Relation Age of Onset   • No Known Problems Father    • No Known Problems Mother        SOCIAL HISTORY:  Social History     Tobacco Use   • Smoking status: Former Smoker     Packs/day: 0.50     Years: 15.00     Pack years: 7.50     Types: Cigarettes     Quit date: 2018     Years since quittin.1   • Smokeless tobacco: Never Used   Substance Use Topics   • Alcohol use: No       CURRENT MEDICATION:    Current Outpatient Medications:   •  amitriptyline (ELAVIL) 25 MG tablet, Take 25 mg by mouth every night., Disp: , Rfl:   •  baclofen (LIORESAL) 10 MG tablet, Take 10 mg by mouth 3 (Three) Times a Day., Disp: , Rfl:   •  clonazePAM (KlonoPIN) 0.5 MG tablet, Take 0.5 mg by mouth 2 (two) times a day as needed for seizures., Disp: , Rfl:   •  gabapentin (NEURONTIN) 400 MG capsule, Take 400 mg by mouth 4 (Four) Times a Day., Disp: , Rfl:   •  HYDROcodone-acetaminophen (NORCO) 5-325 MG per tablet, Take 1 tablet by mouth Every 6 (Six) Hours As Needed for Severe Pain ., Disp: 10 tablet, Rfl: 0  •  ketorolac (TORADOL) 10 MG tablet, Take 1 tablet by mouth Every 6 (Six) Hours As Needed for Moderate Pain ., Disp: 16 tablet, Rfl: 0  •  ondansetron ODT (ZOFRAN-ODT) 4 MG disintegrating tablet, Place 1 tablet on the tongue Every 6 (Six) Hours As Needed for Nausea or Vomiting., Disp: 12 tablet, Rfl: 0  •  oxyCODONE-acetaminophen (Percocet)  MG per tablet, Take 1 tablet by mouth Every 6 (Six) Hours As Needed for Moderate Pain ., Disp: 8 tablet, Rfl: 0  •  phenazopyridine (PYRIDIUM) 200 MG tablet, Take 1 tablet by mouth 3 (Three) Times a Day As Needed for Bladder Spasms., Disp: 20 tablet, Rfl: 0  •  promethazine (PHENERGAN) 25 MG tablet, Take 1 tablet by mouth Every 6 (Six) Hours As Needed for Nausea or Vomiting., Disp: 21 tablet, Rfl: 2  •  tamsulosin (FLOMAX) 0.4 MG capsule 24 hr capsule, Take  1 capsule by mouth Daily., Disp: 30 capsule, Rfl: 0  •  traMADol (ULTRAM) 50 MG tablet, Take 1 tablet by mouth Every 6 (Six) Hours As Needed for Moderate Pain ., Disp: 60 tablet, Rfl: 2    ALLERGIES:  Sulfa antibiotics and Compazine [prochlorperazine edisylate]    VISIT VITALS:  There were no vitals taken for this visit.    Physical Exam    Assessment/Plan     No diagnosis found.    No follow-ups on file.         Patient's There is no height or weight on file to calculate BMI. indicating that she is {weight categories:55418}.      Zee Machado MA

## 2022-03-01 NOTE — PROGRESS NOTES
Chief Complaint:          Chief Complaint   Patient presents with   • LEFT Flank Pain/LLQ ABDOMINAL PAIN     FOLLOW UP       HPI:   46 y.o. female.  Ms Olivia Richmond, is a pleasant 46-year-old female with a significant history of recurrent kidney stones/MSK, who returns to clinic today for evaluation.this is her 1 week follow-up.  Patient was evaluated in clinic by Dr. Friedman 02/17/2022 with concerns of left lower quadrant pain.  She had recently passed a stone which she brought in clinic for evaluation. She had a KUB which was unremarkable.      Today, she reports severe left groin/flank pain that has worsened since last night.  On presentation to clinic, she is very tearful, sitting curled over in exam room with her eys red shot from crying in pain, she is miserable today.  She reports significant discomfort 7/10.  She reports left flank pain that is still very colicky, constant but occasionally very sharp.      She reports left lower quadrant pain, significant pressure/discomfort still in her left lower abdomen and pelvic region.  She still has nausea, no vomiting, denies chills or fevers.  She reports urinary symptoms of frequency, urgency, and dysuria.  She has a frequent urge to void,  She she reports burning on urination,  but denies any episodes of gross hematuria.She took some pyridium with no relief, and is unable to give any urine sample today.    We discussed getting a CT scan ASAP.     Past Medical History:        Past Medical History:   Diagnosis Date   • Arthritis     back   • Complicated UTI (urinary tract infection)    • Disease of thyroid gland     nodules   • GERD (gastroesophageal reflux disease)    • Insomnia    • Kidney stones    • Migraine    • Migraines    • Pneumonia    • Polycystic kidney disease    • PONV (postoperative nausea and vomiting)    • Strep throat          Current Meds:     Current Outpatient Medications   Medication Sig Dispense Refill   • amitriptyline (ELAVIL) 25 MG tablet  Take 25 mg by mouth every night.     • baclofen (LIORESAL) 10 MG tablet Take 10 mg by mouth 3 (Three) Times a Day.     • clonazePAM (KlonoPIN) 0.5 MG tablet Take 0.5 mg by mouth 2 (two) times a day as needed for seizures.     • gabapentin (NEURONTIN) 400 MG capsule Take 400 mg by mouth 4 (Four) Times a Day.     • ketorolac (TORADOL) 10 MG tablet Take 1 tablet by mouth Every 6 (Six) Hours As Needed for Moderate Pain . 16 tablet 0   • ondansetron ODT (ZOFRAN-ODT) 4 MG disintegrating tablet Place 1 tablet on the tongue Every 6 (Six) Hours As Needed for Nausea or Vomiting. 12 tablet 0   • phenazopyridine (PYRIDIUM) 200 MG tablet Take 1 tablet by mouth 3 (Three) Times a Day As Needed for Bladder Spasms. 20 tablet 0   • promethazine (PHENERGAN) 25 MG tablet Take 1 tablet by mouth Every 6 (Six) Hours As Needed for Nausea or Vomiting. 21 tablet 2   • tamsulosin (FLOMAX) 0.4 MG capsule 24 hr capsule Take 1 capsule by mouth Daily. 30 capsule 0   • traMADol (ULTRAM) 50 MG tablet Take 1 tablet by mouth Every 6 (Six) Hours As Needed for Moderate Pain . 60 tablet 2     No current facility-administered medications for this visit.        Allergies:      Allergies   Allergen Reactions   • Sulfa Antibiotics Anaphylaxis   • Compazine [Prochlorperazine Edisylate] Hives        Past Surgical History:     Past Surgical History:   Procedure Laterality Date   • ABDOMINAL SURGERY     •  SECTION     • COLONOSCOPY     • CYSTOSCOPY     • CYSTOSCOPY ELECTROHYDRAULIC LITHOTRIPSY     • CYSTOSCOPY URETEROSCOPY LASER LITHOTRIPSY Left 2020    Procedure: CYSTOSCOPY URETEROSCOPY LASER LITHOTRIPSY;  Surgeon: Garth Friedman MD;  Location: [facility];  Service: Urology;  Laterality: Left;   • DENTAL PROCEDURE     • DILATATION AND CURETTAGE     • DILATATION AND CURETTAGE     • ENDOSCOPY     • EXTRACORPOREAL SHOCK WAVE LITHOTRIPSY (ESWL) Left 2018    Procedure: EXTRACORPOREAL SHOCKWAVE LITHOTRIPSY;  Surgeon: Garth Friedman  MD;  Location: [facility];  Service: Urology   • EXTRACORPOREAL SHOCK WAVE LITHOTRIPSY (ESWL) Left 5/15/2020    Procedure: EXTRACORPOREAL SHOCKWAVE LITHOTRIPSY;  Surgeon: Garth Friedman MD;  Location: [facility];  Service: Urology;  Laterality: Left;   • KIDNEY STONE SURGERY     • LITHOTRIPSY           Social History:     Social History     Socioeconomic History   • Marital status:    Tobacco Use   • Smoking status: Former Smoker     Packs/day: 0.50     Years: 15.00     Pack years: 7.50     Types: Cigarettes     Quit date: 2018     Years since quittin.1   • Smokeless tobacco: Never Used   Vaping Use   • Vaping Use: Never used   Substance and Sexual Activity   • Alcohol use: No   • Drug use: No   • Sexual activity: Yes     Partners: Male     Birth control/protection: None       Family History:     Family History   Problem Relation Age of Onset   • No Known Problems Father    • No Known Problems Mother        Review of Systems:     Review of Systems    Physical Exam:     Physical Exam  Constitutional:       General: She is in acute distress.      Appearance: She is well-developed. She is obese. She is ill-appearing.   HENT:      Head: Normocephalic and atraumatic.   Eyes:      Pupils: Pupils are equal, round, and reactive to light.   Neck:      Thyroid: No thyromegaly.      Trachea: No tracheal deviation.   Cardiovascular:      Rate and Rhythm: Normal rate and regular rhythm.      Heart sounds: No murmur heard.      Pulmonary:      Effort: Pulmonary effort is normal. No respiratory distress.      Breath sounds: Normal breath sounds. No stridor. No wheezing.   Abdominal:      General: Bowel sounds are normal. There is distension.      Palpations: Abdomen is soft.      Tenderness: There is abdominal tenderness. There is left CVA tenderness and guarding.   Genitourinary:     Labia:         Right: No tenderness.         Left: No tenderness.       Vagina: Normal. No vaginal discharge.    Musculoskeletal:         General: Tenderness present. No deformity. Normal range of motion.      Cervical back: Normal range of motion.   Skin:     General: Skin is warm and dry.      Capillary Refill: Capillary refill takes less than 2 seconds.      Coloration: Skin is pale.      Findings: No erythema or rash.   Neurological:      Mental Status: She is alert and oriented to person, place, and time.      Cranial Nerves: No cranial nerve deficit.      Sensory: No sensory deficit.      Motor: Weakness present.      Coordination: Coordination normal.   Psychiatric:         Behavior: Behavior normal.         Thought Content: Thought content normal.         Judgment: Judgment normal.         Procedure:       Assessment/Plan:                                                 ASSESSMENT  Left Flank Pain /history of nephrolithiasis/ abdominal pain  Ms. Olivia Richmond is a pleasant 44-year-old female with very metabolic active stone disease, who presents to clinic today for evaluation of her very significant left-sided pain.  She is apparently uncomfortable, and reports her pain and discomfort as colicky in nature, constant, constant 7 and 10.  Her last KUB 02/17/2021 was unremarkable for any kidney stones.     Nevertheless, we have discussed the various parameters regarding spontaneous passage including the notion that a TINY  1-3 mm stone has a 95% high likelihood of spontaneous passage versus a larger stone being caught up in the upper areas of the urinary tract.  We also discussed the medical management of stone disease and the use of medical expulsive therapy in the form of Flomax. This is used in an off label setting. I also talked about nonoperative management including ambulation and increasing fluids to atleast 2-3L, and hot tubs as being an effective adjuncts in the treatment of a ureteral stone.  Patient is very familiar with this. We discussed the absolute relative indicators for intervention including the presence  of sepsis, and pain we cannot control as the primary need for urgent intervention.         PLAN  I Discussed this case with Dr. Friedman, who is very familiar this patient, evaluated patient today and is agreeable with plan of care.  Due to the significant discomfort,                                         PLAN  We discussed getting a CT scan as soon as possible and follow-up in clinic as discussed.    Encouraged to continue conservative therapy at home, increasing PO fluids, ambulation, hot baths/showers as tolerated.     Also encouraged to continue Flomax, nausea medicine, tramadol as needed, also alternating NSAIDs/Tylenol for breakthrough pain.     We will see at follow-up, post CT stone protocol.    Narcotic pain medication-The Patient has significant acute pain that I believe would be an indication for the use of narcotic pain medication.  I discussed the significant risks of pain medication and the fact that this will be a short only option and I will give her no more than a three-day supply of pain medication.  And I will not plan long-term medication and that  PATIENT will be sent to a pain clinic if at all becomes necessary.      We discussed signing a pain medication agreement and the fact that we're going to run a state [facility] review to be sure the patient is not getting pain medication from elsewhere.  If this is the case we will not give pain medication.  As part of the patient's treatment plan if they are being prescribed a controlled substance with potential for abuse.      This patient has been made aware of the appropriate dose of such medications including, the risk for somnolence, limited ability to drive and/or safety and the significant potential for overdose.  It has been made clear that these medications are for the prescribed patient only without concomitant use of alcohol or other CONTROLLED substances unless prescribed by the medical provider.  THE PATIENT Has completed prescribing  agreement detailing the terms TO continue prescribing him a controlled substance,  Including monitoring SAM REPORTS, the possibility of urine drug screens and PILL Counts. The patient is aware that we review SAM reports on a regular basis and scan them into the chart.  History and physical examination PORTRAYED continued safe and appropriate use of controlled substances.    FINALLY, Also discussed the fact that the new [place] legislation allows only a THREE-DAY prescription for pain medication.  In this situation he will be referred to a chronic pain clinic if need be .    THE PATIENT IS AGREEABLE WITH PLAN OF CARE.    Patient reports that she is not currently experiencing any symptoms of urinary incontinence.    Patient's Body mass index is 32.73 kg/m². indicating that she is obese (BMI >30). Obesity-related health conditions include the following: hypertension and GERD. Obesity is improving with lifestyle modifications. BMI is 32.73. We discussed portion control and increasing exercise..              This document has been electronically signed by GRISELDA CHENG-AKWA, APRN,  March 1, 2022 21:29 EST

## 2022-03-02 NOTE — PATIENT INSTRUCTIONS
"Textbook of Natural Medicine (5th ed., pp. 9142-7304.e3). [place], [place]: Elsevier.\">   Dietary Guidelines to Help Prevent Kidney Stones  Kidney stones are deposits of minerals and salts that form inside your kidneys. Your risk of developing kidney stones may be greater depending on your diet, your lifestyle, the medicines you take, and whether you have certain medical conditions. Most people can lower their chances of developing kidney stones by following the instructions below. Your dietitian may give you more specific instructions depending on your overall health and the type of kidney stones you tend to develop.  What are tips for following this plan?  Reading food labels    · Choose foods with \"no salt added\" or \"low-salt\" labels. Limit your salt (sodium) intake to less than 1,500 mg a day.  · Choose foods with calcium for each meal and snack. Try to eat about 300 mg of calcium at each meal. Foods that contain 200-500 mg of calcium a serving include:  ? 8 oz (237 mL) of milk, calcium-fortifiednon-dairy milk, and calcium-fortifiedfruit juice. Calcium-fortified means that calcium has been added to these drinks.  ? 8 oz (237 mL) of kefir, yogurt, and soy yogurt.  ? 4 oz (114 g) of tofu.  ? 1 oz (28 g) of cheese.  ? 1 cup (150 g) of dried figs.  ? 1 cup (91 g) of cooked broccoli.  ? One 3 oz (85 g) can of sardines or mackerel.  Most people need 1,000-1,500 mg of calcium a day. Talk to your dietitian about how much calcium is recommended for you.  Shopping  · Buy plenty of fresh fruits and vegetables. Most people do not need to avoid fruits and vegetables, even if these foods contain nutrients that may contribute to kidney stones.  · When shopping for convenience foods, choose:  ? Whole pieces of fruit.  ? Pre-made salads with dressing on the side.  ? Low-fat fruit and yogurt smoothies.  · Avoid buying frozen meals or prepared deli foods. These can be high in sodium.  · Look for foods with live cultures, such as " yogurt and kefir.  · Choose high-fiber grains, such as whole-wheat breads, oat bran, and wheat cereals.  Cooking  · Do not add salt to food when cooking. Place a salt shaker on the table and allow each person to add his or her own salt to taste.  · Use vegetable protein, such as beans, textured vegetable protein (TVP), or tofu, instead of meat in pasta, casseroles, and soups.  Meal planning  · Eat less salt, if told by your dietitian. To do this:  ? Avoid eating processed or pre-made food.  ? Avoid eating fast food.  · Eat less animal protein, including cheese, meat, poultry, or fish, if told by your dietitian. To do this:  ? Limit the number of times you have meat, poultry, fish, or cheese each week. Eat a diet free of meat at least 2 days a week.  ? Eat only one serving each day of meat, poultry, fish, or seafood.  ? When you prepare animal protein, cut pieces into small portion sizes. For most meat and fish, one serving is about the size of the palm of your hand.  · Eat at least five servings of fresh fruits and vegetables each day. To do this:  ? Keep fruits and vegetables on hand for snacks.  ? Eat one piece of fruit or a handful of berries with breakfast.  ? Have a salad and fruit at lunch.  ? Have two kinds of vegetables at dinner.  · Limit foods that are high in a substance called oxalate. These include:  ? Spinach (cooked), rhubarb, beets, sweet potatoes, and Swiss chard.  ? Peanuts.  ? Potato chips, french fries, and baked potatoes with skin on.  ? Nuts and nut products.  ? Chocolate.  · If you regularly take a diuretic medicine, make sure to eat at least 1 or 2 servings of fruits or vegetables that are high in potassium each day. These include:  ? Avocado.  ? Banana.  ? Orange, prune, carrot, or tomato juice.  ? Baked potato.  ? Cabbage.  ? Beans and split peas.  Lifestyle    · Drink enough fluid to keep your urine pale yellow. This is the most important thing you can do. Spread your fluid intake  throughout the day.  · If you drink alcohol:  ? Limit how much you use to:  § 0-1 drink a day for women who are not pregnant.  § 0-2 drinks a day for men.  ? Be aware of how much alcohol is in your drink. In the [place] one drink equals one 12 oz bottle of beer (355 mL), one 5 oz glass of wine (148 mL), or one 1½ oz glass of hard liquor (44 mL).  · Lose weight if told by your health care provider. Work with your dietitian to find an eating plan and weight loss strategies that work best for you.    General information  · Talk to your health care provider and dietitian about taking daily supplements. You may be told the following depending on your health and the cause of your kidney stones:  ? Not to take supplements with vitamin C.  ? To take a calcium supplement.  ? To take a daily probiotic supplement.  ? To take other supplements such as magnesium, fish oil, or vitamin B6.  · Take over-the-counter and prescription medicines only as told by your health care provider. These include supplements.  What foods should I limit?  Limit your intake of the following foods, or eat them as told by your dietitian.  Vegetables  Spinach. Rhubarb. Beets. Canned vegetables. Pickles. Olives. Baked potatoes with skin.  Grains  Wheat bran. Baked goods. Salted crackers. Cereals high in sugar.  Meats and other proteins  Nuts. Nut butters. Large portions of meat, poultry, or fish. Salted, precooked, or cured meats, such as sausages, meat loaves, and hot dogs.  Dairy  Cheese.  Beverages  Regular soft drinks. Regular vegetable juice.  Seasonings and condiments  Seasoning blends with salt. Salad dressings. Soy sauce. Ketchup. Barbecue sauce.  Other foods  Canned soups. Canned pasta sauce. Casseroles. Pizza. Lasagna. Frozen meals. Potato chips. French fries.  The items listed above may not be a complete list of foods and beverages you should limit. Contact a dietitian for more information.  What foods should I avoid?  Talk to your dietitian  about specific foods you should avoid based on the type of kidney stones you have and your overall health.  Fruits  Grapefruit.  The item listed above may not be a complete list of foods and beverages you should avoid. Contact a dietitian for more information.  Summary  · Kidney stones are deposits of minerals and salts that form inside your kidneys.  · You can lower your risk of kidney stones by making changes to your diet.  · The most important thing you can do is drink enough fluid. Drink enough fluid to keep your urine pale yellow.  · Talk to your dietitian about how much calcium you should have each day, and eat less salt and animal protein as told by your dietitian.  This information is not intended to replace advice given to you by your health care provider. Make sure you discuss any questions you have with your health care provider.  Document Revised: 12/10/2020 Document Reviewed: 12/10/2020  [email or web address] Patient Education © 2021 [email or web address]      Low-Purine Eating Plan  A low-purine eating plan involves making food choices to limit your intake of purine. Purine is a kind of uric acid. Too much uric acid in your blood can cause certain conditions, such as gout and kidney stones. Eating a low-purine diet can help control these conditions.  What are tips for following this plan?  Reading food labels  · Avoid foods with saturated or Trans fat.  · Check the ingredient list of grains-based foods, such as bread and cereal, to make sure that they contain whole grains.  · Check the ingredient list of sauces or soups to make sure they do not contain meat or fish.  · When choosing soft drinks, check the ingredient list to make sure they do not contain high-fructose corn syrup.  Shopping    · Buy plenty of fresh fruits and vegetables.  · Avoid buying canned or fresh fish.  · Buy dairy products labeled as low-fat or nonfat.  · Avoid buying premade or processed foods. These foods are often high in fat, salt (sodium), and  added sugar.    Cooking  · Use olive oil instead of butter when cooking. Oils like olive oil, canola oil, and sunflower oil contain healthy fats.  Meal planning  · Learn which foods do or do not affect you. If you find out that a food tends to cause your gout symptoms to flare up, avoid eating that food. You can enjoy foods that do not cause problems. If you have any questions about a food item, talk with your dietitian or health care provider.  · Limit foods high in fat, especially saturated fat. Fat makes it harder for your body to get rid of uric acid.  · Choose foods that are lower in fat and are lean sources of protein.  General guidelines  · Limit alcohol intake to no more than 1 drink a day for nonpregnant women and 2 drinks a day for men. One drink equals 12 oz of beer, 5 oz of wine, or 1½ oz of hard liquor. Alcohol can affect the way your body gets rid of uric acid.  · Drink plenty of water to keep your urine clear or pale yellow. Fluids can help remove uric acid from your body.  · If directed by your health care provider, take a vitamin C supplement.  · Work with your health care provider and dietitian to develop a plan to achieve or maintain a healthy weight. Losing weight can help reduce uric acid in your blood.  What foods are recommended?  The items listed may not be a complete list. Talk with your dietitian about what dietary choices are best for you.  Foods low in purines  Foods low in purines do not need to be limited. These include:  · All fruits.  · All low-purine vegetables, pickles, and olives.  · Breads, pasta, rice, cornbread, and popcorn. Cake and other baked goods.  · All dairy foods.  · Eggs, nuts, and nut butters.  · Spices and condiments, such as salt, herbs, and vinegar.  · Plant oils, butter, and margarine.  · Water, sugar-free soft drinks, tea, coffee, and cocoa.  · Vegetable-based soups, broths, sauces, and gravies.  Foods moderate in purines  Foods moderate in purines should be  limited to the amounts listed.  · ½ cup of asparagus, cauliflower, spinach, mushrooms, or green peas, each day.  · 2/3 cup uncooked oatmeal, each day.  · ¼ cup dry wheat bran or wheat germ, each day.  · 2-3 ounces of meat or poultry, each day.  · 4-6 ounces of shellfish, such as crab, lobster, oysters, or shrimp, each day.  · 1 cup cooked beans, peas, or lentils, each day.  · Soup, broths, or bouillon made from meat or fish. Limit these foods as much as possible.  What foods are not recommended?  The items listed may not be a complete list. Talk with your dietitian about what dietary choices are best for you.  Limit your intake of foods high in purines, including:  · Beer and other alcohol.  · Meat-based gravy or sauce.  · Canned or fresh fish, such as:  ? Anchovies, sardines, herring, and tuna.  ? Mussels and scallops.  ? Codfish, trout, and matt.  · Hernandez.  · Organ meats, such as:  ? Liver or kidney.  ? Tripe.  ? Sweetbreads (thymus gland or pancreas).  · Wild game or goose.  · Yeast or yeast extract supplements.  · Drinks sweetened with high-fructose corn syrup.  Summary  · Eating a low-purine diet can help control conditions caused by too much uric acid in the body, such as gout or kidney stones.  · Choose low-purine foods, limit alcohol, and limit foods high in fat.  · You will learn over time which foods do or do not affect you. If you find out that a food tends to cause your gout symptoms to flare up, avoid eating that food.  This information is not intended to replace advice given to you by your health care provider. Make sure you discuss any questions you have with your health care provider.  Document Revised: 04/01/2021 Document Reviewed: 04/01/2021  [employer] Patient Education © 2021 [employer]    Flank Pain, Adult  Flank pain is pain in your side. The flank is the area of your side between your upper belly (abdomen) and your back. The pain may occur over a short time (acute), or it may be long-term  or come back often (chronic). It may be mild or very bad. Pain in this area can be caused by many different things.  Follow these instructions at home:    · Drink enough fluid to keep your pee (urine) clear or pale yellow.  · Rest as told by your doctor.  · Take over-the-counter and prescription medicines only as told by your doctor.  · Keep a journal to keep track of:  ? What has caused your flank pain.  ? What has made it feel better.  · Keep all follow-up visits as told by your doctor. This is important.  Contact a doctor if:  · Medicine does not help your pain.  · You have new symptoms.  · Your pain gets worse.  · You have a fever.  · Your symptoms last longer than 2-3 days.  · You have trouble peeing.  · You are peeing more often than normal.  Get help right away if:  · You have trouble breathing.  · You are short of breath.  · Your belly hurts, or it is swollen or red.  · You feel sick to your stomach (nauseous).  · You throw up (vomit).  · You feel like you will pass out, or you do pass out (faint).  · You have blood in your pee.  Summary  · Flank pain is pain in your side. The flank is the area of your side between your upper belly (abdomen) and your back.  · Flank pain may occur over a short time (acute), or it may be long-term or come back often (chronic). It may be mild or very bad.  · Pain in this area can be caused by many different things.  · Contact your doctor if your symptoms get worse or they last longer than 2-3 days.  This information is not intended to replace advice given to you by your health care provider. Make sure you discuss any questions you have with your health care provider.  Document Revised: 11/30/2018 Document Reviewed: 04/09/2018  Elsevier Patient Education © 2021 [employer] Inc.

## 2022-04-20 DIAGNOSIS — N20.1 LEFT URETERAL CALCULUS: ICD-10-CM

## 2022-04-20 DIAGNOSIS — R10.9 LEFT FLANK PAIN: ICD-10-CM

## 2022-04-20 DIAGNOSIS — N20.0 KIDNEY STONE: ICD-10-CM

## 2022-04-21 RX ORDER — KETOROLAC TROMETHAMINE 10 MG/1
10 TABLET, FILM COATED ORAL EVERY 6 HOURS PRN
Qty: 16 TABLET | Refills: 0 | Status: SHIPPED | OUTPATIENT
Start: 2022-04-21 | End: 2022-11-10 | Stop reason: SDUPTHER

## 2022-04-21 RX ORDER — TRAMADOL HYDROCHLORIDE 50 MG/1
50 TABLET ORAL EVERY 6 HOURS PRN
Qty: 60 TABLET | Refills: 2 | Status: SHIPPED | OUTPATIENT
Start: 2022-04-21

## 2022-04-29 ENCOUNTER — HOSPITAL ENCOUNTER (EMERGENCY)
Facility: HOSPITAL | Age: 47
Discharge: HOME | End: 2022-04-29
Attending: EMERGENCY MEDICINE | Admitting: EMERGENCY MEDICINE

## 2022-04-29 ENCOUNTER — APPOINTMENT (OUTPATIENT)
Dept: CT IMAGING | Facility: HOSPITAL | Age: 47
End: 2022-04-29

## 2022-04-29 VITALS
WEIGHT: 176 LBS | HEART RATE: 94 BPM | RESPIRATION RATE: 16 BRPM | BODY MASS INDEX: 32.39 KG/M2 | DIASTOLIC BLOOD PRESSURE: 95 MMHG | HEIGHT: 62 IN | OXYGEN SATURATION: 100 % | SYSTOLIC BLOOD PRESSURE: 150 MMHG | TEMPERATURE: 97.3 F

## 2022-04-29 DIAGNOSIS — K59.00 CONSTIPATION, UNSPECIFIED CONSTIPATION TYPE: Primary | ICD-10-CM

## 2022-04-29 LAB
ALBUMIN SERPL-MCNC: 4.16 G/DL (ref 3.5–5.2)
ALBUMIN/GLOB SERPL: 1.4 G/DL
ALP SERPL-CCNC: 66 U/L (ref 39–117)
ALT SERPL W P-5'-P-CCNC: 13 U/L (ref 1–33)
AMPHET+METHAMPHET UR QL: NEGATIVE
AMPHETAMINES UR QL: NEGATIVE
ANION GAP SERPL CALCULATED.3IONS-SCNC: 12.6 MMOL/L (ref 5–15)
AST SERPL-CCNC: 25 U/L (ref 1–32)
B-HCG UR QL: NEGATIVE
BACTERIA UR QL AUTO: ABNORMAL /HPF
BARBITURATES UR QL SCN: NEGATIVE
BASOPHILS # BLD AUTO: 0.04 10*3/MM3 (ref 0–0.2)
BASOPHILS NFR BLD AUTO: 0.3 % (ref 0–1.5)
BENZODIAZ UR QL SCN: NEGATIVE
BILIRUB SERPL-MCNC: 0.5 MG/DL (ref 0–1.2)
BILIRUB UR QL STRIP: NEGATIVE
BUN SERPL-MCNC: 9 MG/DL (ref 6–20)
BUN/CREAT SERPL: 10.5 (ref 7–25)
BUPRENORPHINE SERPL-MCNC: NEGATIVE NG/ML
CALCIUM SPEC-SCNC: 9.1 MG/DL (ref 8.6–10.5)
CANNABINOIDS SERPL QL: NEGATIVE
CHLORIDE SERPL-SCNC: 103 MMOL/L (ref 98–107)
CLARITY UR: CLEAR
CO2 SERPL-SCNC: 20.4 MMOL/L (ref 22–29)
COCAINE UR QL: NEGATIVE
COLOR UR: YELLOW
CREAT SERPL-MCNC: 0.86 MG/DL (ref 0.57–1)
CRP SERPL-MCNC: <0.3 MG/DL (ref 0–0.5)
DEPRECATED RDW RBC AUTO: 41.3 FL (ref 37–54)
EGFRCR SERPLBLD CKD-EPI 2021: 85 ML/MIN/1.73
EOSINOPHIL # BLD AUTO: 0.15 10*3/MM3 (ref 0–0.4)
EOSINOPHIL NFR BLD AUTO: 1.2 % (ref 0.3–6.2)
ERYTHROCYTE [DISTWIDTH] IN BLOOD BY AUTOMATED COUNT: 12.6 % (ref 12.3–15.4)
GLOBULIN UR ELPH-MCNC: 2.9 GM/DL
GLUCOSE SERPL-MCNC: 81 MG/DL (ref 65–99)
GLUCOSE UR STRIP-MCNC: NEGATIVE MG/DL
HCT VFR BLD AUTO: 40.1 % (ref 34–46.6)
HGB BLD-MCNC: 13.5 G/DL (ref 12–15.9)
HGB UR QL STRIP.AUTO: ABNORMAL
HOLD SPECIMEN: NORMAL
HOLD SPECIMEN: NORMAL
HYALINE CASTS UR QL AUTO: ABNORMAL /LPF
IMM GRANULOCYTES # BLD AUTO: 0.05 10*3/MM3 (ref 0–0.05)
IMM GRANULOCYTES NFR BLD AUTO: 0.4 % (ref 0–0.5)
KETONES UR QL STRIP: NEGATIVE
LEUKOCYTE ESTERASE UR QL STRIP.AUTO: ABNORMAL
LIPASE SERPL-CCNC: 12 U/L (ref 13–60)
LYMPHOCYTES # BLD AUTO: 3.42 10*3/MM3 (ref 0.7–3.1)
LYMPHOCYTES NFR BLD AUTO: 27.1 % (ref 19.6–45.3)
MCH RBC QN AUTO: 30.8 PG (ref 26.6–33)
MCHC RBC AUTO-ENTMCNC: 33.7 G/DL (ref 31.5–35.7)
MCV RBC AUTO: 91.3 FL (ref 79–97)
METHADONE UR QL SCN: NEGATIVE
MONOCYTES # BLD AUTO: 0.78 10*3/MM3 (ref 0.1–0.9)
MONOCYTES NFR BLD AUTO: 6.2 % (ref 5–12)
NEUTROPHILS NFR BLD AUTO: 64.8 % (ref 42.7–76)
NEUTROPHILS NFR BLD AUTO: 8.2 10*3/MM3 (ref 1.7–7)
NITRITE UR QL STRIP: NEGATIVE
NRBC BLD AUTO-RTO: 0 /100 WBC (ref 0–0.2)
OPIATES UR QL: NEGATIVE
OXYCODONE UR QL SCN: NEGATIVE
PCP UR QL SCN: NEGATIVE
PH UR STRIP.AUTO: 6 [PH] (ref 5–8)
PLATELET # BLD AUTO: 319 10*3/MM3 (ref 140–450)
PMV BLD AUTO: 9.3 FL (ref 6–12)
POTASSIUM SERPL-SCNC: 3.6 MMOL/L (ref 3.5–5.2)
PROPOXYPH UR QL: NEGATIVE
PROT SERPL-MCNC: 7.1 G/DL (ref 6–8.5)
PROT UR QL STRIP: NEGATIVE
RBC # BLD AUTO: 4.39 10*6/MM3 (ref 3.77–5.28)
RBC # UR STRIP: ABNORMAL /HPF
REF LAB TEST METHOD: ABNORMAL
SODIUM SERPL-SCNC: 136 MMOL/L (ref 136–145)
SP GR UR STRIP: 1.01 (ref 1–1.03)
SQUAMOUS #/AREA URNS HPF: ABNORMAL /HPF
TRICYCLICS UR QL SCN: POSITIVE
UROBILINOGEN UR QL STRIP: ABNORMAL
WBC # UR STRIP: ABNORMAL /HPF
WBC NRBC COR # BLD: 12.64 10*3/MM3 (ref 3.4–10.8)
WHOLE BLOOD HOLD SPECIMEN: NORMAL
WHOLE BLOOD HOLD SPECIMEN: NORMAL

## 2022-04-29 PROCEDURE — 96375 TX/PRO/DX INJ NEW DRUG ADDON: CPT

## 2022-04-29 PROCEDURE — 87086 URINE CULTURE/COLONY COUNT: CPT | Performed by: PHYSICIAN ASSISTANT

## 2022-04-29 PROCEDURE — 74176 CT ABD & PELVIS W/O CONTRAST: CPT | Performed by: RADIOLOGY

## 2022-04-29 PROCEDURE — 81025 URINE PREGNANCY TEST: CPT | Performed by: PHYSICIAN ASSISTANT

## 2022-04-29 PROCEDURE — 81001 URINALYSIS AUTO W/SCOPE: CPT | Performed by: PHYSICIAN ASSISTANT

## 2022-04-29 PROCEDURE — 99283 EMERGENCY DEPT VISIT LOW MDM: CPT

## 2022-04-29 PROCEDURE — 25010000002 KETOROLAC TROMETHAMINE PER 15 MG: Performed by: PHYSICIAN ASSISTANT

## 2022-04-29 PROCEDURE — 25010000002 MORPHINE PER 10 MG: Performed by: EMERGENCY MEDICINE

## 2022-04-29 PROCEDURE — 96374 THER/PROPH/DIAG INJ IV PUSH: CPT

## 2022-04-29 PROCEDURE — 85025 COMPLETE CBC W/AUTO DIFF WBC: CPT | Performed by: PHYSICIAN ASSISTANT

## 2022-04-29 PROCEDURE — 86140 C-REACTIVE PROTEIN: CPT | Performed by: PHYSICIAN ASSISTANT

## 2022-04-29 PROCEDURE — 74176 CT ABD & PELVIS W/O CONTRAST: CPT

## 2022-04-29 PROCEDURE — 80306 DRUG TEST PRSMV INSTRMNT: CPT | Performed by: PHYSICIAN ASSISTANT

## 2022-04-29 PROCEDURE — 83690 ASSAY OF LIPASE: CPT | Performed by: PHYSICIAN ASSISTANT

## 2022-04-29 PROCEDURE — 80053 COMPREHEN METABOLIC PANEL: CPT | Performed by: PHYSICIAN ASSISTANT

## 2022-04-29 RX ORDER — POLYETHYLENE GLYCOL 3350 17 G/17G
17 POWDER, FOR SOLUTION ORAL DAILY
Qty: 40 PACKET | Refills: 0 | Status: SHIPPED | OUTPATIENT
Start: 2022-04-29

## 2022-04-29 RX ORDER — SODIUM CHLORIDE 0.9 % (FLUSH) 0.9 %
10 SYRINGE (ML) INJECTION AS NEEDED
Status: DISCONTINUED | OUTPATIENT
Start: 2022-04-29 | End: 2022-04-29 | Stop reason: HOSPADM

## 2022-04-29 RX ORDER — ORPHENADRINE CITRATE 100 MG/1
100 TABLET, EXTENDED RELEASE ORAL 2 TIMES DAILY
Qty: 20 TABLET | Refills: 0 | Status: SHIPPED | OUTPATIENT
Start: 2022-04-29

## 2022-04-29 RX ORDER — KETOROLAC TROMETHAMINE 30 MG/ML
15 INJECTION, SOLUTION INTRAMUSCULAR; INTRAVENOUS ONCE
Status: COMPLETED | OUTPATIENT
Start: 2022-04-29 | End: 2022-04-29

## 2022-04-29 RX ADMIN — MORPHINE SULFATE 4 MG: 4 INJECTION, SOLUTION INTRAMUSCULAR; INTRAVENOUS at 15:34

## 2022-04-29 RX ADMIN — KETOROLAC TROMETHAMINE 15 MG: 30 INJECTION, SOLUTION INTRAMUSCULAR; INTRAVENOUS at 14:42

## 2022-04-29 RX ADMIN — SODIUM CHLORIDE 1000 ML: 9 INJECTION, SOLUTION INTRAVENOUS at 14:35

## 2022-04-30 LAB — BACTERIA SPEC AEROBE CULT: NORMAL

## 2022-06-10 DIAGNOSIS — R10.9 LEFT FLANK PAIN: ICD-10-CM

## 2022-06-10 DIAGNOSIS — N20.1 LEFT URETERAL CALCULUS: ICD-10-CM

## 2022-06-10 DIAGNOSIS — Z87.442 HISTORY OF KIDNEY STONES: ICD-10-CM

## 2022-06-10 DIAGNOSIS — R10.32 LEFT LOWER QUADRANT ABDOMINAL PAIN: ICD-10-CM

## 2022-06-10 DIAGNOSIS — R30.0 DYSURIA: ICD-10-CM

## 2022-06-10 DIAGNOSIS — N20.0 KIDNEY STONE: ICD-10-CM

## 2022-06-10 RX ORDER — ONDANSETRON 4 MG/1
4 TABLET, ORALLY DISINTEGRATING ORAL EVERY 6 HOURS PRN
Qty: 12 TABLET | Refills: 0 | OUTPATIENT
Start: 2022-06-10

## 2022-06-10 RX ORDER — TRAMADOL HYDROCHLORIDE 50 MG/1
50 TABLET ORAL EVERY 6 HOURS PRN
Qty: 60 TABLET | Refills: 2 | OUTPATIENT
Start: 2022-06-10

## 2022-06-10 RX ORDER — KETOROLAC TROMETHAMINE 10 MG/1
10 TABLET, FILM COATED ORAL EVERY 6 HOURS PRN
Qty: 16 TABLET | Refills: 0 | OUTPATIENT
Start: 2022-06-10

## 2022-06-16 ENCOUNTER — APPOINTMENT (OUTPATIENT)
Dept: CT IMAGING | Facility: HOSPITAL | Age: 47
End: 2022-06-16

## 2022-06-16 ENCOUNTER — APPOINTMENT (OUTPATIENT)
Dept: GENERAL RADIOLOGY | Facility: HOSPITAL | Age: 47
End: 2022-06-16

## 2022-06-16 ENCOUNTER — HOSPITAL ENCOUNTER (EMERGENCY)
Facility: HOSPITAL | Age: 47
Discharge: HOME | End: 2022-06-16
Attending: EMERGENCY MEDICINE | Admitting: EMERGENCY MEDICINE

## 2022-06-16 VITALS
RESPIRATION RATE: 16 BRPM | SYSTOLIC BLOOD PRESSURE: 154 MMHG | DIASTOLIC BLOOD PRESSURE: 105 MMHG | HEART RATE: 84 BPM | BODY MASS INDEX: 31.65 KG/M2 | OXYGEN SATURATION: 100 % | TEMPERATURE: 97.3 F | HEIGHT: 62 IN | WEIGHT: 172 LBS

## 2022-06-16 DIAGNOSIS — N23 RENAL COLIC: Primary | ICD-10-CM

## 2022-06-16 DIAGNOSIS — N30.00 ACUTE CYSTITIS WITHOUT HEMATURIA: ICD-10-CM

## 2022-06-16 LAB
ALBUMIN SERPL-MCNC: 4.25 G/DL (ref 3.5–5.2)
ALBUMIN/GLOB SERPL: 1.3 G/DL
ALP SERPL-CCNC: 64 U/L (ref 39–117)
ALT SERPL W P-5'-P-CCNC: 8 U/L (ref 1–33)
ANION GAP SERPL CALCULATED.3IONS-SCNC: 10.8 MMOL/L (ref 5–15)
AST SERPL-CCNC: 17 U/L (ref 1–32)
B-HCG UR QL: NEGATIVE
BACTERIA UR QL AUTO: ABNORMAL /HPF
BASOPHILS # BLD AUTO: 0.02 10*3/MM3 (ref 0–0.2)
BASOPHILS NFR BLD AUTO: 0.2 % (ref 0–1.5)
BILIRUB SERPL-MCNC: 0.6 MG/DL (ref 0–1.2)
BILIRUB UR QL STRIP: NEGATIVE
BUN SERPL-MCNC: 9 MG/DL (ref 6–20)
BUN/CREAT SERPL: 11 (ref 7–25)
CALCIUM SPEC-SCNC: 9.7 MG/DL (ref 8.6–10.5)
CHLORIDE SERPL-SCNC: 108 MMOL/L (ref 98–107)
CLARITY UR: ABNORMAL
CO2 SERPL-SCNC: 24.2 MMOL/L (ref 22–29)
COLOR UR: YELLOW
CREAT SERPL-MCNC: 0.82 MG/DL (ref 0.57–1)
CRP SERPL-MCNC: <0.3 MG/DL (ref 0–0.5)
D-LACTATE SERPL-SCNC: 1.3 MMOL/L (ref 0.5–2)
DEPRECATED RDW RBC AUTO: 42.7 FL (ref 37–54)
EGFRCR SERPLBLD CKD-EPI 2021: 90 ML/MIN/1.73
EOSINOPHIL # BLD AUTO: 0.11 10*3/MM3 (ref 0–0.4)
EOSINOPHIL NFR BLD AUTO: 1.2 % (ref 0.3–6.2)
ERYTHROCYTE [DISTWIDTH] IN BLOOD BY AUTOMATED COUNT: 12.6 % (ref 12.3–15.4)
ERYTHROCYTE [SEDIMENTATION RATE] IN BLOOD: 7 MM/HR (ref 0–20)
GLOBULIN UR ELPH-MCNC: 3.2 GM/DL
GLUCOSE SERPL-MCNC: 100 MG/DL (ref 65–99)
GLUCOSE UR STRIP-MCNC: NEGATIVE MG/DL
HCT VFR BLD AUTO: 38.8 % (ref 34–46.6)
HGB BLD-MCNC: 13 G/DL (ref 12–15.9)
HGB UR QL STRIP.AUTO: ABNORMAL
HOLD SPECIMEN: NORMAL
HOLD SPECIMEN: NORMAL
HYALINE CASTS UR QL AUTO: ABNORMAL /LPF
IMM GRANULOCYTES # BLD AUTO: 0.02 10*3/MM3 (ref 0–0.05)
IMM GRANULOCYTES NFR BLD AUTO: 0.2 % (ref 0–0.5)
KETONES UR QL STRIP: NEGATIVE
LEUKOCYTE ESTERASE UR QL STRIP.AUTO: ABNORMAL
LYMPHOCYTES # BLD AUTO: 2.78 10*3/MM3 (ref 0.7–3.1)
LYMPHOCYTES NFR BLD AUTO: 31.4 % (ref 19.6–45.3)
MCH RBC QN AUTO: 31 PG (ref 26.6–33)
MCHC RBC AUTO-ENTMCNC: 33.5 G/DL (ref 31.5–35.7)
MCV RBC AUTO: 92.6 FL (ref 79–97)
MONOCYTES # BLD AUTO: 0.55 10*3/MM3 (ref 0.1–0.9)
MONOCYTES NFR BLD AUTO: 6.2 % (ref 5–12)
NEUTROPHILS NFR BLD AUTO: 5.36 10*3/MM3 (ref 1.7–7)
NEUTROPHILS NFR BLD AUTO: 60.8 % (ref 42.7–76)
NITRITE UR QL STRIP: NEGATIVE
NRBC BLD AUTO-RTO: 0 /100 WBC (ref 0–0.2)
PH UR STRIP.AUTO: 6 [PH] (ref 5–8)
PLATELET # BLD AUTO: 312 10*3/MM3 (ref 140–450)
PMV BLD AUTO: 9.5 FL (ref 6–12)
POTASSIUM SERPL-SCNC: 3.7 MMOL/L (ref 3.5–5.2)
PROT SERPL-MCNC: 7.4 G/DL (ref 6–8.5)
PROT UR QL STRIP: ABNORMAL
RBC # BLD AUTO: 4.19 10*6/MM3 (ref 3.77–5.28)
RBC # UR STRIP: ABNORMAL /HPF
REF LAB TEST METHOD: ABNORMAL
SODIUM SERPL-SCNC: 143 MMOL/L (ref 136–145)
SP GR UR STRIP: 1.02 (ref 1–1.03)
SQUAMOUS #/AREA URNS HPF: ABNORMAL /HPF
UROBILINOGEN UR QL STRIP: ABNORMAL
WBC # UR STRIP: ABNORMAL /HPF
WBC NRBC COR # BLD: 8.84 10*3/MM3 (ref 3.4–10.8)
WHOLE BLOOD HOLD COAG: NORMAL
WHOLE BLOOD HOLD SPECIMEN: NORMAL

## 2022-06-16 PROCEDURE — 85025 COMPLETE CBC W/AUTO DIFF WBC: CPT | Performed by: PHYSICIAN ASSISTANT

## 2022-06-16 PROCEDURE — 85652 RBC SED RATE AUTOMATED: CPT | Performed by: PHYSICIAN ASSISTANT

## 2022-06-16 PROCEDURE — 74018 RADEX ABDOMEN 1 VIEW: CPT | Performed by: RADIOLOGY

## 2022-06-16 PROCEDURE — 25010000002 ONDANSETRON PER 1 MG: Performed by: PHYSICIAN ASSISTANT

## 2022-06-16 PROCEDURE — 74018 RADEX ABDOMEN 1 VIEW: CPT

## 2022-06-16 PROCEDURE — 96375 TX/PRO/DX INJ NEW DRUG ADDON: CPT

## 2022-06-16 PROCEDURE — 80053 COMPREHEN METABOLIC PANEL: CPT | Performed by: PHYSICIAN ASSISTANT

## 2022-06-16 PROCEDURE — 86140 C-REACTIVE PROTEIN: CPT | Performed by: PHYSICIAN ASSISTANT

## 2022-06-16 PROCEDURE — 81025 URINE PREGNANCY TEST: CPT | Performed by: PHYSICIAN ASSISTANT

## 2022-06-16 PROCEDURE — 25010000002 MORPHINE PER 10 MG: Performed by: EMERGENCY MEDICINE

## 2022-06-16 PROCEDURE — 81001 URINALYSIS AUTO W/SCOPE: CPT | Performed by: PHYSICIAN ASSISTANT

## 2022-06-16 PROCEDURE — 99283 EMERGENCY DEPT VISIT LOW MDM: CPT

## 2022-06-16 PROCEDURE — 96374 THER/PROPH/DIAG INJ IV PUSH: CPT

## 2022-06-16 PROCEDURE — 83605 ASSAY OF LACTIC ACID: CPT | Performed by: PHYSICIAN ASSISTANT

## 2022-06-16 RX ORDER — KETOROLAC TROMETHAMINE 30 MG/ML
15 INJECTION, SOLUTION INTRAMUSCULAR; INTRAVENOUS ONCE
Status: DISCONTINUED | OUTPATIENT
Start: 2022-06-16 | End: 2022-06-16

## 2022-06-16 RX ORDER — METHYLPREDNISOLONE SODIUM SUCCINATE 125 MG/2ML
125 INJECTION, POWDER, LYOPHILIZED, FOR SOLUTION INTRAMUSCULAR; INTRAVENOUS ONCE
Status: DISCONTINUED | OUTPATIENT
Start: 2022-06-16 | End: 2022-06-16

## 2022-06-16 RX ORDER — SODIUM CHLORIDE 0.9 % (FLUSH) 0.9 %
10 SYRINGE (ML) INJECTION AS NEEDED
Status: DISCONTINUED | OUTPATIENT
Start: 2022-06-16 | End: 2022-06-16 | Stop reason: HOSPADM

## 2022-06-16 RX ORDER — ORPHENADRINE CITRATE 30 MG/ML
60 INJECTION INTRAMUSCULAR; INTRAVENOUS ONCE
Status: DISCONTINUED | OUTPATIENT
Start: 2022-06-16 | End: 2022-06-16

## 2022-06-16 RX ORDER — CEFDINIR 300 MG/1
300 CAPSULE ORAL 2 TIMES DAILY
Qty: 14 CAPSULE | Refills: 0 | Status: SHIPPED | OUTPATIENT
Start: 2022-06-16 | End: 2022-06-23

## 2022-06-16 RX ORDER — ONDANSETRON 2 MG/ML
4 INJECTION INTRAMUSCULAR; INTRAVENOUS ONCE
Status: COMPLETED | OUTPATIENT
Start: 2022-06-16 | End: 2022-06-16

## 2022-06-16 RX ADMIN — MORPHINE SULFATE 4 MG: 4 INJECTION, SOLUTION INTRAMUSCULAR; INTRAVENOUS at 12:17

## 2022-06-16 RX ADMIN — SODIUM CHLORIDE 1000 ML: 9 INJECTION, SOLUTION INTRAVENOUS at 12:05

## 2022-06-16 RX ADMIN — ONDANSETRON 4 MG: 2 INJECTION INTRAMUSCULAR; INTRAVENOUS at 12:17

## 2022-06-16 NOTE — ED NOTES
MEDICAL SCREENING:    Reason for Visit: lower abdominal pain and low back pain; recently passed a kidney stone on Monday     Patient initially seen in triage.  The patient was advised further evaluation and diagnostic testing will be needed, some of the treatment and testing will be initiated in the lobby in order to begin the process.  The patient will be returned to the waiting area for the time being and possibly be re-assessed by a subsequent ED provider.  The patient will be brought back to the treatment area in as timely manner as possible.       Emma Thapa PA  06/16/22 0906

## 2022-06-16 NOTE — ED PROVIDER NOTES
Subjective   Patient is a 46-year-old female with migraine headaches, GERD, and history of kidney stones that presents to the ED with complaints of bilateral flank pain radiating into the suprapubic region.  She states the left is worse than right.  She states she is recently passed a large kidney stone on Monday.  She states she had relief for about a day for following passing the stone but symptoms have returned.  She is also complaining of frequent urination and dysuria.  She denies chest pain, shortness of breath, fever, chills, vomiting, headache, dizziness, diarrhea, or constipation.      History provided by:  Patient   used: No        Review of Systems   Constitutional: Negative.  Negative for chills, diaphoresis, fatigue and fever.   HENT: Negative.  Negative for congestion, drooling, ear discharge, ear pain, facial swelling, postnasal drip, rhinorrhea, sinus pressure, sinus pain, sneezing, sore throat, tinnitus and trouble swallowing.    Eyes: Negative.  Negative for photophobia, pain, discharge, redness and itching.   Respiratory: Negative.  Negative for cough, shortness of breath and wheezing.    Cardiovascular: Negative.  Negative for chest pain.   Gastrointestinal: Positive for abdominal pain and nausea. Negative for abdominal distention, constipation, diarrhea and vomiting.   Endocrine: Negative.    Genitourinary: Positive for dysuria, flank pain and frequency.   Musculoskeletal: Negative for arthralgias, back pain, gait problem, joint swelling, myalgias, neck pain and neck stiffness.   Skin: Negative.    Neurological: Negative.  Negative for dizziness, tremors, seizures, syncope, facial asymmetry, speech difficulty, weakness, light-headedness, numbness and headaches.   Psychiatric/Behavioral: Negative.  Negative for confusion.   All other systems reviewed and are negative.      Past Medical History:   Diagnosis Date   • Arthritis     back   • Complicated UTI (urinary tract  infection)    • Disease of thyroid gland     nodules   • GERD (gastroesophageal reflux disease)    • Insomnia    • Kidney stones    • Migraine    • Migraines    • Pneumonia    • Polycystic kidney disease    • PONV (postoperative nausea and vomiting)    • Strep throat        Allergies   Allergen Reactions   • Sulfa Antibiotics Anaphylaxis   • Compazine [Prochlorperazine Edisylate] Hives       Past Surgical History:   Procedure Laterality Date   • ABDOMINAL SURGERY     •  SECTION     • COLONOSCOPY     • CYSTOSCOPY     • CYSTOSCOPY ELECTROHYDRAULIC LITHOTRIPSY     • CYSTOSCOPY URETEROSCOPY LASER LITHOTRIPSY Left 2020    Procedure: CYSTOSCOPY URETEROSCOPY LASER LITHOTRIPSY;  Surgeon: Garth Friedman MD;  Location: [facility];  Service: Urology;  Laterality: Left;   • DENTAL PROCEDURE     • DILATATION AND CURETTAGE     • DILATATION AND CURETTAGE     • ENDOSCOPY     • EXTRACORPOREAL SHOCK WAVE LITHOTRIPSY (ESWL) Left 2018    Procedure: EXTRACORPOREAL SHOCKWAVE LITHOTRIPSY;  Surgeon: Garth Friedman MD;  Location: [facility];  Service: Urology   • EXTRACORPOREAL SHOCK WAVE LITHOTRIPSY (ESWL) Left 5/15/2020    Procedure: EXTRACORPOREAL SHOCKWAVE LITHOTRIPSY;  Surgeon: Garth Friedman MD;  Location: [facility];  Service: Urology;  Laterality: Left;   • KIDNEY STONE SURGERY     • LITHOTRIPSY         Family History   Problem Relation Age of Onset   • No Known Problems Father    • No Known Problems Mother        Social History     Socioeconomic History   • Marital status:    Tobacco Use   • Smoking status: Former Smoker     Packs/day: 0.50     Years: 15.00     Pack years: 7.50     Types: Cigarettes     Quit date: 2018     Years since quittin.4   • Smokeless tobacco: Never Used   Vaping Use   • Vaping Use: Never used   Substance and Sexual Activity   • Alcohol use: No   • Drug use: No   • Sexual activity: Yes     Partners: Male     Birth control/protection: None            Objective   Physical Exam  Vitals and nursing note reviewed.   Constitutional:       General: She is not in acute distress.     Appearance: She is well-developed. She is not diaphoretic.   HENT:      Head: Normocephalic and atraumatic.      Right Ear: External ear normal.      Left Ear: External ear normal.      Nose: Nose normal.      Mouth/Throat:      Mouth: Mucous membranes are moist.      Pharynx: Oropharynx is clear.   Eyes:      Extraocular Movements: Extraocular movements intact.      Conjunctiva/sclera: Conjunctivae normal.      Pupils: Pupils are equal, round, and reactive to light.   Neck:      Vascular: No JVD.      Trachea: No tracheal deviation.   Cardiovascular:      Rate and Rhythm: Normal rate and regular rhythm.      Pulses: Normal pulses.      Heart sounds: Normal heart sounds. No murmur heard.  Pulmonary:      Effort: Pulmonary effort is normal. No respiratory distress.      Breath sounds: Normal breath sounds. No wheezing.   Abdominal:      General: Bowel sounds are normal. There is no distension.      Palpations: Abdomen is soft.      Tenderness: There is abdominal tenderness in the suprapubic area. There is right CVA tenderness and left CVA tenderness. There is no guarding or rebound.      Hernia: No hernia is present.   Musculoskeletal:         General: No deformity. Normal range of motion.      Cervical back: Normal range of motion and neck supple.      Right lower leg: No edema.      Left lower leg: No edema.   Skin:     General: Skin is warm and dry.      Coloration: Skin is not pale.      Findings: No erythema or rash.   Neurological:      General: No focal deficit present.      Mental Status: She is alert and oriented to person, place, and time.      Cranial Nerves: No cranial nerve deficit.   Psychiatric:         Mood and Affect: Mood normal.         Behavior: Behavior normal.         Thought Content: Thought content normal.         Procedures           ED Course  ED Course as  of 06/16/22 1402   Thu Jun 16, 2022   1224 Patient declines CT scan []   1401 Discussed plan of care with patient.  Advised if symptoms worsen return to ED.  Advised close follow-up with Dr. Caldera in 1 to 2 days. []      ED Course User Index  [] Zonia Barajas PA-C                                                 [facility]    Final diagnoses:   Renal colic   Acute cystitis without hematuria       ED Disposition  ED Disposition     ED Disposition   Discharge    Condition   Stable    Comment   --             Garth Friedman MD  [address]  [place] [address]  [phone]    Schedule an appointment as soon as possible for a visit in 1 day           Medication List      New Prescriptions    cefdinir 300 MG capsule  Commonly known as: OMNICEF  Take 1 capsule by mouth 2 (Two) Times a Day for 7 days.           Where to Get Your Medications      You can get these medications from any pharmacy    Bring a paper prescription for each of these medications  · cefdinir 300 MG capsule          Zonia Barajas PA-C  06/16/22 1402

## 2022-07-28 DIAGNOSIS — R30.0 DYSURIA: ICD-10-CM

## 2022-07-28 DIAGNOSIS — R10.9 LEFT FLANK PAIN: ICD-10-CM

## 2022-07-28 DIAGNOSIS — N20.1 LEFT URETERAL CALCULUS: ICD-10-CM

## 2022-07-28 DIAGNOSIS — Z87.442 HISTORY OF KIDNEY STONES: ICD-10-CM

## 2022-07-28 DIAGNOSIS — R10.32 LEFT LOWER QUADRANT ABDOMINAL PAIN: ICD-10-CM

## 2022-07-28 DIAGNOSIS — N20.0 KIDNEY STONE: ICD-10-CM

## 2022-07-28 RX ORDER — KETOROLAC TROMETHAMINE 10 MG/1
10 TABLET, FILM COATED ORAL EVERY 6 HOURS PRN
Qty: 16 TABLET | Refills: 0 | OUTPATIENT
Start: 2022-07-28

## 2022-07-28 RX ORDER — TRAMADOL HYDROCHLORIDE 50 MG/1
50 TABLET ORAL EVERY 6 HOURS PRN
Qty: 60 TABLET | Refills: 2 | OUTPATIENT
Start: 2022-07-28

## 2022-07-28 RX ORDER — TAMSULOSIN HYDROCHLORIDE 0.4 MG/1
1 CAPSULE ORAL DAILY
Qty: 30 CAPSULE | Refills: 0 | Status: SHIPPED | OUTPATIENT
Start: 2022-07-28

## 2022-10-11 ENCOUNTER — APPOINTMENT (OUTPATIENT)
Dept: CT IMAGING | Facility: HOSPITAL | Age: 47
End: 2022-10-11

## 2022-10-11 ENCOUNTER — HOSPITAL ENCOUNTER (EMERGENCY)
Facility: HOSPITAL | Age: 47
Discharge: HOME | End: 2022-10-11
Attending: STUDENT IN AN ORGANIZED HEALTH CARE EDUCATION/TRAINING PROGRAM | Admitting: STUDENT IN AN ORGANIZED HEALTH CARE EDUCATION/TRAINING PROGRAM

## 2022-10-11 VITALS
OXYGEN SATURATION: 100 % | SYSTOLIC BLOOD PRESSURE: 147 MMHG | BODY MASS INDEX: 29.44 KG/M2 | DIASTOLIC BLOOD PRESSURE: 100 MMHG | RESPIRATION RATE: 20 BRPM | HEIGHT: 62 IN | TEMPERATURE: 97.3 F | WEIGHT: 160 LBS | HEART RATE: 88 BPM

## 2022-10-11 DIAGNOSIS — N39.0 UTI (URINARY TRACT INFECTION), UNCOMPLICATED: ICD-10-CM

## 2022-10-11 DIAGNOSIS — R92.2 BREAST DENSITY: ICD-10-CM

## 2022-10-11 DIAGNOSIS — N20.0 NEPHROLITHIASIS: Primary | ICD-10-CM

## 2022-10-11 DIAGNOSIS — N23 RENAL COLIC ON LEFT SIDE: ICD-10-CM

## 2022-10-11 LAB
ALBUMIN SERPL-MCNC: 4.08 G/DL (ref 3.5–5.2)
ALBUMIN/GLOB SERPL: 1.3 G/DL
ALP SERPL-CCNC: 71 U/L (ref 39–117)
ALT SERPL W P-5'-P-CCNC: 10 U/L (ref 1–33)
ANION GAP SERPL CALCULATED.3IONS-SCNC: 13.8 MMOL/L (ref 5–15)
AST SERPL-CCNC: 17 U/L (ref 1–32)
BACTERIA UR QL AUTO: ABNORMAL /HPF
BASOPHILS # BLD AUTO: 0.02 10*3/MM3 (ref 0–0.2)
BASOPHILS NFR BLD AUTO: 0.2 % (ref 0–1.5)
BILIRUB SERPL-MCNC: 0.3 MG/DL (ref 0–1.2)
BILIRUB UR QL STRIP: NEGATIVE
BUN SERPL-MCNC: 9 MG/DL (ref 6–20)
BUN/CREAT SERPL: 15.8 (ref 7–25)
CALCIUM SPEC-SCNC: 9.2 MG/DL (ref 8.6–10.5)
CHLORIDE SERPL-SCNC: 104 MMOL/L (ref 98–107)
CLARITY UR: CLEAR
CO2 SERPL-SCNC: 22.2 MMOL/L (ref 22–29)
COLOR UR: YELLOW
CREAT SERPL-MCNC: 0.57 MG/DL (ref 0.57–1)
DEPRECATED RDW RBC AUTO: 42.5 FL (ref 37–54)
EGFRCR SERPLBLD CKD-EPI 2021: 113.7 ML/MIN/1.73
EOSINOPHIL # BLD AUTO: 0.16 10*3/MM3 (ref 0–0.4)
EOSINOPHIL NFR BLD AUTO: 1.6 % (ref 0.3–6.2)
ERYTHROCYTE [DISTWIDTH] IN BLOOD BY AUTOMATED COUNT: 12.4 % (ref 12.3–15.4)
GLOBULIN UR ELPH-MCNC: 3.2 GM/DL
GLUCOSE SERPL-MCNC: 72 MG/DL (ref 65–99)
GLUCOSE UR STRIP-MCNC: NEGATIVE MG/DL
HCG SERPL QL: NEGATIVE
HCT VFR BLD AUTO: 40.6 % (ref 34–46.6)
HGB BLD-MCNC: 13.5 G/DL (ref 12–15.9)
HGB UR QL STRIP.AUTO: ABNORMAL
HOLD SPECIMEN: NORMAL
HYALINE CASTS UR QL AUTO: ABNORMAL /LPF
IMM GRANULOCYTES # BLD AUTO: 0.04 10*3/MM3 (ref 0–0.05)
IMM GRANULOCYTES NFR BLD AUTO: 0.4 % (ref 0–0.5)
KETONES UR QL STRIP: NEGATIVE
LEUKOCYTE ESTERASE UR QL STRIP.AUTO: ABNORMAL
LYMPHOCYTES # BLD AUTO: 3.13 10*3/MM3 (ref 0.7–3.1)
LYMPHOCYTES NFR BLD AUTO: 32 % (ref 19.6–45.3)
MCH RBC QN AUTO: 31.7 PG (ref 26.6–33)
MCHC RBC AUTO-ENTMCNC: 33.3 G/DL (ref 31.5–35.7)
MCV RBC AUTO: 95.3 FL (ref 79–97)
MONOCYTES # BLD AUTO: 0.58 10*3/MM3 (ref 0.1–0.9)
MONOCYTES NFR BLD AUTO: 5.9 % (ref 5–12)
NEUTROPHILS NFR BLD AUTO: 5.85 10*3/MM3 (ref 1.7–7)
NEUTROPHILS NFR BLD AUTO: 59.9 % (ref 42.7–76)
NITRITE UR QL STRIP: NEGATIVE
NRBC BLD AUTO-RTO: 0 /100 WBC (ref 0–0.2)
PH UR STRIP.AUTO: 6.5 [PH] (ref 5–8)
PLATELET # BLD AUTO: 281 10*3/MM3 (ref 140–450)
PMV BLD AUTO: 9.9 FL (ref 6–12)
POTASSIUM SERPL-SCNC: 3.4 MMOL/L (ref 3.5–5.2)
PROT SERPL-MCNC: 7.3 G/DL (ref 6–8.5)
PROT UR QL STRIP: NEGATIVE
RBC # BLD AUTO: 4.26 10*6/MM3 (ref 3.77–5.28)
RBC # UR STRIP: ABNORMAL /HPF
REF LAB TEST METHOD: ABNORMAL
SODIUM SERPL-SCNC: 140 MMOL/L (ref 136–145)
SP GR UR STRIP: 1.01 (ref 1–1.03)
SQUAMOUS #/AREA URNS HPF: ABNORMAL /HPF
UROBILINOGEN UR QL STRIP: ABNORMAL
WBC # UR STRIP: ABNORMAL /HPF
WBC NRBC COR # BLD: 9.78 10*3/MM3 (ref 3.4–10.8)
WHOLE BLOOD HOLD COAG: NORMAL
WHOLE BLOOD HOLD SPECIMEN: NORMAL

## 2022-10-11 PROCEDURE — 25010000002 KETOROLAC TROMETHAMINE PER 15 MG: Performed by: PHYSICIAN ASSISTANT

## 2022-10-11 PROCEDURE — 74176 CT ABD & PELVIS W/O CONTRAST: CPT | Performed by: RADIOLOGY

## 2022-10-11 PROCEDURE — 80053 COMPREHEN METABOLIC PANEL: CPT | Performed by: PHYSICIAN ASSISTANT

## 2022-10-11 PROCEDURE — 87086 URINE CULTURE/COLONY COUNT: CPT | Performed by: PHYSICIAN ASSISTANT

## 2022-10-11 PROCEDURE — 84703 CHORIONIC GONADOTROPIN ASSAY: CPT | Performed by: PHYSICIAN ASSISTANT

## 2022-10-11 PROCEDURE — 96375 TX/PRO/DX INJ NEW DRUG ADDON: CPT

## 2022-10-11 PROCEDURE — 96365 THER/PROPH/DIAG IV INF INIT: CPT

## 2022-10-11 PROCEDURE — 25010000002 CEFTRIAXONE PER 250 MG: Performed by: PHYSICIAN ASSISTANT

## 2022-10-11 PROCEDURE — 25010000002 ONDANSETRON PER 1 MG: Performed by: PHYSICIAN ASSISTANT

## 2022-10-11 PROCEDURE — 81001 URINALYSIS AUTO W/SCOPE: CPT | Performed by: PHYSICIAN ASSISTANT

## 2022-10-11 PROCEDURE — 25010000002 HYDROMORPHONE PER 4 MG: Performed by: STUDENT IN AN ORGANIZED HEALTH CARE EDUCATION/TRAINING PROGRAM

## 2022-10-11 PROCEDURE — 99284 EMERGENCY DEPT VISIT MOD MDM: CPT

## 2022-10-11 PROCEDURE — 74176 CT ABD & PELVIS W/O CONTRAST: CPT

## 2022-10-11 PROCEDURE — 85025 COMPLETE CBC W/AUTO DIFF WBC: CPT | Performed by: PHYSICIAN ASSISTANT

## 2022-10-11 RX ORDER — HYDROMORPHONE HYDROCHLORIDE 1 MG/ML
0.5 INJECTION, SOLUTION INTRAMUSCULAR; INTRAVENOUS; SUBCUTANEOUS ONCE
Status: COMPLETED | OUTPATIENT
Start: 2022-10-11 | End: 2022-10-11

## 2022-10-11 RX ORDER — CEFDINIR 300 MG/1
300 CAPSULE ORAL 2 TIMES DAILY
Qty: 14 CAPSULE | Refills: 0 | Status: SHIPPED | OUTPATIENT
Start: 2022-10-11

## 2022-10-11 RX ORDER — SODIUM CHLORIDE 0.9 % (FLUSH) 0.9 %
10 SYRINGE (ML) INJECTION AS NEEDED
Status: DISCONTINUED | OUTPATIENT
Start: 2022-10-11 | End: 2022-10-11 | Stop reason: HOSPADM

## 2022-10-11 RX ORDER — ONDANSETRON 2 MG/ML
4 INJECTION INTRAMUSCULAR; INTRAVENOUS ONCE
Status: COMPLETED | OUTPATIENT
Start: 2022-10-11 | End: 2022-10-11

## 2022-10-11 RX ORDER — KETOROLAC TROMETHAMINE 30 MG/ML
30 INJECTION, SOLUTION INTRAMUSCULAR; INTRAVENOUS ONCE
Status: COMPLETED | OUTPATIENT
Start: 2022-10-11 | End: 2022-10-11

## 2022-10-11 RX ADMIN — ONDANSETRON 4 MG: 2 INJECTION INTRAMUSCULAR; INTRAVENOUS at 16:13

## 2022-10-11 RX ADMIN — CEFTRIAXONE 1 G: 1 INJECTION, POWDER, FOR SOLUTION INTRAMUSCULAR; INTRAVENOUS at 17:08

## 2022-10-11 RX ADMIN — HYDROMORPHONE HYDROCHLORIDE 0.5 MG: 1 INJECTION, SOLUTION INTRAMUSCULAR; INTRAVENOUS; SUBCUTANEOUS at 17:12

## 2022-10-11 RX ADMIN — KETOROLAC TROMETHAMINE 30 MG: 30 INJECTION, SOLUTION INTRAMUSCULAR at 16:13

## 2022-10-11 NOTE — ED NOTES
MEDICAL SCREENING:    Reason for Visit: Flank pain  Patient initially seen in triage.  The patient was advised further evaluation and diagnostic testing will be needed, some of the treatment and testing will be initiated in the lobby in order to begin the process.  The patient will be returned to the waiting area for the time being and possibly be re-assessed by a subsequent ED provider.  The patient will be brought back to the treatment area in as timely manner as possible.         Ramírez Buitrago PA  10/11/22 1508

## 2022-10-11 NOTE — ED PROVIDER NOTES
Subjective   History of Present Illness  47-year-old female with past medical history of arthritis, UTI, thyroid disease, GERD, nephrolithiasis, migraines, and polycystic kidney disease presents to the emergency room with left-sided flank pain for the past day and worsening.  She does state that she has a history of kidney stones and states this feels very similar.  She denies dysuria or hematuria.  She does state the pain is worse with movement.  Denies any alleviating factors.  Denies any other complaints or concerns at this time.    History provided by:  Patient   used: No        Review of Systems   Constitutional: Negative.  Negative for fever.   HENT: Negative.    Respiratory: Negative.    Cardiovascular: Negative.  Negative for chest pain.   Gastrointestinal: Negative.  Negative for abdominal pain.   Endocrine: Negative.    Genitourinary: Positive for flank pain. Negative for decreased urine volume, dysuria and hematuria.   Skin: Negative.    Neurological: Negative.    Psychiatric/Behavioral: Negative.    All other systems reviewed and are negative.      Past Medical History:   Diagnosis Date   • Arthritis     back   • Complicated UTI (urinary tract infection)    • Disease of thyroid gland     nodules   • GERD (gastroesophageal reflux disease)    • Insomnia    • Kidney stones    • Migraine    • Migraines    • Pneumonia    • Polycystic kidney disease    • PONV (postoperative nausea and vomiting)    • Strep throat        Allergies   Allergen Reactions   • Sulfa Antibiotics Anaphylaxis   • Compazine [Prochlorperazine Edisylate] Hives       Past Surgical History:   Procedure Laterality Date   • ABDOMINAL SURGERY     •  SECTION     • COLONOSCOPY     • CYSTOSCOPY     • CYSTOSCOPY ELECTROHYDRAULIC LITHOTRIPSY     • CYSTOSCOPY URETEROSCOPY LASER LITHOTRIPSY Left 2020    Procedure: CYSTOSCOPY URETEROSCOPY LASER LITHOTRIPSY;  Surgeon: Garth Friedman MD;  Location: [facility] OR;   Service: Urology;  Laterality: Left;   • DENTAL PROCEDURE     • DILATATION AND CURETTAGE     • DILATATION AND CURETTAGE     • ENDOSCOPY     • EXTRACORPOREAL SHOCK WAVE LITHOTRIPSY (ESWL) Left 2018    Procedure: EXTRACORPOREAL SHOCKWAVE LITHOTRIPSY;  Surgeon: Garth Friedman MD;  Location: [facility];  Service: Urology   • EXTRACORPOREAL SHOCK WAVE LITHOTRIPSY (ESWL) Left 5/15/2020    Procedure: EXTRACORPOREAL SHOCKWAVE LITHOTRIPSY;  Surgeon: Garth Friedman MD;  Location: [facility];  Service: Urology;  Laterality: Left;   • KIDNEY STONE SURGERY     • LITHOTRIPSY         Family History   Problem Relation Age of Onset   • No Known Problems Father    • No Known Problems Mother        Social History     Socioeconomic History   • Marital status:    Tobacco Use   • Smoking status: Former     Packs/day: 0.50     Years: 15.00     Pack years: 7.50     Types: Cigarettes     Quit date: 2018     Years since quittin.7   • Smokeless tobacco: Never   Vaping Use   • Vaping Use: Never used   Substance and Sexual Activity   • Alcohol use: No   • Drug use: No   • Sexual activity: Yes     Partners: Male     Birth control/protection: None           Objective   Physical Exam  Vitals and nursing note reviewed.   Constitutional:       General: She is not in acute distress.     Appearance: She is well-developed. She is not diaphoretic.   HENT:      Head: Normocephalic and atraumatic.      Right Ear: External ear normal.      Left Ear: External ear normal.      Nose: Nose normal.   Eyes:      Conjunctiva/sclera: Conjunctivae normal.      Pupils: Pupils are equal, round, and reactive to light.   Neck:      Vascular: No JVD.      Trachea: No tracheal deviation.   Cardiovascular:      Rate and Rhythm: Normal rate and regular rhythm.      Heart sounds: Normal heart sounds. No murmur heard.  Pulmonary:      Effort: Pulmonary effort is normal. No respiratory distress.      Breath sounds: Normal breath sounds. No  wheezing.   Abdominal:      General: Bowel sounds are normal.      Palpations: Abdomen is soft.      Tenderness: There is no abdominal tenderness. There is left CVA tenderness. There is no right CVA tenderness.   Musculoskeletal:         General: No deformity. Normal range of motion.      Cervical back: Normal range of motion and neck supple.   Skin:     General: Skin is warm and dry.      Coloration: Skin is not pale.      Findings: No erythema or rash.   Neurological:      Mental Status: She is alert and oriented to person, place, and time.      Cranial Nerves: No cranial nerve deficit.   Psychiatric:         Behavior: Behavior normal.         Thought Content: Thought content normal.         Procedures           ED Course  ED Course as of 10/11/22 2213   Tue Oct 11, 2022   1653 CT Abdomen Pelvis Stone Protocol [TK]   1801 Did discussed left breast density with the patient and importance of following up for mammography.  Patient does state that she has never had a mammogram. [TK]      ED Course User Index  [TK] Linda Okeefe, RANDALL                                           MDM  Number of Diagnoses or Management Options  Breast density: new and requires workup  Nephrolithiasis: new and requires workup  Renal colic on left side: new and requires workup  UTI (urinary tract infection), uncomplicated: new and requires workup     Amount and/or Complexity of Data Reviewed  Clinical lab tests: reviewed and ordered  Tests in the radiology section of CPT®: reviewed and ordered    Risk of Complications, Morbidity, and/or Mortality  Presenting problems: moderate  Diagnostic procedures: moderate  Management options: moderate    Patient Progress  Patient progress: stable      Final diagnoses:   Nephrolithiasis   Renal colic on left side   UTI (urinary tract infection), uncomplicated   Breast density       ED Disposition  ED Disposition     ED Disposition   Discharge    Condition   Stable    Comment   --             Steidle,  Garth QUIÑONES MD  [address] [facility] [address]  [facility] [address]  [phone]    In 2 days      Masters, Linda RIBEIRO MD  [address]  [facility] [address]  [facility] [address]  [phone]    In 2 days           Medication List      New Prescriptions    cefdinir 300 MG capsule  Commonly known as: OMNICEF  Take 1 capsule by mouth 2 (Two) Times a Day.           Where to Get Your Medications      These medications were sent to [place] [address] - [phone] [facility] [phone] [facility] [address], [facility] [address]    Phone: [phone]   · cefdinir 300 MG capsule          Linda Okeefe PADelvinC  10/11/22 2217

## 2022-10-12 LAB — BACTERIA SPEC AEROBE CULT: NORMAL

## 2022-11-07 ENCOUNTER — HOSPITAL ENCOUNTER (OUTPATIENT)
Dept: MAMMOGRAPHY | Facility: HOSPITAL | Age: 47
Discharge: HOME | End: 2022-11-07

## 2022-11-07 DIAGNOSIS — Z12.31 VISIT FOR SCREENING MAMMOGRAM: ICD-10-CM

## 2022-11-10 ENCOUNTER — OFFICE VISIT (OUTPATIENT)
Dept: UROLOGY | Facility: CLINIC | Age: 47
End: 2022-11-10

## 2022-11-10 ENCOUNTER — HOSPITAL ENCOUNTER (OUTPATIENT)
Dept: GENERAL RADIOLOGY | Facility: HOSPITAL | Age: 47
Discharge: HOME | End: 2022-11-10

## 2022-11-10 VITALS — HEIGHT: 62 IN | WEIGHT: 160 LBS | BODY MASS INDEX: 29.44 KG/M2

## 2022-11-10 DIAGNOSIS — R30.0 DYSURIA: ICD-10-CM

## 2022-11-10 DIAGNOSIS — N20.0 KIDNEY STONE: ICD-10-CM

## 2022-11-10 DIAGNOSIS — R10.32 LEFT LOWER QUADRANT ABDOMINAL PAIN: ICD-10-CM

## 2022-11-10 DIAGNOSIS — Z87.442 HISTORY OF KIDNEY STONES: ICD-10-CM

## 2022-11-10 DIAGNOSIS — R10.9 LEFT FLANK PAIN: ICD-10-CM

## 2022-11-10 DIAGNOSIS — N20.0 KIDNEY STONE: Primary | ICD-10-CM

## 2022-11-10 PROCEDURE — 74018 RADEX ABDOMEN 1 VIEW: CPT | Performed by: RADIOLOGY

## 2022-11-10 PROCEDURE — 74018 RADEX ABDOMEN 1 VIEW: CPT

## 2022-11-10 PROCEDURE — 99213 OFFICE O/P EST LOW 20 MIN: CPT

## 2022-11-10 RX ORDER — KETOROLAC TROMETHAMINE 10 MG/1
10 TABLET, FILM COATED ORAL EVERY 6 HOURS PRN
Qty: 16 TABLET | Refills: 2 | Status: SHIPPED | OUTPATIENT
Start: 2022-11-10

## 2022-11-10 RX ORDER — ONDANSETRON 4 MG/1
4 TABLET, ORALLY DISINTEGRATING ORAL EVERY 6 HOURS PRN
Qty: 30 TABLET | Refills: 0 | Status: SHIPPED | OUTPATIENT
Start: 2022-11-10

## 2022-11-15 ENCOUNTER — HOSPITAL ENCOUNTER (OUTPATIENT)
Dept: MAMMOGRAPHY | Facility: HOSPITAL | Age: 47
Discharge: HOME | End: 2022-11-15

## 2022-11-15 ENCOUNTER — HOSPITAL ENCOUNTER (OUTPATIENT)
Dept: ULTRASOUND IMAGING | Facility: HOSPITAL | Age: 47
Discharge: HOME | End: 2022-11-15

## 2022-11-15 DIAGNOSIS — R93.89 ABNORMAL CHEST CT: ICD-10-CM

## 2022-11-15 PROCEDURE — 76642 ULTRASOUND BREAST LIMITED: CPT | Performed by: RADIOLOGY

## 2022-11-15 PROCEDURE — G0279 TOMOSYNTHESIS, MAMMO: HCPCS

## 2022-11-15 PROCEDURE — 77066 DX MAMMO INCL CAD BI: CPT | Performed by: RADIOLOGY

## 2022-11-15 PROCEDURE — 77062 BREAST TOMOSYNTHESIS BI: CPT | Performed by: RADIOLOGY

## 2022-11-15 PROCEDURE — 77066 DX MAMMO INCL CAD BI: CPT

## 2022-11-15 PROCEDURE — 76642 ULTRASOUND BREAST LIMITED: CPT

## 2023-04-21 ENCOUNTER — HOSPITAL ENCOUNTER (EMERGENCY)
Facility: HOSPITAL | Age: 48
Discharge: HOME | End: 2023-04-21
Attending: STUDENT IN AN ORGANIZED HEALTH CARE EDUCATION/TRAINING PROGRAM
Payer: COMMERCIAL

## 2023-04-21 ENCOUNTER — APPOINTMENT (OUTPATIENT)
Dept: CT IMAGING | Facility: HOSPITAL | Age: 48
End: 2023-04-21
Payer: COMMERCIAL

## 2023-04-21 ENCOUNTER — APPOINTMENT (OUTPATIENT)
Dept: GENERAL RADIOLOGY | Facility: HOSPITAL | Age: 48
End: 2023-04-21
Payer: COMMERCIAL

## 2023-04-21 VITALS
SYSTOLIC BLOOD PRESSURE: 130 MMHG | WEIGHT: 176 LBS | OXYGEN SATURATION: 99 % | HEART RATE: 63 BPM | DIASTOLIC BLOOD PRESSURE: 89 MMHG | BODY MASS INDEX: 32.39 KG/M2 | RESPIRATION RATE: 16 BRPM | TEMPERATURE: 97.8 F | HEIGHT: 62 IN

## 2023-04-21 DIAGNOSIS — N20.1 URETEROLITHIASIS: ICD-10-CM

## 2023-04-21 DIAGNOSIS — N20.0 KIDNEY STONE: ICD-10-CM

## 2023-04-21 DIAGNOSIS — R30.0 DYSURIA: ICD-10-CM

## 2023-04-21 DIAGNOSIS — R10.9 LEFT FLANK PAIN: ICD-10-CM

## 2023-04-21 DIAGNOSIS — R10.32 LEFT LOWER QUADRANT ABDOMINAL PAIN: ICD-10-CM

## 2023-04-21 DIAGNOSIS — K59.00 CONSTIPATION, UNSPECIFIED CONSTIPATION TYPE: ICD-10-CM

## 2023-04-21 DIAGNOSIS — Z87.442 HISTORY OF KIDNEY STONES: ICD-10-CM

## 2023-04-21 DIAGNOSIS — N20.1 LEFT URETERAL CALCULUS: ICD-10-CM

## 2023-04-21 DIAGNOSIS — N23 RENAL COLIC: Primary | ICD-10-CM

## 2023-04-21 LAB
ALBUMIN SERPL-MCNC: 3.7 G/DL (ref 3.5–5.2)
ALBUMIN/GLOB SERPL: 1.4 G/DL
ALP SERPL-CCNC: 56 U/L (ref 39–117)
ALT SERPL W P-5'-P-CCNC: 5 U/L (ref 1–33)
ANION GAP SERPL CALCULATED.3IONS-SCNC: 9.1 MMOL/L (ref 5–15)
AST SERPL-CCNC: 13 U/L (ref 1–32)
B-HCG UR QL: NEGATIVE
BACTERIA UR QL AUTO: ABNORMAL /HPF
BASOPHILS # BLD AUTO: 0.04 10*3/MM3 (ref 0–0.2)
BASOPHILS NFR BLD AUTO: 0.4 % (ref 0–1.5)
BILIRUB SERPL-MCNC: 0.3 MG/DL (ref 0–1.2)
BILIRUB UR QL STRIP: NEGATIVE
BUN SERPL-MCNC: 8 MG/DL (ref 6–20)
BUN/CREAT SERPL: 11.4 (ref 7–25)
CALCIUM SPEC-SCNC: 8.9 MG/DL (ref 8.6–10.5)
CHLORIDE SERPL-SCNC: 107 MMOL/L (ref 98–107)
CLARITY UR: CLEAR
CO2 SERPL-SCNC: 20.9 MMOL/L (ref 22–29)
COLOR UR: YELLOW
CREAT SERPL-MCNC: 0.7 MG/DL (ref 0.57–1)
DEPRECATED RDW RBC AUTO: 44.3 FL (ref 37–54)
EGFRCR SERPLBLD CKD-EPI 2021: 108.2 ML/MIN/1.73
EOSINOPHIL # BLD AUTO: 0.17 10*3/MM3 (ref 0–0.4)
EOSINOPHIL NFR BLD AUTO: 1.5 % (ref 0.3–6.2)
ERYTHROCYTE [DISTWIDTH] IN BLOOD BY AUTOMATED COUNT: 12.6 % (ref 12.3–15.4)
GLOBULIN UR ELPH-MCNC: 2.7 GM/DL
GLUCOSE SERPL-MCNC: 89 MG/DL (ref 65–99)
GLUCOSE UR STRIP-MCNC: NEGATIVE MG/DL
HCT VFR BLD AUTO: 36.1 % (ref 34–46.6)
HGB BLD-MCNC: 11.8 G/DL (ref 12–15.9)
HGB UR QL STRIP.AUTO: ABNORMAL
HOLD SPECIMEN: NORMAL
HOLD SPECIMEN: NORMAL
HYALINE CASTS UR QL AUTO: ABNORMAL /LPF
IMM GRANULOCYTES # BLD AUTO: 0.04 10*3/MM3 (ref 0–0.05)
IMM GRANULOCYTES NFR BLD AUTO: 0.4 % (ref 0–0.5)
KETONES UR QL STRIP: NEGATIVE
LEUKOCYTE ESTERASE UR QL STRIP.AUTO: ABNORMAL
LIPASE SERPL-CCNC: 21 U/L (ref 13–60)
LYMPHOCYTES # BLD AUTO: 2.87 10*3/MM3 (ref 0.7–3.1)
LYMPHOCYTES NFR BLD AUTO: 26.2 % (ref 19.6–45.3)
MCH RBC QN AUTO: 31.6 PG (ref 26.6–33)
MCHC RBC AUTO-ENTMCNC: 32.7 G/DL (ref 31.5–35.7)
MCV RBC AUTO: 96.5 FL (ref 79–97)
MONOCYTES # BLD AUTO: 0.83 10*3/MM3 (ref 0.1–0.9)
MONOCYTES NFR BLD AUTO: 7.6 % (ref 5–12)
NEUTROPHILS NFR BLD AUTO: 63.9 % (ref 42.7–76)
NEUTROPHILS NFR BLD AUTO: 7.02 10*3/MM3 (ref 1.7–7)
NITRITE UR QL STRIP: NEGATIVE
NRBC BLD AUTO-RTO: 0 /100 WBC (ref 0–0.2)
PH UR STRIP.AUTO: 6.5 [PH] (ref 5–8)
PLATELET # BLD AUTO: 275 10*3/MM3 (ref 140–450)
PMV BLD AUTO: 9.1 FL (ref 6–12)
POTASSIUM SERPL-SCNC: 3.6 MMOL/L (ref 3.5–5.2)
PROT SERPL-MCNC: 6.4 G/DL (ref 6–8.5)
PROT UR QL STRIP: NEGATIVE
RBC # BLD AUTO: 3.74 10*6/MM3 (ref 3.77–5.28)
RBC # UR STRIP: ABNORMAL /HPF
REF LAB TEST METHOD: ABNORMAL
SODIUM SERPL-SCNC: 137 MMOL/L (ref 136–145)
SP GR UR STRIP: 1.02 (ref 1–1.03)
SQUAMOUS #/AREA URNS HPF: ABNORMAL /HPF
UROBILINOGEN UR QL STRIP: ABNORMAL
WBC # UR STRIP: ABNORMAL /HPF
WBC NRBC COR # BLD: 10.97 10*3/MM3 (ref 3.4–10.8)
WHOLE BLOOD HOLD COAG: NORMAL
WHOLE BLOOD HOLD SPECIMEN: NORMAL

## 2023-04-21 PROCEDURE — 74018 RADEX ABDOMEN 1 VIEW: CPT | Performed by: RADIOLOGY

## 2023-04-21 PROCEDURE — 85025 COMPLETE CBC W/AUTO DIFF WBC: CPT | Performed by: PHYSICIAN ASSISTANT

## 2023-04-21 PROCEDURE — 99283 EMERGENCY DEPT VISIT LOW MDM: CPT

## 2023-04-21 PROCEDURE — 83690 ASSAY OF LIPASE: CPT | Performed by: PHYSICIAN ASSISTANT

## 2023-04-21 PROCEDURE — 96374 THER/PROPH/DIAG INJ IV PUSH: CPT

## 2023-04-21 PROCEDURE — 36415 COLL VENOUS BLD VENIPUNCTURE: CPT

## 2023-04-21 PROCEDURE — 81025 URINE PREGNANCY TEST: CPT | Performed by: PHYSICIAN ASSISTANT

## 2023-04-21 PROCEDURE — 96375 TX/PRO/DX INJ NEW DRUG ADDON: CPT

## 2023-04-21 PROCEDURE — 74018 RADEX ABDOMEN 1 VIEW: CPT

## 2023-04-21 PROCEDURE — 25010000002 ONDANSETRON PER 1 MG: Performed by: PHYSICIAN ASSISTANT

## 2023-04-21 PROCEDURE — 80053 COMPREHEN METABOLIC PANEL: CPT | Performed by: PHYSICIAN ASSISTANT

## 2023-04-21 PROCEDURE — 81001 URINALYSIS AUTO W/SCOPE: CPT | Performed by: PHYSICIAN ASSISTANT

## 2023-04-21 PROCEDURE — 25010000002 HYDROMORPHONE PER 4 MG: Performed by: STUDENT IN AN ORGANIZED HEALTH CARE EDUCATION/TRAINING PROGRAM

## 2023-04-21 RX ORDER — HYDROCODONE BITARTRATE AND ACETAMINOPHEN 5; 325 MG/1; MG/1
1 TABLET ORAL EVERY 6 HOURS PRN
Qty: 12 TABLET | Refills: 0 | Status: SHIPPED | OUTPATIENT
Start: 2023-04-21

## 2023-04-21 RX ORDER — SODIUM CHLORIDE 0.9 % (FLUSH) 0.9 %
10 SYRINGE (ML) INJECTION AS NEEDED
Status: DISCONTINUED | OUTPATIENT
Start: 2023-04-21 | End: 2023-04-21 | Stop reason: HOSPADM

## 2023-04-21 RX ORDER — ONDANSETRON 4 MG/1
4 TABLET, ORALLY DISINTEGRATING ORAL EVERY 6 HOURS PRN
Qty: 30 TABLET | Refills: 0 | Status: SHIPPED | OUTPATIENT
Start: 2023-04-21

## 2023-04-21 RX ORDER — TAMSULOSIN HYDROCHLORIDE 0.4 MG/1
1 CAPSULE ORAL DAILY
Qty: 30 CAPSULE | Refills: 0 | Status: SHIPPED | OUTPATIENT
Start: 2023-04-21

## 2023-04-21 RX ORDER — ONDANSETRON 2 MG/ML
4 INJECTION INTRAMUSCULAR; INTRAVENOUS ONCE
Status: COMPLETED | OUTPATIENT
Start: 2023-04-21 | End: 2023-04-21

## 2023-04-21 RX ORDER — KETOROLAC TROMETHAMINE 10 MG/1
10 TABLET, FILM COATED ORAL EVERY 6 HOURS PRN
Qty: 16 TABLET | Refills: 0 | Status: SHIPPED | OUTPATIENT
Start: 2023-04-21

## 2023-04-21 RX ORDER — DOCUSATE SODIUM 100 MG/1
100 CAPSULE, LIQUID FILLED ORAL 2 TIMES DAILY PRN
Qty: 60 CAPSULE | Refills: 0 | Status: SHIPPED | OUTPATIENT
Start: 2023-04-21

## 2023-04-21 RX ORDER — NALOXONE HYDROCHLORIDE 4 MG/.1ML
SPRAY NASAL
Qty: 2 EACH | Refills: 0 | Status: SHIPPED | OUTPATIENT
Start: 2023-04-21

## 2023-04-21 RX ORDER — HYDROMORPHONE HYDROCHLORIDE 1 MG/ML
0.5 INJECTION, SOLUTION INTRAMUSCULAR; INTRAVENOUS; SUBCUTANEOUS ONCE
Status: COMPLETED | OUTPATIENT
Start: 2023-04-21 | End: 2023-04-21

## 2023-04-21 RX ORDER — CEFDINIR 300 MG/1
300 CAPSULE ORAL 2 TIMES DAILY
Qty: 14 CAPSULE | Refills: 0 | Status: SHIPPED | OUTPATIENT
Start: 2023-04-21

## 2023-04-21 RX ADMIN — ONDANSETRON 4 MG: 2 INJECTION INTRAMUSCULAR; INTRAVENOUS at 12:18

## 2023-04-21 RX ADMIN — SODIUM CHLORIDE 1000 ML: 9 INJECTION, SOLUTION INTRAVENOUS at 12:18

## 2023-04-21 RX ADMIN — HYDROMORPHONE HYDROCHLORIDE 0.5 MG: 1 INJECTION, SOLUTION INTRAMUSCULAR; INTRAVENOUS; SUBCUTANEOUS at 12:18

## 2023-04-21 NOTE — Clinical Note
[facility] EMERGENCY DEPARTMENT  [address] [facility] [address]  Phone: [phone]    Olivia Richmond was seen and treated in our emergency department on 4/21/2023.  She may return to work on 04/22/2023.         Thank you for choosing [facility].    Tessie Beasley, JANA

## 2023-04-22 NOTE — ED PROVIDER NOTES
Subjective   History of Present Illness  48 yo female patient with hx of migraines, GERD, and hx of kidney stones presents to the ED with complaints of right flank pain that radiates to the RLQ. Pt reports dysuria and hematuria. Patient denies any fevers, nausea, vomiting. Patient states she has been symptomatic for 1 week. Patient states she thinks she is passing a kidney stone.     History provided by:  Patient   used: No        Review of Systems   Constitutional: Negative.    HENT: Negative.    Eyes: Negative.    Respiratory: Negative.    Cardiovascular: Negative.    Gastrointestinal: Negative.    Endocrine: Negative.    Genitourinary: Positive for flank pain.   Skin: Negative.    Allergic/Immunologic: Negative.    Neurological: Negative.    Hematological: Negative.    Psychiatric/Behavioral: Negative.    All other systems reviewed and are negative.      Past Medical History:   Diagnosis Date   • Arthritis     back   • Complicated UTI (urinary tract infection)    • Disease of thyroid gland     nodules   • GERD (gastroesophageal reflux disease)    • Insomnia    • Kidney stones    • Migraine    • Migraines    • Pneumonia    • Polycystic kidney disease    • PONV (postoperative nausea and vomiting)    • Strep throat        Allergies   Allergen Reactions   • Sulfa Antibiotics Anaphylaxis   • Compazine [Prochlorperazine Edisylate] Hives       Past Surgical History:   Procedure Laterality Date   • ABDOMINAL SURGERY     •  SECTION     • COLONOSCOPY     • CYSTOSCOPY     • CYSTOSCOPY ELECTROHYDRAULIC LITHOTRIPSY     • CYSTOSCOPY URETEROSCOPY LASER LITHOTRIPSY Left 2020    Procedure: CYSTOSCOPY URETEROSCOPY LASER LITHOTRIPSY;  Surgeon: Garth Friedman MD;  Location: [facility];  Service: Urology;  Laterality: Left;   • DENTAL PROCEDURE     • DILATATION AND CURETTAGE     • DILATATION AND CURETTAGE     • ENDOSCOPY     • EXTRACORPOREAL SHOCK WAVE LITHOTRIPSY (ESWL) Left 2018     Procedure: EXTRACORPOREAL SHOCKWAVE LITHOTRIPSY;  Surgeon: Garth Friedman MD;  Location: [facility] OR;  Service: Urology   • EXTRACORPOREAL SHOCK WAVE LITHOTRIPSY (ESWL) Left 5/15/2020    Procedure: EXTRACORPOREAL SHOCKWAVE LITHOTRIPSY;  Surgeon: Garth Friedman MD;  Location: [facility] OR;  Service: Urology;  Laterality: Left;   • KIDNEY STONE SURGERY     • LITHOTRIPSY         Family History   Problem Relation Age of Onset   • No Known Problems Mother    • No Known Problems Father    • Breast cancer Neg Hx        Social History     Socioeconomic History   • Marital status:    Tobacco Use   • Smoking status: Former     Packs/day: 0.50     Years: 15.00     Pack years: 7.50     Types: Cigarettes     Quit date: 2018     Years since quittin.3   • Smokeless tobacco: Never   Vaping Use   • Vaping Use: Never used   Substance and Sexual Activity   • Alcohol use: No   • Drug use: No   • Sexual activity: Yes     Partners: Male     Birth control/protection: None           Objective   Physical Exam  Vitals and nursing note reviewed.   Constitutional:       General: She is not in acute distress.     Appearance: She is well-developed and normal weight. She is not ill-appearing, toxic-appearing or diaphoretic.   HENT:      Head: Normocephalic and atraumatic.      Mouth/Throat:      Mouth: Mucous membranes are moist.      Pharynx: Oropharynx is clear.   Eyes:      Extraocular Movements: Extraocular movements intact.      Pupils: Pupils are equal, round, and reactive to light.   Cardiovascular:      Rate and Rhythm: Normal rate and regular rhythm.      Heart sounds: Normal heart sounds.   Pulmonary:      Effort: Pulmonary effort is normal.      Breath sounds: Normal breath sounds.   Abdominal:      General: Abdomen is flat. Bowel sounds are normal.      Palpations: Abdomen is soft.      Tenderness: There is no abdominal tenderness.   Skin:     General: Skin is warm and dry.      Capillary Refill: Capillary  refill takes less than 2 seconds.   Neurological:      General: No focal deficit present.      Mental Status: She is alert and oriented to person, place, and time.   Psychiatric:         Mood and Affect: Mood normal.         Behavior: Behavior normal.         Procedures           ED Course  ED Course as of 04/22/23 1112   Fri Apr 21, 2023   1400 Pt refused CT scan due to having so many. KUB ordered.  [ML]   1438 XR Abdomen KUB     FINDINGS:  2 views of the abdomen     Calcification over the right kidney     Moderate to large stool burden      This report was finalized on 4/21/2023 2:31 PM by Dr. Dave Will MD.           Specimen Collected: 04/21/23 14:30 EDT Last Resulted: 04/21/23 14:31 EDT           [ML]      ED Course User Index  [ML] Yudith Santos PA                                           Medical Decision Making  48 yo female patient with hx of migraines, GERD, and hx of kidney stones presents to the ED with complaints of right flank pain that radiates to the RLQ. Pt reports dysuria and hematuria. Patient denies any fevers, nausea, vomiting. Patient states she has been symptomatic for 1 week. Patient states she thinks she is passing a kidney stone. Pain resolved. Patient will f/u with Urlogy. Discussed sx and red flags that would warrant return to the ED.     Constipation, unspecified constipation type: acute illness or injury  Renal colic: acute illness or injury  Ureterolithiasis: acute illness or injury  Amount and/or Complexity of Data Reviewed  Labs: ordered.  Radiology: ordered. Decision-making details documented in ED Course.      Risk  OTC drugs.  Prescription drug management.          Final diagnoses:   Renal colic   Constipation, unspecified constipation type   Ureterolithiasis       ED Disposition  ED Disposition     ED Disposition   Discharge    Condition   Stable    Comment   --             Ward, April, APRN  [address] [place] [address]  [phone]    Schedule an  appointment as soon as possible for a visit in 3 days      Garth Friedman MD  [address]  [facility] [id number] [phone]    Schedule an appointment as soon as possible for a visit in 3 days           Medication List      New Prescriptions    docusate sodium 100 MG capsule  Commonly known as: COLACE  Take 1 capsule by mouth 2 (Two) Times a Day As Needed for Constipation.     HYDROcodone-acetaminophen 5-325 MG per tablet  Commonly known as: NORCO  Take 1 tablet by mouth Every 6 (Six) Hours As Needed for Mild Pain.     naloxone 4 MG/0.1ML nasal spray  Commonly known as: NARCAN  CALL 911. Do not prime. Spray into nostril upon signs of opioid overdose. May repeat in 2 to 3 minutes in opposite nostril if no or minimal breathing and responsiveness, then as needed (if doses are available) every 2 to 3 minutes.           Where to Get Your Medications      These medications were sent to [address] - [phone] [place] [phone] [address] [address]    Phone: [phone]   · cefdinir 300 MG capsule  · docusate sodium 100 MG capsule  · HYDROcodone-acetaminophen 5-325 MG per tablet  · ketorolac 10 MG tablet  · naloxone 4 MG/0.1ML nasal spray  · ondansetron ODT 4 MG disintegrating tablet  · tamsulosin 0.4 MG capsule 24 hr capsule          Yudith Santos PA  04/22/23 4526

## 2023-06-11 ENCOUNTER — HOSPITAL ENCOUNTER (EMERGENCY)
Facility: HOSPITAL | Age: 48
Discharge: HOME | End: 2023-06-11
Attending: STUDENT IN AN ORGANIZED HEALTH CARE EDUCATION/TRAINING PROGRAM | Admitting: STUDENT IN AN ORGANIZED HEALTH CARE EDUCATION/TRAINING PROGRAM
Payer: COMMERCIAL

## 2023-06-11 ENCOUNTER — APPOINTMENT (OUTPATIENT)
Dept: CT IMAGING | Facility: HOSPITAL | Age: 48
End: 2023-06-11
Payer: COMMERCIAL

## 2023-06-11 VITALS
BODY MASS INDEX: 30.36 KG/M2 | DIASTOLIC BLOOD PRESSURE: 78 MMHG | HEART RATE: 69 BPM | OXYGEN SATURATION: 100 % | TEMPERATURE: 97.5 F | WEIGHT: 165 LBS | SYSTOLIC BLOOD PRESSURE: 112 MMHG | RESPIRATION RATE: 20 BRPM | HEIGHT: 62 IN

## 2023-06-11 DIAGNOSIS — N20.0 BILATERAL NEPHROLITHIASIS: ICD-10-CM

## 2023-06-11 DIAGNOSIS — N28.1 RENAL CYST: Primary | ICD-10-CM

## 2023-06-11 LAB
ALBUMIN SERPL-MCNC: 4.1 G/DL (ref 3.5–5.2)
ALBUMIN/GLOB SERPL: 1.3 G/DL
ALP SERPL-CCNC: 68 U/L (ref 39–117)
ALT SERPL W P-5'-P-CCNC: 9 U/L (ref 1–33)
ANION GAP SERPL CALCULATED.3IONS-SCNC: 14.7 MMOL/L (ref 5–15)
AST SERPL-CCNC: 15 U/L (ref 1–32)
BACTERIA UR QL AUTO: ABNORMAL /HPF
BASOPHILS # BLD AUTO: 0.03 10*3/MM3 (ref 0–0.2)
BASOPHILS NFR BLD AUTO: 0.3 % (ref 0–1.5)
BILIRUB SERPL-MCNC: 0.3 MG/DL (ref 0–1.2)
BILIRUB UR QL STRIP: NEGATIVE
BUN SERPL-MCNC: 16 MG/DL (ref 6–20)
BUN/CREAT SERPL: 17 (ref 7–25)
CALCIUM SPEC-SCNC: 9.9 MG/DL (ref 8.6–10.5)
CHLORIDE SERPL-SCNC: 105 MMOL/L (ref 98–107)
CLARITY UR: ABNORMAL
CO2 SERPL-SCNC: 23.3 MMOL/L (ref 22–29)
COLOR UR: ABNORMAL
CREAT SERPL-MCNC: 0.94 MG/DL (ref 0.57–1)
DEPRECATED RDW RBC AUTO: 42 FL (ref 37–54)
EGFRCR SERPLBLD CKD-EPI 2021: 75.9 ML/MIN/1.73
EOSINOPHIL # BLD AUTO: 0.19 10*3/MM3 (ref 0–0.4)
EOSINOPHIL NFR BLD AUTO: 2.1 % (ref 0.3–6.2)
ERYTHROCYTE [DISTWIDTH] IN BLOOD BY AUTOMATED COUNT: 12 % (ref 12.3–15.4)
GLOBULIN UR ELPH-MCNC: 3.2 GM/DL
GLUCOSE SERPL-MCNC: 78 MG/DL (ref 65–99)
GLUCOSE UR STRIP-MCNC: NEGATIVE MG/DL
HCG SERPL QL: NEGATIVE
HCT VFR BLD AUTO: 41.2 % (ref 34–46.6)
HGB BLD-MCNC: 13.3 G/DL (ref 12–15.9)
HGB UR QL STRIP.AUTO: ABNORMAL
HYALINE CASTS UR QL AUTO: ABNORMAL /LPF
IMM GRANULOCYTES # BLD AUTO: 0.05 10*3/MM3 (ref 0–0.05)
IMM GRANULOCYTES NFR BLD AUTO: 0.5 % (ref 0–0.5)
KETONES UR QL STRIP: NEGATIVE
LEUKOCYTE ESTERASE UR QL STRIP.AUTO: ABNORMAL
LYMPHOCYTES # BLD AUTO: 2.98 10*3/MM3 (ref 0.7–3.1)
LYMPHOCYTES NFR BLD AUTO: 32.6 % (ref 19.6–45.3)
MCH RBC QN AUTO: 30.8 PG (ref 26.6–33)
MCHC RBC AUTO-ENTMCNC: 32.3 G/DL (ref 31.5–35.7)
MCV RBC AUTO: 95.4 FL (ref 79–97)
MONOCYTES # BLD AUTO: 0.69 10*3/MM3 (ref 0.1–0.9)
MONOCYTES NFR BLD AUTO: 7.5 % (ref 5–12)
NEUTROPHILS NFR BLD AUTO: 5.2 10*3/MM3 (ref 1.7–7)
NEUTROPHILS NFR BLD AUTO: 57 % (ref 42.7–76)
NITRITE UR QL STRIP: POSITIVE
NRBC BLD AUTO-RTO: 0 /100 WBC (ref 0–0.2)
PH UR STRIP.AUTO: <=5 [PH] (ref 5–8)
PLATELET # BLD AUTO: 281 10*3/MM3 (ref 140–450)
PMV BLD AUTO: 9.3 FL (ref 6–12)
POTASSIUM SERPL-SCNC: 3.4 MMOL/L (ref 3.5–5.2)
PROT SERPL-MCNC: 7.3 G/DL (ref 6–8.5)
PROT UR QL STRIP: ABNORMAL
RBC # BLD AUTO: 4.32 10*6/MM3 (ref 3.77–5.28)
RBC # UR STRIP: ABNORMAL /HPF
REF LAB TEST METHOD: ABNORMAL
SODIUM SERPL-SCNC: 143 MMOL/L (ref 136–145)
SP GR UR STRIP: 1.01 (ref 1–1.03)
SQUAMOUS #/AREA URNS HPF: ABNORMAL /HPF
UROBILINOGEN UR QL STRIP: ABNORMAL
WBC # UR STRIP: ABNORMAL /HPF
WBC NRBC COR # BLD: 9.14 10*3/MM3 (ref 3.4–10.8)

## 2023-06-11 PROCEDURE — 81001 URINALYSIS AUTO W/SCOPE: CPT | Performed by: STUDENT IN AN ORGANIZED HEALTH CARE EDUCATION/TRAINING PROGRAM

## 2023-06-11 PROCEDURE — 85025 COMPLETE CBC W/AUTO DIFF WBC: CPT | Performed by: STUDENT IN AN ORGANIZED HEALTH CARE EDUCATION/TRAINING PROGRAM

## 2023-06-11 PROCEDURE — 96374 THER/PROPH/DIAG INJ IV PUSH: CPT

## 2023-06-11 PROCEDURE — 25010000002 MORPHINE PER 10 MG: Performed by: STUDENT IN AN ORGANIZED HEALTH CARE EDUCATION/TRAINING PROGRAM

## 2023-06-11 PROCEDURE — 84703 CHORIONIC GONADOTROPIN ASSAY: CPT | Performed by: STUDENT IN AN ORGANIZED HEALTH CARE EDUCATION/TRAINING PROGRAM

## 2023-06-11 PROCEDURE — 80053 COMPREHEN METABOLIC PANEL: CPT | Performed by: STUDENT IN AN ORGANIZED HEALTH CARE EDUCATION/TRAINING PROGRAM

## 2023-06-11 PROCEDURE — 36415 COLL VENOUS BLD VENIPUNCTURE: CPT

## 2023-06-11 PROCEDURE — 74176 CT ABD & PELVIS W/O CONTRAST: CPT

## 2023-06-11 PROCEDURE — 87086 URINE CULTURE/COLONY COUNT: CPT | Performed by: STUDENT IN AN ORGANIZED HEALTH CARE EDUCATION/TRAINING PROGRAM

## 2023-06-11 PROCEDURE — 99283 EMERGENCY DEPT VISIT LOW MDM: CPT

## 2023-06-11 RX ORDER — CEFDINIR 300 MG/1
300 CAPSULE ORAL 2 TIMES DAILY
Qty: 14 CAPSULE | Refills: 0 | Status: SHIPPED | OUTPATIENT
Start: 2023-06-11 | End: 2023-06-18

## 2023-06-11 RX ORDER — KETOROLAC TROMETHAMINE 10 MG/1
10 TABLET, FILM COATED ORAL EVERY 6 HOURS PRN
Qty: 20 TABLET | Refills: 0 | Status: SHIPPED | OUTPATIENT
Start: 2023-06-11

## 2023-06-11 RX ORDER — TAMSULOSIN HYDROCHLORIDE 0.4 MG/1
1 CAPSULE ORAL DAILY
Qty: 30 CAPSULE | Refills: 0 | Status: SHIPPED | OUTPATIENT
Start: 2023-06-11

## 2023-06-11 RX ADMIN — MORPHINE SULFATE 4 MG: 4 INJECTION, SOLUTION INTRAMUSCULAR; INTRAVENOUS at 22:49

## 2023-06-11 NOTE — Clinical Note
[facility] EMERGENCY DEPARTMENT  [address] [facility] [address]  Phone: [phone]    Olivia Richmond was seen and treated in our emergency department on 6/11/2023.  She may return to work on 06/13/2023.         Thank you for choosing [facility].    Monet Rodriguez MD

## 2023-06-11 NOTE — ED PROVIDER NOTES
Subjective   History of Present Illness    Olivia is a 46 yo w/ PMH for nephrolithiasis  presenting to the ED for (R) flank pain which started over the last 24-48 hours. Patient denies fevers. No N/V. No urinary sx. No recent trauma to abdomen. Patient tolerating po well. No other complaints at this time.     Review of Systems   Constitutional: Negative.  Negative for activity change and fever.   HENT: Negative.  Negative for congestion.    Respiratory: Negative.  Negative for cough and shortness of breath.    Cardiovascular: Negative.  Negative for chest pain.   Gastrointestinal: Negative.  Negative for abdominal pain.   Endocrine: Negative.    Genitourinary:  Positive for flank pain. Negative for dysuria.   Skin: Negative.    Neurological: Negative.  Negative for dizziness, weakness and light-headedness.   Psychiatric/Behavioral: Negative.     All other systems reviewed and are negative.    Past Medical History:   Diagnosis Date    Arthritis     back    Complicated UTI (urinary tract infection)     Disease of thyroid gland     nodules    GERD (gastroesophageal reflux disease)     Insomnia     Kidney stones     Migraine     Migraines     Pneumonia     Polycystic kidney disease     PONV (postoperative nausea and vomiting)     Strep throat        Allergies   Allergen Reactions    Sulfa Antibiotics Anaphylaxis    Compazine [Prochlorperazine Edisylate] Hives       Past Surgical History:   Procedure Laterality Date    ABDOMINAL SURGERY       SECTION      COLONOSCOPY      CYSTOSCOPY      CYSTOSCOPY ELECTROHYDRAULIC LITHOTRIPSY      CYSTOSCOPY URETEROSCOPY LASER LITHOTRIPSY Left 2020    Procedure: CYSTOSCOPY URETEROSCOPY LASER LITHOTRIPSY;  Surgeon: Garth Friedman MD;  Location: [facility];  Service: Urology;  Laterality: Left;    DENTAL PROCEDURE      DILATATION AND CURETTAGE      DILATATION AND CURETTAGE      ENDOSCOPY      EXTRACORPOREAL SHOCK WAVE LITHOTRIPSY (ESWL) Left 2018    Procedure:  EXTRACORPOREAL SHOCKWAVE LITHOTRIPSY;  Surgeon: Garth Friedman MD;  Location: [facility] OR;  Service: Urology    EXTRACORPOREAL SHOCK WAVE LITHOTRIPSY (ESWL) Left 5/15/2020    Procedure: EXTRACORPOREAL SHOCKWAVE LITHOTRIPSY;  Surgeon: Garth Friedman MD;  Location: [facility] OR;  Service: Urology;  Laterality: Left;    KIDNEY STONE SURGERY      LITHOTRIPSY         Family History   Problem Relation Age of Onset    No Known Problems Mother     No Known Problems Father     Breast cancer Neg Hx        Social History     Socioeconomic History    Marital status:    Tobacco Use    Smoking status: Former     Packs/day: 0.50     Years: 15.00     Pack years: 7.50     Types: Cigarettes     Quit date: 2018     Years since quittin.4    Smokeless tobacco: Never   Vaping Use    Vaping Use: Never used   Substance and Sexual Activity    Alcohol use: No    Drug use: No    Sexual activity: Yes     Partners: Male     Birth control/protection: None           Objective   Physical Exam  Constitutional:       Appearance: Normal appearance.   HENT:      Head: Normocephalic and atraumatic.      Right Ear: Tympanic membrane normal.      Left Ear: Tympanic membrane normal.      Nose: Nose normal. No congestion or rhinorrhea.      Mouth/Throat:      Mouth: Mucous membranes are moist.      Pharynx: No oropharyngeal exudate or posterior oropharyngeal erythema.   Eyes:      Extraocular Movements: Extraocular movements intact.      Pupils: Pupils are equal, round, and reactive to light.   Cardiovascular:      Rate and Rhythm: Normal rate and regular rhythm.   Pulmonary:      Effort: Pulmonary effort is normal. No respiratory distress.      Breath sounds: Normal breath sounds.   Abdominal:      General: Abdomen is flat. Bowel sounds are normal. There is no distension.   Musculoskeletal:         General: No swelling. Normal range of motion.      Cervical back: Normal range of motion. No rigidity or tenderness.   Skin:      General: Skin is warm.      Capillary Refill: Capillary refill takes less than 2 seconds.      Coloration: Skin is not jaundiced or pale.   Neurological:      General: No focal deficit present.      Mental Status: She is alert and oriented to person, place, and time.       Procedures           ED Course                                           Medical Decision Making  Olivia is a 48 yo presenting to the ED for flank pain. Patient is afebrile and hemodynamically stable on arrival, non toxic appearing. CVA tenderness on exam. Non toxic appearing. CT stone protocol pending. Basic labs, HCG, UA pending    Amount and/or Complexity of Data Reviewed  Labs: ordered.  Radiology: ordered.        Final diagnoses:   None       ED Disposition  ED Disposition       None            No follow-up provider specified.       Medication List      No changes were made to your prescriptions during this visit.            Monet Rodriguez MD  06/11/23 3192

## 2023-06-12 NOTE — DISCHARGE INSTRUCTIONS
Please call Dr Friedman for further management. You will require further workup of your renal cysts and chronic recurrent renal stones. Please take pain medications as prescribed.

## 2023-06-13 LAB — BACTERIA SPEC AEROBE CULT: NORMAL

## 2023-10-16 ENCOUNTER — APPOINTMENT (OUTPATIENT)
Dept: CT IMAGING | Facility: HOSPITAL | Age: 48
End: 2023-10-16
Payer: COMMERCIAL

## 2023-10-16 ENCOUNTER — HOSPITAL ENCOUNTER (EMERGENCY)
Facility: HOSPITAL | Age: 48
Discharge: HOME | End: 2023-10-16
Attending: EMERGENCY MEDICINE | Admitting: EMERGENCY MEDICINE
Payer: COMMERCIAL

## 2023-10-16 VITALS
HEIGHT: 62 IN | TEMPERATURE: 98.1 F | BODY MASS INDEX: 29.44 KG/M2 | WEIGHT: 160 LBS | HEART RATE: 58 BPM | DIASTOLIC BLOOD PRESSURE: 79 MMHG | SYSTOLIC BLOOD PRESSURE: 111 MMHG | OXYGEN SATURATION: 100 % | RESPIRATION RATE: 18 BRPM

## 2023-10-16 DIAGNOSIS — R10.9 LEFT FLANK PAIN: ICD-10-CM

## 2023-10-16 DIAGNOSIS — N20.0 KIDNEY STONE: ICD-10-CM

## 2023-10-16 DIAGNOSIS — N23 RENAL COLIC: Primary | ICD-10-CM

## 2023-10-16 LAB
ALBUMIN SERPL-MCNC: 4 G/DL (ref 3.5–5.2)
ALBUMIN/GLOB SERPL: 1.4 G/DL
ALP SERPL-CCNC: 51 U/L (ref 39–117)
ALT SERPL W P-5'-P-CCNC: 11 U/L (ref 1–33)
ANION GAP SERPL CALCULATED.3IONS-SCNC: 10.7 MMOL/L (ref 5–15)
AST SERPL-CCNC: 17 U/L (ref 1–32)
B-HCG UR QL: NEGATIVE
BACTERIA UR QL AUTO: ABNORMAL /HPF
BASOPHILS # BLD AUTO: 0.03 10*3/MM3 (ref 0–0.2)
BASOPHILS NFR BLD AUTO: 0.3 % (ref 0–1.5)
BILIRUB SERPL-MCNC: 0.4 MG/DL (ref 0–1.2)
BILIRUB UR QL STRIP: NEGATIVE
BUN SERPL-MCNC: 12 MG/DL (ref 6–20)
BUN/CREAT SERPL: 15.2 (ref 7–25)
CALCIUM SPEC-SCNC: 9.6 MG/DL (ref 8.6–10.5)
CHLORIDE SERPL-SCNC: 107 MMOL/L (ref 98–107)
CLARITY UR: CLEAR
CO2 SERPL-SCNC: 23.3 MMOL/L (ref 22–29)
COLOR UR: YELLOW
CREAT SERPL-MCNC: 0.79 MG/DL (ref 0.57–1)
CRP SERPL-MCNC: <0.3 MG/DL (ref 0–0.5)
DEPRECATED RDW RBC AUTO: 46.5 FL (ref 37–54)
EGFRCR SERPLBLD CKD-EPI 2021: 93 ML/MIN/1.73
EOSINOPHIL # BLD AUTO: 0.08 10*3/MM3 (ref 0–0.4)
EOSINOPHIL NFR BLD AUTO: 0.8 % (ref 0.3–6.2)
ERYTHROCYTE [DISTWIDTH] IN BLOOD BY AUTOMATED COUNT: 12.4 % (ref 12.3–15.4)
GLOBULIN UR ELPH-MCNC: 2.9 GM/DL
GLUCOSE SERPL-MCNC: 94 MG/DL (ref 65–99)
GLUCOSE UR STRIP-MCNC: NEGATIVE MG/DL
HCT VFR BLD AUTO: 42.2 % (ref 34–46.6)
HGB BLD-MCNC: 12.8 G/DL (ref 12–15.9)
HGB UR QL STRIP.AUTO: ABNORMAL
HOLD SPECIMEN: NORMAL
HOLD SPECIMEN: NORMAL
HYALINE CASTS UR QL AUTO: ABNORMAL /LPF
IMM GRANULOCYTES # BLD AUTO: 0.03 10*3/MM3 (ref 0–0.05)
IMM GRANULOCYTES NFR BLD AUTO: 0.3 % (ref 0–0.5)
KETONES UR QL STRIP: NEGATIVE
LEUKOCYTE ESTERASE UR QL STRIP.AUTO: ABNORMAL
LYMPHOCYTES # BLD AUTO: 2.25 10*3/MM3 (ref 0.7–3.1)
LYMPHOCYTES NFR BLD AUTO: 23.5 % (ref 19.6–45.3)
MCH RBC QN AUTO: 30.9 PG (ref 26.6–33)
MCHC RBC AUTO-ENTMCNC: 30.3 G/DL (ref 31.5–35.7)
MCV RBC AUTO: 101.9 FL (ref 79–97)
MONOCYTES # BLD AUTO: 0.66 10*3/MM3 (ref 0.1–0.9)
MONOCYTES NFR BLD AUTO: 6.9 % (ref 5–12)
NEUTROPHILS NFR BLD AUTO: 6.53 10*3/MM3 (ref 1.7–7)
NEUTROPHILS NFR BLD AUTO: 68.2 % (ref 42.7–76)
NITRITE UR QL STRIP: NEGATIVE
NRBC BLD AUTO-RTO: 0 /100 WBC (ref 0–0.2)
PH UR STRIP.AUTO: 7 [PH] (ref 5–8)
PLATELET # BLD AUTO: 249 10*3/MM3 (ref 140–450)
PMV BLD AUTO: 10 FL (ref 6–12)
POTASSIUM SERPL-SCNC: 4 MMOL/L (ref 3.5–5.2)
PROT SERPL-MCNC: 6.9 G/DL (ref 6–8.5)
PROT UR QL STRIP: NEGATIVE
RBC # BLD AUTO: 4.14 10*6/MM3 (ref 3.77–5.28)
RBC # UR STRIP: ABNORMAL /HPF
REF LAB TEST METHOD: ABNORMAL
SODIUM SERPL-SCNC: 141 MMOL/L (ref 136–145)
SP GR UR STRIP: 1.01 (ref 1–1.03)
SQUAMOUS #/AREA URNS HPF: ABNORMAL /HPF
UROBILINOGEN UR QL STRIP: ABNORMAL
WBC # UR STRIP: ABNORMAL /HPF
WBC NRBC COR # BLD: 9.58 10*3/MM3 (ref 3.4–10.8)
WHOLE BLOOD HOLD SPECIMEN: NORMAL

## 2023-10-16 PROCEDURE — 25010000002 ONDANSETRON PER 1 MG: Performed by: EMERGENCY MEDICINE

## 2023-10-16 PROCEDURE — 85025 COMPLETE CBC W/AUTO DIFF WBC: CPT | Performed by: EMERGENCY MEDICINE

## 2023-10-16 PROCEDURE — 96374 THER/PROPH/DIAG INJ IV PUSH: CPT

## 2023-10-16 PROCEDURE — 80053 COMPREHEN METABOLIC PANEL: CPT | Performed by: EMERGENCY MEDICINE

## 2023-10-16 PROCEDURE — 96375 TX/PRO/DX INJ NEW DRUG ADDON: CPT

## 2023-10-16 PROCEDURE — 74176 CT ABD & PELVIS W/O CONTRAST: CPT

## 2023-10-16 PROCEDURE — 86140 C-REACTIVE PROTEIN: CPT | Performed by: EMERGENCY MEDICINE

## 2023-10-16 PROCEDURE — 36415 COLL VENOUS BLD VENIPUNCTURE: CPT

## 2023-10-16 PROCEDURE — 99284 EMERGENCY DEPT VISIT MOD MDM: CPT

## 2023-10-16 PROCEDURE — 25010000002 MORPHINE PER 10 MG: Performed by: EMERGENCY MEDICINE

## 2023-10-16 PROCEDURE — 81001 URINALYSIS AUTO W/SCOPE: CPT | Performed by: EMERGENCY MEDICINE

## 2023-10-16 PROCEDURE — 81025 URINE PREGNANCY TEST: CPT | Performed by: PHYSICIAN ASSISTANT

## 2023-10-16 RX ORDER — ONDANSETRON 2 MG/ML
4 INJECTION INTRAMUSCULAR; INTRAVENOUS ONCE
Status: COMPLETED | OUTPATIENT
Start: 2023-10-16 | End: 2023-10-16

## 2023-10-16 RX ORDER — SODIUM CHLORIDE 0.9 % (FLUSH) 0.9 %
10 SYRINGE (ML) INJECTION AS NEEDED
Status: DISCONTINUED | OUTPATIENT
Start: 2023-10-16 | End: 2023-10-16 | Stop reason: HOSPADM

## 2023-10-16 RX ORDER — KETOROLAC TROMETHAMINE 10 MG/1
10 TABLET, FILM COATED ORAL EVERY 6 HOURS PRN
Qty: 16 TABLET | Refills: 0 | Status: SHIPPED | OUTPATIENT
Start: 2023-10-16

## 2023-10-16 RX ADMIN — ONDANSETRON 4 MG: 2 INJECTION INTRAMUSCULAR; INTRAVENOUS at 06:18

## 2023-10-16 RX ADMIN — MORPHINE SULFATE 4 MG: 4 INJECTION, SOLUTION INTRAMUSCULAR; INTRAVENOUS at 06:18

## 2023-10-16 NOTE — Clinical Note
[facility] EMERGENCY DEPARTMENT  [address] [facility] [address]  Phone: [phone]    Olivia Richmond was seen and treated in our emergency department on 10/16/2023.  She may return to work on 10/17/2023.         Thank you for choosing [facility].    Romaine Matos RN

## 2023-10-16 NOTE — ED PROVIDER NOTES
Subjective     History provided by:  Patient  Flank Pain  Pain location:  L flank  Pain quality: aching and stabbing    Pain radiates to:  Does not radiate  Pain severity:  Moderate  Onset quality:  Gradual  Duration:  1 week  Timing:  Constant  Progression:  Worsening  Chronicity:  Recurrent  Relieved by:  Nothing  Associated symptoms: nausea    Associated symptoms: no chest pain, no dysuria and no fever        Review of Systems   Constitutional: Negative.  Negative for fever.   HENT: Negative.     Respiratory: Negative.     Cardiovascular: Negative.  Negative for chest pain.   Gastrointestinal:  Positive for nausea. Negative for abdominal pain.   Endocrine: Negative.    Genitourinary:  Positive for flank pain. Negative for dysuria.   Skin: Negative.    Neurological: Negative.    Psychiatric/Behavioral: Negative.     All other systems reviewed and are negative.      Past Medical History:   Diagnosis Date    Arthritis     back    Complicated UTI (urinary tract infection)     Disease of thyroid gland     nodules    GERD (gastroesophageal reflux disease)     Insomnia     Kidney stones     Migraine     Migraines     Pneumonia     Polycystic kidney disease     PONV (postoperative nausea and vomiting)     Strep throat        Allergies   Allergen Reactions    Sulfa Antibiotics Anaphylaxis    Compazine [Prochlorperazine Edisylate] Hives       Past Surgical History:   Procedure Laterality Date    ABDOMINAL SURGERY       SECTION      COLONOSCOPY      CYSTOSCOPY      CYSTOSCOPY ELECTROHYDRAULIC LITHOTRIPSY      CYSTOSCOPY URETEROSCOPY LASER LITHOTRIPSY Left 2020    Procedure: CYSTOSCOPY URETEROSCOPY LASER LITHOTRIPSY;  Surgeon: Garth Friedman MD;  Location: [facility];  Service: Urology;  Laterality: Left;    DENTAL PROCEDURE      DILATATION AND CURETTAGE      DILATATION AND CURETTAGE      ENDOSCOPY      EXTRACORPOREAL SHOCK WAVE LITHOTRIPSY (ESWL) Left 2018    Procedure: EXTRACORPOREAL SHOCKWAVE  LITHOTRIPSY;  Surgeon: Garth Friedman MD;  Location: [facility] OR;  Service: Urology    EXTRACORPOREAL SHOCK WAVE LITHOTRIPSY (ESWL) Left 5/15/2020    Procedure: EXTRACORPOREAL SHOCKWAVE LITHOTRIPSY;  Surgeon: Garth Friedman MD;  Location: [facility];  Service: Urology;  Laterality: Left;    KIDNEY STONE SURGERY      LITHOTRIPSY         Family History   Problem Relation Age of Onset    No Known Problems Mother     No Known Problems Father     Breast cancer Neg Hx        Social History     Socioeconomic History    Marital status:    Tobacco Use    Smoking status: Former     Packs/day: 0.50     Years: 15.00     Additional pack years: 0.00     Total pack years: 7.50     Types: Cigarettes     Quit date: 2018     Years since quittin.7    Smokeless tobacco: Never   Vaping Use    Vaping Use: Never used   Substance and Sexual Activity    Alcohol use: No    Drug use: No    Sexual activity: Yes     Partners: Male     Birth control/protection: None           Objective   Physical Exam  Vitals and nursing note reviewed.   Constitutional:       General: She is not in acute distress.     Appearance: She is well-developed. She is not diaphoretic.   HENT:      Head: Normocephalic and atraumatic.      Right Ear: External ear normal.      Left Ear: External ear normal.      Nose: Nose normal.   Eyes:      Conjunctiva/sclera: Conjunctivae normal.   Neck:      Vascular: No JVD.      Trachea: No tracheal deviation.   Cardiovascular:      Rate and Rhythm: Normal rate.      Heart sounds: No murmur heard.  Pulmonary:      Effort: Pulmonary effort is normal. No respiratory distress.      Breath sounds: No wheezing.   Abdominal:      Palpations: Abdomen is soft.      Tenderness: There is abdominal tenderness. There is left CVA tenderness.   Musculoskeletal:         General: No deformity. Normal range of motion.      Cervical back: Normal range of motion and neck supple.   Skin:     General: Skin is warm and dry.       Coloration: Skin is not pale.      Findings: No erythema or rash.   Neurological:      Mental Status: She is alert and oriented to person, place, and time.      Cranial Nerves: No cranial nerve deficit.   Psychiatric:         Behavior: Behavior normal.         Thought Content: Thought content normal.         Procedures           ED Course  ED Course as of 10/18/23 0414   Mon Oct 16, 2023   0720 CT abd pelvis rad interpreted:  NONOBSTRUCTING CALCULI IN THE CALYCES OF BOTH KIDNEYS.  NEGATIVE FOR  HYDRONEPHROSIS OR OBSTRUCTING URINARY TRACT CALCULUS.   [RB]      ED Course User Index  [RB] John Paul Truong II, PA                                           Medical Decision Making  48-year-old with flank pain.    Problems Addressed:  Renal colic: acute illness or injury     Details: Work-up does show nonobstructing stones.  Patient be started on Toradol.  Encouraged outpatient follow-up with urology if symptoms persist.    Amount and/or Complexity of Data Reviewed  Labs: ordered.  Radiology: ordered. Decision-making details documented in ED Course.    Risk  Prescription drug management.        Final diagnoses:   Renal colic       ED Disposition  ED Disposition       ED Disposition   Discharge    Condition   Stable    Comment   --               Ward, April, APRN  [address] [facility] [address] [phone]    Schedule an appointment as soon as possible for a visit            Medication List        Changed      ketorolac 10 MG tablet  Commonly known as: TORADOL  Take 1 tablet by mouth Every 6 (Six) Hours As Needed for Moderate Pain.  What changed: Another medication with the same name was removed. Continue taking this medication, and follow the directions you see here.               Where to Get Your Medications        These medications were sent to [facility]  [address] [place] [address] [place] [id number]      Hours: Monday to Friday 7 AM to 6 PM Phone: [phone]   ketorolac 10 MG tablet             John Paul Truong II, PA  10/18/23 0419

## 2023-11-15 ENCOUNTER — TRANSCRIBE ORDERS (OUTPATIENT)
Dept: ADMINISTRATIVE | Facility: HOSPITAL | Age: 48
End: 2023-11-15
Payer: COMMERCIAL

## 2023-11-15 DIAGNOSIS — Z12.31 VISIT FOR SCREENING MAMMOGRAM: Primary | ICD-10-CM

## 2024-01-11 ENCOUNTER — HOSPITAL ENCOUNTER (OUTPATIENT)
Dept: GENERAL RADIOLOGY | Facility: HOSPITAL | Age: 49
Discharge: HOME | End: 2024-01-11
Admitting: UROLOGY
Payer: COMMERCIAL

## 2024-01-11 ENCOUNTER — OFFICE VISIT (OUTPATIENT)
Dept: UROLOGY | Facility: CLINIC | Age: 49
End: 2024-01-11
Payer: COMMERCIAL

## 2024-01-11 VITALS
BODY MASS INDEX: 29.44 KG/M2 | WEIGHT: 160 LBS | HEIGHT: 62 IN | DIASTOLIC BLOOD PRESSURE: 109 MMHG | SYSTOLIC BLOOD PRESSURE: 169 MMHG | HEART RATE: 101 BPM

## 2024-01-11 DIAGNOSIS — N20.0 KIDNEY STONE: Primary | ICD-10-CM

## 2024-01-11 DIAGNOSIS — N20.1 LEFT URETERAL CALCULUS: ICD-10-CM

## 2024-01-11 DIAGNOSIS — N20.1 CALCULUS OF URETER: ICD-10-CM

## 2024-01-11 DIAGNOSIS — Z87.442 HISTORY OF KIDNEY STONES: ICD-10-CM

## 2024-01-11 DIAGNOSIS — R30.0 DYSURIA: ICD-10-CM

## 2024-01-11 DIAGNOSIS — N20.0 KIDNEY STONE: ICD-10-CM

## 2024-01-11 LAB
BILIRUB BLD-MCNC: NEGATIVE MG/DL
CLARITY, POC: CLEAR
COLOR UR: YELLOW
EXPIRATION DATE: ABNORMAL
GLUCOSE UR STRIP-MCNC: NEGATIVE MG/DL
KETONES UR QL: NEGATIVE
LEUKOCYTE EST, POC: NEGATIVE
Lab: ABNORMAL
NITRITE UR-MCNC: NEGATIVE MG/ML
PH UR: 7 [PH] (ref 5–8)
PROT UR STRIP-MCNC: NEGATIVE MG/DL
RBC # UR STRIP: ABNORMAL /UL
SP GR UR: 1 (ref 1–1.03)
UROBILINOGEN UR QL: NORMAL

## 2024-01-11 PROCEDURE — 99213 OFFICE O/P EST LOW 20 MIN: CPT | Performed by: UROLOGY

## 2024-01-11 PROCEDURE — 74018 RADEX ABDOMEN 1 VIEW: CPT

## 2024-01-11 PROCEDURE — 81003 URINALYSIS AUTO W/O SCOPE: CPT | Performed by: UROLOGY

## 2024-01-11 RX ORDER — OXYCODONE AND ACETAMINOPHEN 7.5; 325 MG/1; MG/1
1 TABLET ORAL EVERY 4 HOURS PRN
Qty: 12 TABLET | Refills: 0 | Status: SHIPPED | OUTPATIENT
Start: 2024-01-11 | End: 2024-01-25 | Stop reason: SDUPTHER

## 2024-01-11 RX ORDER — TRAMADOL HYDROCHLORIDE 50 MG/1
50 TABLET ORAL EVERY 6 HOURS PRN
Qty: 90 TABLET | Refills: 3 | Status: SHIPPED | OUTPATIENT
Start: 2024-01-11 | End: 2024-01-25 | Stop reason: SDUPTHER

## 2024-01-11 NOTE — PROGRESS NOTES
Chief Complaint:      Chief Complaint   Patient presents with    Nephrolithiasis       HPI:   48 y.o. female turns today she has known medullary sponge kidney she has a small stone she is here sobbing uncontrollably with her  her urine is negative she has a high likelihood of spontaneous passage I gave her Toradol and put her on alpha blockade and give her a short course of pain medication.    Past Medical History:     Past Medical History:   Diagnosis Date    Arthritis     back    Complicated UTI (urinary tract infection)     Disease of thyroid gland     nodules    GERD (gastroesophageal reflux disease)     Insomnia     Kidney stones     Migraine     Migraines     Pneumonia     Polycystic kidney disease     PONV (postoperative nausea and vomiting)     Strep throat        Current Meds:     Current Outpatient Medications   Medication Sig Dispense Refill    amitriptyline (ELAVIL) 25 MG tablet Take 1 tablet by mouth Every Night.      amitriptyline (ELAVIL) 25 MG tablet Take 1-2 tablets by mouth nightly 60 tablet 3    amitriptyline (ELAVIL) 25 MG tablet Take 1 to 2 tablets (25-50 mg total) by mouth nightly. 7 tablet 0    amitriptyline (ELAVIL) 25 MG tablet Take 1-2 tablets (25-50 mg total) by mouth nightly. 60 tablet 3    baclofen (LIORESAL) 10 MG tablet Take 1 tablet by mouth 3 (Three) Times a Day.      baclofen (LIORESAL) 10 MG tablet Take 1 tablet by mouth in the morning, then 1 tablet in the afternoon, and then 2 tablets by mouth at night. 120 tablet 3    baclofen (LIORESAL) 10 MG tablet Take 1 tablet by mouth in the morning, 1 tablet in the afternoon and 2 tablets at night. 28 tablet 0    baclofen (LIORESAL) 10 MG tablet Take 1 tablet by mouth every morning and afternoon and 2 tablets by mouth at night. 120 tablet 3    cefdinir (OMNICEF) 300 MG capsule Take 1 capsule by mouth 2 (Two) Times a Day. 14 capsule 0    clonazePAM (KlonoPIN) 0.5 MG tablet Take 1 tablet by mouth 2 (Two) Times a Day As Needed for  Seizures.      clonazePAM (KlonoPIN) 0.5 MG tablet Take 1 tablet by mouth twice daily as needed for Anxiety (insomnia). Max Daily Amount: 1 mg 60 tablet 3    clonazePAM (KlonoPIN) 0.5 MG tablet Take 1 tablet (0.5 mg total) by mouth 2 (two) times daily as needed for Anxiety (insomnia). Max Daily Amount: 1 mg 14 tablet 0    clonazePAM (KlonoPIN) 0.5 MG tablet Take 1 tablet by mouth 2 (two) times daily as needed for Anxiety (insomnia). Max Daily Amount: 1 mg 60 tablet 3    docusate sodium (COLACE) 100 MG capsule Take 1 capsule by mouth 2 (Two) Times a Day As Needed for Constipation. 60 capsule 0    gabapentin (NEURONTIN) 400 MG capsule Take 1 capsule by mouth 4 (Four) Times a Day.      gabapentin (NEURONTIN) 400 MG capsule Take 1 capsule by mouth by in the morning , 1 capsule at noon, 1 capsule in the evening, and 1 capsule before bedtime; Max of 4 capsules daily. 120 capsule 3    gabapentin (NEURONTIN) 400 MG capsule Take 1 capsule (400 mg total) by mouth 4 (four) times daily. Max Daily Amount: 1,600 mg 28 capsule 0    gabapentin (NEURONTIN) 400 MG capsule Take 1 capsule by mouth 4 (four) times daily 120 capsule 3    galcanezumab-gnlm (Emgality) 120 MG/ML auto-injector pen Inject 1 mL under the skin every 28 days. 1 mL 3    hydroCHLOROthiazide (HYDRODIURIL) 12.5 MG tablet Take 1 tablet by mouth in the morning. 30 tablet 7    hydroCHLOROthiazide (HYDRODIURIL) 12.5 MG tablet Take 1 tablet (12.5 mg total) by mouth daily. 7 tablet 0    hydroCHLOROthiazide (HYDRODIURIL) 12.5 MG tablet Take 1 tablet (12.5 mg total) by mouth daily. 7 tablet 1    HYDROcodone-acetaminophen (NORCO) 5-325 MG per tablet Take 1 tablet by mouth Every 6 (Six) Hours As Needed for Mild Pain. 12 tablet 0    ketorolac (TORADOL) 10 MG tablet Take 1 tablet by mouth Every 6 (Six) Hours As Needed for Moderate Pain. 16 tablet 0    naloxone (NARCAN) 4 MG/0.1ML nasal spray CALL 911. Do not prime. Spray into nostril upon signs of opioid overdose. May repeat in  2 to 3 minutes in opposite nostril if no or minimal breathing and responsiveness, then as needed (if doses are available) every 2 to 3 minutes. 2 each 0    ondansetron ODT (ZOFRAN-ODT) 4 MG disintegrating tablet Place 1 tablet on the tongue Every 6 (Six) Hours As Needed for Nausea or Vomiting. 30 tablet 0    orphenadrine (NORFLEX) 100 MG 12 hr tablet Take 1 tablet by mouth 2 (Two) Times a Day. 20 tablet 0    phenazopyridine (PYRIDIUM) 200 MG tablet Take 1 tablet by mouth 3 (Three) Times a Day As Needed for Bladder Spasms. 20 tablet 0    polyethylene glycol (MIRALAX) 17 g packet Take 17 g by mouth Daily. 40 packet 0    promethazine (PHENERGAN) 25 MG tablet Take 1 tablet by mouth Every 6 (Six) Hours As Needed for Nausea or Vomiting. 21 tablet 2    Rimegepant Sulfate (Nurtec) 75 MG tablet dispersible tablet Take 1 tablet by mouth daily as needed (Take one tab at onset of migraine. Max dose 1 tab/24 hours. No more than 2-3 days per week.). 8 tablet 3    tamsulosin (FLOMAX) 0.4 MG capsule 24 hr capsule Take 1 capsule by mouth Daily. 30 capsule 0    tamsulosin (FLOMAX) 0.4 MG capsule 24 hr capsule Take 1 capsule by mouth Daily. 30 capsule 0    traMADol (ULTRAM) 50 MG tablet Take 1 tablet by mouth Every 6 (Six) Hours As Needed for Moderate Pain . 60 tablet 2    traMADol (ULTRAM) 50 MG tablet Take 1 tablet by mouth every 8 hours as needed for Pain. Max Daily Amount: 3 tablets 90 tablet 3    traMADol (ULTRAM) 50 MG tablet Take 1 tablet (50 mg total) by mouth every 8 (eight) hours as needed for Pain for up to 120 days. Max Daily Amount: 150 mg 21 tablet 0    traMADol (ULTRAM) 50 MG tablet Take 1 tablet by mouth every 8 (eight) hours as needed for Pain 90 tablet 3     No current facility-administered medications for this visit.        Allergies:      Allergies   Allergen Reactions    Sulfa Antibiotics Anaphylaxis    Compazine [Prochlorperazine Edisylate] Hives        Past Surgical History:     Past Surgical History:    Procedure Laterality Date    ABDOMINAL SURGERY       SECTION      COLONOSCOPY      CYSTOSCOPY      CYSTOSCOPY ELECTROHYDRAULIC LITHOTRIPSY      CYSTOSCOPY URETEROSCOPY LASER LITHOTRIPSY Left 2020    Procedure: CYSTOSCOPY URETEROSCOPY LASER LITHOTRIPSY;  Surgeon: Garth Friedman MD;  Location: [facility];  Service: Urology;  Laterality: Left;    DENTAL PROCEDURE      DILATATION AND CURETTAGE      DILATATION AND CURETTAGE      ENDOSCOPY      EXTRACORPOREAL SHOCK WAVE LITHOTRIPSY (ESWL) Left 2018    Procedure: EXTRACORPOREAL SHOCKWAVE LITHOTRIPSY;  Surgeon: Garth Friedman MD;  Location: [facility];  Service: Urology    EXTRACORPOREAL SHOCK WAVE LITHOTRIPSY (ESWL) Left 5/15/2020    Procedure: EXTRACORPOREAL SHOCKWAVE LITHOTRIPSY;  Surgeon: Garth Friedman MD;  Location: [facility];  Service: Urology;  Laterality: Left;    KIDNEY STONE SURGERY      LITHOTRIPSY         Social History:     Social History     Socioeconomic History    Marital status:    Tobacco Use    Smoking status: Former     Packs/day: 0.50     Years: 15.00     Additional pack years: 0.00     Total pack years: 7.50     Types: Cigarettes     Quit date: 2018     Years since quittin.0    Smokeless tobacco: Never   Vaping Use    Vaping Use: Never used   Substance and Sexual Activity    Alcohol use: No    Drug use: No    Sexual activity: Yes     Partners: Male     Birth control/protection: None       Family History:     Family History   Problem Relation Age of Onset    No Known Problems Mother     No Known Problems Father     Breast cancer Neg Hx        Review of Systems:     Review of Systems   Constitutional: Negative.  Negative for activity change, appetite change, chills, diaphoresis, fatigue and unexpected weight change.   HENT:  Negative for congestion, dental problem, drooling, ear discharge, ear pain, facial swelling, hearing loss, mouth sores, nosebleeds, postnasal drip, rhinorrhea, sinus  pressure, sneezing, sore throat, tinnitus, trouble swallowing and voice change.    Eyes: Negative.  Negative for photophobia, pain, discharge, redness, itching and visual disturbance.   Respiratory: Negative.  Negative for apnea, cough, choking, chest tightness, shortness of breath, wheezing and stridor.    Cardiovascular: Negative.  Negative for chest pain, palpitations and leg swelling.   Gastrointestinal: Negative.  Negative for abdominal distention, abdominal pain, anal bleeding, blood in stool, constipation, diarrhea, nausea, rectal pain and vomiting.   Endocrine: Negative.  Negative for cold intolerance, heat intolerance, polydipsia, polyphagia and polyuria.   Genitourinary:  Positive for flank pain.   Musculoskeletal:  Negative for arthralgias, back pain, gait problem, joint swelling, myalgias, neck pain and neck stiffness.   Skin: Negative.  Negative for color change, pallor, rash and wound.   Allergic/Immunologic: Negative.  Negative for environmental allergies, food allergies and immunocompromised state.   Neurological: Negative.  Negative for dizziness, tremors, seizures, syncope, facial asymmetry, speech difficulty, weakness, light-headedness, numbness and headaches.   Hematological: Negative.  Negative for adenopathy. Does not bruise/bleed easily.   Psychiatric/Behavioral:  Negative for agitation, behavioral problems, confusion, decreased concentration, dysphoric mood, hallucinations, self-injury, sleep disturbance and suicidal ideas. The patient is not nervous/anxious and is not hyperactive.    All other systems reviewed and are negative.      Physical Exam:     Physical Exam  Constitutional:       Appearance: She is well-developed.   HENT:      Head: Normocephalic and atraumatic.      Right Ear: External ear normal.      Left Ear: External ear normal.   Eyes:      Conjunctiva/sclera: Conjunctivae normal.      Pupils: Pupils are equal, round, and reactive to light.   Cardiovascular:      Rate and  Rhythm: Normal rate and regular rhythm.      Heart sounds: Normal heart sounds.   Pulmonary:      Effort: Pulmonary effort is normal.      Breath sounds: Normal breath sounds.   Abdominal:      General: Bowel sounds are normal. There is no distension.      Palpations: Abdomen is soft. There is no mass.      Tenderness: There is no abdominal tenderness. There is no guarding or rebound.   Genitourinary:     Vagina: No vaginal discharge.   Musculoskeletal:         General: Normal range of motion.   Skin:     General: Skin is warm and dry.   Neurological:      Mental Status: She is alert.      Deep Tendon Reflexes: Reflexes are normal and symmetric.   Psychiatric:         Behavior: Behavior normal.         Thought Content: Thought content normal.         Judgment: Judgment normal.         I have reviewed the following portions of the patient's history: Allergies, current medications, past family history, past medical history, past social history, past surgical history, problem list, and ROS and confirm it is accurate.    Recent Image (CT and/or KUB):      CT Abdomen and Pelvis: No results found for this or any previous visit.       CT Stone Protocol: Results for orders placed during the hospital encounter of 10/16/23    CT Abdomen Pelvis Stone Protocol    Narrative  CLINICAL HISTORY: Flank pain.    COMPARISON: None available.    TECHNIQUE: Noncontrast CT of the abdomen and pelvis was obtained.  Multiplanar reformats were generated Limited exposure control,  adjustment of the mA and/or KV according to patient size or use of  iterative reconstruction technique was utilized.    FINDINGS:    Lung bases: clear.    Hepatobiliary: normal.    Pancreas: normal.    Spleen: normal.    Adrenals: normal.    Kidneys, ureters, bladder: Multiple nonobstructing calculi are seen in  the calyces of both kidneys.  Cysts are noted bilaterally.  Ureters are  normal in course and caliber.  Urinary bladder is decompressed..    Reproductive:  normal.    Bowel: normal.    Appendix: normal.    Peritoneum: normal, no free air or fluid.    Vascular: normal.    Lymph nodes: normal.    Musculoskeletal: normal.    Impression  NONOBSTRUCTING CALCULI IN THE CALYCES OF BOTH KIDNEYS.  NEGATIVE FOR  HYDRONEPHROSIS OR OBSTRUCTING URINARY TRACT CALCULUS.          This report was finalized on 10/16/2023 7:17 AM by Eva Mejía MD.       KUB: Results for orders placed during the hospital encounter of 04/21/23    XR Abdomen KUB    Narrative  EXAMINATION: XR ABDOMEN KUB-    CLINICAL INDICATION: flank pain      COMPARISON: 11/10/2022    FINDINGS:  2 views of the abdomen    Calcification over the right kidney    Moderate to large stool burden    This report was finalized on 4/21/2023 2:31 PM by Dr. Dave Will MD.       Labs (past 3 months):      Admission on 10/16/2023, Discharged on 10/16/2023   Component Date Value Ref Range Status    Color, UA 10/16/2023 Yellow  Yellow, Straw Final    Appearance, UA 10/16/2023 Clear  Clear Final    pH, UA 10/16/2023 7.0  5.0 - 8.0 Final    Specific [place], UA 10/16/2023 1.012  1.005 - 1.030 Final    Glucose, UA 10/16/2023 Negative  Negative Final    Ketones, UA 10/16/2023 Negative  Negative Final    Bilirubin, UA 10/16/2023 Negative  Negative Final    Blood, UA 10/16/2023 Large (3+) (A)  Negative Final    Protein, UA 10/16/2023 Negative  Negative Final    Leuk Esterase, UA 10/16/2023 Small (1+) (A)  Negative Final    Nitrite, UA 10/16/2023 Negative  Negative Final    Urobilinogen, UA 10/16/2023 0.2 E.U./dL  0.2 - 1.0 E.U./dL Final    RBC, UA 10/16/2023 Too Numerous to Count (A)  None Seen, 0-2 /HPF Final    WBC, UA 10/16/2023 3-5 (A)  None Seen, 0-2 /HPF Final    Urine culture not indicated.    Bacteria, UA 10/16/2023 1+ (A)  None Seen /HPF Final    Squamous Epithelial Cells, UA 10/16/2023 3-6 (A)  None Seen, 0-2 /HPF Final    Hyaline Casts, UA 10/16/2023 None Seen  None Seen /LPF Final    Methodology 10/16/2023 Automated Microscopy    Final    Glucose 10/16/2023 94  65 - 99 mg/dL Final    BUN 10/16/2023 12  6 - 20 mg/dL Final    Creatinine 10/16/2023 0.79  0.57 - 1.00 mg/dL Final    Sodium 10/16/2023 141  136 - 145 mmol/L Final    Potassium 10/16/2023 4.0  3.5 - 5.2 mmol/L Final    Chloride 10/16/2023 107  98 - 107 mmol/L Final    CO2 10/16/2023 23.3  22.0 - 29.0 mmol/L Final    Calcium 10/16/2023 9.6  8.6 - 10.5 mg/dL Final    Total Protein 10/16/2023 6.9  6.0 - 8.5 g/dL Final    Albumin 10/16/2023 4.0  3.5 - 5.2 g/dL Final    ALT (SGPT) 10/16/2023 11  1 - 33 U/L Final    AST (SGOT) 10/16/2023 17  1 - 32 U/L Final    Alkaline Phosphatase 10/16/2023 51  39 - 117 U/L Final    Total Bilirubin 10/16/2023 0.4  0.0 - 1.2 mg/dL Final    Globulin 10/16/2023 2.9  gm/dL Final    A/G Ratio 10/16/2023 1.4  g/dL Final    BUN/Creatinine Ratio 10/16/2023 15.2  7.0 - 25.0 Final    Anion Gap 10/16/2023 10.7  5.0 - 15.0 mmol/L Final    eGFR 10/16/2023 93.0  >60.0 mL/min/1.73 Final    C-Reactive Protein 10/16/2023 <0.30  0.00 - 0.50 mg/dL Final    WBC 10/16/2023 9.58  3.40 - 10.80 10*3/mm3 Final    RBC 10/16/2023 4.14  3.77 - 5.28 10*6/mm3 Final    Hemoglobin 10/16/2023 12.8  12.0 - 15.9 g/dL Final    Hematocrit 10/16/2023 42.2  34.0 - 46.6 % Final    MCV 10/16/2023 101.9 (H)  79.0 - 97.0 fL Final    MCH 10/16/2023 30.9  26.6 - 33.0 pg Final    MCHC 10/16/2023 30.3 (L)  31.5 - 35.7 g/dL Final    RDW 10/16/2023 12.4  12.3 - 15.4 % Final    RDW-SD 10/16/2023 46.5  37.0 - 54.0 fl Final    MPV 10/16/2023 10.0  6.0 - 12.0 fL Final    Platelets 10/16/2023 249  140 - 450 10*3/mm3 Final    Neutrophil % 10/16/2023 68.2  42.7 - 76.0 % Final    Lymphocyte % 10/16/2023 23.5  19.6 - 45.3 % Final    Monocyte % 10/16/2023 6.9  5.0 - 12.0 % Final    Eosinophil % 10/16/2023 0.8  0.3 - 6.2 % Final    Basophil % 10/16/2023 0.3  0.0 - 1.5 % Final    Immature Grans % 10/16/2023 0.3  0.0 - 0.5 % Final    Neutrophils, Absolute 10/16/2023 6.53  1.70 - 7.00 10*3/mm3 Final     Lymphocytes, Absolute 10/16/2023 2.25  0.70 - 3.10 10*3/mm3 Final    Monocytes, Absolute 10/16/2023 0.66  0.10 - 0.90 10*3/mm3 Final    Eosinophils, Absolute 10/16/2023 0.08  0.00 - 0.40 10*3/mm3 Final    Basophils, Absolute 10/16/2023 0.03  0.00 - 0.20 10*3/mm3 Final    Immature Grans, Absolute 10/16/2023 0.03  0.00 - 0.05 10*3/mm3 Final    nRBC 10/16/2023 0.0  0.0 - 0.2 /100 WBC Final    Extra Tube 10/16/2023 Hold for add-ons.   Final    Auto resulted.    Extra Tube 10/16/2023 hold for add-on   Final    Auto resulted    Extra Tube 10/16/2023 Hold for add-ons.   Final    Auto resulted.    HCG, Urine QL 10/16/2023 Negative  Negative Final        Procedure:       Assessment/Plan:   Ureteral calculus-patient has been diagnosed with a ureteral calculus.  We have discussed the various parameters regarding spontaneous passage including the notion that a 2 mm stone has a high likelihood of spontaneous passage versus a larger stone being caught up in the upper areas of the urinary tract.  We also discussed the medical management of stone disease and the use of medical expulsive therapy in the form of Flomax.  This is used in an off label setting.  We discussed the indicators for intervention including  absolute indicators such as sepsis and uncontrollable severe pain, as well as the relative indicators of moderate pain that is well-controlled with various analgesia.  I also talked about nonoperative management including ambulation and increasing fluids and hot tub as being an effective adjuncts in the treatment of a ureteral stone.  Narcotic pain medication-patient has significant acute pain that I believe would be an indication for the use of narcotic pain medication.  I discussed the significant risks of pain medication and the fact that this will be a short only option and I will give her no more than a three-day supply of pain medication, I will not plan long-term medication, and that this will be sent to a pain clinic  if it at all becomes necessary.  We discussed signing a pain medication agreement and the fact that we're going to run a state SAM review to be sure the patient is not getting pain medication from elsewhere.  If this is the case, we will not give pain medication as part of the patient's treatment plan of there being prescribed a controlled substance with potential for abuse.  This patient has been well aware of the appropriate dose of such medications including the risks for somnolence, limited ability to drive and/or safety and the significant potential for overdose.  It has been made clear that these medications are for the prescribed patient only without concomitant use of alcohol or other substance unless prescribed by the medical provider.  Has completed prescribing agreement detailing the terms of continue prescribing him a controlled substance including monitoring Sam reports, the possibility of urine drug screens, and pill counts.  The patient is aware that we review SAM reports on a regular basis and scan them into the chart.  History and physical examination exhibited continued safe and appropriate use of controlled substances. We also discussed the fact that the new [place] legislation allows only a three-day prescription for pain medication.  In this situation he will be referred to a chronic pain clinic.  Medullary sponge kidney            This document has been electronically signed by LOU MONTANA MD January 11, 2024 09:54 EST    Dictated Utilizing Dragon Dictation: Part of this note may be an electronic transcription/translation of spoken language to printed text using the Dragon Dictation System.

## 2024-01-25 ENCOUNTER — OFFICE VISIT (OUTPATIENT)
Dept: UROLOGY | Facility: CLINIC | Age: 49
End: 2024-01-25
Payer: COMMERCIAL

## 2024-01-25 VITALS
HEART RATE: 87 BPM | WEIGHT: 158.2 LBS | HEIGHT: 62 IN | SYSTOLIC BLOOD PRESSURE: 163 MMHG | DIASTOLIC BLOOD PRESSURE: 92 MMHG | BODY MASS INDEX: 29.11 KG/M2

## 2024-01-25 DIAGNOSIS — N20.1 LEFT URETERAL CALCULUS: Primary | ICD-10-CM

## 2024-01-25 DIAGNOSIS — N20.0 KIDNEY STONE: ICD-10-CM

## 2024-01-25 PROCEDURE — 99214 OFFICE O/P EST MOD 30 MIN: CPT | Performed by: UROLOGY

## 2024-01-25 RX ORDER — OXYCODONE AND ACETAMINOPHEN 7.5; 325 MG/1; MG/1
1 TABLET ORAL EVERY 4 HOURS PRN
Qty: 12 TABLET | Refills: 0 | Status: SHIPPED | OUTPATIENT
Start: 2024-01-25

## 2024-01-25 NOTE — PROGRESS NOTES
Chief Complaint:      Chief Complaint   Patient presents with    Nephrolithiasis     2 week fu        None today complaining of pain she has a left UVJ stone I gave her some refill I told her I cannot keep giving her narcotics these are very tiny stones and she is going to have to learn to deal with this and the tramadol she is to call back if she wants surgical intervention    Past Medical History:     Past Medical History:   Diagnosis Date    Arthritis     back    Complicated UTI (urinary tract infection)     Disease of thyroid gland     nodules    GERD (gastroesophageal reflux disease)     Insomnia     Kidney stones     Migraine     Migraines     Pneumonia     Polycystic kidney disease     PONV (postoperative nausea and vomiting)     Strep throat        Current Meds:     Current Outpatient Medications   Medication Sig Dispense Refill    amitriptyline (ELAVIL) 25 MG tablet Take 1 tablet by mouth Every Night.      amitriptyline (ELAVIL) 25 MG tablet Take 1-2 tablets by mouth nightly 60 tablet 3    amitriptyline (ELAVIL) 25 MG tablet Take 1 to 2 tablets (25-50 mg total) by mouth nightly. 7 tablet 0    amitriptyline (ELAVIL) 25 MG tablet Take 1-2 tablets (25-50 mg total) by mouth nightly. 60 tablet 3    baclofen (LIORESAL) 10 MG tablet Take 1 tablet by mouth 3 (Three) Times a Day.      baclofen (LIORESAL) 10 MG tablet Take 1 tablet by mouth in the morning, then 1 tablet in the afternoon, and then 2 tablets by mouth at night. 120 tablet 3    baclofen (LIORESAL) 10 MG tablet Take 1 tablet by mouth in the morning, 1 tablet in the afternoon and 2 tablets at night. 28 tablet 0    baclofen (LIORESAL) 10 MG tablet Take 1 tablet by mouth every morning and afternoon and 2 tablets by mouth at night. 120 tablet 3    clonazePAM (KlonoPIN) 0.5 MG tablet Take 1 tablet by mouth 2 (Two) Times a Day As Needed for Seizures.      clonazePAM (KlonoPIN) 0.5 MG tablet Take 1 tablet by mouth twice daily as needed for Anxiety (insomnia).  Max Daily Amount: 1 mg 60 tablet 3    clonazePAM (KlonoPIN) 0.5 MG tablet Take 1 tablet (0.5 mg total) by mouth 2 (two) times daily as needed for Anxiety (insomnia). Max Daily Amount: 1 mg 14 tablet 0    clonazePAM (KlonoPIN) 0.5 MG tablet Take 1 tablet by mouth 2 (two) times daily as needed for Anxiety (insomnia). Max Daily Amount: 1 mg 60 tablet 3    docusate sodium (COLACE) 100 MG capsule Take 1 capsule by mouth 2 (Two) Times a Day As Needed for Constipation. 60 capsule 0    gabapentin (NEURONTIN) 400 MG capsule Take 1 capsule by mouth 4 (Four) Times a Day.      gabapentin (NEURONTIN) 400 MG capsule Take 1 capsule by mouth by in the morning , 1 capsule at noon, 1 capsule in the evening, and 1 capsule before bedtime; Max of 4 capsules daily. 120 capsule 3    gabapentin (NEURONTIN) 400 MG capsule Take 1 capsule (400 mg total) by mouth 4 (four) times daily. Max Daily Amount: 1,600 mg 28 capsule 0    gabapentin (NEURONTIN) 400 MG capsule Take 1 capsule by mouth 4 (four) times daily 120 capsule 3    galcanezumab-gnlm (Emgality) 120 MG/ML auto-injector pen Inject 1 mL under the skin every 28 days. 1 mL 3    hydroCHLOROthiazide (HYDRODIURIL) 12.5 MG tablet Take 1 tablet by mouth in the morning. 30 tablet 7    naloxone (NARCAN) 4 MG/0.1ML nasal spray CALL 911. Do not prime. Spray into nostril upon signs of opioid overdose. May repeat in 2 to 3 minutes in opposite nostril if no or minimal breathing and responsiveness, then as needed (if doses are available) every 2 to 3 minutes. 2 each 0    ondansetron ODT (ZOFRAN-ODT) 4 MG disintegrating tablet Place 1 tablet on the tongue Every 6 (Six) Hours As Needed for Nausea or Vomiting. 30 tablet 0    orphenadrine (NORFLEX) 100 MG 12 hr tablet Take 1 tablet by mouth 2 (Two) Times a Day. 20 tablet 0    oxyCODONE-acetaminophen (Percocet) 7.5-325 MG per tablet Take 1 tablet by mouth Every 4 (Four) Hours As Needed for Severe Pain. 12 tablet 0    phenazopyridine (PYRIDIUM) 200 MG tablet  Take 1 tablet by mouth 3 (Three) Times a Day As Needed for Bladder Spasms. 20 tablet 0    polyethylene glycol (MIRALAX) 17 g packet Take 17 g by mouth Daily. 40 packet 0    promethazine (PHENERGAN) 25 MG tablet Take 1 tablet by mouth Every 6 (Six) Hours As Needed for Nausea or Vomiting. 21 tablet 2    Rimegepant Sulfate (Nurtec) 75 MG tablet dispersible tablet Take 1 tablet by mouth daily as needed (Take one tab at onset of migraine. Max dose 1 tab/24 hours. No more than 2-3 days per week.). 8 tablet 3    traMADol (ULTRAM) 50 MG tablet Take 1 tablet by mouth Every 6 (Six) Hours As Needed for Moderate Pain . 60 tablet 2     No current facility-administered medications for this visit.        Allergies:      Allergies   Allergen Reactions    Sulfa Antibiotics Anaphylaxis    Compazine [Prochlorperazine Edisylate] Hives        Past Surgical History:     Past Surgical History:   Procedure Laterality Date    ABDOMINAL SURGERY       SECTION      COLONOSCOPY      CYSTOSCOPY      CYSTOSCOPY ELECTROHYDRAULIC LITHOTRIPSY      CYSTOSCOPY URETEROSCOPY LASER LITHOTRIPSY Left 2020    Procedure: CYSTOSCOPY URETEROSCOPY LASER LITHOTRIPSY;  Surgeon: Garth Friedman MD;  Location: [facility];  Service: Urology;  Laterality: Left;    DENTAL PROCEDURE      DILATATION AND CURETTAGE      DILATATION AND CURETTAGE      ENDOSCOPY      EXTRACORPOREAL SHOCK WAVE LITHOTRIPSY (ESWL) Left 2018    Procedure: EXTRACORPOREAL SHOCKWAVE LITHOTRIPSY;  Surgeon: Garth Friedman MD;  Location: [facility];  Service: Urology    EXTRACORPOREAL SHOCK WAVE LITHOTRIPSY (ESWL) Left 5/15/2020    Procedure: EXTRACORPOREAL SHOCKWAVE LITHOTRIPSY;  Surgeon: Garth Friedman MD;  Location: [facility];  Service: Urology;  Laterality: Left;    KIDNEY STONE SURGERY      LITHOTRIPSY         Social History:     Social History     Socioeconomic History    Marital status:    Tobacco Use    Smoking status: Former     Packs/day: 0.50      Years: 15.00     Additional pack years: 0.00     Total pack years: 7.50     Types: Cigarettes     Quit date: 2018     Years since quittin.0    Smokeless tobacco: Never   Vaping Use    Vaping Use: Never used   Substance and Sexual Activity    Alcohol use: No    Drug use: No    Sexual activity: Yes     Partners: Male     Birth control/protection: None       Family History:     Family History   Problem Relation Age of Onset    No Known Problems Mother     No Known Problems Father     Breast cancer Neg Hx        Review of Systems:     Review of Systems   Constitutional: Negative.  Negative for activity change, appetite change, chills, diaphoresis, fatigue and unexpected weight change.   HENT:  Negative for congestion, dental problem, drooling, ear discharge, ear pain, facial swelling, hearing loss, mouth sores, nosebleeds, postnasal drip, rhinorrhea, sinus pressure, sneezing, sore throat, tinnitus, trouble swallowing and voice change.    Eyes: Negative.  Negative for photophobia, pain, discharge, redness, itching and visual disturbance.   Respiratory: Negative.  Negative for apnea, cough, choking, chest tightness, shortness of breath, wheezing and stridor.    Cardiovascular: Negative.  Negative for chest pain, palpitations and leg swelling.   Gastrointestinal: Negative.  Negative for abdominal distention, abdominal pain, anal bleeding, blood in stool, constipation, diarrhea, nausea, rectal pain and vomiting.   Endocrine: Negative.  Negative for cold intolerance, heat intolerance, polydipsia, polyphagia and polyuria.   Musculoskeletal: Negative.  Negative for arthralgias, back pain, gait problem, joint swelling, myalgias, neck pain and neck stiffness.   Skin: Negative.  Negative for color change, pallor, rash and wound.   Allergic/Immunologic: Negative.  Negative for environmental allergies, food allergies and immunocompromised state.   Neurological: Negative.  Negative for dizziness, tremors, seizures,  syncope, facial asymmetry, speech difficulty, weakness, light-headedness, numbness and headaches.   Hematological: Negative.  Negative for adenopathy. Does not bruise/bleed easily.   Psychiatric/Behavioral:  Negative for agitation, behavioral problems, confusion, decreased concentration, dysphoric mood, hallucinations, self-injury, sleep disturbance and suicidal ideas. The patient is not nervous/anxious and is not hyperactive.    All other systems reviewed and are negative.      Physical Exam:     Physical Exam  Constitutional:       Appearance: She is well-developed.   HENT:      Head: Normocephalic and atraumatic.      Right Ear: External ear normal.      Left Ear: External ear normal.   Eyes:      Conjunctiva/sclera: Conjunctivae normal.      Pupils: Pupils are equal, round, and reactive to light.   Cardiovascular:      Rate and Rhythm: Normal rate and regular rhythm.      Heart sounds: Normal heart sounds.   Pulmonary:      Effort: Pulmonary effort is normal.      Breath sounds: Normal breath sounds.   Abdominal:      General: Bowel sounds are normal. There is no distension.      Palpations: Abdomen is soft. There is no mass.      Tenderness: There is no abdominal tenderness. There is no guarding or rebound.   Genitourinary:     Vagina: No vaginal discharge.   Musculoskeletal:         General: Normal range of motion.   Skin:     General: Skin is warm and dry.   Neurological:      Mental Status: She is alert.      Deep Tendon Reflexes: Reflexes are normal and symmetric.   Psychiatric:         Behavior: Behavior normal.         Thought Content: Thought content normal.         Judgment: Judgment normal.         I have reviewed the following portions of the patient's history: Allergies, current medications, past family history, past medical history, past social history, past surgical history, problem list, and ROS and confirm it is accurate.    Recent Image (CT and/or KUB):      CT Abdomen and Pelvis: No results  found for this or any previous visit.       CT Stone Protocol: Results for orders placed during the hospital encounter of 10/16/23    CT Abdomen Pelvis Stone Protocol    Narrative  CLINICAL HISTORY: Flank pain.    COMPARISON: None available.    TECHNIQUE: Noncontrast CT of the abdomen and pelvis was obtained.  Multiplanar reformats were generated Limited exposure control,  adjustment of the mA and/or KV according to patient size or use of  iterative reconstruction technique was utilized.    FINDINGS:    Lung bases: clear.    Hepatobiliary: normal.    Pancreas: normal.    Spleen: normal.    Adrenals: normal.    Kidneys, ureters, bladder: Multiple nonobstructing calculi are seen in  the calyces of both kidneys.  Cysts are noted bilaterally.  Ureters are  normal in course and caliber.  Urinary bladder is decompressed..    Reproductive: normal.    Bowel: normal.    Appendix: normal.    Peritoneum: normal, no free air or fluid.    Vascular: normal.    Lymph nodes: normal.    Musculoskeletal: normal.    Impression  NONOBSTRUCTING CALCULI IN THE CALYCES OF BOTH KIDNEYS.  NEGATIVE FOR  HYDRONEPHROSIS OR OBSTRUCTING URINARY TRACT CALCULUS.          This report was finalized on 10/16/2023 7:17 AM by Eva Mejía MD.       KUB: Results for orders placed during the hospital encounter of 01/11/24    XR Abdomen KUB    Narrative  PROCEDURE: XR ABDOMEN KUB-    HISTORY: flank pain; N20.0-Calculus of kidney    COMPARISON: 4/21/2023.    FINDINGS: An AP view of the abdomen and pelvis demonstrates an  unremarkable bowel gas pattern with no evidence of obstruction. There is  a 4 mm calcification overlying the right renal shadow consistent with a  stone.    Impression  Right nephrolithiasis.          This report was finalized on 1/11/2024 10:03 AM by Mariano Macdonald M.D..       Labs (past 3 months):      Office Visit on 01/11/2024   Component Date Value Ref Range Status    Color 01/11/2024 Yellow  Yellow, Straw, Dark Yellow, So  Final    Clarity, UA 01/11/2024 Clear  Clear Final    Specific Gravity  01/11/2024 1.005  1.005 - 1.030 Final    pH, Urine 01/11/2024 7.0  5.0 - 8.0 Final    Leukocytes 01/11/2024 Negative  Negative Final    Nitrite, UA 01/11/2024 Negative  Negative Final    Protein, POC 01/11/2024 Negative  Negative mg/dL Final    Glucose, UA 01/11/2024 Negative  Negative mg/dL Final    Ketones, UA 01/11/2024 Negative  Negative Final    Urobilinogen, UA 01/11/2024 Normal  Normal, 0.2 E.U./dL Final    Bilirubin 01/11/2024 Negative  Negative Final    Blood, UA 01/11/2024 3+ (A)  Negative Final    Lot Number 01/11/2024 [id number]   Final    Expiration Date 01/11/2024 102,024   Final        Procedure:       Assessment/Plan:   Ureteral calculus-patient has been diagnosed with a ureteral calculus.  We have discussed the various parameters regarding spontaneous passage including the notion that a 2 mm stone has a high likelihood of spontaneous passage versus a larger stone being caught up in the upper areas of the urinary tract.  We also discussed the medical management of stone disease and the use of medical expulsive therapy in the form of Flomax.  This is used in an off label setting.  We discussed the indicators for intervention including  absolute indicators such as sepsis and uncontrollable severe pain, as well as the relative indicators of moderate pain that is well-controlled with various analgesia.  I also talked about nonoperative management including ambulation and increasing fluids and hot tub as being an effective adjuncts in the treatment of a ureteral stone.    Narcotic pain medication-patient has significant acute pain that I believe would be an indication for the use of narcotic pain medication.  I discussed the significant risks of pain medication and the fact that this will be a short only option and I will give her no more than a three-day supply of pain medication, I will not plan long-term medication, and that  this will be sent to a pain clinic if it at all becomes necessary.  We discussed signing a pain medication agreement and the fact that we're going to run a state SAM review to be sure the patient is not getting pain medication from elsewhere.  If this is the case, we will not give pain medication as part of the patient's treatment plan of there being prescribed a controlled substance with potential for abuse.  This patient has been well aware of the appropriate dose of such medications including the risks for somnolence, limited ability to drive and/or safety and the significant potential for overdose.  It has been made clear that these medications are for the prescribed patient only without concomitant use of alcohol or other substance unless prescribed by the medical provider.  Has completed prescribing agreement detailing the terms of continue prescribing him a controlled substance including monitoring Sam reports, the possibility of urine drug screens, and pill counts.  The patient is aware that we review SAM reports on a regular basis and scan them into the chart.  History and physical examination exhibited continued safe and appropriate use of controlled substances. We also discussed the fact that the new [place] legislation allows only a three-day prescription for pain medication.  In this situation he will be referred to a chronic pain clinic.      This document has been electronically signed by LOU MONTANA MD January 25, 2024 11:06 EST    Dictated Utilizing Dragon Dictation: Part of this note may be an electronic transcription/translation of spoken language to printed text using the Dragon Dictation System.

## 2024-04-15 ENCOUNTER — APPOINTMENT (OUTPATIENT)
Dept: CT IMAGING | Facility: HOSPITAL | Age: 49
End: 2024-04-15
Payer: COMMERCIAL

## 2024-04-15 ENCOUNTER — HOSPITAL ENCOUNTER (EMERGENCY)
Facility: HOSPITAL | Age: 49
Discharge: HOME | End: 2024-04-15
Attending: EMERGENCY MEDICINE | Admitting: STUDENT IN AN ORGANIZED HEALTH CARE EDUCATION/TRAINING PROGRAM
Payer: COMMERCIAL

## 2024-04-15 VITALS
WEIGHT: 162 LBS | RESPIRATION RATE: 16 BRPM | BODY MASS INDEX: 29.81 KG/M2 | OXYGEN SATURATION: 100 % | SYSTOLIC BLOOD PRESSURE: 122 MMHG | TEMPERATURE: 98.5 F | HEIGHT: 62 IN | HEART RATE: 75 BPM | DIASTOLIC BLOOD PRESSURE: 76 MMHG

## 2024-04-15 DIAGNOSIS — N20.1 URETEROLITHIASIS: Primary | ICD-10-CM

## 2024-04-15 DIAGNOSIS — N39.0 ACUTE UTI: ICD-10-CM

## 2024-04-15 LAB
ALBUMIN SERPL-MCNC: 4.1 G/DL (ref 3.5–5.2)
ALBUMIN/GLOB SERPL: 1.3 G/DL
ALP SERPL-CCNC: 58 U/L (ref 39–117)
ALT SERPL W P-5'-P-CCNC: 7 U/L (ref 1–33)
ANION GAP SERPL CALCULATED.3IONS-SCNC: 12.6 MMOL/L (ref 5–15)
AST SERPL-CCNC: 15 U/L (ref 1–32)
BACTERIA UR QL AUTO: ABNORMAL /HPF
BASOPHILS # BLD AUTO: 0.03 10*3/MM3 (ref 0–0.2)
BASOPHILS NFR BLD AUTO: 0.4 % (ref 0–1.5)
BILIRUB SERPL-MCNC: 0.4 MG/DL (ref 0–1.2)
BILIRUB UR QL STRIP: NEGATIVE
BUN SERPL-MCNC: 17 MG/DL (ref 6–20)
BUN/CREAT SERPL: 20.5 (ref 7–25)
CALCIUM SPEC-SCNC: 9.6 MG/DL (ref 8.6–10.5)
CHLORIDE SERPL-SCNC: 107 MMOL/L (ref 98–107)
CLARITY UR: ABNORMAL
CO2 SERPL-SCNC: 21.4 MMOL/L (ref 22–29)
COLOR UR: ABNORMAL
CREAT SERPL-MCNC: 0.83 MG/DL (ref 0.57–1)
DEPRECATED RDW RBC AUTO: 43.7 FL (ref 37–54)
EGFRCR SERPLBLD CKD-EPI 2021: 87.6 ML/MIN/1.73
EOSINOPHIL # BLD AUTO: 0.21 10*3/MM3 (ref 0–0.4)
EOSINOPHIL NFR BLD AUTO: 2.7 % (ref 0.3–6.2)
ERYTHROCYTE [DISTWIDTH] IN BLOOD BY AUTOMATED COUNT: 12.4 % (ref 12.3–15.4)
GLOBULIN UR ELPH-MCNC: 3.1 GM/DL
GLUCOSE SERPL-MCNC: 91 MG/DL (ref 65–99)
GLUCOSE UR STRIP-MCNC: NEGATIVE MG/DL
HCG SERPL QL: NEGATIVE
HCT VFR BLD AUTO: 41.3 % (ref 34–46.6)
HGB BLD-MCNC: 13.3 G/DL (ref 12–15.9)
HGB UR QL STRIP.AUTO: ABNORMAL
HOLD SPECIMEN: NORMAL
HYALINE CASTS UR QL AUTO: ABNORMAL /LPF
IMM GRANULOCYTES # BLD AUTO: 0.03 10*3/MM3 (ref 0–0.05)
IMM GRANULOCYTES NFR BLD AUTO: 0.4 % (ref 0–0.5)
KETONES UR QL STRIP: NEGATIVE
LEUKOCYTE ESTERASE UR QL STRIP.AUTO: ABNORMAL
LIPASE SERPL-CCNC: 15 U/L (ref 13–60)
LYMPHOCYTES # BLD AUTO: 2.37 10*3/MM3 (ref 0.7–3.1)
LYMPHOCYTES NFR BLD AUTO: 30 % (ref 19.6–45.3)
MCH RBC QN AUTO: 30.9 PG (ref 26.6–33)
MCHC RBC AUTO-ENTMCNC: 32.2 G/DL (ref 31.5–35.7)
MCV RBC AUTO: 96 FL (ref 79–97)
MONOCYTES # BLD AUTO: 0.69 10*3/MM3 (ref 0.1–0.9)
MONOCYTES NFR BLD AUTO: 8.7 % (ref 5–12)
NEUTROPHILS NFR BLD AUTO: 4.58 10*3/MM3 (ref 1.7–7)
NEUTROPHILS NFR BLD AUTO: 57.8 % (ref 42.7–76)
NITRITE UR QL STRIP: POSITIVE
NRBC BLD AUTO-RTO: 0 /100 WBC (ref 0–0.2)
PH UR STRIP.AUTO: 5.5 [PH] (ref 5–8)
PLATELET # BLD AUTO: 254 10*3/MM3 (ref 140–450)
PMV BLD AUTO: 9.8 FL (ref 6–12)
POTASSIUM SERPL-SCNC: 3.5 MMOL/L (ref 3.5–5.2)
PROT SERPL-MCNC: 7.2 G/DL (ref 6–8.5)
PROT UR QL STRIP: ABNORMAL
RBC # BLD AUTO: 4.3 10*6/MM3 (ref 3.77–5.28)
RBC # UR STRIP: ABNORMAL /HPF
REF LAB TEST METHOD: ABNORMAL
SODIUM SERPL-SCNC: 141 MMOL/L (ref 136–145)
SP GR UR STRIP: 1.02 (ref 1–1.03)
SQUAMOUS #/AREA URNS HPF: ABNORMAL /HPF
UROBILINOGEN UR QL STRIP: ABNORMAL
WBC # UR STRIP: ABNORMAL /HPF
WBC NRBC COR # BLD AUTO: 7.91 10*3/MM3 (ref 3.4–10.8)
WHOLE BLOOD HOLD COAG: NORMAL
WHOLE BLOOD HOLD SPECIMEN: NORMAL

## 2024-04-15 PROCEDURE — 96374 THER/PROPH/DIAG INJ IV PUSH: CPT

## 2024-04-15 PROCEDURE — 80053 COMPREHEN METABOLIC PANEL: CPT | Performed by: EMERGENCY MEDICINE

## 2024-04-15 PROCEDURE — 99284 EMERGENCY DEPT VISIT MOD MDM: CPT

## 2024-04-15 PROCEDURE — 87086 URINE CULTURE/COLONY COUNT: CPT | Performed by: EMERGENCY MEDICINE

## 2024-04-15 PROCEDURE — 25010000002 HYDROMORPHONE PER 4 MG: Performed by: EMERGENCY MEDICINE

## 2024-04-15 PROCEDURE — 25810000003 SODIUM CHLORIDE 0.9 % SOLUTION: Performed by: EMERGENCY MEDICINE

## 2024-04-15 PROCEDURE — 96375 TX/PRO/DX INJ NEW DRUG ADDON: CPT

## 2024-04-15 PROCEDURE — 96361 HYDRATE IV INFUSION ADD-ON: CPT

## 2024-04-15 PROCEDURE — 25010000002 ONDANSETRON PER 1 MG: Performed by: EMERGENCY MEDICINE

## 2024-04-15 PROCEDURE — 83690 ASSAY OF LIPASE: CPT | Performed by: EMERGENCY MEDICINE

## 2024-04-15 PROCEDURE — 84703 CHORIONIC GONADOTROPIN ASSAY: CPT | Performed by: EMERGENCY MEDICINE

## 2024-04-15 PROCEDURE — 25010000002 KETOROLAC TROMETHAMINE PER 15 MG: Performed by: EMERGENCY MEDICINE

## 2024-04-15 PROCEDURE — 74176 CT ABD & PELVIS W/O CONTRAST: CPT | Performed by: RADIOLOGY

## 2024-04-15 PROCEDURE — 81001 URINALYSIS AUTO W/SCOPE: CPT | Performed by: EMERGENCY MEDICINE

## 2024-04-15 PROCEDURE — 74176 CT ABD & PELVIS W/O CONTRAST: CPT

## 2024-04-15 PROCEDURE — 85025 COMPLETE CBC W/AUTO DIFF WBC: CPT | Performed by: EMERGENCY MEDICINE

## 2024-04-15 PROCEDURE — 36415 COLL VENOUS BLD VENIPUNCTURE: CPT

## 2024-04-15 RX ORDER — HYDROMORPHONE HYDROCHLORIDE 1 MG/ML
0.5 INJECTION, SOLUTION INTRAMUSCULAR; INTRAVENOUS; SUBCUTANEOUS ONCE
Status: COMPLETED | OUTPATIENT
Start: 2024-04-15 | End: 2024-04-15

## 2024-04-15 RX ORDER — SODIUM CHLORIDE 9 MG/ML
125 INJECTION, SOLUTION INTRAVENOUS CONTINUOUS
Status: DISCONTINUED | OUTPATIENT
Start: 2024-04-15 | End: 2024-04-15 | Stop reason: HOSPADM

## 2024-04-15 RX ORDER — HYDROCODONE BITARTRATE AND ACETAMINOPHEN 5; 325 MG/1; MG/1
1 TABLET ORAL EVERY 8 HOURS PRN
Qty: 8 TABLET | Refills: 0 | Status: SHIPPED | OUTPATIENT
Start: 2024-04-15

## 2024-04-15 RX ORDER — SODIUM CHLORIDE 0.9 % (FLUSH) 0.9 %
10 SYRINGE (ML) INJECTION AS NEEDED
Status: DISCONTINUED | OUTPATIENT
Start: 2024-04-15 | End: 2024-04-15 | Stop reason: HOSPADM

## 2024-04-15 RX ORDER — CEPHALEXIN 250 MG/1
500 CAPSULE ORAL ONCE
Status: COMPLETED | OUTPATIENT
Start: 2024-04-15 | End: 2024-04-15

## 2024-04-15 RX ORDER — ONDANSETRON 2 MG/ML
4 INJECTION INTRAMUSCULAR; INTRAVENOUS ONCE
Status: COMPLETED | OUTPATIENT
Start: 2024-04-15 | End: 2024-04-15

## 2024-04-15 RX ORDER — HYDROCODONE BITARTRATE AND ACETAMINOPHEN 5; 325 MG/1; MG/1
1 TABLET ORAL ONCE
Status: COMPLETED | OUTPATIENT
Start: 2024-04-15 | End: 2024-04-15

## 2024-04-15 RX ORDER — KETOROLAC TROMETHAMINE 30 MG/ML
30 INJECTION, SOLUTION INTRAMUSCULAR; INTRAVENOUS ONCE
Status: COMPLETED | OUTPATIENT
Start: 2024-04-15 | End: 2024-04-15

## 2024-04-15 RX ORDER — CEPHALEXIN 500 MG/1
500 CAPSULE ORAL 2 TIMES DAILY
Qty: 13 CAPSULE | Refills: 0 | Status: SHIPPED | OUTPATIENT
Start: 2024-04-15

## 2024-04-15 RX ADMIN — KETOROLAC TROMETHAMINE 30 MG: 30 INJECTION, SOLUTION INTRAMUSCULAR at 05:11

## 2024-04-15 RX ADMIN — ONDANSETRON 4 MG: 2 INJECTION INTRAMUSCULAR; INTRAVENOUS at 05:11

## 2024-04-15 RX ADMIN — HYDROCODONE BITARTRATE AND ACETAMINOPHEN 1 TABLET: 5; 325 TABLET ORAL at 08:16

## 2024-04-15 RX ADMIN — HYDROMORPHONE HYDROCHLORIDE 0.5 MG: 1 INJECTION, SOLUTION INTRAMUSCULAR; INTRAVENOUS; SUBCUTANEOUS at 05:11

## 2024-04-15 RX ADMIN — CEPHALEXIN 500 MG: 250 CAPSULE ORAL at 08:17

## 2024-04-15 RX ADMIN — SODIUM CHLORIDE 500 ML: 9 INJECTION, SOLUTION INTRAVENOUS at 05:09

## 2024-04-15 RX ADMIN — SODIUM CHLORIDE 125 ML/HR: 9 INJECTION, SOLUTION INTRAVENOUS at 05:50

## 2024-04-15 NOTE — ED PROVIDER NOTES
Subjective   History of Present Illness  48-year-old female who reports a history of kidney stones presents complaining of a 3-week history of left back/flank/left lower and suprapubic abdominal discomfort associated with nausea and vomiting.  She states it feels exactly like previous stones.  She states she went to her PCP and was prescribed Percocet, Toradol, Zofran.  She complains of continued symptoms.  She denies any chance of pregnancy.  She denies other symptoms or complaints.      Review of Systems   All other systems reviewed and are negative.      Past Medical History:   Diagnosis Date    Arthritis     back    Complicated UTI (urinary tract infection)     Disease of thyroid gland     nodules    GERD (gastroesophageal reflux disease)     Insomnia     Kidney stones     Migraine     Migraines     Pneumonia     Polycystic kidney disease     PONV (postoperative nausea and vomiting)     Strep throat        Allergies   Allergen Reactions    Sulfa Antibiotics Anaphylaxis    Compazine [Prochlorperazine Edisylate] Hives       Past Surgical History:   Procedure Laterality Date    ABDOMINAL SURGERY       SECTION      COLONOSCOPY      CYSTOSCOPY      CYSTOSCOPY ELECTROHYDRAULIC LITHOTRIPSY      CYSTOSCOPY URETEROSCOPY LASER LITHOTRIPSY Left 2020    Procedure: CYSTOSCOPY URETEROSCOPY LASER LITHOTRIPSY;  Surgeon: Garth Friedman MD;  [facility];  Service: Urology;  Laterality: Left;    DENTAL PROCEDURE      DILATATION AND CURETTAGE      DILATATION AND CURETTAGE      ENDOSCOPY      EXTRACORPOREAL SHOCK WAVE LITHOTRIPSY (ESWL) Left 2018    Procedure: EXTRACORPOREAL SHOCKWAVE LITHOTRIPSY;  Surgeon: Garth Friedman MD;  Location: [facility] OR;  Service: Urology    EXTRACORPOREAL SHOCK WAVE LITHOTRIPSY (ESWL) Left 5/15/2020    Procedure: EXTRACORPOREAL SHOCKWAVE LITHOTRIPSY;  Surgeon: Garth Friedman MD;  Location: [facility] OR;  Service: Urology;  Laterality: Left;    KIDNEY STONE  SURGERY      LITHOTRIPSY         Family History   Problem Relation Age of Onset    No Known Problems Mother     No Known Problems Father     Breast cancer Neg Hx        Social History     Socioeconomic History    Marital status:    Tobacco Use    Smoking status: Former     Current packs/day: 0.00     Average packs/day: 0.5 packs/day for 15.0 years (7.5 ttl pk-yrs)     Types: Cigarettes     Start date: 2003     Quit date: 2018     Years since quittin.2    Smokeless tobacco: Never   Vaping Use    Vaping status: Never Used   Substance and Sexual Activity    Alcohol use: No    Drug use: No    Sexual activity: Yes     Partners: Male     Birth control/protection: None           Objective   Physical Exam  Vitals and nursing note reviewed.   Constitutional:       General: She is not in acute distress.     Appearance: She is well-developed. She is not toxic-appearing or diaphoretic.      Comments: Well-developed well-nourished female, alert and well-oriented, appears uncomfortable.   HENT:      Head: Normocephalic and atraumatic.   Eyes:      General: No scleral icterus.     Pupils: Pupils are equal, round, and reactive to light.   Neck:      Trachea: No tracheal deviation.   Cardiovascular:      Rate and Rhythm: Normal rate and regular rhythm.   Pulmonary:      Effort: Pulmonary effort is normal. No respiratory distress.      Breath sounds: Normal breath sounds.   Chest:      Chest wall: No tenderness.   Abdominal:      General: Bowel sounds are normal.      Palpations: Abdomen is soft.      Tenderness: There is abdominal tenderness. There is no guarding or rebound.      Comments: Tenderness left flank/left lower quadrant/suprapubically.  No other tenderness.  No acute peritoneal signs.  No costovertebral angle tenderness.   Musculoskeletal:         General: No tenderness. Normal range of motion.      Cervical back: Normal range of motion and neck supple. No rigidity or tenderness.      Right lower leg:  No edema.      Left lower leg: No edema.   Skin:     General: Skin is warm and dry.      Capillary Refill: Capillary refill takes less than 2 seconds.      Coloration: Skin is not pale.   Neurological:      General: No focal deficit present.      Mental Status: She is alert and oriented to person, place, and time.      GCS: GCS eye subscore is 4. GCS verbal subscore is 5. GCS motor subscore is 6.      Motor: No abnormal muscle tone.   Psychiatric:         Mood and Affect: Mood normal.         Behavior: Behavior normal.         Procedures           ED Course  ED Course as of 04/15/24 0815   Mon Apr 15, 2024   0706 Case discussed with and care endorsed to Dr. Carty at shift change, pending CT results.  Electronically signed by John Paul Shultz MD, 04/15/24, 7:07 AM EDT.   [CM]      ED Course User Index  [CM] John Paul Shultz MD                                 CT Abdomen Pelvis Without Contrast    Result Date: 4/15/2024   1.  Bilateral nephrolithiasis 2.  Partial right renal obstruction due to a 2 x 3 mm stone in the distal right ureter seen best on image 79, series 2. 3.  No left ureter stone identified. 4.  No left hydronephrosis. 5.  Normal appendix. 6.  Small umbilical hernia contains only fat. 7.  No free fluid or free air. 8.  No abscess or hematoma.  This report was finalized on 4/15/2024 7:54 AM by Justin Morgan MD.       Results for orders placed or performed during the hospital encounter of 04/15/24   Comprehensive Metabolic Panel    Specimen: Arm, Left; Blood   Result Value Ref Range    Glucose 91 65 - 99 mg/dL    BUN 17 6 - 20 mg/dL    Creatinine 0.83 0.57 - 1.00 mg/dL    Sodium 141 136 - 145 mmol/L    Potassium 3.5 3.5 - 5.2 mmol/L    Chloride 107 98 - 107 mmol/L    CO2 21.4 (L) 22.0 - 29.0 mmol/L    Calcium 9.6 8.6 - 10.5 mg/dL    Total Protein 7.2 6.0 - 8.5 g/dL    Albumin 4.1 3.5 - 5.2 g/dL    ALT (SGPT) 7 1 - 33 U/L    AST (SGOT) 15 1 - 32 U/L    Alkaline Phosphatase 58 39 - 117 U/L    Total  Bilirubin 0.4 0.0 - 1.2 mg/dL    Globulin 3.1 gm/dL    A/G Ratio 1.3 g/dL    BUN/Creatinine Ratio 20.5 7.0 - 25.0    Anion Gap 12.6 5.0 - 15.0 mmol/L    eGFR 87.6 >60.0 mL/min/1.73   Lipase    Specimen: Arm, Left; Blood   Result Value Ref Range    Lipase 15 13 - 60 U/L   hCG, Serum, Qualitative    Specimen: Arm, Left; Blood   Result Value Ref Range    HCG Qualitative Negative Negative   Urinalysis With Culture If Indicated - Urine, Clean Catch    Specimen: Urine, Clean Catch   Result Value Ref Range    Color, UA Dark Yellow (A) Yellow, Straw    Appearance, UA Cloudy (A) Clear    pH, UA 5.5 5.0 - 8.0    Specific Gravity, UA 1.023 1.005 - 1.030    Glucose, UA Negative Negative    Ketones, UA Negative Negative    Bilirubin, UA Negative Negative    Blood, UA Large (3+) (A) Negative    Protein, UA Trace (A) Negative    Leuk Esterase, UA Trace (A) Negative    Nitrite, UA Positive (A) Negative    Urobilinogen, UA 1.0 E.U./dL 0.2 - 1.0 E.U./dL   CBC Auto Differential    Specimen: Arm, Left; Blood   Result Value Ref Range    WBC 7.91 3.40 - 10.80 10*3/mm3    RBC 4.30 3.77 - 5.28 10*6/mm3    Hemoglobin 13.3 12.0 - 15.9 g/dL    Hematocrit 41.3 34.0 - 46.6 %    MCV 96.0 79.0 - 97.0 fL    MCH 30.9 26.6 - 33.0 pg    MCHC 32.2 31.5 - 35.7 g/dL    RDW 12.4 12.3 - 15.4 %    RDW-SD 43.7 37.0 - 54.0 fl    MPV 9.8 6.0 - 12.0 fL    Platelets 254 140 - 450 10*3/mm3    Neutrophil % 57.8 42.7 - 76.0 %    Lymphocyte % 30.0 19.6 - 45.3 %    Monocyte % 8.7 5.0 - 12.0 %    Eosinophil % 2.7 0.3 - 6.2 %    Basophil % 0.4 0.0 - 1.5 %    Immature Grans % 0.4 0.0 - 0.5 %    Neutrophils, Absolute 4.58 1.70 - 7.00 10*3/mm3    Lymphocytes, Absolute 2.37 0.70 - 3.10 10*3/mm3    Monocytes, Absolute 0.69 0.10 - 0.90 10*3/mm3    Eosinophils, Absolute 0.21 0.00 - 0.40 10*3/mm3    Basophils, Absolute 0.03 0.00 - 0.20 10*3/mm3    Immature Grans, Absolute 0.03 0.00 - 0.05 10*3/mm3    nRBC 0.0 0.0 - 0.2 /100 WBC   Urinalysis, Microscopic Only - Urine, Clean  Catch    Specimen: Urine, Clean Catch   Result Value Ref Range    RBC, UA Too Numerous to Count (A) None Seen, 0-2 /HPF    WBC, UA 6-10 (A) None Seen, 0-2 /HPF    Bacteria, UA 1+ (A) None Seen /HPF    Squamous Epithelial Cells, UA 7-12 (A) None Seen, 0-2 /HPF    Hyaline Casts, UA None Seen None Seen /LPF    Methodology Automated Microscopy    Green Top (Gel)   Result Value Ref Range    Extra Tube Hold for add-ons.    Lavender Top   Result Value Ref Range    Extra Tube hold for add-on    Light Blue Top   Result Value Ref Range    Extra Tube Hold for add-ons.                  Medical Decision Making  I assumed care of this patient at shift change from Dr. Shultz.  Please see original note for HPI details and physical exam findings.  Laboratory workup listed.  Urinalysis listed.  CT imaging of the abdomen pelvis no concerns for right-sided ureterolithiasis.  Work up and results were discussed throughly with the patient.  The patient will be discharged for further monitoring and management with their PCP.  Red flags, warning signs, worsening symptoms, and when to return to the ER discussed with and understood by the patient.  Patient will follow up with their PCP in a timely manner.  Vitals stable at discharge.    Amount and/or Complexity of Data Reviewed  Labs: ordered.  Radiology: ordered.    Risk  Prescription drug management.        Final diagnoses:   Ureterolithiasis   Acute UTI       ED Disposition  ED Disposition       ED Disposition   Discharge    Condition   Stable    Comment   --               No follow-up provider specified.       Medication List        New Prescriptions      cephalexin 500 MG capsule  Commonly known as: KEFLEX  Take 1 capsule by mouth 2 (Two) Times a Day.     HYDROcodone-acetaminophen 5-325 MG per tablet  Commonly known as: NORCO  Take 1 tablet by mouth Every 8 (Eight) Hours As Needed for Moderate Pain.               Where to Get Your Medications        These medications were sent to  [facility], [place] - [address] - [phone]  - [phone] FX  [address], [place] [address]      Phone: [phone]   cephalexin 500 MG capsule  HYDROcodone-acetaminophen 5-325 MG per tablet            Robson Carty DO  04/15/24 0815

## 2024-04-16 LAB — BACTERIA SPEC AEROBE CULT: NO GROWTH

## 2024-05-15 ENCOUNTER — TRANSCRIBE ORDERS (OUTPATIENT)
Dept: LAB | Facility: HOSPITAL | Age: 49
End: 2024-05-15
Payer: COMMERCIAL

## 2024-05-15 DIAGNOSIS — N20.0 KIDNEY STONE: Primary | ICD-10-CM

## 2024-07-01 ENCOUNTER — HOSPITAL ENCOUNTER (EMERGENCY)
Facility: HOSPITAL | Age: 49
Discharge: HOME | End: 2024-07-01
Attending: EMERGENCY MEDICINE
Payer: COMMERCIAL

## 2024-07-01 ENCOUNTER — APPOINTMENT (OUTPATIENT)
Dept: CT IMAGING | Facility: HOSPITAL | Age: 49
End: 2024-07-01
Payer: COMMERCIAL

## 2024-07-01 ENCOUNTER — TRANSCRIBE ORDERS (OUTPATIENT)
Dept: LAB | Facility: HOSPITAL | Age: 49
End: 2024-07-01
Payer: COMMERCIAL

## 2024-07-01 VITALS
BODY MASS INDEX: 29.44 KG/M2 | HEIGHT: 62 IN | SYSTOLIC BLOOD PRESSURE: 138 MMHG | WEIGHT: 160 LBS | DIASTOLIC BLOOD PRESSURE: 81 MMHG | HEART RATE: 102 BPM | RESPIRATION RATE: 20 BRPM | TEMPERATURE: 98.7 F | OXYGEN SATURATION: 98 %

## 2024-07-01 DIAGNOSIS — Z87.442 PERSONAL HISTORY OF KIDNEY STONES: Primary | ICD-10-CM

## 2024-07-01 DIAGNOSIS — N23 RENAL COLIC: Primary | ICD-10-CM

## 2024-07-01 LAB
ALBUMIN SERPL-MCNC: 4.2 G/DL (ref 3.5–5.2)
ALBUMIN/GLOB SERPL: 1.4 G/DL
ALP SERPL-CCNC: 60 U/L (ref 39–117)
ALT SERPL W P-5'-P-CCNC: 10 U/L (ref 1–33)
ANION GAP SERPL CALCULATED.3IONS-SCNC: 13.3 MMOL/L (ref 5–15)
AST SERPL-CCNC: 19 U/L (ref 1–32)
BACTERIA UR QL AUTO: ABNORMAL /HPF
BASOPHILS # BLD AUTO: 0.02 10*3/MM3 (ref 0–0.2)
BASOPHILS NFR BLD AUTO: 0.2 % (ref 0–1.5)
BILIRUB SERPL-MCNC: 0.3 MG/DL (ref 0–1.2)
BILIRUB UR QL STRIP: ABNORMAL
BUN SERPL-MCNC: 9 MG/DL (ref 6–20)
BUN/CREAT SERPL: 9.4 (ref 7–25)
CALCIUM SPEC-SCNC: 9.5 MG/DL (ref 8.6–10.5)
CHLORIDE SERPL-SCNC: 104 MMOL/L (ref 98–107)
CLARITY UR: CLEAR
CO2 SERPL-SCNC: 23.7 MMOL/L (ref 22–29)
COLOR UR: ABNORMAL
CREAT SERPL-MCNC: 0.96 MG/DL (ref 0.57–1)
D-LACTATE SERPL-SCNC: 0.9 MMOL/L (ref 0.5–2)
DEPRECATED RDW RBC AUTO: 42.5 FL (ref 37–54)
EGFRCR SERPLBLD CKD-EPI 2021: 73.6 ML/MIN/1.73
EOSINOPHIL # BLD AUTO: 0.09 10*3/MM3 (ref 0–0.4)
EOSINOPHIL NFR BLD AUTO: 1.1 % (ref 0.3–6.2)
ERYTHROCYTE [DISTWIDTH] IN BLOOD BY AUTOMATED COUNT: 12.5 % (ref 12.3–15.4)
GLOBULIN UR ELPH-MCNC: 3.1 GM/DL
GLUCOSE SERPL-MCNC: 97 MG/DL (ref 65–99)
GLUCOSE UR STRIP-MCNC: NEGATIVE MG/DL
HCG SERPL QL: NEGATIVE
HCT VFR BLD AUTO: 38 % (ref 34–46.6)
HGB BLD-MCNC: 12.5 G/DL (ref 12–15.9)
HGB UR QL STRIP.AUTO: ABNORMAL
HOLD SPECIMEN: NORMAL
HYALINE CASTS UR QL AUTO: ABNORMAL /LPF
IMM GRANULOCYTES # BLD AUTO: 0.04 10*3/MM3 (ref 0–0.05)
IMM GRANULOCYTES NFR BLD AUTO: 0.5 % (ref 0–0.5)
KETONES UR QL STRIP: ABNORMAL
LEUKOCYTE ESTERASE UR QL STRIP.AUTO: ABNORMAL
LYMPHOCYTES # BLD AUTO: 2.81 10*3/MM3 (ref 0.7–3.1)
LYMPHOCYTES NFR BLD AUTO: 33.3 % (ref 19.6–45.3)
MCH RBC QN AUTO: 30.6 PG (ref 26.6–33)
MCHC RBC AUTO-ENTMCNC: 32.9 G/DL (ref 31.5–35.7)
MCV RBC AUTO: 92.9 FL (ref 79–97)
MONOCYTES # BLD AUTO: 0.54 10*3/MM3 (ref 0.1–0.9)
MONOCYTES NFR BLD AUTO: 6.4 % (ref 5–12)
NEUTROPHILS NFR BLD AUTO: 4.94 10*3/MM3 (ref 1.7–7)
NEUTROPHILS NFR BLD AUTO: 58.5 % (ref 42.7–76)
NITRITE UR QL STRIP: POSITIVE
NRBC BLD AUTO-RTO: 0 /100 WBC (ref 0–0.2)
PH UR STRIP.AUTO: 5 [PH] (ref 5–8)
PLATELET # BLD AUTO: 276 10*3/MM3 (ref 140–450)
PMV BLD AUTO: 9.1 FL (ref 6–12)
POTASSIUM SERPL-SCNC: 3.9 MMOL/L (ref 3.5–5.2)
PROT SERPL-MCNC: 7.3 G/DL (ref 6–8.5)
PROT UR QL STRIP: ABNORMAL
RBC # BLD AUTO: 4.09 10*6/MM3 (ref 3.77–5.28)
RBC # UR STRIP: ABNORMAL /HPF
REF LAB TEST METHOD: ABNORMAL
SODIUM SERPL-SCNC: 141 MMOL/L (ref 136–145)
SP GR UR STRIP: >=1.03 (ref 1–1.03)
SQUAMOUS #/AREA URNS HPF: ABNORMAL /HPF
UROBILINOGEN UR QL STRIP: ABNORMAL
WBC # UR STRIP: ABNORMAL /HPF
WBC NRBC COR # BLD AUTO: 8.44 10*3/MM3 (ref 3.4–10.8)
WHOLE BLOOD HOLD COAG: NORMAL
WHOLE BLOOD HOLD SPECIMEN: NORMAL

## 2024-07-01 PROCEDURE — 87040 BLOOD CULTURE FOR BACTERIA: CPT | Performed by: PHYSICIAN ASSISTANT

## 2024-07-01 PROCEDURE — 85025 COMPLETE CBC W/AUTO DIFF WBC: CPT | Performed by: PHYSICIAN ASSISTANT

## 2024-07-01 PROCEDURE — 25010000002 HYDROMORPHONE 1 MG/ML SOLUTION: Performed by: EMERGENCY MEDICINE

## 2024-07-01 PROCEDURE — 36415 COLL VENOUS BLD VENIPUNCTURE: CPT

## 2024-07-01 PROCEDURE — 83605 ASSAY OF LACTIC ACID: CPT | Performed by: PHYSICIAN ASSISTANT

## 2024-07-01 PROCEDURE — 74176 CT ABD & PELVIS W/O CONTRAST: CPT

## 2024-07-01 PROCEDURE — 25010000002 ONDANSETRON PER 1 MG: Performed by: EMERGENCY MEDICINE

## 2024-07-01 PROCEDURE — 99284 EMERGENCY DEPT VISIT MOD MDM: CPT

## 2024-07-01 PROCEDURE — 96375 TX/PRO/DX INJ NEW DRUG ADDON: CPT

## 2024-07-01 PROCEDURE — 25810000003 SODIUM CHLORIDE 0.9 % SOLUTION: Performed by: EMERGENCY MEDICINE

## 2024-07-01 PROCEDURE — 96374 THER/PROPH/DIAG INJ IV PUSH: CPT

## 2024-07-01 PROCEDURE — 84703 CHORIONIC GONADOTROPIN ASSAY: CPT | Performed by: PHYSICIAN ASSISTANT

## 2024-07-01 PROCEDURE — 81001 URINALYSIS AUTO W/SCOPE: CPT | Performed by: PHYSICIAN ASSISTANT

## 2024-07-01 PROCEDURE — 74176 CT ABD & PELVIS W/O CONTRAST: CPT | Performed by: RADIOLOGY

## 2024-07-01 PROCEDURE — 80053 COMPREHEN METABOLIC PANEL: CPT | Performed by: PHYSICIAN ASSISTANT

## 2024-07-01 RX ORDER — ONDANSETRON 2 MG/ML
4 INJECTION INTRAMUSCULAR; INTRAVENOUS ONCE
Status: COMPLETED | OUTPATIENT
Start: 2024-07-01 | End: 2024-07-01

## 2024-07-01 RX ORDER — OXYCODONE HYDROCHLORIDE AND ACETAMINOPHEN 5; 325 MG/1; MG/1
1 TABLET ORAL EVERY 6 HOURS PRN
Qty: 8 TABLET | Refills: 0 | Status: SHIPPED | OUTPATIENT
Start: 2024-07-01

## 2024-07-01 RX ADMIN — ONDANSETRON 4 MG: 2 INJECTION INTRAMUSCULAR; INTRAVENOUS at 12:36

## 2024-07-01 RX ADMIN — SODIUM CHLORIDE 1000 ML: 9 INJECTION, SOLUTION INTRAVENOUS at 12:36

## 2024-07-01 RX ADMIN — HYDROMORPHONE HYDROCHLORIDE 1 MG: 1 INJECTION, SOLUTION INTRAMUSCULAR; INTRAVENOUS; SUBCUTANEOUS at 12:36

## 2024-07-01 NOTE — Clinical Note
[facility] EMERGENCY DEPARTMENT  [address] [facility] [address]  Phone: [phone]    Olivia Richmond was seen and treated in our emergency department on 7/1/2024.  She may return to work on 07/03/2024.         Thank you for choosing [facility].    Cindi Sprague RN

## 2024-07-02 NOTE — ED PROVIDER NOTES
Subjective   History of Present Illness  48-year-old female presents secondary to left flank pain.  Patient has a known history of recurrent kidney stones.  Most recently she did have a right-sided ureteral stone.  She denies any fever.  She states that recently she had a white count checked to ensure that she had no sign of infection.  She denies any fever.  She states that she has had some nausea along with vomiting.  She denies any burning with urination or frequency urgency.  She has a past medical history of kidney stones, migraines, polycystic kidney disease, acid reflux.  She presents by private vehicle.      Review of Systems   Constitutional: Negative.  Negative for fever.   HENT: Negative.     Respiratory: Negative.     Cardiovascular: Negative.  Negative for chest pain.   Gastrointestinal: Negative.  Negative for abdominal pain.   Endocrine: Negative.    Genitourinary: Negative.  Positive for flank pain. Negative for dysuria.   Skin: Negative.    Neurological: Negative.    Psychiatric/Behavioral: Negative.     All other systems reviewed and are negative.      Past Medical History:   Diagnosis Date    Arthritis     back    Complicated UTI (urinary tract infection)     Disease of thyroid gland     nodules    GERD (gastroesophageal reflux disease)     Insomnia     Kidney stones     Migraine     Migraines     Pneumonia     Polycystic kidney disease     PONV (postoperative nausea and vomiting)     Strep throat        Allergies   Allergen Reactions    Sulfa Antibiotics Anaphylaxis    Compazine [Prochlorperazine Edisylate] Hives       Past Surgical History:   Procedure Laterality Date    ABDOMINAL SURGERY       SECTION      COLONOSCOPY      CYSTOSCOPY      CYSTOSCOPY ELECTROHYDRAULIC LITHOTRIPSY      CYSTOSCOPY URETEROSCOPY LASER LITHOTRIPSY Left 2020    Procedure: CYSTOSCOPY URETEROSCOPY LASER LITHOTRIPSY;  Surgeon: Garth Friedman MD;  Location: [facility];  Service: Urology;  Laterality:  Left;    DENTAL PROCEDURE      DILATATION AND CURETTAGE      DILATATION AND CURETTAGE      ENDOSCOPY      EXTRACORPOREAL SHOCK WAVE LITHOTRIPSY (ESWL) Left 2018    Procedure: EXTRACORPOREAL SHOCKWAVE LITHOTRIPSY;  Surgeon: Garth Friedman MD;  Location: [facility];  Service: Urology    EXTRACORPOREAL SHOCK WAVE LITHOTRIPSY (ESWL) Left 5/15/2020    Procedure: EXTRACORPOREAL SHOCKWAVE LITHOTRIPSY;  Surgeon: Garth Friedman MD;  Location: [facility];  Service: Urology;  Laterality: Left;    KIDNEY STONE SURGERY      LITHOTRIPSY         Family History   Problem Relation Age of Onset    No Known Problems Mother     No Known Problems Father     Breast cancer Neg Hx        Social History     Socioeconomic History    Marital status:    Tobacco Use    Smoking status: Former     Current packs/day: 0.00     Average packs/day: 0.5 packs/day for 15.0 years (7.5 ttl pk-yrs)     Types: Cigarettes     Start date: 2003     Quit date: 2018     Years since quittin.5    Smokeless tobacco: Never   Vaping Use    Vaping status: Never Used   Substance and Sexual Activity    Alcohol use: No    Drug use: No    Sexual activity: Yes     Partners: Male     Birth control/protection: None           Objective   Physical Exam  Vitals and nursing note reviewed.   Constitutional:       General: She is not in acute distress.     Appearance: She is well-developed. She is not diaphoretic.   HENT:      Head: Normocephalic and atraumatic.      Right Ear: External ear normal.      Left Ear: External ear normal.      Nose: Nose normal.   Eyes:      Conjunctiva/sclera: Conjunctivae normal.      Pupils: Pupils are equal, round, and reactive to light.   Neck:      Vascular: No JVD.      Trachea: No tracheal deviation.   Cardiovascular:      Rate and Rhythm: Normal rate and regular rhythm.      Heart sounds: Normal heart sounds. No murmur heard.  Pulmonary:      Effort: Pulmonary effort is normal. No respiratory distress.       Breath sounds: Normal breath sounds. No wheezing.   Abdominal:      General: Bowel sounds are normal.      Palpations: Abdomen is soft.      Tenderness: There is no abdominal tenderness.   Musculoskeletal:         General: No deformity. Normal range of motion.      Cervical back: Normal range of motion and neck supple.   Skin:     General: Skin is warm and dry.      Coloration: Skin is not pale.      Findings: No erythema or rash.   Neurological:      Mental Status: She is alert and oriented to person, place, and time.      Cranial Nerves: No cranial nerve deficit.   Psychiatric:         Behavior: Behavior normal.         Thought Content: Thought content normal.         Procedures           ED Course                                             Medical Decision Making  48-year-old female presents secondary to left flank pain.  Patient has a known history of recurrent kidney stones.  Most recently she did have a right-sided ureteral stone.  She denies any fever.  She states that recently she had a white count checked to ensure that she had no sign of infection.  She denies any fever.  She states that she has had some nausea along with vomiting.  She denies any burning with urination or frequency urgency.  She has a past medical history of kidney stones, migraines, polycystic kidney disease, acid reflux.  She presents by private vehicle.    Problems Addressed:  Renal colic: complicated acute illness or injury    Amount and/or Complexity of Data Reviewed  Labs: ordered. Decision-making details documented in ED Course.  Radiology: ordered. Decision-making details documented in ED Course.    Risk  Prescription drug management.  Risk Details: Patient was counseled at her diagnostic room lab.  She is counseled at the signs and symptoms worsening with appropriate follow-up.  He voices understanding.        Final diagnoses:   Renal colic       ED Disposition  ED Disposition       ED Disposition   Discharge    Condition    Stable    Comment   --               Lilian Luna MD  [address]  [address]  [place] [id number]  [phone]    Schedule an appointment as soon as possible for a visit       Garth Friedman MD  [address]  [facility] [address]  [phone]    Schedule an appointment as soon as possible for a visit            Medication List        New Prescriptions      oxyCODONE-acetaminophen 5-325 MG per tablet  Commonly known as: PERCOCET  Take 1 tablet by mouth Every 6 (Six) Hours As Needed for Severe Pain.  Replaces: oxyCODONE-acetaminophen 7.5-325 MG per tablet            Stop      HYDROcodone-acetaminophen 5-325 MG per tablet  Commonly known as: NORCO     oxyCODONE-acetaminophen 7.5-325 MG per tablet  Commonly known as: Percocet  Replaced by: oxyCODONE-acetaminophen 5-325 MG per tablet               Where to Get Your Medications        These medications were sent to [place] [address] - [phone] [place] [id number] [facility] [address] [address]      Phone: [phone]   oxyCODONE-acetaminophen 5-325 MG per tablet            Ramírez Buitrago PA  07/01/24 4426

## 2024-07-06 LAB
BACTERIA SPEC AEROBE CULT: NORMAL
BACTERIA SPEC AEROBE CULT: NORMAL

## 2024-08-23 ENCOUNTER — HOSPITAL ENCOUNTER (EMERGENCY)
Facility: HOSPITAL | Age: 49
Discharge: HOME | End: 2024-08-23
Attending: STUDENT IN AN ORGANIZED HEALTH CARE EDUCATION/TRAINING PROGRAM
Payer: COMMERCIAL

## 2024-08-23 ENCOUNTER — APPOINTMENT (OUTPATIENT)
Dept: CT IMAGING | Facility: HOSPITAL | Age: 49
End: 2024-08-23
Payer: COMMERCIAL

## 2024-08-23 VITALS
BODY MASS INDEX: 30.36 KG/M2 | OXYGEN SATURATION: 100 % | HEART RATE: 52 BPM | WEIGHT: 165 LBS | HEIGHT: 62 IN | DIASTOLIC BLOOD PRESSURE: 89 MMHG | SYSTOLIC BLOOD PRESSURE: 138 MMHG | TEMPERATURE: 98.1 F | RESPIRATION RATE: 16 BRPM

## 2024-08-23 DIAGNOSIS — N20.0 KIDNEY STONES: Primary | ICD-10-CM

## 2024-08-23 LAB
ALBUMIN SERPL-MCNC: 4.2 G/DL (ref 3.5–5.2)
ALBUMIN/GLOB SERPL: 1.4 G/DL
ALP SERPL-CCNC: 56 U/L (ref 39–117)
ALT SERPL W P-5'-P-CCNC: 10 U/L (ref 1–33)
ANION GAP SERPL CALCULATED.3IONS-SCNC: 11.2 MMOL/L (ref 5–15)
AST SERPL-CCNC: 21 U/L (ref 1–32)
B-HCG UR QL: NEGATIVE
BACTERIA UR QL AUTO: ABNORMAL /HPF
BASOPHILS # BLD AUTO: 0.02 10*3/MM3 (ref 0–0.2)
BASOPHILS NFR BLD AUTO: 0.3 % (ref 0–1.5)
BILIRUB SERPL-MCNC: 0.4 MG/DL (ref 0–1.2)
BILIRUB UR QL STRIP: ABNORMAL
BUN SERPL-MCNC: 10 MG/DL (ref 6–20)
BUN/CREAT SERPL: 10.8 (ref 7–25)
CALCIUM SPEC-SCNC: 9.8 MG/DL (ref 8.6–10.5)
CHLORIDE SERPL-SCNC: 105 MMOL/L (ref 98–107)
CLARITY UR: ABNORMAL
CO2 SERPL-SCNC: 23.8 MMOL/L (ref 22–29)
COLOR UR: ABNORMAL
CREAT SERPL-MCNC: 0.93 MG/DL (ref 0.57–1)
CRP SERPL-MCNC: <0.3 MG/DL (ref 0–0.5)
DEPRECATED RDW RBC AUTO: 41.9 FL (ref 37–54)
EGFRCR SERPLBLD CKD-EPI 2021: 76 ML/MIN/1.73
EOSINOPHIL # BLD AUTO: 0.1 10*3/MM3 (ref 0–0.4)
EOSINOPHIL NFR BLD AUTO: 1.7 % (ref 0.3–6.2)
ERYTHROCYTE [DISTWIDTH] IN BLOOD BY AUTOMATED COUNT: 12 % (ref 12.3–15.4)
GLOBULIN UR ELPH-MCNC: 3 GM/DL
GLUCOSE SERPL-MCNC: 87 MG/DL (ref 65–99)
GLUCOSE UR STRIP-MCNC: NEGATIVE MG/DL
HCT VFR BLD AUTO: 32.7 % (ref 34–46.6)
HGB BLD-MCNC: 10.5 G/DL (ref 12–15.9)
HGB UR QL STRIP.AUTO: NEGATIVE
HYALINE CASTS UR QL AUTO: ABNORMAL /LPF
IMM GRANULOCYTES # BLD AUTO: 0 10*3/MM3 (ref 0–0.05)
IMM GRANULOCYTES NFR BLD AUTO: 0 % (ref 0–0.5)
KETONES UR QL STRIP: NEGATIVE
LEUKOCYTE ESTERASE UR QL STRIP.AUTO: ABNORMAL
LYMPHOCYTES # BLD AUTO: 2.68 10*3/MM3 (ref 0.7–3.1)
LYMPHOCYTES NFR BLD AUTO: 46.2 % (ref 19.6–45.3)
MCH RBC QN AUTO: 30.6 PG (ref 26.6–33)
MCHC RBC AUTO-ENTMCNC: 32.1 G/DL (ref 31.5–35.7)
MCV RBC AUTO: 95.3 FL (ref 79–97)
MONOCYTES # BLD AUTO: 0.45 10*3/MM3 (ref 0.1–0.9)
MONOCYTES NFR BLD AUTO: 7.8 % (ref 5–12)
MUCOUS THREADS URNS QL MICRO: ABNORMAL /HPF
NEUTROPHILS NFR BLD AUTO: 2.55 10*3/MM3 (ref 1.7–7)
NEUTROPHILS NFR BLD AUTO: 44 % (ref 42.7–76)
NITRITE UR QL STRIP: POSITIVE
NRBC BLD AUTO-RTO: 0 /100 WBC (ref 0–0.2)
PH UR STRIP.AUTO: <=5 [PH] (ref 5–8)
PLATELET # BLD AUTO: 168 10*3/MM3 (ref 140–450)
PMV BLD AUTO: 9.9 FL (ref 6–12)
POTASSIUM SERPL-SCNC: 4 MMOL/L (ref 3.5–5.2)
PROT SERPL-MCNC: 7.2 G/DL (ref 6–8.5)
PROT UR QL STRIP: ABNORMAL
RBC # BLD AUTO: 3.43 10*6/MM3 (ref 3.77–5.28)
RBC # UR STRIP: ABNORMAL /HPF
REF LAB TEST METHOD: ABNORMAL
SODIUM SERPL-SCNC: 140 MMOL/L (ref 136–145)
SP GR UR STRIP: 1.03 (ref 1–1.03)
SQUAMOUS #/AREA URNS HPF: ABNORMAL /HPF
UROBILINOGEN UR QL STRIP: ABNORMAL
WBC # UR STRIP: ABNORMAL /HPF
WBC NRBC COR # BLD AUTO: 5.8 10*3/MM3 (ref 3.4–10.8)

## 2024-08-23 PROCEDURE — 25810000003 SODIUM CHLORIDE 0.9 % SOLUTION: Performed by: PHYSICIAN ASSISTANT

## 2024-08-23 PROCEDURE — 87086 URINE CULTURE/COLONY COUNT: CPT | Performed by: PHYSICIAN ASSISTANT

## 2024-08-23 PROCEDURE — 81001 URINALYSIS AUTO W/SCOPE: CPT | Performed by: PHYSICIAN ASSISTANT

## 2024-08-23 PROCEDURE — 25010000002 MORPHINE PER 10 MG: Performed by: STUDENT IN AN ORGANIZED HEALTH CARE EDUCATION/TRAINING PROGRAM

## 2024-08-23 PROCEDURE — 96374 THER/PROPH/DIAG INJ IV PUSH: CPT

## 2024-08-23 PROCEDURE — 81025 URINE PREGNANCY TEST: CPT | Performed by: PHYSICIAN ASSISTANT

## 2024-08-23 PROCEDURE — 36415 COLL VENOUS BLD VENIPUNCTURE: CPT

## 2024-08-23 PROCEDURE — 25010000002 ONDANSETRON PER 1 MG: Performed by: PHYSICIAN ASSISTANT

## 2024-08-23 PROCEDURE — 99283 EMERGENCY DEPT VISIT LOW MDM: CPT

## 2024-08-23 PROCEDURE — 86140 C-REACTIVE PROTEIN: CPT | Performed by: PHYSICIAN ASSISTANT

## 2024-08-23 PROCEDURE — 96375 TX/PRO/DX INJ NEW DRUG ADDON: CPT

## 2024-08-23 PROCEDURE — 80053 COMPREHEN METABOLIC PANEL: CPT | Performed by: PHYSICIAN ASSISTANT

## 2024-08-23 PROCEDURE — 85025 COMPLETE CBC W/AUTO DIFF WBC: CPT | Performed by: PHYSICIAN ASSISTANT

## 2024-08-23 RX ORDER — SODIUM CHLORIDE 0.9 % (FLUSH) 0.9 %
10 SYRINGE (ML) INJECTION AS NEEDED
Status: DISCONTINUED | OUTPATIENT
Start: 2024-08-23 | End: 2024-08-23 | Stop reason: HOSPADM

## 2024-08-23 RX ORDER — ONDANSETRON 2 MG/ML
4 INJECTION INTRAMUSCULAR; INTRAVENOUS ONCE
Status: COMPLETED | OUTPATIENT
Start: 2024-08-23 | End: 2024-08-23

## 2024-08-23 RX ORDER — KETOROLAC TROMETHAMINE 30 MG/ML
30 INJECTION, SOLUTION INTRAMUSCULAR; INTRAVENOUS ONCE
Status: DISCONTINUED | OUTPATIENT
Start: 2024-08-23 | End: 2024-08-23

## 2024-08-23 RX ADMIN — MORPHINE SULFATE 4 MG: 4 INJECTION, SOLUTION INTRAMUSCULAR; INTRAVENOUS at 13:22

## 2024-08-23 RX ADMIN — SODIUM CHLORIDE 1000 ML: 9 INJECTION, SOLUTION INTRAVENOUS at 13:24

## 2024-08-23 RX ADMIN — ONDANSETRON 4 MG: 2 INJECTION INTRAMUSCULAR; INTRAVENOUS at 13:22

## 2024-08-23 NOTE — DISCHARGE INSTRUCTIONS
As we discussed please follow-up with your urologist on Monday.  If your symptoms worsen please return to the ED for further evaluation.  I recommend hydration, light activity and continue taking your medications as prescribed for pain and nausea.

## 2024-08-23 NOTE — ED PROVIDER NOTES
Subjective   History of Present Illness  This is a 49 year old female patient who presents to the ER with chief complaint of left flank pain. PMH significant for kidney stones. For 3 days, the patient has had left flank pain and left lower back pain. The pain is severe despite taking the percocet her PCP prescribed her. She also has nausea and vomiting. She also has dysuria and hematuria. She said this feels like another kidney stone. Denies fever.       Review of Systems   Constitutional: Negative.  Negative for fever.   HENT: Negative.     Respiratory: Negative.     Cardiovascular: Negative.  Negative for chest pain.   Gastrointestinal:  Positive for abdominal pain, nausea and vomiting. Negative for abdominal distention, anal bleeding, blood in stool, constipation, diarrhea and rectal pain.   Endocrine: Negative.    Genitourinary:  Positive for dysuria, flank pain and hematuria. Negative for decreased urine volume, difficulty urinating, dyspareunia, enuresis, frequency, genital sores, menstrual problem, pelvic pain, urgency, vaginal bleeding, vaginal discharge and vaginal pain.   Skin: Negative.    Neurological: Negative.    Psychiatric/Behavioral: Negative.     All other systems reviewed and are negative.      Past Medical History:   Diagnosis Date    Arthritis     back    Complicated UTI (urinary tract infection)     Disease of thyroid gland     nodules    GERD (gastroesophageal reflux disease)     Insomnia     Kidney stones     Migraine     Migraines     Pneumonia     Polycystic kidney disease     PONV (postoperative nausea and vomiting)     Strep throat        Allergies   Allergen Reactions    Sulfa Antibiotics Anaphylaxis    Compazine [Prochlorperazine Edisylate] Hives       Past Surgical History:   Procedure Laterality Date    ABDOMINAL SURGERY       SECTION      COLONOSCOPY      CYSTOSCOPY      CYSTOSCOPY ELECTROHYDRAULIC LITHOTRIPSY      CYSTOSCOPY URETEROSCOPY LASER LITHOTRIPSY Left 2020     Procedure: CYSTOSCOPY URETEROSCOPY LASER LITHOTRIPSY;  Surgeon: Garth Friedman MD;  Location: [facility] OR;  Service: Urology;  Laterality: Left;    DENTAL PROCEDURE      DILATATION AND CURETTAGE      DILATATION AND CURETTAGE      ENDOSCOPY      EXTRACORPOREAL SHOCK WAVE LITHOTRIPSY (ESWL) Left 2018    Procedure: EXTRACORPOREAL SHOCKWAVE LITHOTRIPSY;  Surgeon: Garth Friedman MD;  Location: [facility] OR;  Service: Urology    EXTRACORPOREAL SHOCK WAVE LITHOTRIPSY (ESWL) Left 5/15/2020    Procedure: EXTRACORPOREAL SHOCKWAVE LITHOTRIPSY;  Surgeon: Garth Friedman MD;  Location: [facility];  Service: Urology;  Laterality: Left;    KIDNEY STONE SURGERY      LITHOTRIPSY         Family History   Problem Relation Age of Onset    No Known Problems Mother     No Known Problems Father     Breast cancer Neg Hx        Social History     Socioeconomic History    Marital status:    Tobacco Use    Smoking status: Former     Current packs/day: 0.00     Average packs/day: 0.5 packs/day for 15.0 years (7.5 ttl pk-yrs)     Types: Cigarettes     Start date: 2003     Quit date: 2018     Years since quittin.6    Smokeless tobacco: Never   Vaping Use    Vaping status: Never Used   Substance and Sexual Activity    Alcohol use: No    Drug use: No    Sexual activity: Yes     Partners: Male     Birth control/protection: None           Objective   Physical Exam  Vitals and nursing note reviewed.   Constitutional:       General: She is not in acute distress.     Appearance: She is well-developed. She is not diaphoretic.   HENT:      Head: Normocephalic and atraumatic.      Right Ear: External ear normal.      Left Ear: External ear normal.      Nose: Nose normal.   Eyes:      Conjunctiva/sclera: Conjunctivae normal.      Pupils: Pupils are equal, round, and reactive to light.   Neck:      Vascular: No JVD.      Trachea: No tracheal deviation.   Cardiovascular:      Rate and Rhythm: Normal rate and regular  rhythm.      Heart sounds: Normal heart sounds. No murmur heard.  Pulmonary:      Effort: Pulmonary effort is normal. No respiratory distress.      Breath sounds: Normal breath sounds. No wheezing.   Abdominal:      General: Bowel sounds are normal.      Palpations: Abdomen is soft.      Tenderness: There is no abdominal tenderness. There is no right CVA tenderness, left CVA tenderness, guarding or rebound.   Musculoskeletal:         General: No deformity. Normal range of motion.      Cervical back: Normal range of motion and neck supple.   Skin:     General: Skin is warm and dry.      Coloration: Skin is not pale.      Findings: No erythema or rash.   Neurological:      Mental Status: She is alert and oriented to person, place, and time.      Cranial Nerves: No cranial nerve deficit.   Psychiatric:         Behavior: Behavior normal.         Thought Content: Thought content normal.         Procedures           ED Course  ED Course as of 08/23/24 1444   Fri Aug 23, 2024   1408 Signed out to Dr. Chavarria at shift change.  [MM]      ED Course User Index  [MM] Emma Thapa PA                                             Medical Decision Making  Case was handed to me at change of shift.    Patient requesting not to have a CT scan done at this time.  Patient states she spoke with her urology office and they asked her to hold off on the CT scan due to concerns of frequent scans and radiation exposure.  Patient has a scheduled appointment on Monday with her urologist.  Patient states her symptoms have greatly improved since getting the morphine.  She states she has all of her needed medications already prescribed to her at home.  She is afebrile and currently without complaint.  We discussed her urinalysis findings and the concern for a UTI.  She states that her urinalysis comes back consistently showing a potential urinary tract infection however it has not grown any bacteria in the past.  She is denying antibiotics at  this time.  We discussed that if her symptoms are to worsen or return she needs to come back to the ER otherwise she can follow-up with her urologist on Monday as outpatient care.  All questions answered risk benefits discussed through shared decision making patient to be discharged.    Problems Addressed:  Kidney stones: complicated acute illness or injury    Amount and/or Complexity of Data Reviewed  Labs: ordered.  Radiology: ordered.    Risk  Prescription drug management.        Final diagnoses:   Kidney stones       ED Disposition  ED Disposition       ED Disposition   Discharge    Condition   Stable    Comment   --               Lilian Luna MD  [address] [place] [id number]  [phone]    Schedule an appointment as soon as possible for a visit in 1 week           Medication List      No changes were made to your prescriptions during this visit.            Edi Chavarria, DO  08/23/24 1448

## 2024-08-24 LAB — BACTERIA SPEC AEROBE CULT: NO GROWTH

## 2024-08-28 NOTE — ED NOTES
Pt placed in [facility]     Gimla Fleming RN  10/12/18 3592     [FreeTextEntry2] : RT shoulder and upper arm pain

## 2024-09-24 ENCOUNTER — HOSPITAL ENCOUNTER (EMERGENCY)
Facility: HOSPITAL | Age: 49
Discharge: HOME | End: 2024-09-24
Attending: STUDENT IN AN ORGANIZED HEALTH CARE EDUCATION/TRAINING PROGRAM | Admitting: STUDENT IN AN ORGANIZED HEALTH CARE EDUCATION/TRAINING PROGRAM
Payer: COMMERCIAL

## 2024-09-24 ENCOUNTER — APPOINTMENT (OUTPATIENT)
Dept: CT IMAGING | Facility: HOSPITAL | Age: 49
End: 2024-09-24
Payer: COMMERCIAL

## 2024-09-24 VITALS
HEART RATE: 76 BPM | OXYGEN SATURATION: 98 % | BODY MASS INDEX: 30.35 KG/M2 | SYSTOLIC BLOOD PRESSURE: 114 MMHG | HEIGHT: 62 IN | DIASTOLIC BLOOD PRESSURE: 95 MMHG | TEMPERATURE: 98 F | WEIGHT: 164.9 LBS | RESPIRATION RATE: 16 BRPM

## 2024-09-24 DIAGNOSIS — N20.0 KIDNEY STONES: Primary | ICD-10-CM

## 2024-09-24 LAB
ALBUMIN SERPL-MCNC: 4.2 G/DL (ref 3.5–5.2)
ALBUMIN/GLOB SERPL: 1.4 G/DL
ALP SERPL-CCNC: 53 U/L (ref 39–117)
ALT SERPL W P-5'-P-CCNC: 9 U/L (ref 1–33)
ANION GAP SERPL CALCULATED.3IONS-SCNC: 10.4 MMOL/L (ref 5–15)
AST SERPL-CCNC: 17 U/L (ref 1–32)
BACTERIA UR QL AUTO: ABNORMAL /HPF
BASOPHILS # BLD AUTO: 0.02 10*3/MM3 (ref 0–0.2)
BASOPHILS NFR BLD AUTO: 0.2 % (ref 0–1.5)
BILIRUB SERPL-MCNC: 0.6 MG/DL (ref 0–1.2)
BILIRUB UR QL STRIP: NEGATIVE
BUN SERPL-MCNC: 10 MG/DL (ref 6–20)
BUN/CREAT SERPL: 12.7 (ref 7–25)
CALCIUM SPEC-SCNC: 9.4 MG/DL (ref 8.6–10.5)
CHLORIDE SERPL-SCNC: 106 MMOL/L (ref 98–107)
CLARITY UR: CLEAR
CO2 SERPL-SCNC: 25.6 MMOL/L (ref 22–29)
COLOR UR: ABNORMAL
CREAT SERPL-MCNC: 0.79 MG/DL (ref 0.57–1)
DEPRECATED RDW RBC AUTO: 41.5 FL (ref 37–54)
EGFRCR SERPLBLD CKD-EPI 2021: 92.4 ML/MIN/1.73
EOSINOPHIL # BLD AUTO: 0.03 10*3/MM3 (ref 0–0.4)
EOSINOPHIL NFR BLD AUTO: 0.4 % (ref 0.3–6.2)
ERYTHROCYTE [DISTWIDTH] IN BLOOD BY AUTOMATED COUNT: 12.1 % (ref 12.3–15.4)
GLOBULIN UR ELPH-MCNC: 2.9 GM/DL
GLUCOSE SERPL-MCNC: 101 MG/DL (ref 65–99)
GLUCOSE UR STRIP-MCNC: NEGATIVE MG/DL
HCG SERPL QL: NEGATIVE
HCT VFR BLD AUTO: 37.5 % (ref 34–46.6)
HGB BLD-MCNC: 12 G/DL (ref 12–15.9)
HGB UR QL STRIP.AUTO: ABNORMAL
HOLD SPECIMEN: NORMAL
HYALINE CASTS UR QL AUTO: ABNORMAL /LPF
IMM GRANULOCYTES # BLD AUTO: 0.03 10*3/MM3 (ref 0–0.05)
IMM GRANULOCYTES NFR BLD AUTO: 0.4 % (ref 0–0.5)
KETONES UR QL STRIP: NEGATIVE
LEUKOCYTE ESTERASE UR QL STRIP.AUTO: ABNORMAL
LYMPHOCYTES # BLD AUTO: 1.98 10*3/MM3 (ref 0.7–3.1)
LYMPHOCYTES NFR BLD AUTO: 23.5 % (ref 19.6–45.3)
MCH RBC QN AUTO: 30.4 PG (ref 26.6–33)
MCHC RBC AUTO-ENTMCNC: 32 G/DL (ref 31.5–35.7)
MCV RBC AUTO: 94.9 FL (ref 79–97)
MONOCYTES # BLD AUTO: 0.46 10*3/MM3 (ref 0.1–0.9)
MONOCYTES NFR BLD AUTO: 5.5 % (ref 5–12)
NEUTROPHILS NFR BLD AUTO: 5.9 10*3/MM3 (ref 1.7–7)
NEUTROPHILS NFR BLD AUTO: 70 % (ref 42.7–76)
NITRITE UR QL STRIP: POSITIVE
NRBC BLD AUTO-RTO: 0 /100 WBC (ref 0–0.2)
PH UR STRIP.AUTO: 6 [PH] (ref 5–8)
PLATELET # BLD AUTO: 245 10*3/MM3 (ref 140–450)
PMV BLD AUTO: 9.3 FL (ref 6–12)
POTASSIUM SERPL-SCNC: 3.9 MMOL/L (ref 3.5–5.2)
PROT SERPL-MCNC: 7.1 G/DL (ref 6–8.5)
PROT UR QL STRIP: NEGATIVE
RBC # BLD AUTO: 3.95 10*6/MM3 (ref 3.77–5.28)
RBC # UR STRIP: ABNORMAL /HPF
REF LAB TEST METHOD: ABNORMAL
SODIUM SERPL-SCNC: 142 MMOL/L (ref 136–145)
SP GR UR STRIP: 1.02 (ref 1–1.03)
SQUAMOUS #/AREA URNS HPF: ABNORMAL /HPF
UROBILINOGEN UR QL STRIP: ABNORMAL
WBC # UR STRIP: ABNORMAL /HPF
WBC NRBC COR # BLD AUTO: 8.42 10*3/MM3 (ref 3.4–10.8)
WHOLE BLOOD HOLD COAG: NORMAL
WHOLE BLOOD HOLD SPECIMEN: NORMAL

## 2024-09-24 PROCEDURE — 96375 TX/PRO/DX INJ NEW DRUG ADDON: CPT

## 2024-09-24 PROCEDURE — 96374 THER/PROPH/DIAG INJ IV PUSH: CPT

## 2024-09-24 PROCEDURE — 74176 CT ABD & PELVIS W/O CONTRAST: CPT | Performed by: RADIOLOGY

## 2024-09-24 PROCEDURE — 74176 CT ABD & PELVIS W/O CONTRAST: CPT

## 2024-09-24 PROCEDURE — 84703 CHORIONIC GONADOTROPIN ASSAY: CPT | Performed by: PHYSICIAN ASSISTANT

## 2024-09-24 PROCEDURE — 80053 COMPREHEN METABOLIC PANEL: CPT | Performed by: PHYSICIAN ASSISTANT

## 2024-09-24 PROCEDURE — 81001 URINALYSIS AUTO W/SCOPE: CPT | Performed by: PHYSICIAN ASSISTANT

## 2024-09-24 PROCEDURE — 25010000002 HYDROMORPHONE 1 MG/ML SOLUTION: Performed by: STUDENT IN AN ORGANIZED HEALTH CARE EDUCATION/TRAINING PROGRAM

## 2024-09-24 PROCEDURE — 36415 COLL VENOUS BLD VENIPUNCTURE: CPT

## 2024-09-24 PROCEDURE — 25810000003 SODIUM CHLORIDE 0.9 % SOLUTION: Performed by: PHYSICIAN ASSISTANT

## 2024-09-24 PROCEDURE — 25010000002 ONDANSETRON PER 1 MG: Performed by: PHYSICIAN ASSISTANT

## 2024-09-24 PROCEDURE — 99284 EMERGENCY DEPT VISIT MOD MDM: CPT

## 2024-09-24 PROCEDURE — 25010000002 KETOROLAC TROMETHAMINE PER 15 MG: Performed by: PHYSICIAN ASSISTANT

## 2024-09-24 PROCEDURE — 85025 COMPLETE CBC W/AUTO DIFF WBC: CPT | Performed by: PHYSICIAN ASSISTANT

## 2024-09-24 RX ORDER — KETOROLAC TROMETHAMINE 30 MG/ML
30 INJECTION, SOLUTION INTRAMUSCULAR; INTRAVENOUS ONCE
Status: COMPLETED | OUTPATIENT
Start: 2024-09-24 | End: 2024-09-24

## 2024-09-24 RX ORDER — ONDANSETRON 2 MG/ML
4 INJECTION INTRAMUSCULAR; INTRAVENOUS ONCE
Status: COMPLETED | OUTPATIENT
Start: 2024-09-24 | End: 2024-09-24

## 2024-09-24 RX ADMIN — KETOROLAC TROMETHAMINE 30 MG: 30 INJECTION, SOLUTION INTRAMUSCULAR; INTRAVENOUS at 15:10

## 2024-09-24 RX ADMIN — SODIUM CHLORIDE 1000 ML: 9 INJECTION, SOLUTION INTRAVENOUS at 12:31

## 2024-09-24 RX ADMIN — HYDROMORPHONE HYDROCHLORIDE 1 MG: 1 INJECTION, SOLUTION INTRAMUSCULAR; INTRAVENOUS; SUBCUTANEOUS at 12:31

## 2024-09-24 RX ADMIN — ONDANSETRON 4 MG: 2 INJECTION INTRAMUSCULAR; INTRAVENOUS at 12:31

## 2025-08-11 ENCOUNTER — APPOINTMENT (OUTPATIENT)
Dept: CT IMAGING | Facility: HOSPITAL | Age: 50
End: 2025-08-11
Payer: COMMERCIAL

## 2025-08-11 ENCOUNTER — HOSPITAL ENCOUNTER (EMERGENCY)
Facility: HOSPITAL | Age: 50
Discharge: HOME | End: 2025-08-11
Attending: EMERGENCY MEDICINE | Admitting: EMERGENCY MEDICINE
Payer: COMMERCIAL

## 2025-08-11 VITALS
SYSTOLIC BLOOD PRESSURE: 180 MMHG | TEMPERATURE: 98.4 F | WEIGHT: 153 LBS | OXYGEN SATURATION: 99 % | HEIGHT: 62 IN | DIASTOLIC BLOOD PRESSURE: 122 MMHG | RESPIRATION RATE: 16 BRPM | HEART RATE: 89 BPM | BODY MASS INDEX: 28.16 KG/M2

## 2025-08-11 DIAGNOSIS — N13.30 HYDRONEPHROSIS, UNSPECIFIED HYDRONEPHROSIS TYPE: ICD-10-CM

## 2025-08-11 DIAGNOSIS — N20.0 KIDNEY STONE: Primary | ICD-10-CM

## 2025-08-11 LAB
ALBUMIN SERPL-MCNC: 4.5 G/DL (ref 3.5–5.2)
ALBUMIN/GLOB SERPL: 1.7 G/DL
ALP SERPL-CCNC: 63 U/L (ref 39–117)
ALT SERPL W P-5'-P-CCNC: 5 U/L (ref 1–33)
ANION GAP SERPL CALCULATED.3IONS-SCNC: 11.8 MMOL/L (ref 5–15)
AST SERPL-CCNC: 19 U/L (ref 1–32)
BACTERIA UR QL AUTO: ABNORMAL /HPF
BASOPHILS # BLD AUTO: 0.02 10*3/MM3 (ref 0–0.2)
BASOPHILS NFR BLD AUTO: 0.2 % (ref 0–1.5)
BILIRUB SERPL-MCNC: 0.3 MG/DL (ref 0–1.2)
BILIRUB UR QL STRIP: NEGATIVE
BUN SERPL-MCNC: 11.4 MG/DL (ref 6–20)
BUN/CREAT SERPL: 13.7 (ref 7–25)
CALCIUM SPEC-SCNC: 9.2 MG/DL (ref 8.6–10.5)
CHLORIDE SERPL-SCNC: 105 MMOL/L (ref 98–107)
CLARITY UR: CLEAR
CO2 SERPL-SCNC: 25.2 MMOL/L (ref 22–29)
COLOR UR: YELLOW
CREAT SERPL-MCNC: 0.83 MG/DL (ref 0.57–1)
CRP SERPL-MCNC: <0.3 MG/DL (ref 0–0.5)
DEPRECATED RDW RBC AUTO: 40.4 FL (ref 37–54)
EGFRCR SERPLBLD CKD-EPI 2021: 87.1 ML/MIN/1.73
EOSINOPHIL # BLD AUTO: 0.12 10*3/MM3 (ref 0–0.4)
EOSINOPHIL NFR BLD AUTO: 1.4 % (ref 0.3–6.2)
ERYTHROCYTE [DISTWIDTH] IN BLOOD BY AUTOMATED COUNT: 11.9 % (ref 12.3–15.4)
GLOBULIN UR ELPH-MCNC: 2.7 GM/DL
GLUCOSE SERPL-MCNC: 99 MG/DL (ref 65–99)
GLUCOSE UR STRIP-MCNC: NEGATIVE MG/DL
HCT VFR BLD AUTO: 40.8 % (ref 34–46.6)
HGB BLD-MCNC: 13.3 G/DL (ref 12–15.9)
HGB UR QL STRIP.AUTO: ABNORMAL
HYALINE CASTS UR QL AUTO: ABNORMAL /LPF
IMM GRANULOCYTES # BLD AUTO: 0.04 10*3/MM3 (ref 0–0.05)
IMM GRANULOCYTES NFR BLD AUTO: 0.5 % (ref 0–0.5)
KETONES UR QL STRIP: NEGATIVE
LEUKOCYTE ESTERASE UR QL STRIP.AUTO: ABNORMAL
LYMPHOCYTES # BLD AUTO: 2.91 10*3/MM3 (ref 0.7–3.1)
LYMPHOCYTES NFR BLD AUTO: 34.2 % (ref 19.6–45.3)
MCH RBC QN AUTO: 30 PG (ref 26.6–33)
MCHC RBC AUTO-ENTMCNC: 32.6 G/DL (ref 31.5–35.7)
MCV RBC AUTO: 92.1 FL (ref 79–97)
MONOCYTES # BLD AUTO: 0.64 10*3/MM3 (ref 0.1–0.9)
MONOCYTES NFR BLD AUTO: 7.5 % (ref 5–12)
NEUTROPHILS NFR BLD AUTO: 4.77 10*3/MM3 (ref 1.7–7)
NEUTROPHILS NFR BLD AUTO: 56.2 % (ref 42.7–76)
NITRITE UR QL STRIP: NEGATIVE
NRBC BLD AUTO-RTO: 0 /100 WBC (ref 0–0.2)
PH UR STRIP.AUTO: 6.5 [PH] (ref 5–8)
PLATELET # BLD AUTO: 261 10*3/MM3 (ref 140–450)
PMV BLD AUTO: 9.4 FL (ref 6–12)
POTASSIUM SERPL-SCNC: 3.6 MMOL/L (ref 3.5–5.2)
PROT SERPL-MCNC: 7.2 G/DL (ref 6–8.5)
PROT UR QL STRIP: NEGATIVE
RBC # BLD AUTO: 4.43 10*6/MM3 (ref 3.77–5.28)
RBC # UR STRIP: ABNORMAL /HPF
REF LAB TEST METHOD: ABNORMAL
SODIUM SERPL-SCNC: 142 MMOL/L (ref 136–145)
SP GR UR STRIP: 1.01 (ref 1–1.03)
SQUAMOUS #/AREA URNS HPF: ABNORMAL /HPF
UROBILINOGEN UR QL STRIP: ABNORMAL
WBC # UR STRIP: ABNORMAL /HPF
WBC NRBC COR # BLD AUTO: 8.5 10*3/MM3 (ref 3.4–10.8)

## 2025-08-11 PROCEDURE — 74176 CT ABD & PELVIS W/O CONTRAST: CPT

## 2025-08-11 PROCEDURE — 36415 COLL VENOUS BLD VENIPUNCTURE: CPT

## 2025-08-11 PROCEDURE — 80053 COMPREHEN METABOLIC PANEL: CPT | Performed by: EMERGENCY MEDICINE

## 2025-08-11 PROCEDURE — 81001 URINALYSIS AUTO W/SCOPE: CPT | Performed by: EMERGENCY MEDICINE

## 2025-08-11 PROCEDURE — 96376 TX/PRO/DX INJ SAME DRUG ADON: CPT

## 2025-08-11 PROCEDURE — 86140 C-REACTIVE PROTEIN: CPT | Performed by: EMERGENCY MEDICINE

## 2025-08-11 PROCEDURE — 99284 EMERGENCY DEPT VISIT MOD MDM: CPT

## 2025-08-11 PROCEDURE — 25810000003 SODIUM CHLORIDE 0.9 % SOLUTION: Performed by: EMERGENCY MEDICINE

## 2025-08-11 PROCEDURE — 25010000002 HYDROMORPHONE 1 MG/ML SOLUTION: Performed by: EMERGENCY MEDICINE

## 2025-08-11 PROCEDURE — 85025 COMPLETE CBC W/AUTO DIFF WBC: CPT | Performed by: EMERGENCY MEDICINE

## 2025-08-11 PROCEDURE — 96374 THER/PROPH/DIAG INJ IV PUSH: CPT

## 2025-08-11 PROCEDURE — 25010000002 HYDROMORPHONE PER 4 MG: Performed by: EMERGENCY MEDICINE

## 2025-08-11 RX ORDER — OXYCODONE AND ACETAMINOPHEN 10; 325 MG/1; MG/1
1 TABLET ORAL EVERY 8 HOURS PRN
Qty: 10 TABLET | Refills: 0 | Status: SHIPPED | OUTPATIENT
Start: 2025-08-11

## 2025-08-11 RX ORDER — KETOROLAC TROMETHAMINE 30 MG/ML
30 INJECTION, SOLUTION INTRAMUSCULAR; INTRAVENOUS ONCE
Status: DISCONTINUED | OUTPATIENT
Start: 2025-08-11 | End: 2025-08-11

## 2025-08-11 RX ORDER — HYDROMORPHONE HYDROCHLORIDE 1 MG/ML
0.5 INJECTION, SOLUTION INTRAMUSCULAR; INTRAVENOUS; SUBCUTANEOUS ONCE
Status: COMPLETED | OUTPATIENT
Start: 2025-08-11 | End: 2025-08-11

## 2025-08-11 RX ORDER — SODIUM CHLORIDE 0.9 % (FLUSH) 0.9 %
10 SYRINGE (ML) INJECTION AS NEEDED
Status: DISCONTINUED | OUTPATIENT
Start: 2025-08-11 | End: 2025-08-11 | Stop reason: HOSPADM

## 2025-08-11 RX ADMIN — HYDROMORPHONE HYDROCHLORIDE 0.5 MG: 1 INJECTION, SOLUTION INTRAMUSCULAR; INTRAVENOUS; SUBCUTANEOUS at 11:58

## 2025-08-11 RX ADMIN — HYDROMORPHONE HYDROCHLORIDE 1 MG: 1 INJECTION, SOLUTION INTRAMUSCULAR; INTRAVENOUS; SUBCUTANEOUS at 10:56

## 2025-08-11 RX ADMIN — SODIUM CHLORIDE 1000 ML: 9 INJECTION, SOLUTION INTRAVENOUS at 10:24

## 2025-08-27 ENCOUNTER — APPOINTMENT (OUTPATIENT)
Dept: CT IMAGING | Facility: HOSPITAL | Age: 50
End: 2025-08-27
Payer: COMMERCIAL

## 2025-08-27 ENCOUNTER — HOSPITAL ENCOUNTER (EMERGENCY)
Facility: HOSPITAL | Age: 50
Discharge: HOME | End: 2025-08-28
Attending: STUDENT IN AN ORGANIZED HEALTH CARE EDUCATION/TRAINING PROGRAM
Payer: COMMERCIAL

## 2025-08-27 DIAGNOSIS — M79.18 MUSCULOSKELETAL PAIN: Primary | ICD-10-CM

## 2025-08-27 DIAGNOSIS — M54.50 ACUTE LEFT-SIDED LOW BACK PAIN WITHOUT SCIATICA: ICD-10-CM

## 2025-08-27 LAB
ALBUMIN SERPL-MCNC: 4.2 G/DL (ref 3.5–5.2)
ALBUMIN/GLOB SERPL: 1.4 G/DL
ALP SERPL-CCNC: 71 U/L (ref 39–117)
ALT SERPL W P-5'-P-CCNC: 8 U/L (ref 1–33)
ANION GAP SERPL CALCULATED.3IONS-SCNC: 10.6 MMOL/L (ref 5–15)
AST SERPL-CCNC: 28 U/L (ref 1–32)
BACTERIA UR QL AUTO: ABNORMAL /HPF
BASOPHILS # BLD AUTO: 0.02 10*3/MM3 (ref 0–0.2)
BASOPHILS NFR BLD AUTO: 0.2 % (ref 0–1.5)
BILIRUB SERPL-MCNC: 0.3 MG/DL (ref 0–1.2)
BILIRUB UR QL STRIP: NEGATIVE
BUN SERPL-MCNC: 11 MG/DL (ref 6–20)
BUN/CREAT SERPL: 11.5 (ref 7–25)
CALCIUM SPEC-SCNC: 9.8 MG/DL (ref 8.6–10.5)
CHLORIDE SERPL-SCNC: 103 MMOL/L (ref 98–107)
CLARITY UR: CLEAR
CO2 SERPL-SCNC: 25.4 MMOL/L (ref 22–29)
COLOR UR: YELLOW
CREAT SERPL-MCNC: 0.96 MG/DL (ref 0.57–1)
DEPRECATED RDW RBC AUTO: 38.6 FL (ref 37–54)
EGFRCR SERPLBLD CKD-EPI 2021: 72.7 ML/MIN/1.73
EOSINOPHIL # BLD AUTO: 0.12 10*3/MM3 (ref 0–0.4)
EOSINOPHIL NFR BLD AUTO: 1.4 % (ref 0.3–6.2)
ERYTHROCYTE [DISTWIDTH] IN BLOOD BY AUTOMATED COUNT: 11.5 % (ref 12.3–15.4)
GLOBULIN UR ELPH-MCNC: 2.9 GM/DL
GLUCOSE SERPL-MCNC: 88 MG/DL (ref 65–99)
GLUCOSE UR STRIP-MCNC: NEGATIVE MG/DL
HCT VFR BLD AUTO: 39.7 % (ref 34–46.6)
HGB BLD-MCNC: 13 G/DL (ref 12–15.9)
HGB UR QL STRIP.AUTO: ABNORMAL
HOLD SPECIMEN: NORMAL
HOLD SPECIMEN: NORMAL
HYALINE CASTS UR QL AUTO: ABNORMAL /LPF
IMM GRANULOCYTES # BLD AUTO: 0.04 10*3/MM3 (ref 0–0.05)
IMM GRANULOCYTES NFR BLD AUTO: 0.5 % (ref 0–0.5)
KETONES UR QL STRIP: NEGATIVE
LEUKOCYTE ESTERASE UR QL STRIP.AUTO: NEGATIVE
LIPASE SERPL-CCNC: 16 U/L (ref 13–60)
LYMPHOCYTES # BLD AUTO: 2.73 10*3/MM3 (ref 0.7–3.1)
LYMPHOCYTES NFR BLD AUTO: 32.4 % (ref 19.6–45.3)
MCH RBC QN AUTO: 29.7 PG (ref 26.6–33)
MCHC RBC AUTO-ENTMCNC: 32.7 G/DL (ref 31.5–35.7)
MCV RBC AUTO: 90.8 FL (ref 79–97)
MONOCYTES # BLD AUTO: 0.81 10*3/MM3 (ref 0.1–0.9)
MONOCYTES NFR BLD AUTO: 9.6 % (ref 5–12)
NEUTROPHILS NFR BLD AUTO: 4.71 10*3/MM3 (ref 1.7–7)
NEUTROPHILS NFR BLD AUTO: 55.9 % (ref 42.7–76)
NITRITE UR QL STRIP: NEGATIVE
NRBC BLD AUTO-RTO: 0 /100 WBC (ref 0–0.2)
PH UR STRIP.AUTO: 7.5 [PH] (ref 5–8)
PLATELET # BLD AUTO: 243 10*3/MM3 (ref 140–450)
PMV BLD AUTO: 9.7 FL (ref 6–12)
POTASSIUM SERPL-SCNC: 4.1 MMOL/L (ref 3.5–5.2)
PROT SERPL-MCNC: 7.1 G/DL (ref 6–8.5)
PROT UR QL STRIP: NEGATIVE
RBC # BLD AUTO: 4.37 10*6/MM3 (ref 3.77–5.28)
RBC # UR STRIP: ABNORMAL /HPF
REF LAB TEST METHOD: ABNORMAL
SODIUM SERPL-SCNC: 139 MMOL/L (ref 136–145)
SP GR UR STRIP: <=1.005 (ref 1–1.03)
SQUAMOUS #/AREA URNS HPF: ABNORMAL /HPF
UROBILINOGEN UR QL STRIP: ABNORMAL
WBC # UR STRIP: ABNORMAL /HPF
WBC NRBC COR # BLD AUTO: 8.43 10*3/MM3 (ref 3.4–10.8)
WHOLE BLOOD HOLD COAG: NORMAL
WHOLE BLOOD HOLD SPECIMEN: NORMAL

## 2025-08-27 PROCEDURE — 81001 URINALYSIS AUTO W/SCOPE: CPT | Performed by: STUDENT IN AN ORGANIZED HEALTH CARE EDUCATION/TRAINING PROGRAM

## 2025-08-27 PROCEDURE — 74176 CT ABD & PELVIS W/O CONTRAST: CPT

## 2025-08-27 PROCEDURE — 25010000002 ORPHENADRINE CITRATE PER 60 MG

## 2025-08-27 PROCEDURE — 99284 EMERGENCY DEPT VISIT MOD MDM: CPT

## 2025-08-27 PROCEDURE — 25010000002 HYDROMORPHONE PER 4 MG: Performed by: STUDENT IN AN ORGANIZED HEALTH CARE EDUCATION/TRAINING PROGRAM

## 2025-08-27 PROCEDURE — 85025 COMPLETE CBC W/AUTO DIFF WBC: CPT | Performed by: STUDENT IN AN ORGANIZED HEALTH CARE EDUCATION/TRAINING PROGRAM

## 2025-08-27 PROCEDURE — 25810000003 SODIUM CHLORIDE 0.9 % SOLUTION

## 2025-08-27 PROCEDURE — 36415 COLL VENOUS BLD VENIPUNCTURE: CPT

## 2025-08-27 PROCEDURE — 25010000002 KETOROLAC TROMETHAMINE PER 15 MG

## 2025-08-27 PROCEDURE — 96374 THER/PROPH/DIAG INJ IV PUSH: CPT

## 2025-08-27 PROCEDURE — 96376 TX/PRO/DX INJ SAME DRUG ADON: CPT

## 2025-08-27 PROCEDURE — 25010000002 ONDANSETRON PER 1 MG

## 2025-08-27 PROCEDURE — 80053 COMPREHEN METABOLIC PANEL: CPT | Performed by: STUDENT IN AN ORGANIZED HEALTH CARE EDUCATION/TRAINING PROGRAM

## 2025-08-27 PROCEDURE — 96375 TX/PRO/DX INJ NEW DRUG ADDON: CPT

## 2025-08-27 PROCEDURE — 83690 ASSAY OF LIPASE: CPT | Performed by: STUDENT IN AN ORGANIZED HEALTH CARE EDUCATION/TRAINING PROGRAM

## 2025-08-27 RX ORDER — SODIUM CHLORIDE 0.9 % (FLUSH) 0.9 %
10 SYRINGE (ML) INJECTION AS NEEDED
Status: DISCONTINUED | OUTPATIENT
Start: 2025-08-27 | End: 2025-08-28 | Stop reason: HOSPADM

## 2025-08-27 RX ORDER — LIDOCAINE 4 G/G
2 PATCH TOPICAL
Status: DISCONTINUED | OUTPATIENT
Start: 2025-08-27 | End: 2025-08-28 | Stop reason: HOSPADM

## 2025-08-27 RX ORDER — KETOROLAC TROMETHAMINE 30 MG/ML
30 INJECTION, SOLUTION INTRAMUSCULAR; INTRAVENOUS ONCE
Status: COMPLETED | OUTPATIENT
Start: 2025-08-27 | End: 2025-08-27

## 2025-08-27 RX ORDER — HYDROMORPHONE HYDROCHLORIDE 1 MG/ML
0.5 INJECTION, SOLUTION INTRAMUSCULAR; INTRAVENOUS; SUBCUTANEOUS ONCE
Refills: 0 | Status: COMPLETED | OUTPATIENT
Start: 2025-08-27 | End: 2025-08-27

## 2025-08-27 RX ORDER — ONDANSETRON 2 MG/ML
4 INJECTION INTRAMUSCULAR; INTRAVENOUS ONCE
Status: COMPLETED | OUTPATIENT
Start: 2025-08-27 | End: 2025-08-27

## 2025-08-27 RX ORDER — ORPHENADRINE CITRATE 30 MG/ML
60 INJECTION INTRAMUSCULAR; INTRAVENOUS ONCE
Status: COMPLETED | OUTPATIENT
Start: 2025-08-27 | End: 2025-08-27

## 2025-08-27 RX ADMIN — SODIUM CHLORIDE 1000 ML: 9 INJECTION, SOLUTION INTRAVENOUS at 20:24

## 2025-08-27 RX ADMIN — HYDROMORPHONE HYDROCHLORIDE 0.5 MG: 1 INJECTION, SOLUTION INTRAMUSCULAR; INTRAVENOUS; SUBCUTANEOUS at 20:24

## 2025-08-27 RX ADMIN — KETOROLAC TROMETHAMINE 30 MG: 30 INJECTION INTRAMUSCULAR; INTRAVENOUS at 21:17

## 2025-08-27 RX ADMIN — LIDOCAINE 2 PATCH: 4 PATCH TOPICAL at 23:11

## 2025-08-27 RX ADMIN — ORPHENADRINE CITRATE 60 MG: 60 INJECTION INTRAMUSCULAR; INTRAVENOUS at 21:17

## 2025-08-27 RX ADMIN — ONDANSETRON 4 MG: 2 INJECTION, SOLUTION INTRAMUSCULAR; INTRAVENOUS at 20:24

## 2025-08-27 RX ADMIN — HYDROMORPHONE HYDROCHLORIDE 0.5 MG: 1 INJECTION, SOLUTION INTRAMUSCULAR; INTRAVENOUS; SUBCUTANEOUS at 22:46

## 2025-08-28 VITALS
RESPIRATION RATE: 18 BRPM | HEIGHT: 62 IN | DIASTOLIC BLOOD PRESSURE: 75 MMHG | OXYGEN SATURATION: 100 % | TEMPERATURE: 98.5 F | BODY MASS INDEX: 29.08 KG/M2 | SYSTOLIC BLOOD PRESSURE: 127 MMHG | HEART RATE: 60 BPM | WEIGHT: 158 LBS

## (undated) DEVICE — COR CYSTO: Brand: MEDLINE INDUSTRIES, INC.

## (undated) DEVICE — GLV SURG PREMIERPRO MIC LTX PF SZ8 BRN

## (undated) DEVICE — NITINOL STONE RETRIEVAL BASKET: Brand: ZERO TIP

## (undated) DEVICE — FIBR LASR HOLMIUM SLIMLINE SIS EZ 365U